# Patient Record
Sex: FEMALE | Race: WHITE | NOT HISPANIC OR LATINO | Employment: OTHER | URBAN - METROPOLITAN AREA
[De-identification: names, ages, dates, MRNs, and addresses within clinical notes are randomized per-mention and may not be internally consistent; named-entity substitution may affect disease eponyms.]

---

## 2017-03-07 ENCOUNTER — GENERIC CONVERSION - ENCOUNTER (OUTPATIENT)
Dept: OTHER | Facility: OTHER | Age: 72
End: 2017-03-07

## 2017-03-27 ENCOUNTER — TRANSCRIBE ORDERS (OUTPATIENT)
Dept: LAB | Facility: CLINIC | Age: 72
End: 2017-03-27

## 2017-03-27 ENCOUNTER — LAB (OUTPATIENT)
Dept: LAB | Facility: CLINIC | Age: 72
End: 2017-03-27
Payer: MEDICARE

## 2017-03-27 DIAGNOSIS — I15.9 SECONDARY HYPERTENSION: ICD-10-CM

## 2017-03-27 DIAGNOSIS — E78.00 PURE HYPERCHOLESTEROLEMIA: ICD-10-CM

## 2017-03-27 DIAGNOSIS — E11.9 TYPE 2 DIABETES MELLITUS WITHOUT COMPLICATIONS (HCC): ICD-10-CM

## 2017-03-27 DIAGNOSIS — E13.9 DIABETES MELLITUS OF OTHER TYPE WITHOUT COMPLICATION: ICD-10-CM

## 2017-03-27 DIAGNOSIS — I10 ESSENTIAL (PRIMARY) HYPERTENSION: ICD-10-CM

## 2017-03-27 DIAGNOSIS — I15.9 SECONDARY HYPERTENSION: Primary | ICD-10-CM

## 2017-03-27 LAB
ALBUMIN SERPL BCP-MCNC: 3.4 G/DL (ref 3.5–5)
ALP SERPL-CCNC: 114 U/L (ref 46–116)
ALT SERPL W P-5'-P-CCNC: 22 U/L (ref 12–78)
ANION GAP SERPL CALCULATED.3IONS-SCNC: 7 MMOL/L (ref 4–13)
AST SERPL W P-5'-P-CCNC: 10 U/L (ref 5–45)
BASOPHILS # BLD AUTO: 0.04 THOUSANDS/ΜL (ref 0–0.1)
BASOPHILS NFR BLD AUTO: 0 % (ref 0–1)
BILIRUB SERPL-MCNC: 0.36 MG/DL (ref 0.2–1)
BUN SERPL-MCNC: 15 MG/DL (ref 5–25)
CALCIUM SERPL-MCNC: 9.1 MG/DL (ref 8.3–10.1)
CHLORIDE SERPL-SCNC: 106 MMOL/L (ref 100–108)
CHOLEST SERPL-MCNC: 246 MG/DL (ref 50–200)
CO2 SERPL-SCNC: 29 MMOL/L (ref 21–32)
CREAT SERPL-MCNC: 0.67 MG/DL (ref 0.6–1.3)
EOSINOPHIL # BLD AUTO: 0.33 THOUSAND/ΜL (ref 0–0.61)
EOSINOPHIL NFR BLD AUTO: 4 % (ref 0–6)
ERYTHROCYTE [DISTWIDTH] IN BLOOD BY AUTOMATED COUNT: 13.4 % (ref 11.6–15.1)
EST. AVERAGE GLUCOSE BLD GHB EST-MCNC: 169 MG/DL
GFR SERPL CREATININE-BSD FRML MDRD: >60 ML/MIN/1.73SQ M
GLUCOSE P FAST SERPL-MCNC: 152 MG/DL (ref 65–99)
HBA1C MFR BLD: 7.5 % (ref 4.2–6.3)
HCT VFR BLD AUTO: 42.2 % (ref 34.8–46.1)
HDLC SERPL-MCNC: 49 MG/DL (ref 40–60)
HGB BLD-MCNC: 13.6 G/DL (ref 11.5–15.4)
LDLC SERPL CALC-MCNC: 153 MG/DL (ref 0–100)
LYMPHOCYTES # BLD AUTO: 1.79 THOUSANDS/ΜL (ref 0.6–4.47)
LYMPHOCYTES NFR BLD AUTO: 20 % (ref 14–44)
MCH RBC QN AUTO: 28.2 PG (ref 26.8–34.3)
MCHC RBC AUTO-ENTMCNC: 32.2 G/DL (ref 31.4–37.4)
MCV RBC AUTO: 87 FL (ref 82–98)
MONOCYTES # BLD AUTO: 0.41 THOUSAND/ΜL (ref 0.17–1.22)
MONOCYTES NFR BLD AUTO: 5 % (ref 4–12)
NEUTROPHILS # BLD AUTO: 6.3 THOUSANDS/ΜL (ref 1.85–7.62)
NEUTS SEG NFR BLD AUTO: 71 % (ref 43–75)
NRBC BLD AUTO-RTO: 0 /100 WBCS
PLATELET # BLD AUTO: 252 THOUSANDS/UL (ref 149–390)
PMV BLD AUTO: 11.3 FL (ref 8.9–12.7)
POTASSIUM SERPL-SCNC: 3.7 MMOL/L (ref 3.5–5.3)
PROT SERPL-MCNC: 7.2 G/DL (ref 6.4–8.2)
RBC # BLD AUTO: 4.83 MILLION/UL (ref 3.81–5.12)
SODIUM SERPL-SCNC: 142 MMOL/L (ref 136–145)
TRIGL SERPL-MCNC: 220 MG/DL
WBC # BLD AUTO: 8.89 THOUSAND/UL (ref 4.31–10.16)

## 2017-03-27 PROCEDURE — 83036 HEMOGLOBIN GLYCOSYLATED A1C: CPT

## 2017-03-27 PROCEDURE — 36415 COLL VENOUS BLD VENIPUNCTURE: CPT

## 2017-03-27 PROCEDURE — 80053 COMPREHEN METABOLIC PANEL: CPT

## 2017-03-27 PROCEDURE — 85025 COMPLETE CBC W/AUTO DIFF WBC: CPT

## 2017-03-27 PROCEDURE — 80061 LIPID PANEL: CPT

## 2017-04-07 ENCOUNTER — ALLSCRIPTS OFFICE VISIT (OUTPATIENT)
Dept: OTHER | Facility: OTHER | Age: 72
End: 2017-04-07

## 2017-05-09 ENCOUNTER — TRANSCRIBE ORDERS (OUTPATIENT)
Dept: LAB | Facility: CLINIC | Age: 72
End: 2017-05-09

## 2017-05-09 ENCOUNTER — APPOINTMENT (OUTPATIENT)
Dept: LAB | Facility: CLINIC | Age: 72
End: 2017-05-09
Payer: MEDICARE

## 2017-05-09 ENCOUNTER — ALLSCRIPTS OFFICE VISIT (OUTPATIENT)
Dept: OTHER | Facility: OTHER | Age: 72
End: 2017-05-09

## 2017-05-09 DIAGNOSIS — E78.00 PURE HYPERCHOLESTEROLEMIA: ICD-10-CM

## 2017-05-09 DIAGNOSIS — I10 ESSENTIAL (PRIMARY) HYPERTENSION: ICD-10-CM

## 2017-05-09 DIAGNOSIS — E11.9 TYPE 2 DIABETES MELLITUS WITHOUT COMPLICATIONS (HCC): ICD-10-CM

## 2017-06-26 ENCOUNTER — APPOINTMENT (OUTPATIENT)
Dept: LAB | Facility: CLINIC | Age: 72
End: 2017-06-26
Payer: MEDICARE

## 2017-06-26 ENCOUNTER — TRANSCRIBE ORDERS (OUTPATIENT)
Dept: LAB | Facility: CLINIC | Age: 72
End: 2017-06-26

## 2017-06-26 DIAGNOSIS — E11.9 DIABETES MELLITUS WITHOUT COMPLICATION (HCC): Primary | ICD-10-CM

## 2017-06-26 DIAGNOSIS — E11.9 DIABETES MELLITUS WITHOUT COMPLICATION (HCC): ICD-10-CM

## 2017-06-26 LAB
ALBUMIN SERPL BCP-MCNC: 3.5 G/DL (ref 3.5–5)
ALP SERPL-CCNC: 100 U/L (ref 46–116)
ALT SERPL W P-5'-P-CCNC: 20 U/L (ref 12–78)
ANION GAP SERPL CALCULATED.3IONS-SCNC: 8 MMOL/L (ref 4–13)
AST SERPL W P-5'-P-CCNC: 14 U/L (ref 5–45)
BILIRUB SERPL-MCNC: 0.4 MG/DL (ref 0.2–1)
BUN SERPL-MCNC: 20 MG/DL (ref 5–25)
CALCIUM SERPL-MCNC: 8.9 MG/DL (ref 8.3–10.1)
CHLORIDE SERPL-SCNC: 110 MMOL/L (ref 100–108)
CHOLEST SERPL-MCNC: 255 MG/DL (ref 50–200)
CO2 SERPL-SCNC: 25 MMOL/L (ref 21–32)
CREAT SERPL-MCNC: 0.67 MG/DL (ref 0.6–1.3)
ERYTHROCYTE [DISTWIDTH] IN BLOOD BY AUTOMATED COUNT: 14.5 % (ref 11.6–15.1)
EST. AVERAGE GLUCOSE BLD GHB EST-MCNC: 151 MG/DL
GFR SERPL CREATININE-BSD FRML MDRD: >60 ML/MIN/1.73SQ M
GLUCOSE P FAST SERPL-MCNC: 144 MG/DL (ref 65–99)
HBA1C MFR BLD: 6.9 % (ref 4.2–6.3)
HCT VFR BLD AUTO: 42.2 % (ref 34.8–46.1)
HDLC SERPL-MCNC: 51 MG/DL (ref 40–60)
HGB BLD-MCNC: 13.5 G/DL (ref 11.5–15.4)
LDLC SERPL CALC-MCNC: 158 MG/DL (ref 0–100)
MCH RBC QN AUTO: 28.7 PG (ref 26.8–34.3)
MCHC RBC AUTO-ENTMCNC: 32 G/DL (ref 31.4–37.4)
MCV RBC AUTO: 90 FL (ref 82–98)
PLATELET # BLD AUTO: 220 THOUSANDS/UL (ref 149–390)
PMV BLD AUTO: 12.5 FL (ref 8.9–12.7)
POTASSIUM SERPL-SCNC: 4.2 MMOL/L (ref 3.5–5.3)
PROT SERPL-MCNC: 6.6 G/DL (ref 6.4–8.2)
RBC # BLD AUTO: 4.7 MILLION/UL (ref 3.81–5.12)
SODIUM SERPL-SCNC: 143 MMOL/L (ref 136–145)
TRIGL SERPL-MCNC: 231 MG/DL
WBC # BLD AUTO: 8.31 THOUSAND/UL (ref 4.31–10.16)

## 2017-06-26 PROCEDURE — 80053 COMPREHEN METABOLIC PANEL: CPT

## 2017-06-26 PROCEDURE — 85027 COMPLETE CBC AUTOMATED: CPT

## 2017-06-26 PROCEDURE — 80061 LIPID PANEL: CPT

## 2017-06-26 PROCEDURE — 36415 COLL VENOUS BLD VENIPUNCTURE: CPT

## 2017-06-26 PROCEDURE — 83036 HEMOGLOBIN GLYCOSYLATED A1C: CPT

## 2017-06-30 ENCOUNTER — ALLSCRIPTS OFFICE VISIT (OUTPATIENT)
Dept: OTHER | Facility: OTHER | Age: 72
End: 2017-06-30

## 2017-06-30 DIAGNOSIS — M79.89 OTHER DISORDERS OF SOFT TISSUE: ICD-10-CM

## 2017-07-11 ENCOUNTER — HOSPITAL ENCOUNTER (OUTPATIENT)
Dept: ULTRASOUND IMAGING | Facility: HOSPITAL | Age: 72
Discharge: HOME | End: 2017-07-11
Attending: INTERNAL MEDICINE
Payer: MEDICARE

## 2017-07-11 DIAGNOSIS — M79.89 OTHER DISORDERS OF SOFT TISSUE: ICD-10-CM

## 2017-07-11 PROCEDURE — 76882 US LMTD JT/FCL EVL NVASC XTR: CPT

## 2017-08-16 ENCOUNTER — TRANSCRIBE ORDERS (OUTPATIENT)
Dept: ADMINISTRATIVE | Facility: HOSPITAL | Age: 72
End: 2017-08-16

## 2017-08-16 DIAGNOSIS — E04.1 THYROID NODULE: ICD-10-CM

## 2017-08-16 DIAGNOSIS — E05.90 HYPERTHYROIDISM: Primary | ICD-10-CM

## 2017-08-18 ENCOUNTER — HOSPITAL ENCOUNTER (OUTPATIENT)
Dept: ULTRASOUND IMAGING | Facility: HOSPITAL | Age: 72
Discharge: HOME | End: 2017-08-18
Payer: MEDICARE

## 2017-08-18 DIAGNOSIS — E05.90 HYPERTHYROIDISM: ICD-10-CM

## 2017-08-18 DIAGNOSIS — E04.1 THYROID NODULE: ICD-10-CM

## 2017-08-18 PROCEDURE — 76536 US EXAM OF HEAD AND NECK: CPT

## 2017-09-27 ENCOUNTER — HOSPITAL ENCOUNTER (INPATIENT)
Facility: HOSPITAL | Age: 72
LOS: 2 days | Discharge: HOME | DRG: 281 | End: 2017-09-29
Attending: EMERGENCY MEDICINE | Admitting: FAMILY MEDICINE
Payer: MEDICARE

## 2017-09-27 DIAGNOSIS — I21.4 NSTEMI (NON-ST ELEVATED MYOCARDIAL INFARCTION) (HCC): ICD-10-CM

## 2017-09-27 DIAGNOSIS — R07.9 CHEST PAIN: Primary | ICD-10-CM

## 2017-09-27 DIAGNOSIS — R73.9 HYPERGLYCEMIA: ICD-10-CM

## 2017-09-27 DIAGNOSIS — I47.1 SVT (SUPRAVENTRICULAR TACHYCARDIA) (HCC): ICD-10-CM

## 2017-09-27 PROBLEM — R00.0 TACHYCARDIA: Status: ACTIVE | Noted: 2017-09-27

## 2017-09-27 PROBLEM — I10 HYPERTENSION: Status: ACTIVE | Noted: 2017-09-27

## 2017-09-27 PROBLEM — E11.9 DIABETES MELLITUS (HCC): Status: ACTIVE | Noted: 2017-09-27

## 2017-09-27 LAB
ALBUMIN SERPL BCP-MCNC: 3.5 G/DL (ref 3.5–5)
ALP SERPL-CCNC: 97 U/L (ref 46–116)
ALT SERPL W P-5'-P-CCNC: 23 U/L (ref 12–78)
ANION GAP SERPL CALCULATED.3IONS-SCNC: 13 MMOL/L (ref 4–13)
APTT PPP: 26 SECONDS (ref 23–35)
AST SERPL W P-5'-P-CCNC: 13 U/L (ref 5–45)
BACTERIA UR QL AUTO: ABNORMAL /HPF
BASOPHILS # BLD AUTO: 0.05 THOUSANDS/ΜL (ref 0–0.1)
BASOPHILS NFR BLD AUTO: 0 % (ref 0–1)
BILIRUB SERPL-MCNC: 0.2 MG/DL (ref 0.2–1)
BILIRUB UR QL STRIP: NEGATIVE
BUN SERPL-MCNC: 25 MG/DL (ref 5–25)
CALCIUM SERPL-MCNC: 9.1 MG/DL (ref 8.3–10.1)
CHLORIDE SERPL-SCNC: 104 MMOL/L (ref 100–108)
CLARITY UR: CLEAR
CO2 SERPL-SCNC: 24 MMOL/L (ref 21–32)
COLOR UR: YELLOW
CREAT SERPL-MCNC: 0.92 MG/DL (ref 0.6–1.3)
DEPRECATED D DIMER PPP: 307 NG/ML (FEU) (ref 0–424)
EOSINOPHIL # BLD AUTO: 0.22 THOUSAND/ΜL (ref 0–0.61)
EOSINOPHIL NFR BLD AUTO: 2 % (ref 0–6)
ERYTHROCYTE [DISTWIDTH] IN BLOOD BY AUTOMATED COUNT: 13.8 % (ref 11.6–15.1)
GFR SERPL CREATININE-BSD FRML MDRD: 62 ML/MIN/1.73SQ M
GLUCOSE SERPL-MCNC: 265 MG/DL (ref 65–140)
GLUCOSE UR STRIP-MCNC: ABNORMAL MG/DL
HCT VFR BLD AUTO: 44.7 % (ref 34.8–46.1)
HGB BLD-MCNC: 14.6 G/DL (ref 11.5–15.4)
HGB UR QL STRIP.AUTO: ABNORMAL
INR PPP: 0.96 (ref 0.86–1.16)
KETONES UR STRIP-MCNC: NEGATIVE MG/DL
LEUKOCYTE ESTERASE UR QL STRIP: NEGATIVE
LYMPHOCYTES # BLD AUTO: 1.75 THOUSANDS/ΜL (ref 0.6–4.47)
LYMPHOCYTES NFR BLD AUTO: 16 % (ref 14–44)
MAGNESIUM SERPL-MCNC: 1.9 MG/DL (ref 1.6–2.6)
MCH RBC QN AUTO: 30 PG (ref 26.8–34.3)
MCHC RBC AUTO-ENTMCNC: 32.7 G/DL (ref 31.4–37.4)
MCV RBC AUTO: 92 FL (ref 82–98)
MONOCYTES # BLD AUTO: 0.51 THOUSAND/ΜL (ref 0.17–1.22)
MONOCYTES NFR BLD AUTO: 5 % (ref 4–12)
NEUTROPHILS # BLD AUTO: 8.72 THOUSANDS/ΜL (ref 1.85–7.62)
NEUTS SEG NFR BLD AUTO: 77 % (ref 43–75)
NITRITE UR QL STRIP: NEGATIVE
NON-SQ EPI CELLS URNS QL MICRO: ABNORMAL /HPF
PH UR STRIP.AUTO: 6 [PH] (ref 4.5–8)
PLATELET # BLD AUTO: 198 THOUSANDS/UL (ref 149–390)
PMV BLD AUTO: 11 FL (ref 8.9–12.7)
POTASSIUM SERPL-SCNC: 3.6 MMOL/L (ref 3.5–5.3)
PROT SERPL-MCNC: 7.1 G/DL (ref 6.4–8.2)
PROT UR STRIP-MCNC: NEGATIVE MG/DL
PROTHROMBIN TIME: 12.7 SECONDS (ref 12.1–14.4)
RBC # BLD AUTO: 4.86 MILLION/UL (ref 3.81–5.12)
RBC #/AREA URNS AUTO: ABNORMAL /HPF
SODIUM SERPL-SCNC: 141 MMOL/L (ref 136–145)
SP GR UR STRIP.AUTO: 1.02 (ref 1–1.03)
TROPONIN I SERPL-MCNC: 0.13 NG/ML
TSH SERPL DL<=0.05 MIU/L-ACNC: 2.67 UIU/ML (ref 0.36–3.74)
UROBILINOGEN UR QL STRIP.AUTO: 0.2 E.U./DL
WBC # BLD AUTO: 11.25 THOUSAND/UL (ref 4.31–10.16)
WBC #/AREA URNS AUTO: ABNORMAL /HPF

## 2017-09-27 PROCEDURE — 85025 COMPLETE CBC W/AUTO DIFF WBC: CPT | Performed by: EMERGENCY MEDICINE

## 2017-09-27 PROCEDURE — 84100 ASSAY OF PHOSPHORUS: CPT | Performed by: FAMILY MEDICINE

## 2017-09-27 PROCEDURE — 84484 ASSAY OF TROPONIN QUANT: CPT | Performed by: EMERGENCY MEDICINE

## 2017-09-27 PROCEDURE — 85379 FIBRIN DEGRADATION QUANT: CPT | Performed by: EMERGENCY MEDICINE

## 2017-09-27 PROCEDURE — 80053 COMPREHEN METABOLIC PANEL: CPT | Performed by: EMERGENCY MEDICINE

## 2017-09-27 PROCEDURE — 93005 ELECTROCARDIOGRAM TRACING: CPT | Performed by: EMERGENCY MEDICINE

## 2017-09-27 PROCEDURE — 96374 THER/PROPH/DIAG INJ IV PUSH: CPT

## 2017-09-27 PROCEDURE — 36415 COLL VENOUS BLD VENIPUNCTURE: CPT | Performed by: EMERGENCY MEDICINE

## 2017-09-27 PROCEDURE — 85730 THROMBOPLASTIN TIME PARTIAL: CPT | Performed by: EMERGENCY MEDICINE

## 2017-09-27 PROCEDURE — 84443 ASSAY THYROID STIM HORMONE: CPT | Performed by: EMERGENCY MEDICINE

## 2017-09-27 PROCEDURE — 83735 ASSAY OF MAGNESIUM: CPT | Performed by: EMERGENCY MEDICINE

## 2017-09-27 PROCEDURE — 85610 PROTHROMBIN TIME: CPT | Performed by: EMERGENCY MEDICINE

## 2017-09-27 PROCEDURE — 81001 URINALYSIS AUTO W/SCOPE: CPT | Performed by: EMERGENCY MEDICINE

## 2017-09-27 RX ORDER — DILTIAZEM HYDROCHLORIDE 5 MG/ML
20 INJECTION INTRAVENOUS ONCE
Status: COMPLETED | OUTPATIENT
Start: 2017-09-27 | End: 2017-09-27

## 2017-09-27 RX ADMIN — DILTIAZEM HYDROCHLORIDE 20 MG: 5 INJECTION INTRAVENOUS at 20:21

## 2017-09-28 ENCOUNTER — APPOINTMENT (INPATIENT)
Dept: NON INVASIVE DIAGNOSTICS | Facility: HOSPITAL | Age: 72
DRG: 281 | End: 2017-09-28
Payer: MEDICARE

## 2017-09-28 LAB
ANION GAP SERPL CALCULATED.3IONS-SCNC: 9 MMOL/L (ref 4–13)
BUN SERPL-MCNC: 22 MG/DL (ref 5–25)
CALCIUM SERPL-MCNC: 9 MG/DL (ref 8.3–10.1)
CHLORIDE SERPL-SCNC: 107 MMOL/L (ref 100–108)
CO2 SERPL-SCNC: 26 MMOL/L (ref 21–32)
CREAT SERPL-MCNC: 0.61 MG/DL (ref 0.6–1.3)
ERYTHROCYTE [DISTWIDTH] IN BLOOD BY AUTOMATED COUNT: 13.7 % (ref 11.6–15.1)
GFR SERPL CREATININE-BSD FRML MDRD: 90 ML/MIN/1.73SQ M
GLUCOSE SERPL-MCNC: 150 MG/DL (ref 65–140)
GLUCOSE SERPL-MCNC: 154 MG/DL (ref 65–140)
GLUCOSE SERPL-MCNC: 165 MG/DL (ref 65–140)
GLUCOSE SERPL-MCNC: 165 MG/DL (ref 65–140)
GLUCOSE SERPL-MCNC: 166 MG/DL (ref 65–140)
GLUCOSE SERPL-MCNC: 175 MG/DL (ref 65–140)
HCT VFR BLD AUTO: 42 % (ref 34.8–46.1)
HGB BLD-MCNC: 13.6 G/DL (ref 11.5–15.4)
MAGNESIUM SERPL-MCNC: 2.1 MG/DL (ref 1.6–2.6)
MCH RBC QN AUTO: 29.7 PG (ref 26.8–34.3)
MCHC RBC AUTO-ENTMCNC: 32.4 G/DL (ref 31.4–37.4)
MCV RBC AUTO: 92 FL (ref 82–98)
PHOSPHATE SERPL-MCNC: 3.8 MG/DL (ref 2.3–4.1)
PLATELET # BLD AUTO: 207 THOUSANDS/UL (ref 149–390)
PLATELET # BLD AUTO: 215 THOUSANDS/UL (ref 149–390)
PMV BLD AUTO: 10.9 FL (ref 8.9–12.7)
PMV BLD AUTO: 11.6 FL (ref 8.9–12.7)
POTASSIUM SERPL-SCNC: 3.7 MMOL/L (ref 3.5–5.3)
RBC # BLD AUTO: 4.58 MILLION/UL (ref 3.81–5.12)
SODIUM SERPL-SCNC: 142 MMOL/L (ref 136–145)
TROPONIN I SERPL-MCNC: 1.34 NG/ML
TROPONIN I SERPL-MCNC: 1.43 NG/ML
TROPONIN I SERPL-MCNC: 1.64 NG/ML
WBC # BLD AUTO: 9.51 THOUSAND/UL (ref 4.31–10.16)

## 2017-09-28 PROCEDURE — 93005 ELECTROCARDIOGRAM TRACING: CPT | Performed by: FAMILY MEDICINE

## 2017-09-28 PROCEDURE — 83735 ASSAY OF MAGNESIUM: CPT | Performed by: FAMILY MEDICINE

## 2017-09-28 PROCEDURE — 84484 ASSAY OF TROPONIN QUANT: CPT | Performed by: FAMILY MEDICINE

## 2017-09-28 PROCEDURE — 82948 REAGENT STRIP/BLOOD GLUCOSE: CPT

## 2017-09-28 PROCEDURE — 85049 AUTOMATED PLATELET COUNT: CPT | Performed by: FAMILY MEDICINE

## 2017-09-28 PROCEDURE — 99285 EMERGENCY DEPT VISIT HI MDM: CPT

## 2017-09-28 PROCEDURE — 85027 COMPLETE CBC AUTOMATED: CPT | Performed by: FAMILY MEDICINE

## 2017-09-28 PROCEDURE — 93306 TTE W/DOPPLER COMPLETE: CPT

## 2017-09-28 PROCEDURE — 80048 BASIC METABOLIC PNL TOTAL CA: CPT | Performed by: FAMILY MEDICINE

## 2017-09-28 RX ORDER — ASPIRIN 81 MG/1
81 TABLET, CHEWABLE ORAL DAILY
Status: DISCONTINUED | OUTPATIENT
Start: 2017-09-28 | End: 2017-09-29 | Stop reason: HOSPADM

## 2017-09-28 RX ORDER — ATORVASTATIN CALCIUM 40 MG/1
40 TABLET, FILM COATED ORAL
Status: DISCONTINUED | OUTPATIENT
Start: 2017-09-28 | End: 2017-09-29 | Stop reason: HOSPADM

## 2017-09-28 RX ORDER — AMLODIPINE BESYLATE 5 MG/1
5 TABLET ORAL DAILY
Status: DISCONTINUED | OUTPATIENT
Start: 2017-09-28 | End: 2017-09-29 | Stop reason: HOSPADM

## 2017-09-28 RX ORDER — NITROGLYCERIN 0.4 MG/1
0.4 TABLET SUBLINGUAL
Status: DISCONTINUED | OUTPATIENT
Start: 2017-09-28 | End: 2017-09-29 | Stop reason: HOSPADM

## 2017-09-28 RX ADMIN — AMLODIPINE BESYLATE 5 MG: 5 TABLET ORAL at 11:22

## 2017-09-28 RX ADMIN — INSULIN LISPRO 1 UNITS: 100 INJECTION, SOLUTION INTRAVENOUS; SUBCUTANEOUS at 01:17

## 2017-09-28 RX ADMIN — ATORVASTATIN CALCIUM 40 MG: 40 TABLET, FILM COATED ORAL at 16:03

## 2017-09-28 RX ADMIN — INSULIN LISPRO 1 UNITS: 100 INJECTION, SOLUTION INTRAVENOUS; SUBCUTANEOUS at 07:50

## 2017-09-28 RX ADMIN — METOPROLOL TARTRATE 25 MG: 25 TABLET ORAL at 01:17

## 2017-09-28 RX ADMIN — METOPROLOL TARTRATE 12.5 MG: 25 TABLET ORAL at 21:28

## 2017-09-28 RX ADMIN — INSULIN LISPRO 1 UNITS: 100 INJECTION, SOLUTION INTRAVENOUS; SUBCUTANEOUS at 11:45

## 2017-09-28 RX ADMIN — METOPROLOL TARTRATE 12.5 MG: 25 TABLET ORAL at 10:08

## 2017-09-28 RX ADMIN — ENOXAPARIN SODIUM 80 MG: 80 INJECTION SUBCUTANEOUS at 02:18

## 2017-09-28 RX ADMIN — ASPIRIN 81 MG 81 MG: 81 TABLET ORAL at 10:08

## 2017-09-28 RX ADMIN — INSULIN LISPRO 1 UNITS: 100 INJECTION, SOLUTION INTRAVENOUS; SUBCUTANEOUS at 21:31

## 2017-09-28 RX ADMIN — INSULIN LISPRO 1 UNITS: 100 INJECTION, SOLUTION INTRAVENOUS; SUBCUTANEOUS at 16:04

## 2017-09-28 NOTE — SOCIAL WORK
Cm met with the patient to evaluate the patients prior function and living situation and any barriers to d/c and form a safe d/c plan  Cm also evaluated the patient for any services in the home or needs for services  Pt resides at home alone in a ranch house  Has 2 AMOL then all on one floor  Pt is independent with her adls and ambulation  No services or DME  PCP is Pennie Mullins and pharmacy is [facility]  Pt drives  Plans are home on dc with no needs anticipated  CM will continue to follow and assist in dc planning

## 2017-09-28 NOTE — PLAN OF CARE
DISCHARGE PLANNING     Discharge to home or other facility with appropriate resources Progressing        Knowledge Deficit     Patient/family/caregiver demonstrates understanding of disease process, treatment plan, medications, and discharge instructions Progressing        PAIN - ADULT     Verbalizes/displays adequate comfort level or baseline comfort level Progressing        SAFETY ADULT     Patient will remain free of falls Progressing     Maintain or return to baseline ADL function Progressing     Maintain or return mobility status to optimal level Progressing

## 2017-09-28 NOTE — PLAN OF CARE
Problem: DISCHARGE PLANNING - CARE MANAGEMENT  Goal: Discharge to post-acute care or home with appropriate resources  INTERVENTIONS:  - Conduct assessment to determine patient/family and health care team treatment goals, and need for post-acute services based on payer coverage, community resources, and patient preferences, and barriers to discharge  - Address psychosocial, clinical, and financial barriers to discharge as identified in assessment in conjunction with the patient/family and health care team  - Arrange appropriate level of post-acute services according to patient's   needs and preference and payer coverage in collaboration with the physician and health care team  - Communicate with and update the patient/family, physician, and health care team regarding progress on the discharge plan  - Arrange appropriate transportation to post-acute venues  Outcome: Progressing  -home on dc with no anticipated CM needs, Will follow and assist in dc planning

## 2017-09-28 NOTE — H&P
History and Physical - [facility] Internal Medicine    Patient Information: Fady Toure 67 y o  female MRN: [id number]  Unit/Bed#: OP63 Encounter: [id number]  Admitting Physician: Erin Medina MD  PCP: Man Pablo DO  Date of Admission:  09/27/17    Assessment/Plan:    Hospital Problem List:     Principal Problem:    Chest pain  Supraventricular tachycardia  Active Problems:    Hypertension    Diabetes mellitus    Thyroid condition (unknown)  Plan for the Primary Problem(s):  1  Telemetry  2  Aspirin 81 mg PO daily  3  Sublingual NTG as needed for chest pain  4  Serial troponin  5  SVT converted to normal sinus rhythm, will monitor  Plan for Additional Problems:   1  HTN: she stated tthat her blood pressure usually runs above normal, she does not take medication for this condition (Although recommended by her PCP)  Will give one dose of metoprolol, monitor blood pressure  She will need to be on an antihypertensive medication  2  Diabetes type 2: hold metformin, start ISS  3  Thyroid condition, she stated that she takes thyroid medication, she can not recall the name and can not tell if hypo or hyperthyroidism  No information available in her available  records ( reviewed)  will clarify with her PCP's office  VTE Prophylaxis: Enoxaparin (Lovenox)  / sequential compression device   Code Status: full code  Anticipated Length of Stay:  Patient will be admitted on an Inpatient basis with an anticipated length of stay of  2 midnights  Justification for Hospital Stay: chest pain, arhythmia  Total Time for Visit, including Counseling / Coordination of Care: 30 minutes  Greater than 50% of this total time spent on direct patient counseling and coordination of care  Chief Complaint:   Chest pain and palpitations      History of Present Illness:    Fady Toure is a 67 y o  female who presents with chest pain and palpitations occurred at her home tonight after stressful conversation with her daughter  The chest pain is midsternal, sharp, 7/10 on pain scale, not radiated to shoulder or back, no jaw pain  No diaphoresis, EMS was called , her heart rate was found to be elevated at 150  Upon arrival here the monitor showed supraventricular tachycardia, she received Cardizem 20 mg IV in the emergency room  She then converted to normal sinus rhythm with current heart rate around 75  Her chest pain resolved after medication given in ambulance ( ? Aspirin)   Review of Systems:    Review of Systems   Constitutional: Negative for chills, diaphoresis, fatigue and fever  HENT: Negative for congestion, facial swelling, sinus pain, sinus pressure and tinnitus  Eyes: Negative for photophobia, discharge, itching and visual disturbance  Respiratory: Negative for apnea, cough, choking, chest tightness, shortness of breath, wheezing and stridor  Cardiovascular: Positive for chest pain and palpitations  Negative for leg swelling  Gastrointestinal: Negative for abdominal distention, abdominal pain, blood in stool, constipation, diarrhea, nausea, rectal pain and vomiting  Genitourinary: Negative for difficulty urinating, dysuria, flank pain, hematuria and pelvic pain  Musculoskeletal: Negative for arthralgias, back pain, joint swelling, myalgias, neck pain and neck stiffness  Right leg muscle atrophy (history of polio)   Skin:        Facial redness ( history of rosacea)   Neurological: Negative for dizziness, tremors, syncope, facial asymmetry, weakness, light-headedness, numbness and headaches  Psychiatric/Behavioral: Negative for agitation, behavioral problems, confusion, hallucinations, self-injury and suicidal ideas  The patient is nervous/anxious  Past Medical and Surgical History:     History reviewed  No pertinent past medical history      Past Surgical History:   Procedure Laterality Date    HYSTERECTOMY      ROTATOR CUFF REPAIR         Meds/Allergies:    Prior to Admission medications    Medication Sig Start Date End Date Taking? Authorizing Provider   metFORMIN (GLUCOPHAGE) 500 mg tablet Take 500 mg by mouth daily with breakfast   Yes Historical Provider, MD     she cannot recall the name of her thyroid medication  Allergies: Allergies   Allergen Reactions    Lisinopril        Social History:     Marital Status: /Civil Union     History   Alcohol Use No     History   Smoking Status    Never Smoker   Smokeless Tobacco    Never Used     History   Drug Use No       Family History:    non-contributory    Physical Exam:     Vitals:   Blood Pressure: 161/65 (09/27/17 2203)  Pulse: 68 (09/27/17 2203)  Temperature: 98 3 °F (36 8 °C) (09/27/17 2008)  Temp Source: Temporal (09/27/17 2008)  Respirations: 18 (09/27/17 2203)  SpO2: 96 % (09/27/17 2203)    Physical Exam   Constitutional: She is oriented to person, place, and time  She appears well-developed and well-nourished  No distress  HENT:   Head: Normocephalic and atraumatic  Mouth/Throat: Oropharynx is clear and moist  No oropharyngeal exudate  Eyes: Conjunctivae and EOM are normal  Pupils are equal, round, and reactive to light  Right eye exhibits no discharge  No scleral icterus  Neck: Normal range of motion  Neck supple  No JVD present  No tracheal deviation present  Cardiovascular: Intact distal pulses  Exam reveals no gallop and no friction rub  No murmur heard  Initial tachycardia, improved to regular sinus rhythm after the Cardizem  Pulmonary/Chest: Effort normal  No stridor  No respiratory distress  She has no wheezes  She has no rales  She exhibits no tenderness  Abdominal: Soft  Bowel sounds are normal  She exhibits no distension  There is no tenderness  There is no rebound and no guarding  Musculoskeletal: She exhibits no edema or tenderness  Right leg muscle atrophy, chronic, (history of polio)  Lymphadenopathy:     She has no cervical adenopathy     Neurological: She is alert and oriented to person, place, and time  No cranial nerve deficit  Coordination normal    Skin: Skin is warm  No rash noted  She is not diaphoretic  There is erythema (facial flushing ( history of rosecea) )  No pallor  Psychiatric: Her behavior is normal    Appears anxious          Additional Data:     Lab Results: I have personally reviewed pertinent reports  Results from last 7 days  Lab Units 09/27/17 2101   WBC Thousand/uL 11 25*   HEMOGLOBIN g/dL 14 6   HEMATOCRIT % 44 7   PLATELETS Thousands/uL 198   NEUTROS PCT % 77*   LYMPHS PCT % 16   MONOS PCT % 5   EOS PCT % 2       Results from last 7 days  Lab Units 09/27/17 2101   SODIUM mmol/L 141   POTASSIUM mmol/L 3 6   CHLORIDE mmol/L 104   CO2 mmol/L 24   BUN mg/dL 25   CREATININE mg/dL 0 92   CALCIUM mg/dL 9 1   TOTAL PROTEIN g/dL 7 1   BILIRUBIN TOTAL mg/dL 0 20   ALK PHOS U/L 97   ALT U/L 23   AST U/L 13   GLUCOSE RANDOM mg/dL 265*       Results from last 7 days  Lab Units 09/27/17  2101   INR  0 96           EKG, Pathology, and Other Studies Reviewed on Admission:   · EKG: initial SVT  Improved after the Cardizem given in the emergency room to NSR  Non specific ST-T wave changes  Allscripts / Epic Records Reviewed:  Yes

## 2017-09-28 NOTE — ED PROVIDER NOTES
History  Chief Complaint   Patient presents with    Chest Pain     CP developed this evening after argument with daughter  Pt and EMS give hx -pt had had argument with daughter and developed mid chest pain and palpitations  EMS found pt in HR 150s  Pt denies radiation of pain  No diaphoresis  No N/V/D  No urinary sx  Pt presents  AND bp 190 / 152  Pt does not see PCP regularly  Saw new PCP about 2 months ago and gave c/o palpitations -states he did an EKG , but unsure results  Pt states was not referred to cardiology or but in meds  Was sent for OP US of thyroid-does not know results  PMH  HTN "years ago"; Polio; History provided by:  Patient  Chest Pain   Pain location:  Substernal area  Pain quality: pressure and tightness    Pain quality: not radiating and not stabbing    Pain radiates to:  Does not radiate  Pain radiates to the back: no    Pain severity:  Mild  Onset quality:  Sudden  Progression:  Partially resolved  Chronicity:  New  Context: not breathing, not lifting, no movement, not raising an arm and no trauma    Relieved by:  Rest  Associated symptoms: palpitations and shortness of breath    Associated symptoms: no abdominal pain, no altered mental status, no anorexia, no back pain, no claudication, no cough, no diaphoresis, no dizziness, no fatigue, no fever, no headache, no lower extremity edema, no nausea, no near-syncope, no numbness, no orthopnea, no syncope and not vomiting    Risk factors: hypertension    Risk factors: no coronary artery disease, no diabetes mellitus, no immobilization, no prior DVT/PE, no smoking and no surgery        Prior to Admission Medications   Prescriptions Last Dose Informant Patient Reported? Taking?   metFORMIN (GLUCOPHAGE) 500 mg tablet   Yes Yes   Sig: Take 500 mg by mouth daily with breakfast      Facility-Administered Medications: None       History reviewed  No pertinent past medical history      Past Surgical History:   Procedure Laterality Date    HYSTERECTOMY      ROTATOR CUFF REPAIR         History reviewed  No pertinent family history  I have reviewed and agree with the history as documented  Social History   Substance Use Topics    Smoking status: Never Smoker    Smokeless tobacco: Never Used    Alcohol use No        Review of Systems   Constitutional: Negative for diaphoresis, fatigue and fever  Respiratory: Positive for shortness of breath  Negative for cough  Cardiovascular: Positive for chest pain and palpitations  Negative for orthopnea, claudication, syncope and near-syncope  Gastrointestinal: Negative for abdominal pain, anorexia, nausea and vomiting  Musculoskeletal: Negative for back pain  Neurological: Negative for dizziness, numbness and headaches  Physical Exam  ED Triage Vitals   Temperature Pulse Respirations Blood Pressure SpO2   09/27/17 2008 09/27/17 2008 09/27/17 2008 09/27/17 2008 09/27/17 2008   98 3 °F (36 8 °C) (!) 150 20 (!) 190/105 94 %      Temp Source Heart Rate Source Patient Position - Orthostatic VS BP Location FiO2 (%)   09/27/17 2008 09/27/17 2008 09/27/17 2008 09/27/17 2008 --   Temporal Monitor Sitting Left arm       Pain Score       09/27/17 2026       No Pain           Physical Exam   Constitutional: She is oriented to person, place, and time  She appears well-developed and well-nourished  She is cooperative  Non-toxic appearance  She does not have a sickly appearance  She appears distressed  HENT:   Head: Normocephalic and atraumatic  Mouth/Throat: Uvula is midline, oropharynx is clear and moist and mucous membranes are normal    Eyes: Conjunctivae and EOM are normal  Pupils are equal, round, and reactive to light  Neck: Trachea normal, normal range of motion and phonation normal  Neck supple  No JVD present  No thyroid mass present  Cardiovascular: Intact distal pulses and normal pulses  An irregularly irregular rhythm present  Tachycardia present      No prf edema or calf tenderness   Pulmonary/Chest: Effort normal  No accessory muscle usage or stridor  No respiratory distress  She has decreased breath sounds  She has no wheezes  She has no rhonchi  She has no rales  Abdominal: Soft  Bowel sounds are normal  There is no tenderness  There is no rigidity, no guarding and no CVA tenderness  Neurological: She is alert and oriented to person, place, and time  She has normal strength and normal reflexes  No cranial nerve deficit  Skin: Skin is warm and dry  No petechiae and no rash noted  She is not diaphoretic  No cyanosis  Psychiatric: She has a normal mood and affect  Her speech is normal and behavior is normal  Cognition and memory are normal    Vitals reviewed        ED Medications  Medications   diltiazem (CARDIZEM) injection 20 mg (20 mg Intravenous Given 9/27/17 2021)   metoprolol tartrate (LOPRESSOR) tablet 25 mg (25 mg Oral Given 9/28/17 0117)   enoxaparin (LOVENOX) subcutaneous injection 80 mg (80 mg Subcutaneous Given 9/28/17 0218)   regadenoson (LEXISCAN) injection 0 4 mg (0 4 mg Intravenous Given 9/29/17 1051)       Diagnostic Studies  Labs Reviewed   CBC AND DIFFERENTIAL - Abnormal        Result Value Ref Range Status    WBC 11 25 (*) 4 31 - 10 16 Thousand/uL Final    Neutrophils Relative 77 (*) 43 - 75 % Final    Neutrophils Absolute 8 72 (*) 1 85 - 7 62 Thousands/µL Final    RBC 4 86  3 81 - 5 12 Million/uL Final    Hemoglobin 14 6  11 5 - 15 4 g/dL Final    Hematocrit 44 7  34 8 - 46 1 % Final    MCV 92  82 - 98 fL Final    MCH 30 0  26 8 - 34 3 pg Final    MCHC 32 7  31 4 - 37 4 g/dL Final    RDW 13 8  11 6 - 15 1 % Final    MPV 11 0  8 9 - 12 7 fL Final    Platelets 534  144 - 390 Thousands/uL Final    Lymphocytes Relative 16  14 - 44 % Final    Monocytes Relative 5  4 - 12 % Final    Eosinophils Relative 2  0 - 6 % Final    Basophils Relative 0  0 - 1 % Final    Lymphocytes Absolute 1 75  0 60 - 4 47 Thousands/µL Final    Monocytes Absolute 0 51  0 17 - 1 22 Thousand/µL Final    Eosinophils Absolute 0 22  0 00 - 0 61 Thousand/µL Final    Basophils Absolute 0 05  0 00 - 0 10 Thousands/µL Final   COMPREHENSIVE METABOLIC PANEL - Abnormal     Glucose 265 (*) 65 - 140 mg/dL Final    Comment:   If the patient is fasting, the ADA then defines impaired fasting glucose as > 100 mg/dL and diabetes as > or equal to 123 mg/dL  Specimen collection should occur prior to Sulfasalazine administration due to the potential for falsely depressed results  Specimen collection should occur prior to Sulfapyridine administration due to the potential for falsely elevated results  Sodium 141  136 - 145 mmol/L Final    Potassium 3 6  3 5 - 5 3 mmol/L Final    Chloride 104  100 - 108 mmol/L Final    CO2 24  21 - 32 mmol/L Final    Anion Gap 13  4 - 13 mmol/L Final    BUN 25  5 - 25 mg/dL Final    Creatinine 0 92  0 60 - 1 30 mg/dL Final    Comment: Standardized to IDMS reference method    Calcium 9 1  8 3 - 10 1 mg/dL Final    AST 13  5 - 45 U/L Final    Comment:   Specimen collection should occur prior to Sulfasalazine administration due to the potential for falsely depressed results  ALT 23  12 - 78 U/L Final    Comment:   Specimen collection should occur prior to Sulfasalazine administration due to the potential for falsely depressed results  Alkaline Phosphatase 97  46 - 116 U/L Final    Total Protein 7 1  6 4 - 8 2 g/dL Final    Albumin 3 5  3 5 - 5 0 g/dL Final    Total Bilirubin 0 20  0 20 - 1 00 mg/dL Final    eGFR 62  ml/min/1 73sq m Final    Narrative:     National Kidney Disease Education Program recommendations are as follows:  GFR calculation is accurate only with a steady state creatinine  Chronic Kidney disease less than 60 ml/min/1 73 sq  meters  Kidney failure less than 15 ml/min/1 73 sq  meters     TROPONIN I - Abnormal     Troponin I 0 13 (*) <=0 04 ng/mL Final    Comment:  Results indicate test should be repeated on new specimen collected within 4-6 hours of the original     Narrative:     [employer] Chemistry analyzer 99% cutoff is > 0 04 ng/mL in network labs    o cTnI 99% cutoff is useful only when applied to patients in the clinical setting of myocardial ischemia  o cTnI 99% cutoff should be interpreted in the context of clinical history, ECG findings and possibly cardiac imaging to establish correct diagnosis  o cTnI 99% cutoff may be suggestive but clearly not indicative of a coronary event without the clinical setting of myocardial ischemia  UA W REFLEX TO MICROSCOPIC WITH REFLEX TO CULTURE - Abnormal     Glucose,  (1/2%) (*) Negative mg/dl Final    Color, UA Yellow   Final    Clarity, UA Clear   Final    Specific Gravity, UA 1 020  1 003 - 1 030 Final    pH, UA 6 0  4 5 - 8 0 Final    Leukocytes, UA Negative  Negative Final    Nitrite, UA Negative  Negative Final    Protein, UA Negative  Negative mg/dl Final    Ketones, UA Negative  Negative mg/dl Final    Urobilinogen, UA 0 2  0 2, 1 0 E U /dl E U /dl Final    Bilirubin, UA Negative  Negative Final    Blood, UA Trace-Intact  Negative, Trace-Intact Final   URINE MICROSCOPIC - Abnormal     RBC, UA 1-2 (*) None Seen, 0-5 /hpf Final    WBC, UA 1-2 (*) None Seen, 0-5, 5-55, 5-65 /hpf Final    Epithelial Cells Occasional  None Seen, Occasional /hpf Final    Bacteria, UA Occasional  None Seen, Occasional /hpf Final   PROTIME-INR - Normal    Protime 12 7  12 1 - 14 4 seconds Final    INR 0 96  0 86 - 1 16 Final   APTT - Normal    PTT 26  23 - 35 seconds Final    Narrative: Therapeutic Heparin Range = 60-90 seconds   TSH, 3RD GENERATION - Normal    TSH 3RD GENERATON 2 669  0 358 - 3 740 uIU/mL Final    Narrative:     Patients undergoing fluorescein dye angiography may retain small amounts of fluorescein in the body for 48-72 hours post procedure  Samples containing fluorescein can produce falsely depressed TSH values   If the patient had this procedure,a specimen should be resubmitted post fluorescein clearance  The recommended reference ranges for TSH during pregnancy are as follows:  First trimester 0 1 to 2 5 uIU/mL  Second trimester  0 2 to 3 0 uIU/mL  Third trimester 0 3 to 3 0 uIU/m     MAGNESIUM - Normal    Magnesium 1 9  1 6 - 2 6 mg/dL Final   D-DIMER, QUANTITATIVE - Normal    D-Dimer, Quant 307  0 - 424 ng/ml (FEU) Final    Comment:   Reference and upper limits to exclude DVT and PE are the same  Do not use to exclude if clinical symptoms are present  Pregnant women:  1st trimester:  <220 - 1060 ng/ml (FEU)  2nd trimester:  <220 - 1880 ng/ml (FEU)  3rd trimester:   238 - 3280 ng/ml (FEU)         GOLD TOP ON HOLD       No orders to display       Procedures  ECG 12 Lead Documentation  Date/Time: 9/27/2017 8:05 PM  Performed by: Erma Wade  Authorized by: Erma Wade     ECG reviewed by me, the ED Provider: yes    Patient location:  ED  Interpretation:     Interpretation: non-specific    Rate:     ECG rate assessment: tachycardic    Rhythm:     Rhythm: SVT    ST segments:     ST segments:  Abnormal    Elevation:  V2, V3 and V4    ECG 12 Lead Documentation  Date/Time: 9/27/2017 8:56 PM  Performed by: Brandon Villela by: Erma Wade     ECG reviewed by me, the ED Provider: yes    Patient location:  ED  Interpretation:     Interpretation: non-specific    Rate:     ECG rate assessment: normal    Rhythm:     Rhythm: sinus rhythm    Ectopy:     Ectopy: none            Phone Contacts  ED Phone Contact    ED Course  ED Course as of Sep 29 2133   Wed Sep 27, 2017   2028 After cardizem HR 89 and bp 140/62   Pt voicing improvement - No CP    2054 Remains in controlled NSR    2116 WBC: (!) 11 25 2116 Hemoglobin: 14 6   2116 Hematocrit: 44 7   2116 Leukocytes, UA: Negative   2116 Nitrite, UA: Negative   2116 Glucose, UA: (!) 500 (1/2%)   2142 Glucose: (!) 265   2142 TSH 3RD GENERATON: 2 669   2142 Leukocytes, UA: Negative   2144 Nitrite, UA: Negative   2147 Discussed results with pt and admit-agrees Troponin I: (!!) 0 13   2158 Spoke with Dr Roshan Zamorano admit          HEART Risk Score    Flowsheet Row Most Recent Value   History  1 Filed at: 09/27/2017 2115   ECG  1 Filed at: 09/27/2017 2115   Age  2 Filed at: 09/27/2017 2115   Risk Factors  1 Filed at: 09/27/2017 2115   Troponin  0 Filed at: 09/27/2017 2115   Heart Score Risk Calculator   History  1 Filed at: 09/27/2017 2115   ECG  1 Filed at: 09/27/2017 2115   Age  2 Filed at: 09/27/2017 2115   Risk Factors  1 Filed at: 09/27/2017 2115   Troponin  0 Filed at: 09/27/2017 2115   HEART Score  5 Filed at: 09/27/2017 2115   HEART Score  5 Filed at: 09/27/2017 2115                            [facility]  CritCare Time    Disposition  Final diagnoses:   Chest pain   SVT (supraventricular tachycardia)   Hyperglycemia     ED Disposition     ED Disposition Condition Comment    Admit  Case was discussed with Dr Shereen Kennedy the patient's admission status was agreed to be Admission Status: inpatient status to the service of Dr Romel Murray   Follow-up Information     Follow up With Specialties Details Why Contact Info    Angel Bolden DO Family Medicine Follow up in 1 week(s)  [address], DO Cardiology, Multidisciplinary   [place] [address]  [phone]          Discharge Medication List as of 9/29/2017  1:18 PM      START taking these medications    Details   amLODIPine (NORVASC) 5 mg tablet Take 1 tablet by mouth daily, Starting Sat 9/30/2017, Normal      aspirin 81 mg chewable tablet Chew 1 tablet daily, Starting Sat 9/30/2017, Normal      atorvastatin (LIPITOR) 40 mg tablet Take 1 tablet by mouth daily with dinner, Starting Fri 9/29/2017, Normal      metoprolol tartrate (LOPRESSOR) 25 mg tablet Take 0 5 tablets by mouth every 12 (twelve) hours, Starting Fri 9/29/2017, Normal         CONTINUE these medications which have NOT CHANGED    Details metFORMIN (GLUCOPHAGE) 500 mg tablet Take 500 mg by mouth daily with breakfast, Historical Med             Outpatient Discharge Orders  Discharge Diet     Activity as tolerated     Call provider for:  severe uncontrolled pain     Call provider for:  difficulty breathing, headache or visual disturbances     Call provider for:  persistent dizziness or light-headedness     Call provider for:  extreme fatigue         ED Provider  Electronically Signed by       Ramon Macias DO  09/29/17 2132       Ramon Macias DO  09/29/17 2133
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
## 2017-09-29 ENCOUNTER — APPOINTMENT (INPATIENT)
Dept: NUCLEAR MEDICINE | Facility: HOSPITAL | Age: 72
DRG: 281 | End: 2017-09-29
Payer: MEDICARE

## 2017-09-29 VITALS
SYSTOLIC BLOOD PRESSURE: 158 MMHG | WEIGHT: 178.57 LBS | BODY MASS INDEX: 31.64 KG/M2 | TEMPERATURE: 97.3 F | OXYGEN SATURATION: 98 % | HEIGHT: 63 IN | DIASTOLIC BLOOD PRESSURE: 82 MMHG | HEART RATE: 66 BPM | RESPIRATION RATE: 18 BRPM

## 2017-09-29 PROBLEM — I21.4 NSTEMI (NON-ST ELEVATED MYOCARDIAL INFARCTION) (HCC): Status: ACTIVE | Noted: 2017-09-29

## 2017-09-29 LAB
ATRIAL RATE: 150 BPM
ATRIAL RATE: 58 BPM
ATRIAL RATE: 73 BPM
GLUCOSE SERPL-MCNC: 138 MG/DL (ref 65–140)
GLUCOSE SERPL-MCNC: 155 MG/DL (ref 65–140)
P AXIS: 54 DEGREES
P AXIS: 61 DEGREES
PR INTERVAL: 174 MS
PR INTERVAL: 204 MS
QRS AXIS: 102 DEGREES
QRS AXIS: 105 DEGREES
QRS AXIS: 127 DEGREES
QRSD INTERVAL: 100 MS
QRSD INTERVAL: 92 MS
QRSD INTERVAL: 94 MS
QT INTERVAL: 316 MS
QT INTERVAL: 424 MS
QT INTERVAL: 462 MS
QTC INTERVAL: 453 MS
QTC INTERVAL: 467 MS
QTC INTERVAL: 504 MS
T WAVE AXIS: -5 DEGREES
T WAVE AXIS: 46 DEGREES
T WAVE AXIS: 56 DEGREES
VENTRICULAR RATE: 153 BPM
VENTRICULAR RATE: 58 BPM
VENTRICULAR RATE: 73 BPM

## 2017-09-29 PROCEDURE — 93017 CV STRESS TEST TRACING ONLY: CPT

## 2017-09-29 PROCEDURE — 82948 REAGENT STRIP/BLOOD GLUCOSE: CPT

## 2017-09-29 PROCEDURE — A9502 TC99M TETROFOSMIN: HCPCS

## 2017-09-29 PROCEDURE — 78452 HT MUSCLE IMAGE SPECT MULT: CPT

## 2017-09-29 RX ORDER — AMLODIPINE BESYLATE 5 MG/1
5 TABLET ORAL DAILY
Qty: 30 TABLET | Refills: 0 | Status: SHIPPED | OUTPATIENT
Start: 2017-09-30 | End: 2018-03-23

## 2017-09-29 RX ORDER — ASPIRIN 81 MG/1
81 TABLET, CHEWABLE ORAL DAILY
Qty: 30 TABLET | Refills: 0 | Status: SHIPPED | OUTPATIENT
Start: 2017-09-30 | End: 2020-11-02

## 2017-09-29 RX ORDER — ATORVASTATIN CALCIUM 40 MG/1
40 TABLET, FILM COATED ORAL
Qty: 30 TABLET | Refills: 0 | Status: SHIPPED | OUTPATIENT
Start: 2017-09-29 | End: 2017-10-04

## 2017-09-29 RX ADMIN — INSULIN LISPRO 1 UNITS: 100 INJECTION, SOLUTION INTRAVENOUS; SUBCUTANEOUS at 08:03

## 2017-09-29 RX ADMIN — AMLODIPINE BESYLATE 5 MG: 5 TABLET ORAL at 12:12

## 2017-09-29 RX ADMIN — REGADENOSON 0.4 MG: 0.08 INJECTION, SOLUTION INTRAVENOUS at 10:51

## 2017-09-29 RX ADMIN — ASPIRIN 81 MG 81 MG: 81 TABLET ORAL at 12:12

## 2017-09-29 RX ADMIN — METOPROLOL TARTRATE 12.5 MG: 25 TABLET ORAL at 12:12

## 2017-09-29 RX ADMIN — ENOXAPARIN SODIUM 40 MG: 40 INJECTION SUBCUTANEOUS at 12:12
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
## 2017-09-29 NOTE — PROGRESS NOTES
[facility] Internal Medicine Progress Note  Patient: Mirella Medina 67 y o  female   MRN: [id number]  PCP: Jenifer Marcano DO  Unit/Bed#: 530-86 Encounter: [id number]  Date Of Visit: 17    Assessment:    Principal Problem:    Chest pain  Active Problems:    Tachycardia    Hypertension    Diabetes mellitus  NSTEMI      Plan:    · Case discussed with Cardiology  · BP control  · Medical therapy  · Check ECHO, further plan based on echo      Current Length of Stay: 2 day(s)    Current Patient Status: Inpatient     Code Status: Level 1 - Full Code      Subjective:   No pain    Objective:     Vitals:   Temp (24hrs), Av 4 °F (36 3 °C), Min:97 3 °F (36 3 °C), Max:97 6 °F (36 4 °C)    HR:  [61-68] 66  Resp:  [18] 18  BP: (133-182)/(62-84) 158/82  SpO2:  [95 %-98 %] 98 %  Body mass index is 31 63 kg/m²  Input and Output Summary (last 24 hours): Intake/Output Summary (Last 24 hours) at 17 1238  Last data filed at 17 0830   Gross per 24 hour   Intake              600 ml   Output              600 ml   Net                0 ml       Physical Exam:     Physical Exam   Constitutional: She is oriented to person, place, and time  She appears well-developed and well-nourished  No distress  Cardiovascular: Normal rate and regular rhythm  Exam reveals no gallop and no friction rub  No murmur heard  Pulmonary/Chest: Effort normal and breath sounds normal  No respiratory distress  She has no wheezes  Abdominal: Soft  Bowel sounds are normal  She exhibits no distension  There is no tenderness  Musculoskeletal: She exhibits no edema  Neurological: She is alert and oriented to person, place, and time  She has normal reflexes  No cranial nerve deficit  Skin: She is not diaphoretic  Nursing note and vitals reviewed        Additional Data:     Labs:      Results from last 7 days  Lab Units 17  0425  17  2101   WBC Thousand/uL 9 51  --  11 25*   HEMOGLOBIN g/dL 13 6  --  14 6   HEMATOCRIT % 42 0  --  44 7   PLATELETS Thousands/uL 215  < > 198   NEUTROS PCT %  --   --  77*   LYMPHS PCT %  --   --  16   MONOS PCT %  --   --  5   EOS PCT %  --   --  2   < > = values in this interval not displayed  Results from last 7 days  Lab Units 09/28/17  0425 09/27/17  2101   SODIUM mmol/L 142 141   POTASSIUM mmol/L 3 7 3 6   CHLORIDE mmol/L 107 104   CO2 mmol/L 26 24   BUN mg/dL 22 25   CREATININE mg/dL 0 61 0 92   CALCIUM mg/dL 9 0 9 1   TOTAL PROTEIN g/dL  --  7 1   BILIRUBIN TOTAL mg/dL  --  0 20   ALK PHOS U/L  --  97   ALT U/L  --  23   AST U/L  --  13   GLUCOSE RANDOM mg/dL 150* 265*       Results from last 7 days  Lab Units 09/27/17 2101   INR  0 96       * I Have Reviewed All Lab Data Listed Above  * Additional Pertinent Lab Tests Reviewed:  [facility] Admission Reviewed      Recent Cultures (last 7 days):           Last 24 Hours Medication List:     amLODIPine 5 mg Oral Daily   aspirin 81 mg Oral Daily   atorvastatin 40 mg Oral Daily With Dinner   enoxaparin 40 mg Subcutaneous Q24H [address]   insulin lispro 1-6 Units Subcutaneous 4x Daily (AC & HS)   metoprolol tartrate 12 5 mg Oral Q12H [address]        Today, Patient Was Seen By: Gregor Haines DO
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
## 2017-09-29 NOTE — PLAN OF CARE
Problem: PAIN - ADULT  Goal: Verbalizes/displays adequate comfort level or baseline comfort level  Interventions:  - Encourage patient to monitor pain and request assistance  - Assess pain using appropriate pain scale  - Administer analgesics based on type and severity of pain and evaluate response  - Implement non-pharmacological measures as appropriate and evaluate response  - Consider cultural and social influences on pain and pain management  - Notify physician/advanced practitioner if interventions unsuccessful or patient reports new pain   Outcome: Progressing      Problem: SAFETY ADULT  Goal: Patient will remain free of falls  INTERVENTIONS:  - Assess patient frequently for physical needs  -  Identify cognitive and physical deficits and behaviors that affect risk of falls    -  [place] fall precautions as indicated by assessment   - Educate patient/family on patient safety including physical limitations  - Instruct patient to call for assistance with activity based on assessment  - Modify environment to reduce risk of injury  - Consider OT/PT consult to assist with strengthening/mobility   Outcome: Progressing    Goal: Maintain or return to baseline ADL function  INTERVENTIONS:  -  Assess patient's ability to carry out ADLs; assess patient's baseline for ADL function and identify physical deficits which impact ability to perform ADLs (bathing, care of mouth/teeth, toileting, grooming, dressing, etc )  - Assess/evaluate cause of self-care deficits   - Assess range of motion  - Assess patient's mobility; develop plan if impaired  - Assess patient's need for assistive devices and provide as appropriate  - Encourage maximum independence but intervene and supervise when necessary  ¯ Involve family in performance of ADLs  ¯ Assess for home care needs following discharge   ¯ Request OT consult to assist with ADL evaluation and planning for discharge  ¯ Provide patient education as appropriate   Outcome: Progressing    Goal: Maintain or return mobility status to optimal level  INTERVENTIONS:  - Assess patient's baseline mobility status (ambulation, transfers, stairs, etc )    - Identify cognitive and physical deficits and behaviors that affect mobility  - Identify mobility aids required to assist with transfers and/or ambulation (gait belt, sit-to-stand, lift, walker, cane, etc )  - [place] fall precautions as indicated by assessment  - Record patient progress and toleration of activity level on Mobility SBAR; progress patient to next Phase/Stage  - Instruct patient to call for assistance with activity based on assessment  - Request Rehabilitation consult to assist with strengthening/weightbearing, etc    Outcome: Progressing      Problem: DISCHARGE PLANNING  Goal: Discharge to home or other facility with appropriate resources  INTERVENTIONS:  - Identify barriers to discharge w/patient and caregiver  - Arrange for needed discharge resources and transportation as appropriate  - Identify discharge learning needs (meds, wound care, etc )  - Arrange for interpretive services to assist at discharge as needed  - Refer to Case Management Department for coordinating discharge planning if the patient needs post-hospital services based on physician/advanced practitioner order or complex needs related to functional status, cognitive ability, or social support system   Outcome: Progressing      Problem: Knowledge Deficit  Goal: Patient/family/caregiver demonstrates understanding of disease process, treatment plan, medications, and discharge instructions  Complete learning assessment and assess knowledge base    Interventions:  - Provide teaching at level of understanding  - Provide teaching via preferred learning methods   Outcome: Progressing      Problem: DISCHARGE PLANNING - CARE MANAGEMENT  Goal: Discharge to post-acute care or home with appropriate resources  INTERVENTIONS:  - Conduct assessment to determine patient/family and health care team treatment goals, and need for post-acute services based on payer coverage, community resources, and patient preferences, and barriers to discharge  - Address psychosocial, clinical, and financial barriers to discharge as identified in assessment in conjunction with the patient/family and health care team  - Arrange appropriate level of post-acute services according to patient's   needs and preference and payer coverage in collaboration with the physician and health care team  - Communicate with and update the patient/family, physician, and health care team regarding progress on the discharge plan  - Arrange appropriate transportation to post-acute venues   Outcome: Progressing

## 2017-09-29 NOTE — PROGRESS NOTES
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  Recommend starting metoprolol 12 5 mg Q 12  Add aspirin and statin  Add Norvasc 5 mg daily for better blood pressure control  As a diabetic she is at high risk for coronary disease her sister did have CABG at a younger age  No further supraventricular tachycardia on monitor  A Alvino feeling well with no complaints  Echocardiogram was reviewed shows preserved LV systolic function  Continue aspirin and statin and beta blocker for a Zuri scan Myoview today to rule out any significant ischemic burden and will need close outpatient follow-up  Follow blood pressure on Norvasc    Subjective:    No significant events overnight  Denies chest pain or discomfort overall feeling well    ROS    Objective:   Vitals: Blood pressure (!) 182/84, pulse 66, temperature (!) 97 3 °F (36 3 °C), temperature source Temporal, resp  rate 18, height 5' 3" (1 6 m), weight 81 kg (178 lb 9 2 oz), SpO2 98 %  , Body mass index is 31 63 kg/m² , Orthostatic Blood Pressures    Flowsheet Row Most Recent Value   Blood Pressure   182/84 filed at 2017 0730   Patient Position - Orthostatic VS  Sitting filed at 2017 8003         Systolic (10IVU), UF , Min:133 , AZB:766     Diastolic (40EFD), RIF:06, Min:62, Max:84      Intake/Output Summary (Last 24 hours) at 17 1057  Last data filed at 17 0830   Gross per 24 hour   Intake              600 ml   Output              600 ml   Net 0 ml     Weight (last 2 days)     Date/Time   Weight    09/28/17 0021  81 (178 57)                Telemetry Review: No significant arrhythmias seen on telemetry review  EKG personally reviewed by Alejandra Rdz DO  Physical Exam   Constitutional: She appears well-nourished  No distress  HENT:   Head: Atraumatic  Eyes: Conjunctivae are normal  Pupils are equal, round, and reactive to light  Neck: Neck supple  Cardiovascular: Normal rate, regular rhythm and normal heart sounds  No murmur heard  Pulmonary/Chest: Effort normal and breath sounds normal  No respiratory distress  She has no rales  Abdominal: Bowel sounds are normal  She exhibits no distension  There is no tenderness  There is no rebound  Musculoskeletal: Normal range of motion  She exhibits no edema  Neurological: She is alert  No cranial nerve deficit  Skin: Skin is warm and dry  No erythema           Laboratory Results:    Results from last 7 days  Lab Units 09/28/17  0652 09/28/17  0352 09/28/17  0103   TROPONIN I ng/mL 1 43* 1 64* 1 34*       CBC with diff:   Results from last 7 days  Lab Units 09/28/17  0425 09/28/17  0103 09/27/17  2101   WBC Thousand/uL 9 51  --  11 25*   HEMOGLOBIN g/dL 13 6  --  14 6   HEMATOCRIT % 42 0  --  44 7   MCV fL 92  --  92   PLATELETS Thousands/uL 215 207 198   MCH pg 29 7  --  30 0   MCHC g/dL 32 4  --  32 7   RDW % 13 7  --  13 8   MPV fL 11 6 10 9 11 0         CMP:  Results from last 7 days  Lab Units 09/28/17  0425 09/27/17  2101   SODIUM mmol/L 142 141   POTASSIUM mmol/L 3 7 3 6   CHLORIDE mmol/L 107 104   CO2 mmol/L 26 24   ANION GAP mmol/L 9 13   BUN mg/dL 22 25   CREATININE mg/dL 0 61 0 92   GLUCOSE RANDOM mg/dL 150* 265*   CALCIUM mg/dL 9 0 9 1   AST U/L  --  13   ALT U/L  --  23   ALK PHOS U/L  --  97   TOTAL PROTEIN g/dL  --  7 1   ALBUMIN g/dL  --  3 5   BILIRUBIN TOTAL mg/dL  --  0 20   EGFR ml/min/1 73sq m 90 62         BMP:  Results from last 7 days  Lab Units 17  0425 17  2101   SODIUM mmol/L 142 141   POTASSIUM mmol/L 3 7 3 6   CHLORIDE mmol/L 107 104   CO2 mmol/L 26 24   BUN mg/dL 22 25   CREATININE mg/dL 0 61 0 92   GLUCOSE RANDOM mg/dL 150* 265*   CALCIUM mg/dL 9 0 9 1       BNP: No results for input(s): BNP in the last 72 hours  Magnesium:   Results from last 7 days  Lab Units 17  0103 17  2101   MAGNESIUM mg/dL 2 1 1 9       Coags:   Results from last 7 days  Lab Units 17  2101   PTT seconds 26   INR  0 96       TSH:       Invalid input(s): TSH     Hemoglobin A1C       Lipid Profile:       Cardiac testing:   Results for orders placed during the hospital encounter of 17   Echo complete with contrast if indicated    Narrative [address] [facility] [address] [facility] [address], [place]  [phone]    Transthoracic Echocardiogram  2D, M-mode, Doppler, and Color Doppler    Study date:  28-Sep-2017    Patient: [facility]  MR number: [id number]  Account number: [de-identified]  : 1945  Age: 67 years  Gender: Female  Status: Inpatient  Location: Bedside  Height: 63 in  Weight: 179 5 lb  BP: 166/ 72 mmHg    Indications: Assess left ventricular function  Diagnoses: R07 9 - Chest pain, unspecified    Sonographer:  Maurice Mccauley RDCS  Primary Physician:  Param Lopez III, DO  Referring Physician:  [place] DO  Group:  [facility]  Interpreting Physician:  Raven Hills DO    SUMMARY    LEFT VENTRICLE:  Systolic function was normal  Ejection fraction was estimated in the range of 55 % to 60 %  Although no diagnostic regional wall motion abnormality was identified, this possibility cannot be completely excluded on the basis of this study  Wall thickness was at the upper limits of normal   Doppler parameters were consistent with abnormal left ventricular relaxation (grade 1 diastolic dysfunction)  MITRAL VALVE:  There was moderate annular calcification    There was moderate thickening of the posterior leaflet  There was mild regurgitation  AORTIC VALVE:  There was mild regurgitation  TRICUSPID VALVE:  There was mild regurgitation  HISTORY: PRIOR HISTORY: CHEST PAIN, DIABETES MELLITUS, HYPERTENSION, HYPERCHOLESTEROLEMIA, PALPITATIONS    PROCEDURE: The procedure was performed at the bedside  This was a routine study  The transthoracic approach was used  The study included complete 2D imaging, M-mode, complete spectral Doppler, and color Doppler  Images were obtained from  the parasternal, apical, subcostal, and suprasternal notch acoustic windows  Image quality was adequate  LEFT VENTRICLE: Size was normal  Systolic function was normal  Ejection fraction was estimated in the range of 55 % to 60 %  Although no diagnostic regional wall motion abnormality was identified, this possibility cannot be completely  excluded on the basis of this study  Wall thickness was at the upper limits of normal  DOPPLER: Doppler parameters were consistent with abnormal left ventricular relaxation (grade 1 diastolic dysfunction)  RIGHT VENTRICLE: The size was normal  Systolic function was normal  Wall thickness was normal     LEFT ATRIUM: Size was normal     RIGHT ATRIUM: Size was normal     MITRAL VALVE: There was moderate annular calcification  There was moderate thickening of the posterior leaflet  There was normal leaflet separation  DOPPLER: The transmitral velocity was within the normal range  There was no evidence for  stenosis  There was mild regurgitation  AORTIC VALVE: The valve was trileaflet  Leaflets exhibited mildly increased thickness, normal cuspal separation, and sclerosis  DOPPLER: Transaortic velocity was within the normal range  There was no evidence for stenosis  There was mild  regurgitation  TRICUSPID VALVE: The valve structure was normal  There was normal leaflet separation  DOPPLER: The transtricuspid velocity was within the normal range   There was no evidence for stenosis  There was mild regurgitation  PULMONIC VALVE: Leaflets exhibited normal thickness, no calcification, and normal cuspal separation  DOPPLER: The transpulmonic velocity was within the normal range  There was no regurgitation  PERICARDIUM: There was no pericardial effusion  The pericardium was normal in appearance  AORTA: The root exhibited normal size  SYSTEMIC VEINS: IVC: The inferior vena cava was normal in size and course  Respirophasic changes were normal     SYSTEM MEASUREMENT TABLES    2D  %FS: 40 06 %  AV Diam: 2 81 cm  EDV(Teich): 118 09 ml  EF(Teich): 70 47 %  ESV(Teich): 34 87 ml  IVSd: 1 cm  LA Area: 14 15 cm2  LA Diam: 3 88 cm  LVEDV MOD A4C: 64 75 ml  LVEF MOD A4C: 69 09 %  LVESV MOD A4C: 20 02 ml  LVIDd: 5 cm  LVIDs: 3 cm  LVLd A4C: 6 66 cm  LVLs A4C: 5 92 cm  LVPWd: 1 cm  RA Area: 9 51 cm2  RV DIAM: 2 88 cm  SV MOD A4C: 44 73 ml  SV(Teich): 83 22 ml    CW  AV Vmax: 1 67 m/s  AV maxP 19 mmHg  PV Vmax: 1 15 m/s  PV maxP 31 mmHg  TR Vmax: 2 67 m/s  TR maxP 61 mmHg    MM  TAPSE: 1 91 cm    PW  E': 0 07 m/s  E/E': 13 42  LVOT Vmax: 0 73 m/s  LVOT maxP 11 mmHg  MV A [place]: 1 4 m/s  MV Dec [place]: 4 39 m/s2  MV DecT: 228 12 ms  MV E [place]: 1 m/s  MV E/A Ratio: 0 72  RVOT Vmax: 0 78 m/s  RVOT maxP 41 mmHg    [employer] Accredited Echocardiography Laboratory    Prepared and electronically signed by    Lupe Tuttle DO  Signed 28-Sep-2017 12:35:37       No results found for this or any previous visit  No results found for this or any previous visit  Results for orders placed in visit on 14   NM myocardial perfusion spect (stress and/or rest)    Narrative This stress test was performed by a cardiologist and the    cardiologist will render the interpretation              Ting Escudero        Reading DEWAYNE Cai MD        Releasing RadiologistDEWAYNE Shultz MD Released Date Time- 02/17/14 1051      ------------------------------------------------------------------------------   AURELIA Ugarte DA        Meds/Allergies   all current active meds have been reviewed  Prescriptions Prior to Admission   Medication    metFORMIN (GLUCOPHAGE) 500 mg tablet          Assessment:  Principal Problem:    Chest pain  Active Problems:    Tachycardia    Hypertension    Diabetes mellitus

## 2017-09-29 NOTE — SOCIAL WORK
Discussed with patient preferences on discharge;understanding how to manage health at home; purpose of taking medications; importance of follow up care/appointments; and symptoms to watch out for once discharged home  Pt is being dc'd home with no Cm needs  Pt prefers to schedule her own post dc follow up  Appointments

## 2017-10-02 ENCOUNTER — GENERIC CONVERSION - ENCOUNTER (OUTPATIENT)
Dept: OTHER | Facility: OTHER | Age: 72
End: 2017-10-02

## 2017-10-04 ENCOUNTER — TELEPHONE (OUTPATIENT)
Dept: PREADMISSION TESTING | Facility: HOSPITAL | Age: 72
End: 2017-10-04

## 2017-10-05 ENCOUNTER — HOSPITAL ENCOUNTER (OUTPATIENT)
Dept: NON INVASIVE DIAGNOSTICS | Facility: HOSPITAL | Age: 72
Discharge: HOME | End: 2017-10-05
Attending: INTERNAL MEDICINE | Admitting: INTERNAL MEDICINE
Payer: MEDICARE

## 2017-10-05 VITALS
RESPIRATION RATE: 16 BRPM | HEIGHT: 63 IN | BODY MASS INDEX: 31.01 KG/M2 | OXYGEN SATURATION: 96 % | DIASTOLIC BLOOD PRESSURE: 69 MMHG | WEIGHT: 175 LBS | HEART RATE: 65 BPM | TEMPERATURE: 97.1 F | SYSTOLIC BLOOD PRESSURE: 157 MMHG

## 2017-10-05 DIAGNOSIS — R07.9 CHEST PAIN: ICD-10-CM

## 2017-10-05 LAB — GLUCOSE SERPL-MCNC: 130 MG/DL (ref 65–140)

## 2017-10-05 PROCEDURE — 82948 REAGENT STRIP/BLOOD GLUCOSE: CPT

## 2017-10-05 PROCEDURE — 99152 MOD SED SAME PHYS/QHP 5/>YRS: CPT | Performed by: INTERNAL MEDICINE

## 2017-10-05 PROCEDURE — 93458 L HRT ARTERY/VENTRICLE ANGIO: CPT | Performed by: INTERNAL MEDICINE

## 2017-10-05 PROCEDURE — C1760 CLOSURE DEV, VASC: HCPCS | Performed by: INTERNAL MEDICINE

## 2017-10-05 PROCEDURE — C1769 GUIDE WIRE: HCPCS | Performed by: INTERNAL MEDICINE

## 2017-10-05 PROCEDURE — C1894 INTRO/SHEATH, NON-LASER: HCPCS | Performed by: INTERNAL MEDICINE

## 2017-10-05 RX ORDER — MIDAZOLAM HYDROCHLORIDE 1 MG/ML
INJECTION INTRAMUSCULAR; INTRAVENOUS CODE/TRAUMA/SEDATION MEDICATION
Status: COMPLETED | OUTPATIENT
Start: 2017-10-05 | End: 2017-10-05

## 2017-10-05 RX ORDER — LIDOCAINE HYDROCHLORIDE 10 MG/ML
INJECTION, SOLUTION INFILTRATION; PERINEURAL CODE/TRAUMA/SEDATION MEDICATION
Status: COMPLETED | OUTPATIENT
Start: 2017-10-05 | End: 2017-10-05

## 2017-10-05 RX ORDER — ASPIRIN 81 MG/1
TABLET, CHEWABLE ORAL CODE/TRAUMA/SEDATION MEDICATION
Status: COMPLETED | OUTPATIENT
Start: 2017-10-05 | End: 2017-10-05

## 2017-10-05 RX ORDER — SODIUM CHLORIDE 9 MG/ML
150 INJECTION, SOLUTION INTRAVENOUS CONTINUOUS
Status: ACTIVE | OUTPATIENT
Start: 2017-10-05 | End: 2017-10-05

## 2017-10-05 RX ORDER — FENTANYL CITRATE 50 UG/ML
INJECTION, SOLUTION INTRAMUSCULAR; INTRAVENOUS CODE/TRAUMA/SEDATION MEDICATION
Status: COMPLETED | OUTPATIENT
Start: 2017-10-05 | End: 2017-10-05

## 2017-10-05 RX ORDER — SODIUM CHLORIDE 9 MG/ML
20 INJECTION, SOLUTION INTRAVENOUS CONTINUOUS
Status: DISCONTINUED | OUTPATIENT
Start: 2017-10-05 | End: 2017-10-05

## 2017-10-05 RX ADMIN — MIDAZOLAM 2 MG: 1 INJECTION INTRAMUSCULAR; INTRAVENOUS at 07:43

## 2017-10-05 RX ADMIN — LIDOCAINE HYDROCHLORIDE 10 ML: 10 INJECTION, SOLUTION INFILTRATION; PERINEURAL at 07:50

## 2017-10-05 RX ADMIN — FENTANYL CITRATE 25 MCG: 50 INJECTION, SOLUTION INTRAMUSCULAR; INTRAVENOUS at 07:43

## 2017-10-05 RX ADMIN — ASPIRIN 81 MG 324 MG: 81 TABLET ORAL at 07:39

## 2017-10-05 RX ADMIN — IOHEXOL 70 ML: 350 INJECTION, SOLUTION INTRAVENOUS at 08:00

## 2017-10-05 NOTE — DISCHARGE INSTRUCTIONS
Chest Pain   WHAT YOU NEED TO KNOW:   Chest pain can be caused by a range of conditions, from not serious to life-threatening  Chest pain can be a symptom of a digestive problem, such as acid reflux or a stomach ulcer  An anxiety attack or a strong emotion, such as anger, can also cause chest pain  Infection, inflammation, or a fracture in the bones or cartilage in your chest can cause pain or discomfort  Sometimes chest pain or pressure is caused by poor blood flow to your heart (angina)  Chest pain may also be caused by life-threatening conditions such as a heart attack or blood clot in your lungs  DISCHARGE INSTRUCTIONS:   Call 911 if:   · You have any of the following signs of a heart attack:      ¨ Squeezing, pressure, or pain in your chest that lasts longer than 5 minutes or returns    ¨ Discomfort or pain in your back, neck, jaw, stomach, or arm     ¨ Trouble breathing    ¨ Nausea or vomiting    ¨ Lightheadedness or a sudden cold sweat, especially with chest pain or trouble breathing    Seek care immediately if:   · You have chest discomfort that gets worse, even with medicine  · You cough or vomit blood  · Your bowel movements are black or bloody  · You cannot stop vomiting, or it hurts to swallow  Contact your healthcare provider if:   · You have questions or concerns about your condition or care  Medicines:   · Medicines  may be given to treat the cause of your chest pain  Examples include pain medicine, anxiety medicine, or medicines to increase blood flow to your heart  · Do not take certain medicines without asking your healthcare provider first   These include NSAIDs, herbal or vitamin supplements, or hormones (estrogen or progestin)  · Take your medicine as directed  Contact your healthcare provider if you think your medicine is not helping or if you have side effects  Tell him or her if you are allergic to any medicine   Keep a list of the medicines, vitamins, and herbs you take  Include the amounts, and when and why you take them  Bring the list or the pill bottles to follow-up visits  Carry your medicine list with you in case of an emergency  Follow up with your healthcare provider within 72 hours, or as directed: You may need to return for more tests to find the cause of your chest pain  You may be referred to a specialist, such as a cardiologist or gastroenterologist  Write down your questions so you remember to ask them during your visits  Healthy living tips: The following are general healthy guidelines  If your chest pain is caused by a heart problem, your healthcare provider will give you specific guidelines to follow  · Do not smoke  Nicotine and other chemicals in cigarettes and cigars can cause lung and heart damage  Ask your healthcare provider for information if you currently smoke and need help to quit  E-cigarettes or smokeless tobacco still contain nicotine  Talk to your healthcare provider before you use these products  · Eat a variety of healthy, low-fat foods  Healthy foods include fruits, vegetables, whole-grain breads, low-fat dairy products, beans, lean meats, and fish  Ask for more information about a heart healthy diet  · Ask about activity  Your healthcare provider will tell you which activities to limit or avoid  Ask when you can drive, return to work, and have sex  Ask about the best exercise plan for you  · Maintain a healthy weight  Ask your healthcare provider how much you should weigh  Ask him or her to help you create a weight loss plan if you are overweight  © 2017 [employer] [employer] Information is for End User's use only and may not be sold, redistributed or otherwise used for commercial purposes  All illustrations and images included in CareNotes® are the copyrighted property of [employer]  or [address]  The above information is an  only   It is not intended as medical advice for individual conditions or treatments  Talk to your doctor, nurse or pharmacist before following any medical regimen to see if it is safe and effective for you

## 2017-10-05 NOTE — PROGRESS NOTES
Cardiac cath performed via the RFA  Closed with angioseal at 8 am     Diffuse diabetic CAD  OM2 subtotally occluded with L to L and R to L collaterals  60 % mid LAD stenosis  60 % mid RCA stenosis    She needs maximal medical therapy - ASA, high dose Lipitor or Crestor, strict BP control   she is presently asymptomatic  F/up with Dr Grace Carey If symptoms develops, she would need culprit vessel PCI or consideration for CABG

## 2017-11-22 ENCOUNTER — TRANSCRIBE ORDERS (OUTPATIENT)
Dept: ADMINISTRATIVE | Facility: HOSPITAL | Age: 72
End: 2017-11-22

## 2017-11-22 DIAGNOSIS — Z12.31 ENCOUNTER FOR SCREENING MAMMOGRAM FOR MALIGNANT NEOPLASM OF BREAST: Primary | ICD-10-CM

## 2017-11-27 ENCOUNTER — ALLSCRIPTS OFFICE VISIT (OUTPATIENT)
Dept: OTHER | Facility: OTHER | Age: 72
End: 2017-11-27

## 2017-11-28 NOTE — PROGRESS NOTES
Assessment  Assessed    1  CAD (coronary artery disease) (414 00) (I25 10)   2  Hypercholesterolemia (272 0) (E78 00)   3  Hypertension (401 9) (I10)   4  Supraventricular tachycardia (427 89) (I47 1)    Plan  CAD (coronary artery disease)    · Rosuvastatin Calcium 5 MG Oral Tablet; TAKE 1 TABLET BY MOUTH EVERYMONDAY AND FRIDAY   Rx By: Jaquelin Lerma; Dispense: 34 Days ; #:10 Tablet; Refill: 3;CAD (coronary artery disease); YASH = N; Verified Transmission to [address] [place] [address]; Last Updated By: SystemFeusd; 11/27/2017 1:15:06 PM  CAD (coronary artery disease), Hypercholesterolemia, Hypertension, Supraventriculartachycardia    · Follow-up visit in 4 Months Evaluation and Treatment  Follow-up  Status: Hold For -Scheduling  Requested for: 57TED0704   Ordered;CAD (coronary artery disease), Hypercholesterolemia, Hypertension, Supraventricular tachycardia; Ordered By: Jaquelin Lerma Performed:  Due: 21CWK5917    Discussion/Summary  Cardiology Discussion Summary Free Text Note Form [facility]:   1  3vCAD moderateHTNSVTHLDDM  Continue aggressive medical therapy for coronary artery disease  She needs to be on a statin will lab rosuvastatin 5 mg one tablet on Monday and Friday and see if she can tolerate this  Losartan is being added by her primary physician to treat her hypertension  We'll continue with aggressive risk factor modification area talked her about lifestyle changes including reduction in sodium and exercise  If she has any progressive symptoms she may need PCI to the RCA  If LAD disease progresses will likely need CABG  History of Present Illness  Cardiology HPI Free Text Note Form [facility]: Mrs  Drakeveien [address] Cerebral 65-year-old female [address] CARDIA AND ELEVATED TROPONIN  SHE MULTIPLE RISK FACTORS FOR CORONARY DISEASE AND UNDERWENT HEART CATHETERIZATION  This revealed three-vessel coronary disease that was moderate   She was started on appropriate medical therapy  There's been no anginal symptoms  She was unable to tolerate the statin due to lower extremity cramping  She stopped this medication  The pressures have also been elevated losartan was recently added  She denies any significant dyspnea  She's able to go swimming a couple days a week  She denies any palpitations, lightheadedness, dizziness, or syncope  There was supraventricular tach cardia which has not returned on beta blockers  Review of Systems  Cardiology Female ROS:    Cardiac: No complaints of chest pain, no palpitations, no fainting  Skin: No complaints of nonhealing sores or skin rash  Genitourinary: No complaints of recurrent urinary tract infections, frequent urination at night, difficult urination, blood in urine, kidney stones, loss of bladder control, kidney problems, denies any birth control or hormone replacement, is not post menopausal, not currently pregnant  Psychological: No complaints of feeling depressed, anxiety, panic attacks, or difficulty concentrating  General: No complaints of trouble sleeping, lack of energy, fatigue, appetite changes, weight changes, fever, frequent infections, or night sweats  Respiratory: No complaints of shortness of breath, cough with sputum, or wheezing  HEENT: No complaints of serious problems, hearing problems, nose problems, throat problems, or snoring  Gastrointestinal: No complaints of liver problems, nausea, vomiting, heartburn, constipation, bloody stools, diarrhea, problems swallowing, adbominal pain, or rectal bleeding  Hematologic: No complaints of bleeding disorders, anemia, blood clots, or excessive brusing  Neurological: No complaints of numbness, tingling, dizziness, weakness, seizures, headaches, syncope or fainting, AM fatigue, daytime sleepiness, no witnessed apnea episodes  Musculoskeletal: No complaints of arthritis, back pain, or painfull swelling  Active Problems  Problems    1   Benign essential hypertension (401  1) (I10)   2  CAD (coronary artery disease) (414 00) (I25 10)   3  Chest pain (786 50) (R07 9)   4  Encounter for diabetic foot exam (250 00) (E11 9)   5  H/O non-ST elevation myocardial infarction (NSTEMI) (412) (I25 2)   6  Hypercholesterolemia (272 0) (E78 00)   7  Hypertension (401 9) (I10)   8  Left arm swelling (729 81) (M79 89)   9  Mixed incontinence urge and stress (788 33) (N39 46)   10  Osteopenia of multiple sites (733 90) (M85 89)   11  Rosacea (695 3) (L71 9)   12  Screening for neurological condition (V80 09) (Z13 89)   13  Supraventricular tachycardia (427 89) (I47 1)   14  Type 2 diabetes mellitus (250 00) (E11 9)    Past Medical History  Problems    1  History of Abnormal electrocardiogram (794 31) (R94 31)   2  History of Complete tear of right rotator cuff (727 61) (M75 121)   3  History of esophageal reflux (V12 79) (Z87 19)   4  History of Poliomyelitis (V12 02)   5  History of Severe depression (311) (F32 2)   6  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)   7  History of Vitamin D deficiency (268 9) (E55 9)  Active Problems And Past Medical History Reviewed: The active problems and past medical history were reviewed and updated today  Surgical History  Problems    1  History of Cataract Surgery   2  History of Hysterectomy   3  Denied: History of Leg Repair  Surgical History Reviewed: The surgical history was reviewed and updated today  Family History  Sister    1  Family history of Breast Cancer (V16 3)   2  Family history of Heart Disease (V17 49)  Family History    3  Family history of Heart Disease (V17 49)   4  Family history of Sudden/Instantaneous Cardiac Death (V17 41)  Family History Reviewed: The family history was reviewed and updated today         Social History  Problems    · Advance directive indicates patient wish for do-not-resuscitate status (V49 86) (Z66)   · Denied: History of Alcohol Use (History)   · Always uses seat belt   · Daily caffeine consumption   · Denied: History of Drug Use   · Exercises daily (V49 89) (Z78 9)   · Has advance care plan   · Marital History - Currently    · Never A Smoker   · No advance directive on file (V49 89) (Z78 9)   · No alcohol use   · No drug use   · Patient has living will (V49 89) (Z78 9)   · Tobacco non-user (V49 89) (Z78 9)   · Under stress   ·   Social History Reviewed: The social history was reviewed and updated today  Current Meds   1  Accu-Chek Krysten Device; USE AS DIRECTED; Therapy: 48VCK0558 to (Evaluate:21Jun2016)  Requested for: 20Jun2016; Last Rx:20Jun2016 Ordered   2  Accu-Chek Krysten STRP; USE TO TEST BLOOD SUGAR TWICE DAILY AS DIRECTED; Therapy: 17BXV9831 to (Last Rx:24Jun2016)  Requested for: 20DVP4137 Ordered   3  Accu-Chek Multiclix Lancets Miscellaneous; USE TO TEST BLOOD SUGAR TWICE DAILY; Therapy: 20Jun2016 to (Last Rx:61Kor7722)  Requested for: 09TBG2688 Ordered   4  [place] Thyroid 15 MG Oral Tablet; TAKE 1 TABLET DAILY; Therapy: (Recorded:27Nov2017) to Recorded   5  [place] Thyroid 30 MG Oral Tablet; TAKE 1 TABLET DAILY; Therapy: [phone]) to Recorded   6  Aspirin 81 MG Oral Tablet Chewable; CHEW AND SWALLOW 1 TABLET DAILY; Therapy: (94 31 11) to Recorded   7  Coenzyme Q10 100 MG Oral Capsule; TAKE 400 MGM DAILY; Therapy: (Recorded:27Nov2017) to Recorded   8  MetFORMIN HCl - 500 MG Oral Tablet; TAKE 1 TABLET DAILY WITH FOOD; Therapy: (94 31 11) to Recorded   9  Metoprolol Tartrate 25 MG Oral Tablet; take 1 tablet every twelve hours; Therapy: (Recorded:27Nov2017) to Recorded    Allergies  Medication    1  AmLODIPine Besylate TABS   2  Atorvastatin Calcium TABS   3  Lisinopril TABS  Denied    4   MetFORMIN HCl TABS    Vitals  Vital Signs    Recorded: 95XHL7607 12:50PM   Heart Rate 76   Systolic 685, LUE, Sitting   Diastolic 88, LUE, Sitting   Height 5 ft 5 in   Weight 187 lb    BMI Calculated 31 12   BSA Calculated 1 92 Physical Exam   Constitutional  General appearance: No acute distress, well appearing and well nourished  Eyes  Conjunctiva and Sclera examination: Conjunctiva pink, sclera anicteric  Ears, Nose, Mouth, and Throat - Oropharynx: Clear, nares are clear, mucous membranes are moist   Neck  Neck and thyroid: Normal, supple, trachea midline, no thyromegaly  Pulmonary  Respiratory effort: No increased work of breathing or signs of respiratory distress  Auscultation of lungs: Clear to auscultation, no rales, no rhonchi, no wheezing, good air movement  Cardiovascular  Auscultation of heart: Normal rate and rhythm, normal S1 and S2, no murmurs  Carotid pulses: Normal, 2+ bilaterally  Peripheral vascular exam: Normal pulses throughout, no tenderness, erythema or swelling  Pedal pulses: Normal, 2+ bilaterally  Examination of extremities for edema and/or varicosities: Normal    Abdomen  Abdomen: Non-tender and no distention  Liver and spleen: No hepatomegaly or splenomegaly  Musculoskeletal Gait and station: Normal gait  -- Digits and nails: Normal without clubbing or cyanosis  -- Inspection/palpation of joints, bones, and muscles: Normal, ROM normal    Skin - Skin and subcutaneous tissue: Normal without rashes or lesions  Skin is warm and well perfused, normal turgor  Neurologic - Cranial nerves: II - XII intact  -- Speech: Normal    Psychiatric - Orientation to person, place, and time: Normal -- Mood and affect: Normal       Signatures   Electronically signed by : Toro Ross DO; Nov 27 2017  1:21PM EST                       (Author)

## 2017-12-15 ENCOUNTER — GENERIC CONVERSION - ENCOUNTER (OUTPATIENT)
Dept: OTHER | Facility: OTHER | Age: 72
End: 2017-12-15

## 2017-12-15 ENCOUNTER — HOSPITAL ENCOUNTER (OUTPATIENT)
Dept: MAMMOGRAPHY | Facility: HOSPITAL | Age: 72
Discharge: HOME | End: 2017-12-15
Payer: MEDICARE

## 2017-12-15 DIAGNOSIS — Z12.31 ENCOUNTER FOR SCREENING MAMMOGRAM FOR MALIGNANT NEOPLASM OF BREAST: ICD-10-CM

## 2017-12-15 PROCEDURE — 77063 BREAST TOMOSYNTHESIS BI: CPT

## 2017-12-15 PROCEDURE — G0202 SCR MAMMO BI INCL CAD: HCPCS

## 2018-01-12 VITALS
SYSTOLIC BLOOD PRESSURE: 120 MMHG | DIASTOLIC BLOOD PRESSURE: 80 MMHG | HEIGHT: 65 IN | WEIGHT: 180.19 LBS | HEART RATE: 76 BPM | TEMPERATURE: 97.8 F | BODY MASS INDEX: 30.02 KG/M2 | OXYGEN SATURATION: 98 % | RESPIRATION RATE: 18 BRPM

## 2018-01-12 VITALS
OXYGEN SATURATION: 98 % | BODY MASS INDEX: 29.51 KG/M2 | HEART RATE: 72 BPM | DIASTOLIC BLOOD PRESSURE: 78 MMHG | HEIGHT: 65 IN | WEIGHT: 177.13 LBS | RESPIRATION RATE: 18 BRPM | TEMPERATURE: 98.5 F | SYSTOLIC BLOOD PRESSURE: 116 MMHG

## 2018-01-14 VITALS
SYSTOLIC BLOOD PRESSURE: 144 MMHG | BODY MASS INDEX: 29.7 KG/M2 | WEIGHT: 178.25 LBS | OXYGEN SATURATION: 95 % | DIASTOLIC BLOOD PRESSURE: 82 MMHG | HEART RATE: 79 BPM | RESPIRATION RATE: 18 BRPM | TEMPERATURE: 97.4 F | HEIGHT: 65 IN

## 2018-01-14 VITALS
DIASTOLIC BLOOD PRESSURE: 88 MMHG | BODY MASS INDEX: 31.16 KG/M2 | HEIGHT: 65 IN | WEIGHT: 187 LBS | SYSTOLIC BLOOD PRESSURE: 184 MMHG | HEART RATE: 76 BPM

## 2018-01-16 NOTE — PROGRESS NOTES
Assessment    1  Encounter for preventive health examination (V70 0) (Z00 00)   2  Type 2 diabetes mellitus (250 00) (E11 9)   3  Screening for breast cancer (V76 10) (Z12 39)   4  Osteopenia of multiple sites (733 90) (M85 89)    Plan  Benign essential hypertension    · Losartan Potassium-HCTZ 100-25 MG Oral Tablet  PMH: History of osteopenia, Osteopenia of multiple sites    · * DXA BONE DENSITY SPINE HIP AND PELVIS; Status:Hold For - Scheduling; Requested  for:17Bcn9394;   Screening for breast cancer    · MAMMO SCREENING BILATERAL W 3D & CAD; Status:Hold For - Scheduling; Requested  for:05Njl6173;   Situational mixed anxiety and depressive disorder    · ALPRAZolam 0 5 MG Oral Tablet  Type 2 diabetes mellitus    · (1) CBC/ PLT (NO DIFF); Status:Active; Requested for:63Xhy3324;    · (1) COMPREHENSIVE METABOLIC PANEL; Status:Active; Requested for:45Dbg5427;    · (1) HEMOGLOBIN A1C; Status:Active; Requested for:03Ldh6929;    · (1) LIPID PANEL, FASTING; Status:Active; Requested for:88Vjn1802;    · (LC) Microalbumin, Random Urine; Status:Active; Requested for:45Mtb7703;     Discussion/Summary    HEALTH MAINTENANCE  -Patient was counseled on age appropriate vaccinations and she is up to date on pneumovax and adacel    -Patient was counseled on age appropriate cancer screening guidelines: she is up to date on colon and cervical cancer screening  She is due for a mammogram and this was ordered today   -She reports good control of her diabetes and we will check fasting labs   -She is up to date on her eye exam and screen for glaucoma  -She is not due for abdominal aortic aneurysm   She is due for repeat bone density screening, DEXA scan ordered  -She was counseled on healthy diet, daily exercise and is a non smoker  -She was encouraged to contact bereavement counselor through [facility] and will monitor on fu  -She will be monitored off antihypertensive therapy to assess for improvement in BP  -We will need to discuss advance directive at a later date: hold off at present per patient request given recent loss of spouse  Impression: Subsequent Annual Wellness Visit, with preventive exam as well as age and risk appropriate counseling completed  Chief Complaint  medicare wellness subsequent      History of Present Illness  Shanti Perez is here for her medicare wellness visit  Patient was given a health risk assessment questionnaire to complete  Her patient care team was reviewed, she currently sees no specialists  She has no symptoms of depression except a normal grief reaction from the passing of her  two months ago  She has moderate symptoms of feeling low but is seeing a grief counselor  She reports no issues with pain, has help at home and she is independent with her ADLs  She rates her health as good  She reports full functional ability and is stable at home  She has no hearing difficulty  She denies any change in vision  She has had a normal EKG in the past   She states she has no cognitive impairment  She has had no falls  She states her glycemic control and BP control has been good  She notes some blotches on her arm and thinks it may be from the medication  No other concerns  The patient is being seen for the subsequent annual wellness visit  Co-Managers and Medical Equipment/Suppliers: See Patient Care Team      Patient Care Team    Care Team Member Role Specialty Office Number   Laureen Guzmangriceldakyrie [facility] Specialist Cardiology [phone]   Spencer HOLLEY  Specialist Orthopedic Surgery [phone]   Ilana Merritt MD  Family Medicine [phone]   Darci Simon      [employer]   [phone]   [address]  Ophthalmology [phone] [address] [facility] [phone]   [facility] [phone]     Review of Systems    Constitutional: negative  Eyes: negative  ENT: negative  Cardiovascular: negative  Respiratory: negative  Gastrointestinal: negative  Genitourinary: negative  Musculoskeletal: negative  Integumentary and Breasts: a rash  Neurological: negative  Psychiatric: negative  Endocrine: negative  Hematologic and Lymphatic: negative  Active Problems    1  Benign essential hypertension (401 1) (I10)   2  Esophageal reflux (530 81) (K21 9)   3  Hypercholesterolemia (272 0) (E78 0)   4  Obesity (278 00) (E66 9)   5  Osteopenia of multiple sites (733 90) (M85 89)   6  Situational mixed anxiety and depressive disorder (309 28) (F43 23)   7  Type 2 diabetes mellitus (250 00) (E11 9)    Past Medical History    1  History of Abnormal electrocardiogram (794 31) (R94 31)   2  History of Complete tear of right rotator cuff (727 61) (M75 121)   3  History of anemia (V12 3) (Z86 2)   4  History of rosacea (V13 3) (Z87 2)   5  History of Poliomyelitis (V12 02)   6  History of Vitamin D deficiency (268 9) (E55 9)    The active problems and past medical history were reviewed and updated today  Surgical History    1  History of Cataract Surgery   2  History of Hysterectomy   3  Denied: History of Leg Repair    The surgical history was reviewed and updated today  Family History  Sister    1  Family history of Breast Cancer (V16 3)   2  Family history of Heart Disease (V17 49)  Family History    3  Family history of Heart Disease (V17 49)   4  Family history of Sudden/Instantaneous Cardiac Death (V17 41)    The family history was reviewed and updated today  Social History    · Denied: History of Alcohol Use (History)   · Denied: History of Drug Use   · Marital History - Currently    · Never A Smoker   · No advance directive on file (V44 89) (Z78 9)  The social history was reviewed and updated today  Current Meds   1  Accu-Chek Krysten Device; USE AS DIRECTED; Therapy: 04SGQ8791 to (Evaluate:21Jun2016)  Requested for: 20Jun2016; Last   Rx:20Jun2016 Ordered   2   Accu-Chek Krysten In Vitro Strip; USE TO TEST BLOOD SUGAR TWICE DAILY AS   DIRECTED; Therapy: 42CPN5756 to (Last Rx:24Jun2016)  Requested for: 26CSL3998 Ordered   3  Accu-Chek Multiclix Lancets Miscellaneous; USE TO TEST BLOOD SUGAR TWICE   DAILY; Therapy: 46UEI5224 to (Last Rx:20Jun2016)  Requested for: 20Jun2016 Ordered   4  ALPRAZolam 0 5 MG Oral Tablet; TAKE 1 TABLET 3 TIMES DAILY AS NEEDED; Therapy: 44YWF2311 to (Evaluate:97Gke2675); Last Rx:21Bgx8813 Ordered   5  Calcium + D3 600-200 MG-UNIT Oral Tablet; TAKE 1 TABLET DAILY; Therapy: 83TZL9333 to (Emmanuel Drop)  Requested for: 13BBZ4875; Last   Rx:11Jun2015 Ordered   6  Losartan Potassium-HCTZ 100-25 MG Oral Tablet; TAKE 1 TABLET DAILY; Therapy: 52JEF1666 to (Sidonie First)  Requested for: 84Qag6847; Last   Rx:22Rfs4835 Ordered   7  MetFORMIN HCl - 500 MG Oral Tablet; TAKE 1 TABLET EVERY 12 HOURS WITH FOOD; Therapy: 83IWB0423 to (Evaluate:34Sfa1245)  Requested for: 90MWQ6495; Last   Rx:99Sen0554 Ordered    The medication list was reviewed and updated today  Allergies    1  MetFORMIN HCl TABS   2  Lisinopril TABS    Immunizations  Influenza --- Anderson Boas: Temporarily Deferred: Pt requests deferral, Pt refuses; Magaly Kail: Temporarily  Deferred: Pt requests deferral, Pt refuses   PPSV --- Anderson Boas: Temporarily Deferred: Pt refuses;  Series2: 28-Mar-2014   Tdap --- Anderson Boas: 2012   Zoster --- Anderson Boas: Temporarily Deferred: Pt refuses     Vitals  Signs   Recorded: 66DWF3236 31:02TZ   Systolic: 290  Diastolic: 78  Heart Rate: 81  Respiration: 18  Temperature: 98 F  O2 Saturation: 98  Height: 5 ft 4 5 in  Weight: 174 lb   BMI Calculated: 29 41  BSA Calculated: 1 85    Signatures   Electronically signed by : Verner Blackwater, MD; Aug 24 2016 10:00AM EST                       (Author)

## 2018-01-17 NOTE — MISCELLANEOUS
Chief Complaint  Chief Complaint Free Text Note Form: [facility] patient transfer out denies [facility]   Chief Complaint Chronic Condition [facility]: The patient is here for an emergency room follow-up  History of Present Illness  TCM Communication [facility]: The patient is being contacted for follow-up after hospitalization and [facility] records were reviewed  She was hospitalized at [facility]  The date of admission: 09/27/2017, date of discharge: 09/29/2017  Diagnosis: chest pain  She was discharged to home  Medications reviewed and updated today  She refused a follow up appointment due to transfered out  Follow-up appointments with other specialists: Pennie Mullins DO Follow up in 1 week(s)  Wiliam Berumen DO  Counseling was provided to the patient  Topics counseled included diagnostic results, instructions for management, risk factor reductions and activities of daily living  Communication performed and completed by Taisha Wolf      Active Problems    1  Benign essential hypertension (401 1) (I10)   2  Chest pain (786 50) (R07 9)   3  Encounter for diabetic foot exam (250 00) (E11 9)   4  H/O non-ST elevation myocardial infarction (NSTEMI) (412) (I25 2)   5  Hypercholesterolemia (272 0) (E78 00)   6  Hypertension (401 9) (I10)   7  Left arm swelling (729 81) (M79 89)   8  Mixed incontinence urge and stress (788 33) (N39 46)   9  Osteopenia of multiple sites (733 90) (M85 89)   10  Rosacea (695 3) (L71 9)   11  Screening for neurological condition (V80 09) (Z13 89)   12  Supraventricular tachycardia (427 89) (I47 1)   13  Type 2 diabetes mellitus (250 00) (E11 9)    Past Medical History    1  History of Abnormal electrocardiogram (794 31) (R94 31)   2  History of Complete tear of right rotator cuff (727 61) (M75 121)   3  History of esophageal reflux (V12 79) (Z87 19)   4  History of Poliomyelitis (V12 02)   5  History of Severe depression (311) (F32 2)   6   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)   7  History of Vitamin D deficiency (268 9) (E55 9)    Surgical History    1  History of Cataract Surgery   2  History of Hysterectomy   3  Denied: History of Leg Repair    Family History  Sister    1  Family history of Breast Cancer (V16 3)   2  Family history of Heart Disease (V17 49)  Family History    3  Family history of Heart Disease (V17 49)   4  Family history of Sudden/Instantaneous Cardiac Death (V17 41)    Social History    · Advance directive indicates patient wish for do-not-resuscitate status (V49 86) (Z68)   · Denied: History of Alcohol Use (History)   · Always uses seat belt   · Daily caffeine consumption   · Denied: History of Drug Use   · Exercises daily (V49 89) (Z78 9)   · Has advance care plan   · Marital History - Currently    · Never A Smoker   · No advance directive on file (V49 89) (Z78 9)   · No alcohol use   · No drug use   · Patient has living will (V49 89) (Z78 9)   · Tobacco non-user (V49 89) (Z78 9)   · Under stress   ·     Current Meds   1  Accu-Chek Krysten Device; USE AS DIRECTED; Therapy: 43FOP3398 to (Evaluate:21Jun2016)  Requested for: 20Jun2016; Last   Rx:20Jun2016 Ordered   2  Accu-Chek Krysten STRP; USE TO TEST BLOOD SUGAR TWICE DAILY AS DIRECTED; Therapy: 48BYQ1056 to (Last Rx:24Jun2016)  Requested for: 77VYG0084 Ordered   3  Accu-Chek Multiclix Lancets Miscellaneous; USE TO TEST BLOOD SUGAR TWICE   DAILY; Therapy: 20Jun2016 to (Last Rx:74Smu5467)  Requested for: 07QFU2509 Ordered   4  AmLODIPine Besylate 5 MG Oral Tablet; TAKE 1 TABLET DAILY AS DIRECTED; Therapy: (Peter Castles) to Recorded   5  Aspirin 81 MG Oral Tablet Chewable; CHEW AND SWALLOW 1 TABLET DAILY; Therapy: (Alvy Castles) to Recorded   6  Atorvastatin Calcium 40 MG Oral Tablet; TAKE 1 TABLET DAILY; Therapy: (Alvy Castles) to Recorded   7  MetFORMIN HCl - 500 MG Oral Tablet; TAKE 1 TABLET DAILY WITH FOOD;    Therapy: (Yasmeen Marking) to Recorded   8  Metoprolol Tartrate 25 MG Oral Tablet; take 0 5mg tab q 12 hrs; Therapy: (Recorded:65Zyi8029) to Recorded    Allergies    1  Lisinopril TABS  Denied    2  MetFORMIN HCl TABS    Message   Recorded as Task   Date: 09/29/2017 02:08 PM, Created By: System   Task Name: [facility] NICOLA   Assigned To:  Linda Reddy   Regarding Patient: Farrukh Morales, Status: Active   Comment:    System - 29 Sep 2017 2:08 PM     Patient discharged from hospital   Patient Name: Sylvia Valerio  Patient YOB: 1945  Discharge Date: 9/29/2017  Facility: [facility]     Signatures   Electronically signed by : Brisa Fraire, ; Oct  4 2017  7:59PM EST                       (Author)    Electronically signed by : HITESH Chawla ; Oct  4 2017 10:08PM EST                       (Co-author)

## 2018-03-19 RX ORDER — ROSUVASTATIN CALCIUM 5 MG/1
5 TABLET, COATED ORAL DAILY
COMMUNITY
Start: 2017-11-27 | End: 2018-05-27

## 2018-03-19 RX ORDER — UBIDECARENONE 100 MG
CAPSULE ORAL
COMMUNITY
End: 2018-04-30

## 2018-03-19 RX ORDER — BLOOD SUGAR DIAGNOSTIC
STRIP MISCELLANEOUS
Refills: 3 | COMMUNITY
Start: 2018-02-06

## 2018-03-23 ENCOUNTER — OFFICE VISIT (OUTPATIENT)
Dept: CARDIOLOGY CLINIC | Facility: HOSPITAL | Age: 73
End: 2018-03-23
Payer: MEDICARE

## 2018-03-23 VITALS
DIASTOLIC BLOOD PRESSURE: 68 MMHG | SYSTOLIC BLOOD PRESSURE: 130 MMHG | WEIGHT: 181.8 LBS | HEIGHT: 63 IN | BODY MASS INDEX: 32.21 KG/M2 | HEART RATE: 62 BPM

## 2018-03-23 DIAGNOSIS — I47.1 PAROXYSMAL SVT (SUPRAVENTRICULAR TACHYCARDIA) (HCC): ICD-10-CM

## 2018-03-23 DIAGNOSIS — I25.119 CORONARY ARTERY DISEASE INVOLVING NATIVE CORONARY ARTERY OF NATIVE HEART WITH ANGINA PECTORIS (HCC): ICD-10-CM

## 2018-03-23 DIAGNOSIS — I10 ESSENTIAL HYPERTENSION: Primary | ICD-10-CM

## 2018-03-23 PROBLEM — I47.10 PAROXYSMAL SVT (SUPRAVENTRICULAR TACHYCARDIA): Status: ACTIVE | Noted: 2018-03-23

## 2018-03-23 PROCEDURE — 93000 ELECTROCARDIOGRAM COMPLETE: CPT | Performed by: INTERNAL MEDICINE

## 2018-03-23 PROCEDURE — 99214 OFFICE O/P EST MOD 30 MIN: CPT | Performed by: INTERNAL MEDICINE

## 2018-03-23 RX ORDER — LOSARTAN POTASSIUM 50 MG/1
50 TABLET ORAL DAILY
Qty: 90 TABLET | Refills: 3 | Status: SHIPPED | OUTPATIENT
Start: 2018-03-23 | End: 2018-05-27

## 2018-03-23 NOTE — PROGRESS NOTES
Cardiology Follow Up    Terence June [birth year]  [id number]  [address] [id number]    1  Essential hypertension  POCT ECG    losartan (COZAAR) 50 mg tablet   2  Coronary artery disease involving native coronary artery of native heart with angina pectoris ([facility]ly [facility])  Lipid panel    Comprehensive metabolic panel   3  Paroxysmal SVT (supraventricular tachycardia) ([facility])         Discussion/Summary:  Overall she is doing well  Coronary artery disease stable with no complaints of angina  She exercises on a routine basis and is doing well with good functional capacity  Blood pressure has been high I have increased losartan to 50 mg a day  She is due for basic blood work to check her kidney and liver function as well as a fasting lipid profile  She has had trouble with the statins before will increased statin to 3 days a week  Interval History:  Routine follow-up visit  Denies any chest pain, shortness of breath, palpitations, lightheadedness, dizziness, or syncope  She goes swimming and exercises on a regular basis with a good functional capacity no limitations  There has been no lower extremity edema, PND, orthopnea  She had a history of supraventricular tachycardia which has not come back      Problem List     Chest pain    Tachycardia    Hypertension    Diabetes mellitus ([facility])    NSTEMI (non-ST elevated myocardial infarction) ([facility] )    Coronary artery disease involving native coronary artery of native heart with angina pectoris ([facility])    Paroxysmal SVT (supraventricular tachycardia) ([facility])        Past Medical History:   Diagnosis Date    Abnormal stress test     Angina pectoris ([facility] )     1 wk ago    Diabetes mellitus ([facility] )     niddm    History of palpitations     History of poliomyelitis     Hyperlipidemia     Hypertension     Hypothyroid     Rosacea     Wears glasses      Social History     Social History    Marital status:      Spouse name: N/A    Number of children: N/A    Years of education: N/A     Occupational History    Not on file  Social History Main Topics    Smoking status: Never Smoker    Smokeless tobacco: Never Used    Alcohol use No    Drug use: No    Sexual activity: Not on file     Other Topics Concern    Not on file     Social History Narrative    No narrative on file      No family history on file    Past Surgical History:   Procedure Laterality Date    CATARACT EXTRACTION Bilateral     COLONOSCOPY      HYSTERECTOMY      ROTATOR CUFF REPAIR Right        Current Outpatient Prescriptions:     ACCU-CHEK KAILEY PLUS test strip, , Disp: , Rfl: 3    aspirin 81 mg chewable tablet, Chew 1 tablet daily, Disp: 30 tablet, Rfl: 0    Blood Glucose Monitoring Suppl (ACCU-CHEK KAILEY CONNECT) w/Device KIT, by Does not apply route, Disp: , Rfl:     glucose blood (ACCU-CHEK KAILEY PLUS) test strip, by In Vitro route, Disp: , Rfl:     Lancets Misc  (ACCU-CHEK MULTICLIX LANCET DEV) KIT, by Does not apply route 2 (two) times a day, Disp: , Rfl:     metFORMIN (GLUCOPHAGE) 500 mg tablet, Take 500 mg by mouth daily with breakfast, Disp: , Rfl:     metoprolol tartrate (LOPRESSOR) 25 mg tablet, Take 0 5 tablets by mouth every 12 (twelve) hours (Patient taking differently: Take 25 mg by mouth every 12 (twelve) hours  ), Disp: 60 tablet, Rfl: 0    rosuvastatin (CRESTOR) 5 mg tablet, Take by mouth, Disp: , Rfl:     Thyroid (NATURE-THROID PO), Take 90 mg by mouth daily  , Disp: , Rfl:     co-enzyme Q-10 100 mg capsule, Take by mouth, Disp: , Rfl:     losartan (COZAAR) 50 mg tablet, Take 1 tablet (50 mg total) by mouth daily, Disp: 90 tablet, Rfl: 3  Allergies   Allergen Reactions    Lisinopril Rash     Changed pigment of my skin     Atorvastatin Rash       Labs:     Chemistry        Component Value Date/Time     09/28/2017 0425     12/08/2015 1014    K 3 7 09/28/2017 0425    K 4 4 12/08/2015 1014     09/28/2017 0425     12/08/2015 1014    CO2 26 09/28/2017 0425    CO2 30 9 12/08/2015 1014    BUN 22 09/28/2017 0425    BUN 20 12/08/2015 1014    CREATININE 0 61 09/28/2017 0425    CREATININE 0 67 12/08/2015 1014        Component Value Date/Time    CALCIUM 9 0 09/28/2017 0425    CALCIUM 8 9 12/08/2015 1014    ALKPHOS 97 09/27/2017 2101    ALKPHOS 111 12/08/2015 1014    AST 13 09/27/2017 2101    AST 20 12/08/2015 1014    ALT 23 09/27/2017 2101    ALT 25 12/08/2015 1014    BILITOT 0 20 09/27/2017 2101    BILITOT 0 48 12/08/2015 1014            Lab Results   Component Value Date    CHOL 255 (H) 06/26/2017    CHOL 246 (H) 03/27/2017    CHOL 264 (H) 11/22/2016     Lab Results   Component Value Date    HDL 51 06/26/2017    HDL 49 03/27/2017    HDL 62 (H) 11/22/2016     Lab Results   Component Value Date    LDLCALC 158 (H) 06/26/2017    LDLCALC 153 (H) 03/27/2017    LDLCALC 157 (H) 11/22/2016     Lab Results   Component Value Date    TRIG 231 (H) 06/26/2017    TRIG 220 (H) 03/27/2017    TRIG 225 (H) 11/22/2016     No components found for: CHOLHDL    Imaging: No results found  ECG:  Normal sinus rhythm right axis deviation      ROS    Vitals:    03/23/18 1353   BP: 130/68   Pulse: 62     Vitals:    03/23/18 1353   Weight: 82 5 kg (181 lb 12 8 oz)     Height: 5' 3" (160 cm)   Body mass index is 32 2 kg/m²      Physical Exam:  Vital signs reviewed home blood pressures are high  General appearance:  Appears stated age, alert, well appearing and in no distress  HEENT:  PERRLA, EOMI, no scleral icterus, no conjunctival pallor  NECK:  Supple, No elevated JVP, no thyromegaly, no carotid bruits  HEART:  Regular rate and rhythm, normal S1/S2, no S3/S4, no murmur or rub  LUNGS:  Clear to auscultation bilaterally, no wheezes rales or rhonchi  ABDOMEN:  Soft, non-tender, positive bowel sounds, no rebound or guarding, no organomegaly   EXTREMITIES:  No edema, normal range of motion  VASCULAR:  Normal pedal pulses, good pulse volume   SKIN: No lesions or rashes on exposed skin  NEURO:  CN II-XII intact, no focal deficits

## 2018-04-19 ENCOUNTER — HOSPITAL ENCOUNTER (EMERGENCY)
Facility: HOSPITAL | Age: 73
Discharge: HOME | End: 2018-04-19
Attending: EMERGENCY MEDICINE | Admitting: EMERGENCY MEDICINE
Payer: MEDICARE

## 2018-04-19 VITALS
HEART RATE: 59 BPM | RESPIRATION RATE: 20 BRPM | SYSTOLIC BLOOD PRESSURE: 116 MMHG | OXYGEN SATURATION: 98 % | WEIGHT: 183.86 LBS | DIASTOLIC BLOOD PRESSURE: 71 MMHG | BODY MASS INDEX: 32.57 KG/M2 | TEMPERATURE: 98.4 F

## 2018-04-19 DIAGNOSIS — I10 HTN (HYPERTENSION): Primary | ICD-10-CM

## 2018-04-19 LAB
ALBUMIN SERPL BCP-MCNC: 3.5 G/DL (ref 3.5–5)
ALP SERPL-CCNC: 110 U/L (ref 46–116)
ALT SERPL W P-5'-P-CCNC: 27 U/L (ref 12–78)
ANION GAP SERPL CALCULATED.3IONS-SCNC: 11 MMOL/L (ref 4–13)
AST SERPL W P-5'-P-CCNC: 19 U/L (ref 5–45)
BASOPHILS # BLD AUTO: 0.03 THOUSANDS/ΜL (ref 0–0.1)
BASOPHILS NFR BLD AUTO: 0 % (ref 0–1)
BILIRUB SERPL-MCNC: 0.4 MG/DL (ref 0.2–1)
BUN SERPL-MCNC: 19 MG/DL (ref 5–25)
CALCIUM SERPL-MCNC: 9.3 MG/DL (ref 8.3–10.1)
CHLORIDE SERPL-SCNC: 102 MMOL/L (ref 100–108)
CO2 SERPL-SCNC: 26 MMOL/L (ref 21–32)
CREAT SERPL-MCNC: 0.81 MG/DL (ref 0.6–1.3)
EOSINOPHIL # BLD AUTO: 0.17 THOUSAND/ΜL (ref 0–0.61)
EOSINOPHIL NFR BLD AUTO: 2 % (ref 0–6)
ERYTHROCYTE [DISTWIDTH] IN BLOOD BY AUTOMATED COUNT: 12.7 % (ref 11.6–15.1)
GFR SERPL CREATININE-BSD FRML MDRD: 72 ML/MIN/1.73SQ M
GLUCOSE SERPL-MCNC: 215 MG/DL (ref 65–140)
HCT VFR BLD AUTO: 43.8 % (ref 34.8–46.1)
HGB BLD-MCNC: 14.9 G/DL (ref 11.5–15.4)
LYMPHOCYTES # BLD AUTO: 1.68 THOUSANDS/ΜL (ref 0.6–4.47)
LYMPHOCYTES NFR BLD AUTO: 20 % (ref 14–44)
MAGNESIUM SERPL-MCNC: 1.9 MG/DL (ref 1.6–2.6)
MCH RBC QN AUTO: 30.7 PG (ref 26.8–34.3)
MCHC RBC AUTO-ENTMCNC: 34 G/DL (ref 31.4–37.4)
MCV RBC AUTO: 90 FL (ref 82–98)
MONOCYTES # BLD AUTO: 0.37 THOUSAND/ΜL (ref 0.17–1.22)
MONOCYTES NFR BLD AUTO: 4 % (ref 4–12)
NEUTROPHILS # BLD AUTO: 6.09 THOUSANDS/ΜL (ref 1.85–7.62)
NEUTS SEG NFR BLD AUTO: 74 % (ref 43–75)
PLATELET # BLD AUTO: 223 THOUSANDS/UL (ref 149–390)
PMV BLD AUTO: 10.9 FL (ref 8.9–12.7)
POTASSIUM SERPL-SCNC: 4 MMOL/L (ref 3.5–5.3)
PROT SERPL-MCNC: 7.4 G/DL (ref 6.4–8.2)
RBC # BLD AUTO: 4.85 MILLION/UL (ref 3.81–5.12)
SODIUM SERPL-SCNC: 139 MMOL/L (ref 136–145)
TROPONIN I SERPL-MCNC: <0.02 NG/ML
TSH SERPL DL<=0.05 MIU/L-ACNC: 2.75 UIU/ML (ref 0.36–3.74)
WBC # BLD AUTO: 8.34 THOUSAND/UL (ref 4.31–10.16)

## 2018-04-19 PROCEDURE — 84443 ASSAY THYROID STIM HORMONE: CPT | Performed by: EMERGENCY MEDICINE

## 2018-04-19 PROCEDURE — 36415 COLL VENOUS BLD VENIPUNCTURE: CPT | Performed by: EMERGENCY MEDICINE

## 2018-04-19 PROCEDURE — 96376 TX/PRO/DX INJ SAME DRUG ADON: CPT

## 2018-04-19 PROCEDURE — 83735 ASSAY OF MAGNESIUM: CPT | Performed by: EMERGENCY MEDICINE

## 2018-04-19 PROCEDURE — 85025 COMPLETE CBC W/AUTO DIFF WBC: CPT | Performed by: EMERGENCY MEDICINE

## 2018-04-19 PROCEDURE — 80053 COMPREHEN METABOLIC PANEL: CPT | Performed by: EMERGENCY MEDICINE

## 2018-04-19 PROCEDURE — 96374 THER/PROPH/DIAG INJ IV PUSH: CPT

## 2018-04-19 PROCEDURE — 93005 ELECTROCARDIOGRAM TRACING: CPT

## 2018-04-19 PROCEDURE — 96361 HYDRATE IV INFUSION ADD-ON: CPT

## 2018-04-19 PROCEDURE — 99283 EMERGENCY DEPT VISIT LOW MDM: CPT

## 2018-04-19 PROCEDURE — 84484 ASSAY OF TROPONIN QUANT: CPT | Performed by: EMERGENCY MEDICINE

## 2018-04-19 RX ORDER — HYDRALAZINE HYDROCHLORIDE 20 MG/ML
5 INJECTION INTRAMUSCULAR; INTRAVENOUS ONCE
Status: COMPLETED | OUTPATIENT
Start: 2018-04-19 | End: 2018-04-19

## 2018-04-19 RX ORDER — HYDRALAZINE HYDROCHLORIDE 20 MG/ML
5 INJECTION INTRAMUSCULAR; INTRAVENOUS ONCE
Status: DISCONTINUED | OUTPATIENT
Start: 2018-04-19 | End: 2018-04-19

## 2018-04-19 RX ORDER — LOSARTAN POTASSIUM 50 MG/1
50 TABLET ORAL 2 TIMES DAILY
Qty: 40 TABLET | Refills: 0 | Status: SHIPPED | OUTPATIENT
Start: 2018-04-19 | End: 2019-03-01 | Stop reason: SDUPTHER

## 2018-04-19 RX ADMIN — SODIUM CHLORIDE 1000 ML: 0.9 INJECTION, SOLUTION INTRAVENOUS at 09:10

## 2018-04-19 RX ADMIN — HYDRALAZINE HYDROCHLORIDE 5 MG: 20 INJECTION INTRAMUSCULAR; INTRAVENOUS at 09:11

## 2018-04-19 RX ADMIN — HYDRALAZINE HYDROCHLORIDE 5 MG: 20 INJECTION INTRAMUSCULAR; INTRAVENOUS at 10:19

## 2018-04-19 NOTE — ED PROVIDER NOTES
History  Chief Complaint   Patient presents with    High Blood Pressure     Monitoring blood pressures since 3/23 when saw cardiologist  Medication changes were made but blood pressures are getting higher  denies any symptoms     Patient is a 69-year-old female with a history of high blood pressure coming in today for worsening blood pressure  Patient has a history of high blood pressure, SVT, diabetes, coronary artery disease (triple vessel disease amenable to medical therapy), hypothyroidism coming in today with worsening blood pressure  Patient states she saw her cardiologist on March 23rd where he increased her losartan from 25 mg to 50 mg once a day  Patient states she has been compliant with her medications, no changes to her medications, diet or exercise  She has had no headaches, visual changes, nausea, vomiting, diarrhea  She has no chest pain, shortness of breath, palpitations, lower extremity edema  Patient had brought her paper where she keeps  her blood pressure documented  Blood pressures range from 670-829 systolic           History provided by:  Patient and caregiver   used: No    Hypertension   Severity:  Moderate  Onset quality:  Gradual  Timing:  Constant  Progression:  Worsening  Chronicity:  Recurrent  Context: normal sodium, not caffeine, not drug abuse, not herbal remedies, not noncompliance, not OTC medications used and not stress    Relieved by:  None tried  Worsened by:  Nothing  Ineffective treatments:  None tried  Associated symptoms: no abdominal pain, no anxiety, no blurred vision, no chest pain, no confusion, no dizziness, no ear pain, no epistaxis, no fatigue, no fever, no headaches, no hematuria, no hypokalemia, no loss of consciousness, no nausea, no neck pain, no palpitations, no peripheral edema, no shortness of breath, no syncope, no tinnitus, not vomiting and no weakness    Risk factors: diabetes    Risk factors: no alcohol use, no cardiac disease, no cocaine use, no decongestant use, no family history of hypertension, no kidney disease, no obesity, no prior aneurysm, no prior stroke, no PVD and no tobacco use        Prior to Admission Medications   Prescriptions Last Dose Informant Patient Reported? Taking? ACCU-CHEK KAILEY PLUS test strip   Yes No   Blood Glucose Monitoring Suppl (ACCU-CHEK KAILEY CONNECT) w/Device KIT   Yes No   Sig: by Does not apply route   Lancets Misc  (ACCU-CHEK MULTICLIX LANCET DEV) KIT   Yes No   Sig: by Does not apply route 2 (two) times a day   Thyroid (NATURE-THROID PO)  Self Yes No   Sig: Take 90 mg by mouth daily     aspirin 81 mg chewable tablet   No No   Sig: Chew 1 tablet daily   co-enzyme Q-10 100 mg capsule   Yes No   Sig: Take by mouth   glucose blood (ACCU-CHEK KAILEY PLUS) test strip   Yes No   Sig: by In Vitro route   losartan (COZAAR) 50 mg tablet   No No   Sig: Take 1 tablet (50 mg total) by mouth daily   metFORMIN (GLUCOPHAGE) 500 mg tablet   Yes No   Sig: Take 500 mg by mouth daily with breakfast   metoprolol tartrate (LOPRESSOR) 25 mg tablet  Self No No   Sig: Take 0 5 tablets by mouth every 12 (twelve) hours   Patient taking differently: Take 25 mg by mouth every 12 (twelve) hours     rosuvastatin (CRESTOR) 5 mg tablet   Yes No   Sig: Take by mouth      Facility-Administered Medications: None       Past Medical History:   Diagnosis Date    Abnormal stress test     Angina pectoris ([facility])     1 wk ago    Diabetes mellitus ([facility])     niddm    History of palpitations     History of poliomyelitis     Hyperlipidemia     Hypertension     Hypothyroid     Rosacea     Wears glasses        Past Surgical History:   Procedure Laterality Date    CATARACT EXTRACTION Bilateral     COLONOSCOPY      HYSTERECTOMY      ROTATOR CUFF REPAIR Right        History reviewed  No pertinent family history  I have reviewed and agree with the history as documented      Social History   Substance Use Topics    Smoking status: Never Smoker    Smokeless tobacco: Never Used    Alcohol use No        Review of Systems   Constitutional: Negative for fatigue and fever  HENT: Negative for ear pain, nosebleeds and tinnitus  Eyes: Negative for blurred vision  Respiratory: Negative for shortness of breath  Cardiovascular: Negative for chest pain, palpitations and syncope  Gastrointestinal: Negative for abdominal pain, nausea and vomiting  Genitourinary: Negative for hematuria  Musculoskeletal: Negative for neck pain  Neurological: Negative for dizziness, loss of consciousness, weakness and headaches  Psychiatric/Behavioral: Negative for confusion  The patient is not nervous/anxious  Physical Exam  ED Triage Vitals   Temperature Pulse Respirations Blood Pressure SpO2   04/19/18 0849 04/19/18 0849 04/19/18 0849 04/19/18 0849 04/19/18 0849   98 4 °F (36 9 °C) 78 18 (!) 240/119 98 %      Temp Source Heart Rate Source Patient Position - Orthostatic VS BP Location FiO2 (%)   04/19/18 0849 04/19/18 0849 04/19/18 0915 04/19/18 0849 --   Temporal Monitor Lying Left arm       Pain Score       04/19/18 0849       No Pain           Orthostatic Vital Signs  Vitals:    04/19/18 0915 04/19/18 1000 04/19/18 1015 04/19/18 1052   BP: (!) 186/110 (!) 195/116 (!) 208/91 116/71   Pulse: 59 64 61 59   Patient Position - Orthostatic VS: Lying Lying Lying Lying       Physical Exam   Constitutional: She is oriented to person, place, and time  She appears well-developed and well-nourished  No distress  HENT:   Head: Normocephalic and atraumatic  Mouth/Throat: Oropharynx is clear and moist    Patient maintaining airway maintaining secretions   Eyes: Conjunctivae and EOM are normal  Pupils are equal, round, and reactive to light  Neck: Normal range of motion  Neck supple  Cardiovascular: Normal rate, regular rhythm, normal heart sounds and intact distal pulses  No murmur heard    Pulmonary/Chest: Effort normal and breath sounds normal  No stridor  No respiratory distress  Abdominal: Soft  Bowel sounds are normal  She exhibits no distension  There is no tenderness  Musculoskeletal: Normal range of motion  She exhibits no edema  Right lower extremity atrophy due to polio   Neurological: She is alert and oriented to person, place, and time  She displays normal reflexes  No cranial nerve deficit or sensory deficit  She exhibits normal muscle tone  Coordination normal    Negative pronator drift   Skin: Skin is warm  Capillary refill takes less than 2 seconds  She is not diaphoretic  Psychiatric: She has a normal mood and affect  Her behavior is normal  Judgment and thought content normal    Nursing note and vitals reviewed  ED Medications  Medications   sodium chloride 0 9 % bolus 1,000 mL (0 mL Intravenous Stopped 4/19/18 1017)   hydrALAZINE (APRESOLINE) injection 5 mg (5 mg Intravenous Given 4/19/18 0911)   hydrALAZINE (APRESOLINE) injection 5 mg (5 mg Intravenous Given 4/19/18 1019)       Diagnostic Studies  Results Reviewed     Procedure Component Value Units Date/Time    TSH [[id number]]  (Normal) Collected:  04/19/18 0910    Lab Status:  Final result Specimen:  Blood from Arm, Right Updated:  04/19/18 1003     TSH 3RD GENERATON 2 755 uIU/mL     Narrative:         Patients undergoing fluorescein dye angiography may retain small amounts of fluorescein in the body for 48-72 hours post procedure  Samples containing fluorescein can produce falsely depressed TSH values  If the patient had this procedure,a specimen should be resubmitted post fluorescein clearance            The recommended reference ranges for TSH during pregnancy are as follows:  First trimester 0 1 to 2 5 uIU/mL  Second trimester  0 2 to 3 0 uIU/mL  Third trimester 0 3 to 3 0 uIU/m      Comprehensive metabolic panel [[id number]]  (Abnormal) Collected:  04/19/18 0909    Lab Status:  Final result Specimen:  Blood from Arm, Right Updated:  04/19/18 0948     Sodium 139 mmol/L Potassium 4 0 mmol/L      Chloride 102 mmol/L      CO2 26 mmol/L      Anion Gap 11 mmol/L      BUN 19 mg/dL      Creatinine 0 81 mg/dL      Glucose 215 (H) mg/dL      Calcium 9 3 mg/dL      AST 19 U/L      ALT 27 U/L      Alkaline Phosphatase 110 U/L      Total Protein 7 4 g/dL      Albumin 3 5 g/dL      Total Bilirubin 0 40 mg/dL      eGFR 72 ml/min/1 73sq m     Narrative:         National Kidney Disease Education Program recommendations are as follows:  GFR calculation is accurate only with a steady state creatinine  Chronic Kidney disease less than 60 ml/min/1 73 sq  meters  Kidney failure less than 15 ml/min/1 73 sq  meters  Magnesium [[id number]]  (Normal) Collected:  04/19/18 0909    Lab Status:  Final result Specimen:  Blood from Arm, Right Updated:  04/19/18 0948     Magnesium 1 9 mg/dL     Troponin I [[id number]]  (Normal) Collected:  04/19/18 0909    Lab Status:  Final result Specimen:  Blood from Arm, Right Updated:  04/19/18 0941     Troponin I <0 02 ng/mL     Narrative:         [employer] Chemistry analyzer 99% cutoff is > 0 04 ng/mL in network labs    o cTnI 99% cutoff is useful only when applied to patients in the clinical setting of myocardial ischemia  o cTnI 99% cutoff should be interpreted in the context of clinical history, ECG findings and possibly cardiac imaging to establish correct diagnosis  o cTnI 99% cutoff may be suggestive but clearly not indicative of a coronary event without the clinical setting of myocardial ischemia      CBC and differential [[id number]]  (Normal) Collected:  04/19/18 0909    Lab Status:  Final result Specimen:  Blood from Arm, Right Updated:  04/19/18 0917     WBC 8 34 Thousand/uL      RBC 4 85 Million/uL      Hemoglobin 14 9 g/dL      Hematocrit 43 8 %      MCV 90 fL      MCH 30 7 pg      MCHC 34 0 g/dL      RDW 12 7 %      MPV 10 9 fL      Platelets 000 Thousands/uL      Neutrophils Relative 74 %      Lymphocytes Relative 20 %      Monocytes Relative 4 % Eosinophils Relative 2 %      Basophils Relative 0 %      Neutrophils Absolute 6 09 Thousands/µL      Lymphocytes Absolute 1 68 Thousands/µL      Monocytes Absolute 0 37 Thousand/µL      Eosinophils Absolute 0 17 Thousand/µL      Basophils Absolute 0 03 Thousands/µL                  No orders to display              Procedures  Procedures       Phone Contacts  ED Phone Contact    ED Course  ED Course                                MDM  Number of Diagnoses or Management Options  HTN (hypertension):   Diagnosis management comments: Patient is a 79-year-old female coming in today with uncontrolled blood pressure at this time  Patient is neurologically intact, nontoxic appearing on my exam   Will check labs for end-organ damage as well as chest x-ray  Will provide IV fluids and IV hydralazine  Will slowly lower blood pressure  10:07 AM  Patient finished 500 cc bolus and did not wish for more IVF  Patient blood pressure is 195  Will give another 5 hydralazine and discussed with Cardiology  Labs stable  No evidence of end-organ damage  10:16 AM  Patient was given another 5 mg at Hydralazine given elevated Bp  Spoke with patient's cardiologist   Suggest increasing valsartan to 50 mg b i d  He wishes need a call PCP for closer follow-up  10:44 AM  Patient has appointment with her PCP tomorrow morning  Discussed with patient regarding her labs a my discussion with cardiologist   Patient did take her valsartan this morning  Patient appears more comfortable  She again denies headaches, visual changes, weakness throughout the bilateral upper and lower extremities  NIH 0     10:54 AM  Patient's blood pressure markedly improved  Will discharge home  Patient remains asymptomatic  Return to ER instructions given   AM: normal sinus rhythm at 70 beats per minute  Right axis  Intervals within normal limits and stable  QTC measured at 443 milliseconds  Nonspecific ST segment changes    Artifact present  Amount and/or Complexity of Data Reviewed  Clinical lab tests: ordered and reviewed  Tests in the medicine section of CPT®: ordered and reviewed      CritCare Time    Disposition  Final diagnoses:   HTN (hypertension)     Time reflects when diagnosis was documented in both MDM as applicable and the Disposition within this note     Time User Action Codes Description Comment    4/19/2018  9:21 AM Brittany Pelletier Add [I10] HTN (hypertension)       ED Disposition     ED Disposition Condition Comment    Discharge  Brittaniealeta Magaly discharge to home/self care  Condition at discharge: Stable        Follow-up Information     Follow up With Specialties Details Why Contact Info    Eric Trevizo DO Family Medicine Go in 1 day  [address]  [phone]          Discharge Medication List as of 4/19/2018 10:53 AM      START taking these medications    Details   !! losartan (COZAAR) 50 mg tablet Take 1 tablet (50 mg total) by mouth 2 (two) times a day for 20 days, Starting Thu 4/19/2018, Until Wed 5/9/2018, Print       !! - Potential duplicate medications found  Please discuss with provider  CONTINUE these medications which have NOT CHANGED    Details   ! ! ACCU-CHEK KAILEY PLUS test strip Historical Med      aspirin 81 mg chewable tablet Chew 1 tablet daily, Starting Sat 9/30/2017, Normal      Blood Glucose Monitoring Suppl (ACCU-CHEK KAILEY CONNECT) w/Device KIT by Does not apply route, Starting Mon 6/20/2016, Historical Med      co-enzyme Q-10 100 mg capsule Take by mouth, Historical Med      !! glucose blood (ACCU-CHEK KAILEY PLUS) test strip by In Vitro route, Starting Mon 6/20/2016, Historical Med      Lancets Misc  (ACCU-CHEK MULTICLIX LANCET DEV) KIT by Does not apply route 2 (two) times a day, Starting Mon 6/20/2016, Historical Med      !! losartan (COZAAR) 50 mg tablet Take 1 tablet (50 mg total) by mouth daily, Starting Fri 3/23/2018, Normal      metFORMIN (GLUCOPHAGE) 500 mg tablet Take 500 mg by mouth daily with breakfast, Historical Med      metoprolol tartrate (LOPRESSOR) 25 mg tablet Take 0 5 tablets by mouth every 12 (twelve) hours, Starting Fri 9/29/2017, Normal      rosuvastatin (CRESTOR) 5 mg tablet Take by mouth, Starting Mon 11/27/2017, Historical Med      Thyroid (NATURE-THROID PO) Take 90 mg by mouth daily  , Historical Med       !! - Potential duplicate medications found  Please discuss with provider  No discharge procedures on file      ED Provider  Electronically Signed by           Josefa Miller DO  04/19/18 1124

## 2018-04-19 NOTE — DISCHARGE INSTRUCTIONS
Make sure you keep your appointment with your family doctor tomorrow 4/20/18  Call Cardiology for follow-up  Chronic Hypertension   WHAT YOU NEED TO KNOW:   Hypertension is high blood pressure (BP)  Your BP is the force of your blood moving against the walls of your arteries  Normal BP is less than 120/80  Prehypertension is between 120/80 and 139/89  Hypertension is 140/90 or higher  Hypertension causes your BP to get so high that your heart has to work much harder than normal  This can damage your heart  Chronic hypertension is a long-term condition that you can control with a healthy lifestyle or medicines  A controlled blood pressure helps protect your organs, such as your heart, lungs, brain, and kidneys  DISCHARGE INSTRUCTIONS:   Call 911 for any of the following:   · You have discomfort in your chest that feels like squeezing, pressure, fullness, or pain  · You become confused or have difficulty speaking  · You suddenly feel lightheaded or have trouble breathing  · You have pain or discomfort in your back, neck, jaw, stomach, or arm  Seek care immediately if:   · You have a severe headache or vision loss  · You have weakness in an arm or leg  Contact your healthcare provider if:   · You feel faint, dizzy, confused, or drowsy  · You have been taking your BP medicine and your BP is still higher than your healthcare provider says it should be  · You have questions or concerns about your condition or care  Medicines: You may need any of the following:  · Medicine  may be used to help lower your BP  You may need more than one type of medicine  Take the medicine exactly as directed  · Diuretics  help decrease extra fluid that collects in your body  This will help lower your BP  You may urinate more often while you take this medicine  · Cholesterol medicine  helps lower your cholesterol level   A low cholesterol level helps prevent heart disease and makes it easier to control your blood pressure  · Take your medicine as directed  Contact your healthcare provider if you think your medicine is not helping or if you have side effects  Tell him or her if you are allergic to any medicine  Keep a list of the medicines, vitamins, and herbs you take  Include the amounts, and when and why you take them  Bring the list or the pill bottles to follow-up visits  Carry your medicine list with you in case of an emergency  Follow up with your healthcare provider as directed: You will need to return to have your blood pressure checked and to have other lab tests done  Write down your questions so you remember to ask them during your visits  Manage chronic hypertension:  Talk with your healthcare provider about these and other ways to manage hypertension:  · Take your BP at home  Sit and rest for 5 minutes before you take your BP  Extend your arm and support it on a flat surface  Your arm should be at the same level as your heart  Follow the directions that came with your BP monitor  If possible, take at least 2 BP readings each time  Take your BP at least twice a day at the same times each day, such as morning and evening  Keep a record of your BP readings and bring it to your follow-up visits  Ask your healthcare provider what your blood pressure should be  · Limit sodium (salt) as directed  Too much sodium can affect your fluid balance  Check labels to find low-sodium or no-salt-added foods  Some low-sodium foods use potassium salts for flavor  Too much potassium can also cause health problems  Your healthcare provider will tell you how much sodium and potassium are safe for you to have in a day  He or she may recommend that you limit sodium to 2,300 mg a day  · Follow the meal plan recommended by your healthcare provider  A dietitian or your provider can give you more information on low-sodium plans or the DASH (Dietary Approaches to Stop Hypertension) eating plan   The DASH plan is low in sodium, unhealthy fats, and total fat  It is high in potassium, calcium, and fiber  · Exercise to maintain a healthy weight  Exercise at least 30 minutes per day, on most days of the week  This will help decrease your blood pressure  Ask about the best exercise plan for you  · Decrease stress  This may help lower your BP  Learn ways to relax, such as deep breathing or listening to music  · Limit alcohol  Women should limit alcohol to 1 drink a day  Men should limit alcohol to 2 drinks a day  A drink of alcohol is 12 ounces of beer, 5 ounces of wine, or 1½ ounces of liquor  · Do not smoke  Nicotine and other chemicals in cigarettes and cigars can increase your BP and also cause lung damage  Ask your healthcare provider for information if you currently smoke and need help to quit  E-cigarettes or smokeless tobacco still contain nicotine  Talk to your healthcare provider before you use these products  © 2017 [employer] Information is for End User's use only and may not be sold, redistributed or otherwise used for commercial purposes  All illustrations and images included in CareNotes® are the copyrighted property of [employer]  or [employer]  The above information is an  only  It is not intended as medical advice for individual conditions or treatments  Talk to your doctor, nurse or pharmacist before following any medical regimen to see if it is safe and effective for you  Hypertension   WHAT YOU NEED TO KNOW:   Hypertension is high blood pressure (BP)  Your BP is the force of your blood moving against the walls of your arteries  Normal BP is less than 120/80  Prehypertension is between 120/80 and 139/89  Hypertension is 140/90 or higher  Hypertension causes your BP to get so high that your heart has to work much harder than normal  This can damage your heart  You can control hypertension with a healthy lifestyle or medicines   A controlled blood pressure helps protect your organs, such as your heart, lungs, brain, and kidneys  DISCHARGE INSTRUCTIONS:   Call 911 for any of the following:   · You have discomfort in your chest that feels like squeezing, pressure, fullness, or pain  · You become confused or have difficulty speaking  · You suddenly feel lightheaded or have trouble breathing  · You have pain or discomfort in your back, neck, jaw, stomach, or arm  Seek care immediately if:   · You have a severe headache or vision loss  · You have weakness in an arm or leg  Contact your healthcare provider if:   · You feel faint, dizzy, confused, or drowsy  · You have been taking your BP medicine and your BP is still higher than your healthcare provider says it should be  · You have questions or concerns about your condition or care  Medicines: You may  need any of the following:  · Medicine  may be used to help lower your BP  You may need more than one type of medicine  Take the medicine exactly as directed  · Diuretics  help decrease extra fluid that collects in your body  This will help lower your BP  You may urinate more often while you take this medicine  · Cholesterol medicine  helps lower your cholesterol level  A low cholesterol level helps prevent heart disease and makes it easier to control your blood pressure  · Take your medicine as directed  Contact your healthcare provider if you think your medicine is not helping or if you have side effects  Tell him or her if you are allergic to any medicine  Keep a list of the medicines, vitamins, and herbs you take  Include the amounts, and when and why you take them  Bring the list or the pill bottles to follow-up visits  Carry your medicine list with you in case of an emergency  Follow up with your healthcare provider as directed: You will need to return to have your BP checked and to have other lab tests done   Write down your questions so you remember to ask them during your visits  Manage hypertension:  Talk with your healthcare provider about these and other ways to manage hypertension:  · Check your BP at home  Sit and rest for 5 minutes before you take your BP  Extend your arm and support it on a flat surface  Your arm should be at the same level as your heart  Follow the directions that came with your BP monitor  If possible, take at least 2 BP readings each time  Take your BP at least twice a day at the same times each day, such as morning and evening  Keep a record of your BP readings and bring it to your follow-up visits  Ask your healthcare provider what your BP should be  · Limit sodium (salt) as directed  Too much sodium can affect your fluid balance  Check labels to find low-sodium or no-salt-added foods  Some low-sodium foods use potassium salts for flavor  Too much potassium can also cause health problems  Your healthcare provider will tell you how much sodium and potassium are safe for you to have in a day  He or she may recommend that you limit sodium to 2,300 mg a day  · Follow the meal plan recommended by your healthcare provider  A dietitian or your provider can give you more information on low-sodium plans or the DASH (Dietary Approaches to Stop Hypertension) eating plan  The DASH plan is low in sodium, unhealthy fats, and total fat  It is high in potassium, calcium, and fiber  · Exercise to maintain a healthy weight  Exercise at least 30 minutes per day, on most days of the week  This will help decrease your blood pressure  Ask your healthcare provider about the best exercise plan for you  · Decrease stress  This may help lower your BP  Learn ways to relax, such as deep breathing or listening to music  · Limit alcohol  Women should limit alcohol to 1 drink a day  Men should limit alcohol to 2 drinks a day  A drink of alcohol is 12 ounces of beer, 5 ounces of wine, or 1½ ounces of liquor      · Do not smoke   Nicotine and other chemicals in cigarettes and cigars can increase your BP and also cause lung damage  Ask your healthcare provider for information if you currently smoke and need help to quit  E-cigarettes or smokeless tobacco still contain nicotine  Talk to your healthcare provider before you use these products  · Manage any other health conditions you have  Health conditions such as diabetes can increase your risk for hypertension  Follow your healthcare provider's instructions and take all your medicines as directed  © 2017 [employer] Information is for End User's use only and may not be sold, redistributed or otherwise used for commercial purposes  All illustrations and images included in CareNotes® are the copyrighted property of [employer]  or [employer]  The above information is an  only  It is not intended as medical advice for individual conditions or treatments  Talk to your doctor, nurse or pharmacist before following any medical regimen to see if it is safe and effective for you  Hypertension in the Older Adult   WHAT YOU NEED TO KNOW:   Hypertension is high blood pressure (BP)  Your BP is the force of your blood moving against the walls of your arteries  Normal BP is less than 120/80  Prehypertension is between 120/80 and 139/89  Hypertension is 140/90 or higher  Hypertension causes your heart to work much harder than normal  This can damage your heart  Your blood pressure will increase as you get older  However, hypertension is not a normal part of aging  You can control hypertension with a healthy lifestyle or medicines  A controlled blood pressure helps protect your organs, such as your heart, lungs, brain, and kidneys  DISCHARGE INSTRUCTIONS:   Call 911 or have someone else call for any of the following:   · You have discomfort in your chest that feels like squeezing, pressure, fullness, or pain       · You become confused or have difficulty speaking  · You suddenly feel lightheaded or have trouble breathing  · You have pain or discomfort in your back, neck, jaw, stomach, or arm  · You faint or lose consciousness  Seek care immediately if:   · You have a severe headache or vision loss  · You have weakness in an arm or leg  · You get dizzy and fall  Contact your healthcare provider if:   · You feel faint, dizzy, confused, or drowsy  · You have been taking your BP medicine and your BP is still higher than your healthcare provider says it should be  · Your blood pressure is lower than your healthcare provider said it should be  · You have questions or concerns about your condition or care  Medicines: You may  need any of the following:  · Medicine  may be used to help lower your BP  You may need more than one type of medicine  · Diuretics  help decrease extra fluid that collects in your body  This will help lower your BP  You may urinate more often while you take this medicine  · Take your medicine as directed  Contact your healthcare provider if you think your medicine is not helping or if you have side effects  Tell him or her if you are allergic to any medicine  Keep a list of the medicines, vitamins, and herbs you take  Include the amounts, and when and why you take them  Bring the list or the pill bottles to follow-up visits  Carry your medicine list with you in case of an emergency  What you need to know about BP medicine:   · Take your medicine at the same time every day  · Do not stop taking your medicine if your BP is at the target goal  A BP at the target goal means that the medicine is working correctly  · Know the name and dose of your medicine  · Refill your medicine before you run out  · Blood pressure medicine can make you feel dizzy  Stand up slowly from a sitting or lying position  This will help prevent dizziness or your risk of falls       · Ask your healthcare provider if you should take your blood pressure medicine on the day of a surgery or procedure  Follow up with your healthcare provider as directed: You will need to return to have your BP checked  Write down your questions so you remember to ask them during your visits  Manage hypertension:   · Check your BP at home 2 times a day or as directed  Take your BP at the same times each day, such as morning and evening  Take 2 readings each time  Ask your healthcare provider for more directions  Keep a record of your BP readings and bring it to your follow-up visits  Ask your healthcare provider what your BP should be  · Limit sodium as directed  Too much sodium can affect your fluid balance and make it hard to control your BP  Check labels to find low-sodium or no-salt-added foods  Some low-sodium foods use potassium salts for flavor  Too much potassium can also cause health problems  Your healthcare provider will tell you how much sodium and potassium are safe for you to have in a day  He or she may recommend that you limit sodium to 2,300 mg a day  · Follow the meal plan recommended by your healthcare provider  A dietitian or your provider can give you more information on low-sodium plans or the DASH (Dietary Approaches to Stop Hypertension) eating plan  The DASH plan is low in sodium, unhealthy fats, and total fat  It is high in potassium, calcium, and fiber  · Exercise to maintain a healthy weight  Exercise will also help decrease your BP  Exercise for 30 minutes a day on most days of the week  Start with 10 minutes at a time  Examples of exercise include brisk walking or riding a stationary bike  Talk to your healthcare provider before you start an exercise plan  He or she can make sure the exercise plan is safe for you  · Decrease stress  This can help lower your BP  Learn ways to relax, such as deep breathing or listening to music  Get plenty of sleep every night   A lack of sleep can increase your stress level  · Limit or do not drink alcohol  Ask your healthcare provider if it is safe for you to drink alcohol  Also ask how much is safe for you to drink  · Do not smoke  Nicotine and other chemicals in cigarettes and cigars can increase your BP and also cause lung damage  Ask your healthcare provider for information if you currently smoke and need help to quit  E-cigarettes or smokeless tobacco still contain nicotine  Talk to your healthcare provider before you use these products  · Manage any other health conditions you have  Health conditions such as diabetes can increase your risk for hypertension  Follow your healthcare provider's instructions and take all your medicines as directed  © 2017 [employer] Information is for End User's use only and may not be sold, redistributed or otherwise used for commercial purposes  All illustrations and images included in CareNotes® are the copyrighted property of [employer]  or [employer]  The above information is an  only  It is not intended as medical advice for individual conditions or treatments  Talk to your doctor, nurse or pharmacist before following any medical regimen to see if it is safe and effective for you

## 2018-04-20 ENCOUNTER — TRANSCRIBE ORDERS (OUTPATIENT)
Dept: ADMINISTRATIVE | Facility: HOSPITAL | Age: 73
End: 2018-04-20

## 2018-04-20 DIAGNOSIS — I25.10 CORONARY ARTERY DISEASE INVOLVING NATIVE HEART, ANGINA PRESENCE UNSPECIFIED, UNSPECIFIED VESSEL OR LESION TYPE: ICD-10-CM

## 2018-04-20 DIAGNOSIS — I10 HYPERTENSION, UNSPECIFIED TYPE: Primary | ICD-10-CM

## 2018-04-20 LAB
ATRIAL RATE: 69 BPM
P AXIS: 54 DEGREES
PR INTERVAL: 186 MS
QRS AXIS: 115 DEGREES
QRSD INTERVAL: 94 MS
QT INTERVAL: 414 MS
QTC INTERVAL: 443 MS
T WAVE AXIS: 54 DEGREES
VENTRICULAR RATE: 69 BPM

## 2018-04-20 PROCEDURE — 93010 ELECTROCARDIOGRAM REPORT: CPT | Performed by: INTERNAL MEDICINE

## 2018-04-23 ENCOUNTER — TELEPHONE (OUTPATIENT)
Dept: CARDIOLOGY CLINIC | Facility: CLINIC | Age: 73
End: 2018-04-23

## 2018-04-23 DIAGNOSIS — I10 HYPERTENSION, UNSPECIFIED TYPE: Primary | ICD-10-CM

## 2018-04-23 RX ORDER — HYDRALAZINE HYDROCHLORIDE 25 MG/1
25 TABLET, FILM COATED ORAL 2 TIMES DAILY
Qty: 60 TABLET | Refills: 6 | Status: SHIPPED | OUTPATIENT
Start: 2018-04-23 | End: 2019-09-17 | Stop reason: ALTCHOICE

## 2018-04-23 NOTE — TELEPHONE ENCOUNTER
I told them she needed an appointment with her PCP within 2 days  She needs to be seen  Can you call them because the ER is useless  Thanks  I am rounding at [place] and cannot see her

## 2018-04-26 ENCOUNTER — HOSPITAL ENCOUNTER (OUTPATIENT)
Dept: ULTRASOUND IMAGING | Facility: HOSPITAL | Age: 73
Discharge: HOME | End: 2018-04-26
Payer: MEDICARE

## 2018-04-26 DIAGNOSIS — I10 HYPERTENSION, UNSPECIFIED TYPE: ICD-10-CM

## 2018-04-26 PROCEDURE — 93975 VASCULAR STUDY: CPT

## 2018-04-26 PROCEDURE — 93975 VASCULAR STUDY: CPT | Performed by: SURGERY

## 2018-04-30 ENCOUNTER — HOSPITAL ENCOUNTER (EMERGENCY)
Facility: HOSPITAL | Age: 73
Discharge: HOME | End: 2018-05-01
Attending: EMERGENCY MEDICINE | Admitting: EMERGENCY MEDICINE
Payer: MEDICARE

## 2018-04-30 ENCOUNTER — APPOINTMENT (EMERGENCY)
Dept: RADIOLOGY | Facility: HOSPITAL | Age: 73
End: 2018-04-30
Payer: MEDICARE

## 2018-04-30 VITALS
DIASTOLIC BLOOD PRESSURE: 67 MMHG | BODY MASS INDEX: 32.23 KG/M2 | OXYGEN SATURATION: 99 % | TEMPERATURE: 97.6 F | RESPIRATION RATE: 18 BRPM | SYSTOLIC BLOOD PRESSURE: 148 MMHG | HEART RATE: 64 BPM | HEIGHT: 63 IN | WEIGHT: 181.88 LBS

## 2018-04-30 DIAGNOSIS — R07.9 CHEST PAIN: ICD-10-CM

## 2018-04-30 DIAGNOSIS — R00.2 PALPITATIONS: Primary | ICD-10-CM

## 2018-04-30 LAB
ALBUMIN SERPL BCP-MCNC: 3.7 G/DL (ref 3.5–5)
ALP SERPL-CCNC: 118 U/L (ref 46–116)
ALT SERPL W P-5'-P-CCNC: 29 U/L (ref 12–78)
ANION GAP SERPL CALCULATED.3IONS-SCNC: 9 MMOL/L (ref 4–13)
AST SERPL W P-5'-P-CCNC: 24 U/L (ref 5–45)
BASOPHILS # BLD AUTO: 0.04 THOUSANDS/ΜL (ref 0–0.1)
BASOPHILS NFR BLD AUTO: 0 % (ref 0–1)
BILIRUB SERPL-MCNC: 0.4 MG/DL (ref 0.2–1)
BUN SERPL-MCNC: 22 MG/DL (ref 5–25)
CALCIUM SERPL-MCNC: 10 MG/DL (ref 8.3–10.1)
CHLORIDE SERPL-SCNC: 103 MMOL/L (ref 100–108)
CO2 SERPL-SCNC: 30 MMOL/L (ref 21–32)
CREAT SERPL-MCNC: 0.72 MG/DL (ref 0.6–1.3)
EOSINOPHIL # BLD AUTO: 0.29 THOUSAND/ΜL (ref 0–0.61)
EOSINOPHIL NFR BLD AUTO: 3 % (ref 0–6)
ERYTHROCYTE [DISTWIDTH] IN BLOOD BY AUTOMATED COUNT: 12.5 % (ref 11.6–15.1)
GFR SERPL CREATININE-BSD FRML MDRD: 83 ML/MIN/1.73SQ M
GLUCOSE SERPL-MCNC: 131 MG/DL (ref 65–140)
HCT VFR BLD AUTO: 45.3 % (ref 34.8–46.1)
HGB BLD-MCNC: 15.2 G/DL (ref 11.5–15.4)
LYMPHOCYTES # BLD AUTO: 2.52 THOUSANDS/ΜL (ref 0.6–4.47)
LYMPHOCYTES NFR BLD AUTO: 22 % (ref 14–44)
MCH RBC QN AUTO: 30.3 PG (ref 26.8–34.3)
MCHC RBC AUTO-ENTMCNC: 33.6 G/DL (ref 31.4–37.4)
MCV RBC AUTO: 90 FL (ref 82–98)
MONOCYTES # BLD AUTO: 0.45 THOUSAND/ΜL (ref 0.17–1.22)
MONOCYTES NFR BLD AUTO: 4 % (ref 4–12)
NEUTROPHILS # BLD AUTO: 7.97 THOUSANDS/ΜL (ref 1.85–7.62)
NEUTS SEG NFR BLD AUTO: 71 % (ref 43–75)
PLATELET # BLD AUTO: 232 THOUSANDS/UL (ref 149–390)
PMV BLD AUTO: 11.1 FL (ref 8.9–12.7)
POTASSIUM SERPL-SCNC: 4.1 MMOL/L (ref 3.5–5.3)
PROT SERPL-MCNC: 7.7 G/DL (ref 6.4–8.2)
RBC # BLD AUTO: 5.01 MILLION/UL (ref 3.81–5.12)
SODIUM SERPL-SCNC: 142 MMOL/L (ref 136–145)
TROPONIN I SERPL-MCNC: <0.02 NG/ML
TROPONIN I SERPL-MCNC: <0.02 NG/ML
WBC # BLD AUTO: 11.27 THOUSAND/UL (ref 4.31–10.16)

## 2018-04-30 PROCEDURE — 36415 COLL VENOUS BLD VENIPUNCTURE: CPT

## 2018-04-30 PROCEDURE — 96374 THER/PROPH/DIAG INJ IV PUSH: CPT

## 2018-04-30 PROCEDURE — 93005 ELECTROCARDIOGRAM TRACING: CPT

## 2018-04-30 PROCEDURE — 85025 COMPLETE CBC W/AUTO DIFF WBC: CPT | Performed by: EMERGENCY MEDICINE

## 2018-04-30 PROCEDURE — 71046 X-RAY EXAM CHEST 2 VIEWS: CPT

## 2018-04-30 PROCEDURE — 80053 COMPREHEN METABOLIC PANEL: CPT | Performed by: EMERGENCY MEDICINE

## 2018-04-30 PROCEDURE — 84484 ASSAY OF TROPONIN QUANT: CPT | Performed by: EMERGENCY MEDICINE

## 2018-04-30 PROCEDURE — 96375 TX/PRO/DX INJ NEW DRUG ADDON: CPT

## 2018-04-30 RX ORDER — METOPROLOL TARTRATE 5 MG/5ML
5 INJECTION INTRAVENOUS ONCE
Status: COMPLETED | OUTPATIENT
Start: 2018-04-30 | End: 2018-04-30

## 2018-04-30 RX ORDER — HYDRALAZINE HYDROCHLORIDE 20 MG/ML
5 INJECTION INTRAMUSCULAR; INTRAVENOUS ONCE
Status: COMPLETED | OUTPATIENT
Start: 2018-04-30 | End: 2018-04-30

## 2018-04-30 RX ORDER — LABETALOL HYDROCHLORIDE 5 MG/ML
15 INJECTION, SOLUTION INTRAVENOUS ONCE
Status: COMPLETED | OUTPATIENT
Start: 2018-04-30 | End: 2018-04-30

## 2018-04-30 RX ORDER — ASPIRIN 81 MG/1
324 TABLET, CHEWABLE ORAL ONCE
Status: COMPLETED | OUTPATIENT
Start: 2018-04-30 | End: 2018-04-30

## 2018-04-30 RX ADMIN — ASPIRIN 81 MG 324 MG: 81 TABLET ORAL at 20:56

## 2018-04-30 RX ADMIN — METOPROLOL TARTRATE 5 MG: 5 INJECTION, SOLUTION INTRAVENOUS at 22:08

## 2018-04-30 RX ADMIN — HYDRALAZINE HYDROCHLORIDE 5 MG: 20 INJECTION INTRAMUSCULAR; INTRAVENOUS at 23:20

## 2018-04-30 RX ADMIN — LABETALOL 20 MG/4 ML (5 MG/ML) INTRAVENOUS SYRINGE 15 MG: at 20:57

## 2018-05-01 ENCOUNTER — HOSPITAL ENCOUNTER (OUTPATIENT)
Dept: NON INVASIVE DIAGNOSTICS | Facility: HOSPITAL | Age: 73
Discharge: HOME | End: 2018-05-01
Attending: EMERGENCY MEDICINE
Payer: MEDICARE

## 2018-05-01 DIAGNOSIS — R00.2 PALPITATIONS: ICD-10-CM

## 2018-05-01 LAB
ATRIAL RATE: 76 BPM
P AXIS: 45 DEGREES
PR INTERVAL: 186 MS
QRS AXIS: 105 DEGREES
QRSD INTERVAL: 96 MS
QT INTERVAL: 412 MS
QTC INTERVAL: 463 MS
T WAVE AXIS: 2 DEGREES
VENTRICULAR RATE: 76 BPM

## 2018-05-01 PROCEDURE — 93010 ELECTROCARDIOGRAM REPORT: CPT | Performed by: INTERNAL MEDICINE

## 2018-05-01 PROCEDURE — 93225 XTRNL ECG REC<48 HRS REC: CPT

## 2018-05-01 PROCEDURE — 93226 XTRNL ECG REC<48 HR SCAN A/R: CPT

## 2018-05-01 PROCEDURE — 99284 EMERGENCY DEPT VISIT MOD MDM: CPT

## 2018-05-01 NOTE — ED PROCEDURE NOTE
PROCEDURE  ECG 12 Lead Documentation  Date/Time: 4/30/2018 7:31 PM  Performed by: Domonique Watts by: Mellissa Warner     Indications / Diagnosis:  Palpitations  ECG reviewed by me, the ED Provider: yes    Patient location:  ED  Previous ECG:     Comparison to cardiac monitor: Yes    Interpretation:     Interpretation: non-specific    Rate:     ECG rate:  76    ECG rate assessment: normal    Rhythm:     Rhythm: sinus rhythm    Ectopy:     Ectopy: none    QRS:     QRS axis:  Right    QRS intervals:  Normal  Conduction:     Conduction: normal    ST segments:     ST segments:  Normal  T waves:     T waves: normal           Mauricio Boeck, MD  05/01/18 1378

## 2018-05-01 NOTE — DISCHARGE INSTRUCTIONS
Chest Pain   WHAT YOU NEED TO KNOW:   Chest pain can be caused by a range of conditions, from not serious to life-threatening  Chest pain can be a symptom of a digestive problem, such as acid reflux or a stomach ulcer  An anxiety attack or a strong emotion, such as anger, can also cause chest pain  Infection, inflammation, or a fracture in the bones or cartilage in your chest can cause pain or discomfort  Sometimes chest pain or pressure is caused by poor blood flow to your heart (angina)  Chest pain may also be caused by life-threatening conditions such as a heart attack or blood clot in your lungs  DISCHARGE INSTRUCTIONS:   Call 911 if:   · You have any of the following signs of a heart attack:      ¨ Squeezing, pressure, or pain in your chest that lasts longer than 5 minutes or returns    ¨ Discomfort or pain in your back, neck, jaw, stomach, or arm     ¨ Trouble breathing    ¨ Nausea or vomiting    ¨ Lightheadedness or a sudden cold sweat, especially with chest pain or trouble breathing    Return to the emergency department if:   · You have chest discomfort that gets worse, even with medicine  · You cough or vomit blood  · Your bowel movements are black or bloody  · You cannot stop vomiting, or it hurts to swallow  Contact your healthcare provider if:   · You have questions or concerns about your condition or care  Medicines:   · Medicines  may be given to treat the cause of your chest pain  Examples include pain medicine, anxiety medicine, or medicines to increase blood flow to your heart  · Do not take certain medicines without asking your healthcare provider first   These include NSAIDs, herbal or vitamin supplements, or hormones (estrogen or progestin)  · Take your medicine as directed  Contact your healthcare provider if you think your medicine is not helping or if you have side effects  Tell him or her if you are allergic to any medicine   Keep a list of the medicines, vitamins, and herbs you take  Include the amounts, and when and why you take them  Bring the list or the pill bottles to follow-up visits  Carry your medicine list with you in case of an emergency  Follow up with your healthcare provider within 72 hours, or as directed: You may need to return for more tests to find the cause of your chest pain  You may be referred to a specialist, such as a cardiologist or gastroenterologist  Write down your questions so you remember to ask them during your visits  Healthy living tips: The following are general healthy guidelines  If your chest pain is caused by a heart problem, your healthcare provider will give you specific guidelines to follow  · Do not smoke  Nicotine and other chemicals in cigarettes and cigars can cause lung and heart damage  Ask your healthcare provider for information if you currently smoke and need help to quit  E-cigarettes or smokeless tobacco still contain nicotine  Talk to your healthcare provider before you use these products  · Eat a variety of healthy, low-fat foods  Healthy foods include fruits, vegetables, whole-grain breads, low-fat dairy products, beans, lean meats, and fish  Ask for more information about a heart healthy diet  · Ask about activity  Your healthcare provider will tell you which activities to limit or avoid  Ask when you can drive, return to work, and have sex  Ask about the best exercise plan for you  · Maintain a healthy weight  Ask your healthcare provider how much you should weigh  Ask him or her to help you create a weight loss plan if you are overweight  · Get the flu and pneumonia vaccines  All adults should get the influenza (flu) vaccine  Get it every year as soon as it becomes available  The pneumococcal vaccine is given to adults aged 72 years or older  The vaccine is given every 5 years to prevent pneumococcal disease, such as pneumonia    © 2017 [employer] Information is for End User's use only and may not be sold, redistributed or otherwise used for commercial purposes  All illustrations and images included in CareNotes® are the copyrighted property of A D [employer] M [employer] Inc  or Terrence Cherry  The above information is an  only  It is not intended as medical advice for individual conditions or treatments  Talk to your doctor, nurse or pharmacist before following any medical regimen to see if it is safe and effective for you  Heart Palpitations   WHAT YOU NEED TO KNOW:   Heart palpitations are feelings that your heart races, jumps, throbs, or flutters  You may feel extra beats, no beats for a short time, or skipped beats  You may have these feelings in your chest, throat, or neck  They may happen when you are sitting, standing, or lying  Heart palpitations may be frightening, but are usually not caused by a serious problem  DISCHARGE INSTRUCTIONS:   Call 911 or have someone else call for any of the following:   · You have any of the following signs of a heart attack:      ¨ Squeezing, pressure, or pain in your chest that lasts longer than 5 minutes or returns    ¨ Discomfort or pain in your back, neck, jaw, stomach, or arm     ¨ Trouble breathing    ¨ Nausea or vomiting    ¨ Lightheadedness or a sudden cold sweat, especially with chest pain or trouble breathing    · You have any of the following signs of a stroke:      ¨ Numbness or drooping on one side of your face     ¨ Weakness in an arm or leg    ¨ Confusion or difficulty speaking    ¨ Dizziness, a severe headache, or vision loss    · You faint or lose consciousness  Return to the emergency department if:   · Your palpitations happen more often or get more intense  Contact your healthcare provider if:   · You have new or worsening swelling in your feet or ankles  · You have questions or concerns about your condition or care  Follow up with your healthcare provider as directed:   You may need to follow up with a cardiologist  Bienvenido Colón may need tests to check for heart problems that cause palpitations  Write down your questions so you remember to ask them during your visits  Keep a record:  Write down when your palpitations start and stop, what you were doing when they started, and your symptoms  Keep track of what you ate or drank within a few hours of your palpitations  Include anything that seemed to help your symptoms, such as lying down or holding your breath  This record will help you and your healthcare provider learn what triggers your palpitations  Bring this record with you to your follow up visits  Help prevent heart palpitations:   · Manage stress and anxiety  Find ways to relax such as listening to music, meditating, or doing yoga  Exercise can also help decrease stress and anxiety  Talk to someone you trust about your stress or anxiety  You can also talk to a therapist      · Get plenty of sleep every night  Ask your healthcare provider how much sleep you need each night  · Do not drink caffeine or alcohol  Caffeine and alcohol can make your palpitations worse  Caffeine is found in soda, coffee, tea, chocolate, and drinks that increase your energy  · Do not smoke  Nicotine and other chemicals in cigarettes and cigars may damage your heart and blood vessels  Ask your healthcare provider for information if you currently smoke and need help to quit  E-cigarettes or smokeless tobacco still contain nicotine  Talk to your healthcare provider before you use these products  · Do not use illegal drugs  Talk to your healthcare provider if you use illegal drugs and want help to quit  © 2017 [address] [employer] Information is for End User's use only and may not be sold, redistributed or otherwise used for commercial purposes  All illustrations and images included in CareNotes® are the copyrighted property of A [employer]  or Terrence Cherry    The above information is an  only  It is not intended as medical advice for individual conditions or treatments  Talk to your doctor, nurse or pharmacist before following any medical regimen to see if it is safe and effective for you

## 2018-05-03 PROCEDURE — 93227 XTRNL ECG REC<48 HR R&I: CPT | Performed by: INTERNAL MEDICINE

## 2018-05-09 NOTE — ED PROVIDER NOTES
History  Chief Complaint   Patient presents with    Hypertension     Pt reports increased heart rate and BP after coming inside from doing yardwork  Denies any SOB     Patient: Josh Garcia  20 y o /female  YOB: 1945  MRN: [id number]  PCP: Riya Robles DO  Date of evaluation: 4/30/2018    (N B  Voice-recognition software may have been used in the preparation of this document )    History provided by:  Patient  Palpitations   Onset quality:  Gradual  Timing:  Constant  Progression:  Resolved  Chronicity:  New  Context: exercise    Context: not anxiety and not appetite suppressants    Relieved by:  Nothing  Worsened by:  Nothing  Associated symptoms: chest pain    Associated symptoms: no back pain, no cough, no diaphoresis, no dizziness, no leg pain, no nausea, no near-syncope, no orthopnea, no PND, no shortness of breath, no syncope, no vomiting and no weakness        Prior to Admission Medications   Prescriptions Last Dose Informant Patient Reported? Taking?    ACCU-CHEK KAILEY PLUS test strip   Yes Yes   Blood Glucose Monitoring Suppl (ACCU-CHEK KAILEY CONNECT) w/Device KIT   Yes Yes   Sig: by Does not apply route   Lancets Misc  (ACCU-CHEK MULTICLIX LANCET DEV) KIT   Yes Yes   Sig: by Does not apply route 2 (two) times a day   Thyroid (NATURE-THROID PO)  Self Yes Yes   Sig: Take 90 mg by mouth daily     aspirin 81 mg chewable tablet   No Yes   Sig: Chew 1 tablet daily   glucose blood (ACCU-CHEK KAILEY PLUS) test strip   Yes Yes   Sig: by In Vitro route   hydrALAZINE (APRESOLINE) 25 mg tablet   No Yes   Sig: Take 1 tablet (25 mg total) by mouth 2 (two) times a day   losartan (COZAAR) 50 mg tablet   No Yes   Sig: Take 1 tablet (50 mg total) by mouth daily   losartan (COZAAR) 50 mg tablet   No Yes   Sig: Take 1 tablet (50 mg total) by mouth 2 (two) times a day for 20 days   metFORMIN (GLUCOPHAGE) 500 mg tablet   Yes Yes   Sig: Take 500 mg by mouth daily with breakfast   metoprolol tartrate (LOPRESSOR) 25 mg tablet  Self No Yes   Sig: Take 0 5 tablets by mouth every 12 (twelve) hours   Patient taking differently: Take 25 mg by mouth every 12 (twelve) hours     rosuvastatin (CRESTOR) 5 mg tablet   Yes Yes   Sig: Take by mouth      Facility-Administered Medications: None       Past Medical History:   Diagnosis Date    Abnormal stress test     Angina pectoris (HCC)     1 wk ago    Diabetes mellitus ([facility] [address] )     niddm    History of palpitations     History of poliomyelitis     Hyperlipidemia     Hypertension     Hypothyroid     Rosacea     Wears glasses        Past Surgical History:   Procedure Laterality Date    CATARACT EXTRACTION Bilateral     COLONOSCOPY      HYSTERECTOMY      ROTATOR CUFF REPAIR Right        History reviewed  No pertinent family history  I have reviewed and agree with the history as documented  Social History   Substance Use Topics    Smoking status: Never Smoker    Smokeless tobacco: Never Used    Alcohol use No        Review of Systems   Constitutional: Negative for chills, diaphoresis and fever  HENT: Negative for hearing loss, trouble swallowing and voice change  Eyes: Negative for pain, redness and visual disturbance  Respiratory: Negative for cough and shortness of breath  Cardiovascular: Positive for chest pain and palpitations  Negative for orthopnea, syncope, PND and near-syncope  Gastrointestinal: Negative for abdominal pain, constipation, diarrhea, nausea and vomiting  Genitourinary: Negative for dysuria, hematuria, vaginal bleeding and vaginal discharge  Musculoskeletal: Negative for back pain, gait problem and neck pain  Skin: Negative for color change and rash  Neurological: Negative for dizziness, weakness and light-headedness  Psychiatric/Behavioral: Negative for confusion and decreased concentration  The patient is not nervous/anxious  All other systems reviewed and are negative        Physical Exam  ED Triage Vitals [04/30/18 1921]   Temperature Pulse Respirations Blood Pressure SpO2   97 6 °F (36 4 °C) 81 18 (!) 228/100 99 %      Temp Source Heart Rate Source Patient Position - Orthostatic VS BP Location FiO2 (%)   Oral Monitor Sitting Left arm --      Pain Score       3           Orthostatic Vital Signs  Vitals:    04/30/18 2131 04/30/18 2214 04/30/18 2300 04/30/18 2330   BP: (!) 172/72 (!) 178/111 163/74 148/67   Pulse: 65 60 62 64   Patient Position - Orthostatic VS: Sitting   Lying       Physical Exam   Constitutional: She is oriented to person, place, and time  She appears well-developed and well-nourished  HENT:   Mouth/Throat: Oropharynx is clear and moist and mucous membranes are normal    Voice normal   Eyes: EOM are normal  Pupils are equal, round, and reactive to light  Cardiovascular: Normal rate and regular rhythm  Pulmonary/Chest: Effort normal    Abdominal: Soft  Bowel sounds are normal    Neurological: She is alert and oriented to person, place, and time  GCS eye subscore is 4  GCS verbal subscore is 5  GCS motor subscore is 6  Skin: Skin is warm and dry  Psychiatric: She has a normal mood and affect  Her speech is normal and behavior is normal    Nursing note and vitals reviewed        ED Medications  Medications   labetalol (NORMODYNE) injection 15 mg (15 mg Intravenous Given 4/30/18 2057)   aspirin chewable tablet 324 mg (324 mg Oral Given 4/30/18 2056)   metoprolol (LOPRESSOR) injection 5 mg (5 mg Intravenous Given 4/30/18 2208)   hydrALAZINE (APRESOLINE) injection 5 mg (5 mg Intravenous Given 4/30/18 2320)       Diagnostic Studies  Results Reviewed     Procedure Component Value Units Date/Time    Troponin I [[id number]]  (Normal) Collected:  04/30/18 2213    Lab Status:  Final result Specimen:  Blood from Arm, Right Updated:  04/30/18 2238     Troponin I <0 02 ng/mL     Narrative:         Siemens Chemistry analyzer 99% cutoff is > 0 04 ng/mL in network labs    o cTnI 99% cutoff is useful only when applied to patients in the clinical setting of myocardial ischemia  o cTnI 99% cutoff should be interpreted in the context of clinical history, ECG findings and possibly cardiac imaging to establish correct diagnosis  o cTnI 99% cutoff may be suggestive but clearly not indicative of a coronary event without the clinical setting of myocardial ischemia  Troponin I [[id number]]  (Normal) Collected:  04/30/18 1942    Lab Status:  Final result Specimen:  Blood from Arm, Right Updated:  04/30/18 2006     Troponin I <0 02 ng/mL     Narrative:         S[employer]ens Chemistry analyzer 99% cutoff is > 0 04 ng/mL in network labs    o cTnI 99% cutoff is useful only when applied to patients in the clinical setting of myocardial ischemia  o cTnI 99% cutoff should be interpreted in the context of clinical history, ECG findings and possibly cardiac imaging to establish correct diagnosis  o cTnI 99% cutoff may be suggestive but clearly not indicative of a coronary event without the clinical setting of myocardial ischemia  Comprehensive metabolic panel [[id number]]  (Abnormal) Collected:  04/30/18 1942    Lab Status:  Final result Specimen:  Blood from Arm, Right Updated:  04/30/18 2001     Sodium 142 mmol/L      Potassium 4 1 mmol/L      Chloride 103 mmol/L      CO2 30 mmol/L      Anion Gap 9 mmol/L      BUN 22 mg/dL      Creatinine 0 72 mg/dL      Glucose 131 mg/dL      Calcium 10 0 mg/dL      AST 24 U/L      ALT 29 U/L      Alkaline Phosphatase 118 (H) U/L      Total Protein 7 7 g/dL      Albumin 3 7 g/dL      Total Bilirubin 0 40 mg/dL      eGFR 83 ml/min/1 73sq m     Narrative:         National Kidney Disease Education Program recommendations are as follows:  GFR calculation is accurate only with a steady state creatinine  Chronic Kidney disease less than 60 ml/min/1 73 sq  meters  Kidney failure less than 15 ml/min/1 73 sq  meters      CBC and differential [[id number]]  (Abnormal) Collected:  04/30/18 1942    Lab Status:  Final result Specimen:  Blood from Arm, Right Updated:  04/30/18 1949     WBC 11 27 (H) Thousand/uL      RBC 5 01 Million/uL      Hemoglobin 15 2 g/dL      Hematocrit 45 3 %      MCV 90 fL      MCH 30 3 pg      MCHC 33 6 g/dL      RDW 12 5 %      MPV 11 1 fL      Platelets 342 Thousands/uL      Neutrophils Relative 71 %      Lymphocytes Relative 22 %      Monocytes Relative 4 %      Eosinophils Relative 3 %      Basophils Relative 0 %      Neutrophils Absolute 7 97 (H) Thousands/µL      Lymphocytes Absolute 2 52 Thousands/µL      Monocytes Absolute 0 45 Thousand/µL      Eosinophils Absolute 0 29 Thousand/µL      Basophils Absolute 0 04 Thousands/µL                  X-ray chest 2 views   Final Result by AURELIA Granger MD (05/01 9027)      No acute cardiopulmonary disease  Workstation performed: [id number]                    Procedures  Procedures       Phone Contacts  ED Phone Contact    ED Course  ED Course as of May 09 0025   Tue May 01, 2018   0010 I reviewed the results of this evaluation and their significance, as well as the need for followup, with the patient and with family when available  All concerns / questions were addressed  I went over any signs / symptoms which should prompt a return to the ED  The patient appears stable for discharge and early follow-up as directed  I discharged the patient myself  The patient left the department stable and in no distress              HEART Risk Score      Most Recent Value   History  0 Filed at: 05/01/2018 0256   ECG  1 Filed at: 05/01/2018 0256   Age  2 Filed at: 05/01/2018 0256   Risk Factors  1 Filed at: 05/01/2018 0256   Troponin  0 Filed at: 05/01/2018 0256   Heart Score Risk Calculator   History  0 Filed at: 05/01/2018 0256   ECG  1 Filed at: 05/01/2018 0256   Age  2 Filed at: 05/01/2018 0256   Risk Factors  1 Filed at: 05/01/2018 0256   Troponin  0 Filed at: 05/01/2018 0256   HEART Score  4 Filed at: 05/01/2018 0256   HEART Score  4 Filed at: 05/01/2018 0256                            [facility]  CritCare Time    Disposition  Final diagnoses:   Palpitations   Chest pain     Time reflects when diagnosis was documented in both MDM as applicable and the Disposition within this note     Time User Action Codes Description Comment    4/30/2018 11:57 PM Mary Kate Sheriff Add [R00 2] Palpitations     4/30/2018 11:57 PM Elizabeth PROCTOR Add [R07 9] Chest pain       ED Disposition     ED Disposition Condition Comment    Discharge  Yinka Salmon discharge to home/self care  Condition at discharge: Good        Follow-up Information     Follow up With Specialties Details Why Leyda [address], DO Cardiology, Multidisciplinary Call in 1 day Tell about this ER visit  Jaime [address] [place] [address], DO [address] Colton [facility]  [phone]          Discharge Medication List as of 4/30/2018 11:58 PM      CONTINUE these medications which have NOT CHANGED    Details   ! ! ACCU-CHEK KAILEY PLUS test strip Historical Med      aspirin 81 mg chewable tablet Chew 1 tablet daily, Starting Sat 9/30/2017, Normal      Blood Glucose Monitoring Suppl (ACCU-CHEK KAILEY CONNECT) w/Device KIT by Does not apply route, Starting Mon 6/20/2016, Historical Med      !! glucose blood (ACCU-CHEK KAILEY PLUS) test strip by In Vitro route, Starting Mon 6/20/2016, Historical Med      hydrALAZINE (APRESOLINE) 25 mg tablet Take 1 tablet (25 mg total) by mouth 2 (two) times a day, Starting Mon 4/23/2018, Normal      Lancets Misc  (ACCU-CHEK MULTICLIX LANCET DEV) KIT by Does not apply route 2 (two) times a day, Starting Mon 6/20/2016, Historical Med      !! losartan (COZAAR) 50 mg tablet Take 1 tablet (50 mg total) by mouth daily, Starting Fri 3/23/2018, Normal      !! losartan (COZAAR) 50 mg tablet Take 1 tablet (50 mg total) by mouth 2 (two) times a day for 20 days, Starting Thu 4/19/2018, Until Wed 5/9/2018, Print metFORMIN (GLUCOPHAGE) 500 mg tablet Take 500 mg by mouth daily with breakfast, Historical Med      metoprolol tartrate (LOPRESSOR) 25 mg tablet Take 0 5 tablets by mouth every 12 (twelve) hours, Starting Fri 9/29/2017, Normal      rosuvastatin (CRESTOR) 5 mg tablet Take by mouth, Starting Mon 11/27/2017, Historical Med      Thyroid (NATURE-THROID PO) Take 90 mg by mouth daily  , Historical Med       !! - Potential duplicate medications found  Please discuss with provider  Outpatient Discharge Orders  Holter monitor - 48 hour   Standing Status: Future Number of Occurrences: 1 Standing Exp   Date: 04/30/22         ED Provider  Electronically Signed by           Joycelyn Samuels MD  05/09/18 7954

## 2018-05-27 ENCOUNTER — HOSPITAL ENCOUNTER (EMERGENCY)
Facility: HOSPITAL | Age: 73
Discharge: HOME | End: 2018-05-27
Payer: MEDICARE

## 2018-05-27 VITALS
RESPIRATION RATE: 18 BRPM | HEART RATE: 80 BPM | WEIGHT: 181.66 LBS | DIASTOLIC BLOOD PRESSURE: 82 MMHG | TEMPERATURE: 97.4 F | HEIGHT: 63 IN | OXYGEN SATURATION: 96 % | BODY MASS INDEX: 32.19 KG/M2 | SYSTOLIC BLOOD PRESSURE: 170 MMHG
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
## 2018-05-27 PROCEDURE — 99281 EMR DPT VST MAYX REQ PHY/QHP: CPT

## 2018-05-27 RX ORDER — CEPHALEXIN 500 MG/1
500 CAPSULE ORAL EVERY 6 HOURS SCHEDULED
Qty: 28 CAPSULE | Refills: 0 | Status: SHIPPED | OUTPATIENT
Start: 2018-05-27 | End: 2018-06-03

## 2018-05-27 RX ORDER — CEPHALEXIN 250 MG/1
500 CAPSULE ORAL 4 TIMES DAILY
Qty: 28 CAPSULE | Refills: 0 | Status: SHIPPED | OUTPATIENT
Start: 2018-05-27 | End: 2018-06-03

## 2018-05-27 RX ORDER — CEPHALEXIN 250 MG/1
500 CAPSULE ORAL ONCE
Status: COMPLETED | OUTPATIENT
Start: 2018-05-27 | End: 2018-05-27

## 2018-05-27 RX ADMIN — CEPHALEXIN 500 MG: 250 CAPSULE ORAL at 16:19

## 2018-05-27 NOTE — ED PROVIDER NOTES
History  Chief Complaint   Patient presents with   Nneka Almazan 82     PT reports last night while she was sleeping she was bit by something on her posterior left arm  Pt reports area is itchy     Patient presents to the emergency department today offering a chief complaint of an insect bite left deltoid region  Patient states she woke up this morning with some itching  She denies pain  She admits to some localized redness  Denies range of motion or sensation deficits  Prior to Admission Medications   Prescriptions Last Dose Informant Patient Reported? Taking?    ACCU-CHEK KAILEY PLUS test strip   Yes No   Blood Glucose Monitoring Suppl (ACCU-CHEK KAILEY CONNECT) w/Device KIT   Yes No   Sig: by Does not apply route   Lancets Misc  (ACCU-CHEK MULTICLIX LANCET DEV) KIT   Yes No   Sig: by Does not apply route 2 (two) times a day   Thyroid (NATURE-THROID PO)  Self Yes Yes   Sig: Take 90 mg by mouth daily     aspirin 81 mg chewable tablet   No Yes   Sig: Chew 1 tablet daily   glucose blood (ACCU-CHEK KAILEY PLUS) test strip   Yes No   Sig: by In Vitro route   hydrALAZINE (APRESOLINE) 25 mg tablet   No Yes   Sig: Take 1 tablet (25 mg total) by mouth 2 (two) times a day   losartan (COZAAR) 50 mg tablet   No Yes   Sig: Take 1 tablet (50 mg total) by mouth 2 (two) times a day for 20 days   metFORMIN (GLUCOPHAGE) 500 mg tablet   Yes Yes   Sig: Take 500 mg by mouth daily with breakfast   metoprolol tartrate (LOPRESSOR) 25 mg tablet  Self No Yes   Sig: Take 0 5 tablets by mouth every 12 (twelve) hours   Patient taking differently: Take 25 mg by mouth every 12 (twelve) hours        Facility-Administered Medications: None       Past Medical History:   Diagnosis Date    Abnormal stress test     Angina pectoris (HCC)     1 wk ago    Diabetes mellitus (HCC)     niddm    History of palpitations     History of poliomyelitis     Hyperlipidemia     Hypertension     Hypothyroid     Rosacea     Wears glasses        Past Surgical History:   Procedure Laterality Date    CATARACT EXTRACTION Bilateral     COLONOSCOPY      HYSTERECTOMY      ROTATOR CUFF REPAIR Right        History reviewed  No pertinent family history  I have reviewed and agree with the history as documented  Social History   Substance Use Topics    Smoking status: Never Smoker    Smokeless tobacco: Never Used    Alcohol use No        Review of Systems   Constitutional: Negative  HENT: Negative  Eyes: Negative  Respiratory: Negative  Cardiovascular: Negative  Endocrine: Negative  Genitourinary: Negative  Musculoskeletal: Negative  Skin: Positive for wound  Allergic/Immunologic: Negative  Neurological: Negative  Hematological: Negative  Psychiatric/Behavioral: Negative  All other systems reviewed and are negative  Physical Exam  Physical Exam   Constitutional: She is oriented to person, place, and time  She appears well-developed and well-nourished  No distress  HENT:   Head: Atraumatic  Eyes: Pupils are equal, round, and reactive to light  Cardiovascular: Normal rate  Pulmonary/Chest: Effort normal and breath sounds normal    Musculoskeletal: She exhibits no deformity  Neurological: She is alert and oriented to person, place, and time  Skin: Skin is warm  Capillary refill takes less than 2 seconds  She is not diaphoretic  Patient has an area without apparent by x2 very small consistent with an unknown insect of the left deltoid region with mild surrounding erythema  Normal neurovascular motor exams distally   Psychiatric: She has a normal mood and affect  Vitals reviewed        Vital Signs  ED Triage Vitals   Temperature Pulse Respirations Blood Pressure SpO2   05/27/18 1608 05/27/18 1608 05/27/18 1608 05/27/18 1612 05/27/18 1608   (!) 97 4 °F (36 3 °C) 80 18 170/82 96 %      Temp Source Heart Rate Source Patient Position - Orthostatic VS BP Location FiO2 (%)   05/27/18 1608 05/27/18 1608 05/27/18 1612 05/27/18 1612 --   Temporal Monitor Sitting Right arm       Pain Score       05/27/18 1608       3           Vitals:    05/27/18 1608 05/27/18 1612   BP:  170/82   Pulse: 80    Patient Position - Orthostatic VS:  Sitting       Visual Acuity      ED Medications  Medications   cephalexin (KEFLEX) capsule 500 mg (500 mg Oral Given 5/27/18 1619)       Diagnostic Studies  Results Reviewed     None                 No orders to display              Procedures  Procedures       Phone Contacts  ED Phone Contact    ED Course                               MDM  CritCare Time    Disposition  Final diagnoses:   Insect bite, initial encounter     Time reflects when diagnosis was documented in both MDM as applicable and the Disposition within this note     Time User Action Codes Description Comment    5/27/2018  4:15 PM Cortez Lunger Add Mikle Clock  XXXA] Insect bite, initial encounter       ED Disposition     ED Disposition Condition Comment    Discharge  Jamie Valladares discharge to home/self care  Condition at discharge: Good        Follow-up Information     Follow up With Specialties Details Why Contact Info    Reilly Lafleur DO Family Medicine Schedule an appointment as soon as possible for a visit If symptoms worsen [address] [place] [id number]  [phone]            Patient's Medications   Discharge Prescriptions    CEPHALEXIN (KEFLEX) 250 MG CAPSULE    Take 2 capsules (500 mg total) by mouth 4 (four) times a day for 7 days       Start Date: 5/27/2018 End Date: 6/3/2018       Order Dose: 500 mg       Quantity: 28 capsule    Refills: 0     No discharge procedures on file      ED Provider  Electronically Signed by           Alysia Zhang PA-C  05/27/18 5965

## 2018-05-27 NOTE — DISCHARGE INSTRUCTIONS
Insect Bite or Sting   WHAT YOU NEED TO KNOW:   Most insect bites and stings are not dangerous and go away without treatment  Your symptoms may be mild, or you may develop anaphylaxis  Anaphylaxis is a sudden, life-threatening reaction that needs immediate treatment  Common examples of insects that bite or sting are bees, ticks, mosquitoes, spiders, and ants  Insect bites or stings can lead to diseases such as malaria, West Nile virus, Lyme disease, or [place] Spotted Fever  DISCHARGE INSTRUCTIONS:   Call 911 for signs or symptoms of anaphylaxis,  such as trouble breathing, swelling in your mouth or throat, or wheezing  You may also have itching, a rash, hives, or feel like you are going to faint  Return to the emergency department if:   · You are stung on your tongue or in your throat  · A white area forms around the bite  · You are sweating badly or have body pain  · You think you were bitten or stung by a poisonous insect  Contact your healthcare provider if:   · You have a fever  · The area becomes red, warm, tender, and swollen beyond the area of the bite or sting  · You have questions or concerns about your condition or care  Medicines:   · Antihistamines  decrease itching and rash  · Epinephrine  is used to treat severe allergic reactions such as anaphylaxis  · Take your medicine as directed  Contact your healthcare provider if you think your medicine is not helping or if you have side effects  Tell him of her if you are allergic to any medicine  Keep a list of the medicines, vitamins, and herbs you take  Include the amounts, and when and why you take them  Bring the list or the pill bottles to follow-up visits  Carry your medicine list with you in case of an emergency  Steps to take for signs or symptoms of anaphylaxis:   · Immediately  give 1 shot of epinephrine only into the outer thigh muscle  · Leave the shot in place  as directed   Your healthcare provider may recommend you leave it in place for up to 10 seconds before you remove it  This helps make sure all of the epinephrine is delivered  · Call 911 and go to the emergency department,  even if the shot improved symptoms  Do not drive yourself  Bring the used epinephrine shot with you  Safety precautions to take if you are at risk for anaphylaxis:   · Keep 2 shots of epinephrine with you at all times  You may need a second shot, because epinephrine only works for about 20 minutes and symptoms may return  Your healthcare provider can show you and family members how to give the shot  Check the expiration date every month and replace it before it expires  · Create an action plan  Your healthcare provider can help you create a written plan that explains the allergy and an emergency plan to treat a reaction  The plan explains when to give a second epinephrine shot if symptoms return or do not improve after the first  Give copies of the action plan and emergency instructions to family members, work and school staff, and  providers  Show them how to give a shot of epinephrine  · Carry medical alert identification  Wear medical alert jewelry or carry a card that says you have an insect allergy  Ask your healthcare provider where to get these items  If an insect bites or stings you:   · Remove the stinger  Scrape the stinger out with your fingernail, edge of a credit card, or a knife blade  Do not squeeze the wound  Gently wash the area with soap and water  · Remove the tick  Ticks must be removed as soon as possible so you do not get diseases passed through tick bites  Ask your healthcare provider for more information on tick bites and how to remove ticks  Care for a bite or sting wound:   · Elevate the affected area  Prop the wound above the level of your heart, if possible  Elevate the area for 10 to 20 minutes each hour or as directed by your healthcare provider  · Use compresses    Soak a clean washcloth in cold water, wring it out, and put it on the bite or sting  Use the compress for 10 to 20 minutes each hour or as directed by your healthcare provider  After 24 to 48 hours, change to warm compresses  · Apply a paste  Add water to baking soda to make a thick paste  Put the paste on the area for 5 minutes  Rinse gently to remove the paste  Prevent another insect bite or sting:   · Do not wear bright-colored or flower-print clothing when you plan to spend time outdoors  Do not use hairspray, perfumes, or aftershave  · Do not leave food out  · Empty any standing water and wash container with soap and water every 2 days  · Put screens on all open windows and doors  · Put insect repellent that contains DEET on skin that is showing when you go outside  Put insect repellent at the top of your boots, bottom of pant legs, and sleeve cuffs  Wear long sleeves, pants, and shoes  · Use citronella candles outdoors to help keep mosquitoes away  Put a tick and flea collar on pets  Follow up with your healthcare provider as directed:  Write down your questions so you remember to ask them during your visits  © 2017 [employer] Information is for End User's use only and may not be sold, redistributed or otherwise used for commercial purposes  All illustrations and images included in CareNotes® are the copyrighted property of [employer]  or Terrence Cherry  The above information is an  only  It is not intended as medical advice for individual conditions or treatments  Talk to your doctor, nurse or pharmacist before following any medical regimen to see if it is safe and effective for you

## 2018-09-04 RX ORDER — THYROID 30 MG/1
30 TABLET ORAL 2 TIMES DAILY
Refills: 6 | COMMUNITY
Start: 2018-08-08

## 2018-09-04 RX ORDER — DOXAZOSIN 8 MG/1
4 TABLET ORAL
Refills: 1 | COMMUNITY
Start: 2018-08-08 | End: 2019-11-13 | Stop reason: SDUPTHER

## 2018-09-06 ENCOUNTER — OFFICE VISIT (OUTPATIENT)
Dept: CARDIOLOGY CLINIC | Facility: HOSPITAL | Age: 73
End: 2018-09-06
Payer: MEDICARE

## 2018-09-06 VITALS
WEIGHT: 183 LBS | DIASTOLIC BLOOD PRESSURE: 68 MMHG | BODY MASS INDEX: 32.43 KG/M2 | HEIGHT: 63 IN | HEART RATE: 60 BPM | SYSTOLIC BLOOD PRESSURE: 124 MMHG

## 2018-09-06 DIAGNOSIS — I47.1 PAROXYSMAL SVT (SUPRAVENTRICULAR TACHYCARDIA) (HCC): ICD-10-CM

## 2018-09-06 DIAGNOSIS — I10 HYPERTENSION, UNSPECIFIED TYPE: ICD-10-CM

## 2018-09-06 DIAGNOSIS — I25.119 CORONARY ARTERY DISEASE INVOLVING NATIVE CORONARY ARTERY OF NATIVE HEART WITH ANGINA PECTORIS (HCC): Primary | ICD-10-CM

## 2018-09-06 PROCEDURE — 93000 ELECTROCARDIOGRAM COMPLETE: CPT | Performed by: INTERNAL MEDICINE

## 2018-09-06 PROCEDURE — 99214 OFFICE O/P EST MOD 30 MIN: CPT | Performed by: INTERNAL MEDICINE

## 2018-09-06 NOTE — PROGRESS NOTES
Cardiology Follow Up    Taya Sorto  [birth year]  [id number]  [address] [address]    1  Coronary artery disease involving native coronary artery of native heart with angina pectoris ([address] )  POCT ECG    Lipid Panel with Direct LDL reflex   2  Hypertension, unspecified type  POCT ECG    Lipid Panel with Direct LDL reflex   3  Paroxysmal SVT (supraventricular tachycardia) ([facility])         Discussion/Summary:  Overall she is doing well  Coronary artery disease stable with no complaints of angina  Her functional capacity has been good for her age  She has a hard time managing her blood pressure she does not seem to remember which medication she is supposed to take and has several pill bottles in her bag  She is having new medications added and her home blood pressure cuff is about 20 points higher than our measurements today  I have asked her to bring in all of her pills to her next office appointment to try to clarify  Continues to refuse statins or any other cholesterol medications    Interval History:  78-year-old female history of coronary disease, hypertension, renal artery stenosis, paroxysmal supraventricular tachycardia Routine follow-up visit  Denies any chest pain, shortness of breath, palpitations, lightheadedness, dizziness, or syncope  There has been no further supraventricular tachycardia  Functional capacity has been good  There has been no lightheadedness  She stays well hydrated  She seems somewhat unsure on her antihypertensive regimen      Problem List     Chest pain    Tachycardia    Hypertension    Diabetes mellitus (HCC)    NSTEMI (non-ST elevated myocardial infarction) [facility])    Coronary artery disease involving native coronary artery of native heart with angina pectoris (HCC)    Paroxysmal SVT (supraventricular tachycardia) [facility])        Past Medical History:   Diagnosis Date    Abnormal stress test     Angina pectoris (HCC)     1 wk ago    Diabetes mellitus ([facility] )     niddm    History of palpitations     History of poliomyelitis     Hyperlipidemia     Hypertension     Hypothyroid     Rosacea     Wears glasses      Social History     Social History    Marital status:      Spouse name: N/A    Number of children: N/A    Years of education: N/A     Occupational History    Not on file  Social History Main Topics    Smoking status: Never Smoker    Smokeless tobacco: Never Used    Alcohol use No    Drug use: No    Sexual activity: Not on file     Other Topics Concern    Not on file     Social History Narrative    No narrative on file      History reviewed  No pertinent family history    Past Surgical History:   Procedure Laterality Date    CATARACT EXTRACTION Bilateral     COLONOSCOPY      HYSTERECTOMY      ROTATOR CUFF REPAIR Right        Current Outpatient Prescriptions:     ACCU-CHEK KAILEY PLUS test strip, , Disp: , Rfl: 3    ARMOUR THYROID 30 MG tablet, Take 15 mg by mouth daily  , Disp: , Rfl: 6    aspirin 81 mg chewable tablet, Chew 1 tablet daily, Disp: 30 tablet, Rfl: 0    Blood Glucose Monitoring Suppl (ACCU-CHEK KAILEY CONNECT) w/Device KIT, by Does not apply route, Disp: , Rfl:     doxazosin (CARDURA) 8 MG tablet, Take 8 mg by mouth daily at bedtime, Disp: , Rfl: 1    glucose blood (ACCU-CHEK KAILEY PLUS) test strip, by In Vitro route, Disp: , Rfl:     Lancets Misc  (ACCU-CHEK MULTICLIX LANCET DEV) KIT, by Does not apply route 2 (two) times a day, Disp: , Rfl:     metFORMIN (GLUCOPHAGE) 500 mg tablet, Take 500 mg by mouth 3 (three) times a day  , Disp: , Rfl:     metoprolol tartrate (LOPRESSOR) 25 mg tablet, Take 0 5 tablets by mouth every 12 (twelve) hours (Patient taking differently: Take 25 mg by mouth every 12 (twelve) hours  ), Disp: 60 tablet, Rfl: 0    Thyroid (NATURE-THROID PO), Take 90 mg by mouth daily  , Disp: , Rfl:     hydrALAZINE (APRESOLINE) 25 mg tablet, Take 1 tablet (25 mg total) by mouth 2 (two) times a day (Patient not taking: Reported on 9/6/2018 ), Disp: 60 tablet, Rfl: 6    losartan (COZAAR) 50 mg tablet, Take 1 tablet (50 mg total) by mouth 2 (two) times a day for 20 days (Patient not taking: Reported on 9/6/2018 ), Disp: 40 tablet, Rfl: 0  Allergies   Allergen Reactions    Lisinopril Rash     Changed pigment of my skin     Atorvastatin Rash    Amlodipine        Labs:     Chemistry        Component Value Date/Time     04/30/2018 1942     12/08/2015 1014    K 4 1 04/30/2018 1942    K 4 4 12/08/2015 1014     04/30/2018 1942     12/08/2015 1014    CO2 30 04/30/2018 1942    CO2 30 9 12/08/2015 1014    BUN 22 04/30/2018 1942    BUN 20 12/08/2015 1014    CREATININE 0 72 04/30/2018 1942    CREATININE 0 67 12/08/2015 1014        Component Value Date/Time    CALCIUM 10 0 04/30/2018 1942    CALCIUM 8 9 12/08/2015 1014    ALKPHOS 118 (H) 04/30/2018 1942    ALKPHOS 111 12/08/2015 1014    AST 24 04/30/2018 1942    AST 20 12/08/2015 1014    ALT 29 04/30/2018 1942    ALT 25 12/08/2015 1014    BILITOT 0 48 12/08/2015 1014            Lab Results   Component Value Date    CHOL 267 12/08/2015    CHOL 270 06/10/2015    CHOL 243 03/18/2015     Lab Results   Component Value Date    HDL 51 06/26/2017    HDL 49 03/27/2017    HDL 62 (H) 11/22/2016     Lab Results   Component Value Date    LDLCALC 158 (H) 06/26/2017    LDLCALC 153 (H) 03/27/2017    LDLCALC 157 (H) 11/22/2016     Lab Results   Component Value Date    TRIG 231 (H) 06/26/2017    TRIG 220 (H) 03/27/2017    TRIG 225 (H) 11/22/2016     No components found for: CHOLHDL    Imaging: No results found  ECG:  Normal sinus rhythm right axis deviation nonspecific ST changes      Review of Systems   Constitution: Negative  HENT: Negative  Eyes: Negative  Cardiovascular: Negative  Respiratory: Negative  Endocrine: Negative  Hematologic/Lymphatic: Negative  Skin: Negative  Musculoskeletal: Negative  Gastrointestinal: Negative  Genitourinary: Negative  Neurological: Negative  Psychiatric/Behavioral: Negative  Vitals:    09/06/18 1035   BP: 124/68   Pulse: 60     Vitals:    09/06/18 1035   Weight: 83 kg (183 lb)     Height: 5' 3" (160 cm)   Body mass index is 32 42 kg/m²      Physical Exam:  Vital signs reviewed home blood pressures are high  General appearance:  Appears stated age, alert, well appearing and in no distress  HEENT:  PERRLA, EOMI, no scleral icterus, no conjunctival pallor  NECK:  Supple, No elevated JVP, no thyromegaly, no carotid bruits  HEART:  Regular rate and rhythm, normal S1/S2, no S3/S4, no murmur or rub  LUNGS:  Clear to auscultation bilaterally, no wheezes rales or rhonchi  ABDOMEN:  Soft, non-tender, positive bowel sounds, no rebound or guarding, no organomegaly   EXTREMITIES:  No edema, normal range of motion  VASCULAR:  Normal pedal pulses, good pulse volume   SKIN: No lesions or rashes on exposed skin  NEURO:  CN II-XII intact, no focal deficits

## 2019-01-23 ENCOUNTER — OFFICE VISIT (OUTPATIENT)
Dept: CARDIOLOGY CLINIC | Facility: CLINIC | Age: 74
End: 2019-01-23
Payer: MEDICARE

## 2019-01-23 VITALS
SYSTOLIC BLOOD PRESSURE: 128 MMHG | HEIGHT: 63 IN | BODY MASS INDEX: 32.07 KG/M2 | DIASTOLIC BLOOD PRESSURE: 62 MMHG | HEART RATE: 50 BPM | WEIGHT: 181 LBS

## 2019-01-23 DIAGNOSIS — I25.10 CORONARY ARTERY DISEASE INVOLVING NATIVE CORONARY ARTERY OF NATIVE HEART WITHOUT ANGINA PECTORIS: ICD-10-CM

## 2019-01-23 DIAGNOSIS — I21.4 NSTEMI (NON-ST ELEVATED MYOCARDIAL INFARCTION) (HCC): Primary | ICD-10-CM

## 2019-01-23 DIAGNOSIS — E78.00 HYPERCHOLESTEROLEMIA: ICD-10-CM

## 2019-01-23 DIAGNOSIS — I10 ESSENTIAL HYPERTENSION: ICD-10-CM

## 2019-01-23 PROBLEM — R07.9 CHEST PAIN: Status: RESOLVED | Noted: 2017-09-27 | Resolved: 2019-01-23

## 2019-01-23 PROBLEM — R00.0 TACHYCARDIA: Status: RESOLVED | Noted: 2017-09-27 | Resolved: 2019-01-23

## 2019-01-23 PROCEDURE — 99214 OFFICE O/P EST MOD 30 MIN: CPT | Performed by: INTERNAL MEDICINE

## 2019-01-23 RX ORDER — CLOPIDOGREL BISULFATE 75 MG/1
75 TABLET ORAL
COMMUNITY
Start: 2019-01-09 | End: 2019-01-23 | Stop reason: SDUPTHER

## 2019-01-23 RX ORDER — CLOPIDOGREL BISULFATE 75 MG/1
75 TABLET ORAL ONCE
Qty: 90 TABLET | Refills: 3 | Status: SHIPPED | OUTPATIENT
Start: 2019-01-23 | End: 2019-11-13 | Stop reason: SDUPTHER

## 2019-01-23 RX ORDER — ATORVASTATIN CALCIUM 40 MG/1
40 TABLET, FILM COATED ORAL
COMMUNITY
Start: 2019-01-08 | End: 2019-01-23 | Stop reason: SDUPTHER

## 2019-01-23 RX ORDER — ATORVASTATIN CALCIUM 40 MG/1
40 TABLET, FILM COATED ORAL DAILY
Qty: 90 TABLET | Refills: 3 | Status: SHIPPED | OUTPATIENT
Start: 2019-01-23 | End: 2020-03-06 | Stop reason: SDUPTHER

## 2019-01-23 RX ORDER — NITROGLYCERIN 0.4 MG/1
0.4 TABLET SUBLINGUAL
COMMUNITY
Start: 2019-01-08

## 2019-01-23 RX ORDER — THYROID,PORK 15 MG
15 TABLET ORAL EVERY EVENING
Refills: 5 | COMMUNITY
Start: 2019-01-15

## 2019-01-23 NOTE — PROGRESS NOTES
Cardiology Follow Up    Елена Villagomez  [birth year]  [facility]  [address] [id number] [phone]    1  NSTEMI (non-ST elevated myocardial infarction) ([facility])  atorvastatin (LIPITOR) 40 mg tablet    clopidogrel (PLAVIX) 75 mg tablet   2  Coronary artery disease involving native coronary artery of native heart without angina pectoris     3  Essential hypertension     4  Hypercholesterolemia           Discussion/Summary:She is stable post NSTEMI with RCA stenting  She has other CAD and needs to be followed closely for recurrent anginal symptoms  I have reviewed her medications and made no changes  See has a f/up apptment with Dr Liliane Joya in early 3/2019  Interval History: She is seen in hospital follow up after admission at [facility] for CP / non STEMI  She is a patient of Dr Liliane Joya and sees him in the [employer]  Hx :  Cardiac cath 10/5/2017 - multi - vessel CAD, she has not having anginal symptoms and medical management was recommended  She refused statin therapy  Did well without CP     1/7/2019 - acute CP  Treated at [facility]  Elevated troponin  NSTEMI  Cath - received a TIM to the mid RCA ( 3 0 x 28 mm )    ECHO - EF 60%, no significant valve disease  Started on Lipitor 40 mg daily which she is tolerating  Also on ASA and Plavix  Doing well  No further CP  To start cardiac rehab tomorrow      Patient Active Problem List   Diagnosis    Hypertension    Diabetes mellitus ([facility]    NSTEMI (non-ST elevated myocardial infarction) ([facility]    Coronary artery disease involving native coronary artery of native heart without angina pectoris    Paroxysmal SVT (supraventricular tachycardia) ([facility] )    Hypercholesterolemia     Past Medical History:   Diagnosis Date    Abnormal stress test     Angina pectoris ([facility] )     1 wk ago    CAD (coronary artery disease)     Diabetes mellitus ([facility] )     niddm    Edema     Endometriosis     History of palpitations     History of poliomyelitis     Hyperlipidemia     Hypertension     Hypothyroid     Kidney stones     NSTEMI (non-ST elevated myocardial infarction) ([facility] ) 01/05/2019    Obesity     Paroxysmal SVT [facility]supraventricular tachycardia) ([facility])     Renal insufficiency     Rosacea     Thyroid nodule     Wears glasses      Social History     Social History    Marital status:      Spouse name: N/A    Number of children: N/A    Years of education: N/A     Occupational History    Not on file       Social History Main Topics    Smoking status: Never Smoker    Smokeless tobacco: Never Used    Alcohol use No    Drug use: No    Sexual activity: Not on file     Other Topics Concern    Not on file     Social History Narrative    No narrative on file      Family History   Problem Relation Age of Onset    Asthma Mother     Stomach cancer Father      Past Surgical History:   Procedure Laterality Date    CARDIAC CATHETERIZATION  2017    CARDIAC CATHETERIZATION  01/07/2019    CATARACT EXTRACTION Bilateral     COLONOSCOPY      HYSTERECTOMY      ROTATOR CUFF REPAIR Right        Current Outpatient Prescriptions:     ACCU-CHEK KAILEY PLUS test strip, , Disp: , Rfl: 3    ARMOUR THYROID 15 MG tablet, 15 mg every evening, Disp: , Rfl: 5    ARMOUR THYROID 30 MG tablet, Take 15 mg by mouth daily  , Disp: , Rfl: 6    aspirin 81 mg chewable tablet, Chew 1 tablet daily, Disp: 30 tablet, Rfl: 0    atorvastatin (LIPITOR) 40 mg tablet, Take 1 tablet (40 mg total) by mouth daily, Disp: 90 tablet, Rfl: 3    Blood Glucose Monitoring Suppl (ACCU-CHEK KAILEY CONNECT) w/Device KIT, by Does not apply route, Disp: , Rfl:     clopidogrel (PLAVIX) 75 mg tablet, Take 1 tablet (75 mg total) by mouth once for 1 dose, Disp: 90 tablet, Rfl: 3    doxazosin (CARDURA) 8 MG tablet, Take 8 mg by mouth daily at bedtime, Disp: , Rfl: 1    glucose blood (ACCU-CHEK KAILEY PLUS) test strip, by In Vitro route, Disp: , Rfl:     Lancets Misc  (ACCU-CHEK MULTICLIX LANCET DEV) KIT, by Does not apply route 2 (two) times a day, Disp: , Rfl:     losartan (COZAAR) 50 mg tablet, Take 1 tablet (50 mg total) by mouth 2 (two) times a day for 20 days, Disp: 40 tablet, Rfl: 0    metFORMIN (GLUCOPHAGE) 500 mg tablet, Take 500 mg by mouth 3 (three) times a day  , Disp: , Rfl:     metoprolol tartrate (LOPRESSOR) 25 mg tablet, Take 0 5 tablets by mouth every 12 (twelve) hours (Patient taking differently: Take 50 mg by mouth every 12 (twelve) hours  ), Disp: 60 tablet, Rfl: 0    nitroglycerin (NITROSTAT) 0 4 mg SL tablet, Place 0 4 mg under the tongue, Disp: , Rfl:     hydrALAZINE (APRESOLINE) 25 mg tablet, Take 1 tablet (25 mg total) by mouth 2 (two) times a day (Patient not taking: Reported on 9/6/2018 ), Disp: 60 tablet, Rfl: 6  Allergies   Allergen Reactions    Lisinopril Rash     Changed pigment of my skin     Atorvastatin Rash    Amlodipine      Vitals:    01/23/19 0905   BP: 128/62   BP Location: Right arm   Patient Position: Sitting   Cuff Size: Adult   Pulse: (!) 50   Weight: 82 1 kg (181 lb)   Height: 5' 3" (1 6 m)     Weight (last 2 days)     Date/Time   Weight    01/23/19 0905  82 1 (181)             Blood pressure 128/62, pulse (!) 50, height 5' 3" (1 6 m), weight 82 1 kg (181 lb)  , Body mass index is 32 06 kg/m²  Labs:  No visits with results within 6 Month(s) from this visit     Latest known visit with results is:   Admission on 04/30/2018, Discharged on 05/01/2018   Component Date Value    Sodium 04/30/2018 142     Potassium 04/30/2018 4 1     Chloride 04/30/2018 103     CO2 04/30/2018 30     ANION GAP 04/30/2018 9     BUN 04/30/2018 22     Creatinine 04/30/2018 0 72     Glucose 04/30/2018 131     Calcium 04/30/2018 10 0     AST 04/30/2018 24     ALT 04/30/2018 29     Alkaline Phosphatase 04/30/2018 118*    Total Protein 04/30/2018 7 7     Albumin 04/30/2018 3 7     Total Bilirubin 04/30/2018 0 40     eGFR 04/30/2018 83     WBC 04/30/2018 11 27*    RBC 04/30/2018 5 01     Hemoglobin 04/30/2018 15 2     Hematocrit 04/30/2018 45 3     MCV 04/30/2018 90     MCH 04/30/2018 30 3     MCHC 04/30/2018 33 6     RDW 04/30/2018 12 5     MPV 04/30/2018 11 1     Platelets 09/57/2584 232     Neutrophils Relative 04/30/2018 71     Lymphocytes Relative 04/30/2018 22     Monocytes Relative 04/30/2018 4     Eosinophils Relative 04/30/2018 3     Basophils Relative 04/30/2018 0     Neutrophils Absolute 04/30/2018 7 97*    Lymphocytes Absolute 04/30/2018 2 52     Monocytes Absolute 04/30/2018 0 45     Eosinophils Absolute 04/30/2018 0 29     Basophils Absolute 04/30/2018 0 04     Troponin I 04/30/2018 <0 02     Troponin I 04/30/2018 <0 02     Ventricular Rate 04/30/2018 76     Atrial Rate 04/30/2018 76     W[place] Interval 04/30/2018 186     QRSD Interval 04/30/2018 96     QT Interval 04/30/2018 412     QTC Interval 04/30/2018 463     P Axis 04/30/2018 45     QRS Axis 04/30/2018 105     T Wave Axis 04/30/2018 2      Imaging: No results found  Review of Systems:  Review of Systems   Constitutional: Negative for diaphoresis, fatigue, fever and unexpected weight change  HENT: Negative  Respiratory: Negative for cough, shortness of breath and wheezing  Cardiovascular: Negative for chest pain, palpitations and leg swelling  Gastrointestinal: Negative for abdominal pain, diarrhea and nausea  Musculoskeletal: Negative for gait problem and myalgias  Skin: Negative for rash  Neurological: Negative for dizziness and numbness  Psychiatric/Behavioral: Negative  Physical Exam:  Physical Exam   Constitutional: She is oriented to person, place, and time  She appears well-developed and well-nourished  HENT:   Head: Normocephalic and atraumatic  Eyes: Pupils are equal, round, and reactive to light  Neck: Normal range of motion   Neck supple  No JVD present  Cardiovascular: Regular rhythm, S1 normal, S2 normal and normal pulses  Pulses:       Carotid pulses are 2+ on the right side, and 2+ on the left side  Pulmonary/Chest: Effort normal and breath sounds normal  She has no wheezes  She has no rales  Abdominal: Soft  Bowel sounds are normal  There is no tenderness  Musculoskeletal: Normal range of motion  She exhibits no edema or tenderness  Neurological: She is alert and oriented to person, place, and time  She has normal reflexes  No cranial nerve deficit  Skin: Skin is warm  Psychiatric: She has a normal mood and affect

## 2019-03-01 ENCOUNTER — OFFICE VISIT (OUTPATIENT)
Dept: CARDIOLOGY CLINIC | Facility: HOSPITAL | Age: 74
End: 2019-03-01
Payer: MEDICARE

## 2019-03-01 VITALS
DIASTOLIC BLOOD PRESSURE: 70 MMHG | HEIGHT: 63 IN | WEIGHT: 185 LBS | HEART RATE: 53 BPM | BODY MASS INDEX: 32.78 KG/M2 | SYSTOLIC BLOOD PRESSURE: 120 MMHG

## 2019-03-01 DIAGNOSIS — I10 ESSENTIAL HYPERTENSION: ICD-10-CM

## 2019-03-01 DIAGNOSIS — I47.1 PAROXYSMAL SVT (SUPRAVENTRICULAR TACHYCARDIA) (HCC): ICD-10-CM

## 2019-03-01 DIAGNOSIS — I10 HTN (HYPERTENSION): ICD-10-CM

## 2019-03-01 DIAGNOSIS — I25.10 CORONARY ARTERY DISEASE INVOLVING NATIVE CORONARY ARTERY OF NATIVE HEART WITHOUT ANGINA PECTORIS: Primary | ICD-10-CM

## 2019-03-01 DIAGNOSIS — E78.00 HYPERCHOLESTEROLEMIA: ICD-10-CM

## 2019-03-01 PROCEDURE — 93000 ELECTROCARDIOGRAM COMPLETE: CPT | Performed by: INTERNAL MEDICINE

## 2019-03-01 PROCEDURE — 99214 OFFICE O/P EST MOD 30 MIN: CPT | Performed by: INTERNAL MEDICINE

## 2019-03-01 RX ORDER — LOSARTAN POTASSIUM 50 MG/1
50 TABLET ORAL DAILY
Qty: 90 TABLET | Refills: 3 | Status: SHIPPED | OUTPATIENT
Start: 2019-03-01 | End: 2020-05-27 | Stop reason: SDUPTHER

## 2019-03-01 NOTE — PROGRESS NOTES
Cardiology Follow Up    Dawna Butler  [birth year]  [id number]  [address] [address]    1  Coronary artery disease involving native coronary artery of native heart without angina pectoris  POCT ECG    CBC and Platelet    Basic metabolic panel    metoprolol tartrate (LOPRESSOR) 25 mg tablet   2  Essential hypertension     3  Paroxysmal SVT (supraventricular tachycardia) ([address] )     4  Hypercholesterolemia     5  HTN (hypertension)  losartan (COZAAR) 50 mg tablet       Discussion/Summary:  In January she had a small non-STEMI at an outside hospital and received a drug coated stent to the RCA  Since that time she has had significant fatigue  The increased her metoprolol to 50 mg q 12 and she has been a bit bradycardic  I have decreased down to 25 mg q 12 continue to follow her closely  I am pleased that she is taking a statin at this time  Continue on dual anti-platelet therapy  LV function has been preserved  Encouraged that she continue with diet and exercise no further testing at this time I will see her back in 6 months  Continue with cardiac rehab  Interval History:  78-year-old female history of coronary disease, hypertension, renal artery stenosis, paroxysmal supraventricular tachycardia Routine follow-up visit  In early January she was driving in the car and had an episode of tachycardia with chest tightness  She was brought to an outside hospital and was sent to [place] where they found a small troponin elevation  Left heart catheterization was performed the stent was placed in the right coronary artery  Since hospital discharge she has been doing well with the exception of increased generalized fatigue  Beta-blocker dose had been increased as well  She is now taking a statin with no side effects  Denies any lower extremity edema, PND, orthopnea  His been no palpitations, lightheadedness, dizziness  No shortness of breath or chest discomfort  Problem List     Chest pain    Tachycardia    Hypertension    Diabetes mellitus (HCC)    NSTEMI (non-ST elevated myocardial infarction) ([place] [address] )    Coronary artery disease involving native coronary artery of native heart with angina pectoris (HCC)    Paroxysmal SVT (supraventricular tachycardia) ([place] [address] )        Past Medical History:   Diagnosis Date    Abnormal stress test     Angina pectoris ([place] [address] )     1 wk ago    CAD (coronary artery disease)     Diabetes mellitus ([place] [address] )     niddm    Edema     Endometriosis     History of palpitations     History of poliomyelitis     Hyperlipidemia     Hypertension     Hypothyroid     Kidney stones     NSTEMI (non-ST elevated myocardial infarction) ([place] [address] ) 01/05/2019    Obesity     [facility]     Renal insufficiency     Rosacea     Thyroid nodule     Wears glasses      Social History     Socioeconomic History    Marital status:       Spouse name: Not on file    Number of children: Not on file    Years of education: Not on file    Highest education level: Not on file   Occupational History    Not on file   Social Needs    Financial resource strain: Not on file    Food insecurity:     Worry: Not on file     Inability: Not on file    Transportation needs:     Medical: Not on file     Non-medical: Not on file   Tobacco Use    Smoking status: Never Smoker    Smokeless tobacco: Never Used   Substance and Sexual Activity    Alcohol use: No    Drug use: No    Sexual activity: Not on file   Lifestyle    Physical activity:     Days per week: Not on file     Minutes per session: Not on file    Stress: Not on file   Relationships    Social connections:     Talks on phone: Not on file     Gets together: Not on file     Attends Yazidism service: Not on file     Active member of club or organization: Not on file     Attends meetings of clubs or organizations: Not on file     Relationship status: Not on file    Intimate partner violence:     Fear of current or ex partner: Not on file     Emotionally abused: Not on file     Physically abused: Not on file     Forced sexual activity: Not on file   Other Topics Concern    Not on file   Social History Narrative    Not on file      Family History   Problem Relation Age of Onset    Asthma Mother     Stomach cancer Father      Past Surgical History:   Procedure Laterality Date    CARDIAC CATHETERIZATION  2017    CARDIAC CATHETERIZATION  01/07/2019    CATARACT EXTRACTION Bilateral     COLONOSCOPY      HYSTERECTOMY      ROTATOR CUFF REPAIR Right        Current Outpatient Medications:     ACCU-CHEK KAILEY PLUS test strip, , Disp: , Rfl: 3    ARMOUR THYROID 15 MG tablet, 15 mg every evening, Disp: , Rfl: 5    ARMOUR THYROID 30 MG tablet, Take 15 mg by mouth daily  , Disp: , Rfl: 6    aspirin 81 mg chewable tablet, Chew 1 tablet daily, Disp: 30 tablet, Rfl: 0    Blood Glucose Monitoring Suppl (ACCU-CHEK KAILEY CONNECT) w/Device KIT, by Does not apply route, Disp: , Rfl:     clopidogrel (PLAVIX) 75 mg tablet, Take 1 tablet (75 mg total) by mouth once for 1 dose, Disp: 90 tablet, Rfl: 3    doxazosin (CARDURA) 8 MG tablet, Take 4 mg by mouth daily at bedtime , Disp: , Rfl: 1    glucose blood (ACCU-CHEK KAILEY PLUS) test strip, by In Vitro route, Disp: , Rfl:     Lancets Misc  (ACCU-CHEK MULTICLIX LANCET DEV) KIT, by Does not apply route 2 (two) times a day, Disp: , Rfl:     losartan (COZAAR) 50 mg tablet, Take 1 tablet (50 mg total) by mouth daily, Disp: 90 tablet, Rfl: 3    metFORMIN (GLUCOPHAGE) 500 mg tablet, Take 500 mg by mouth 2 (two) times a day with meals , Disp: , Rfl:     metoprolol tartrate (LOPRESSOR) 25 mg tablet, Take 1 tablet (25 mg total) by mouth every 12 (twelve) hours, Disp: 180 tablet, Rfl: 3    nitroglycerin (NITROSTAT) 0 4 mg SL tablet, Place 0 4 mg under the tongue, Disp: , Rfl:     atorvastatin (LIPITOR) 40 mg tablet, Take 1 tablet (40 mg total) by mouth daily (Patient not taking: Reported on 3/1/2019), Disp: 90 tablet, Rfl: 3    hydrALAZINE (APRESOLINE) 25 mg tablet, Take 1 tablet (25 mg total) by mouth 2 (two) times a day (Patient not taking: Reported on 9/6/2018 ), Disp: 60 tablet, Rfl: 6  Allergies   Allergen Reactions    Lisinopril Rash     Changed pigment of my skin     Atorvastatin Rash    Amlodipine        Labs:     Chemistry        Component Value Date/Time     12/08/2015 1014    K 4 1 04/30/2018 1942    K 4 4 12/08/2015 1014     04/30/2018 1942     12/08/2015 1014    CO2 30 04/30/2018 1942    CO2 30 9 12/08/2015 1014    BUN 22 04/30/2018 1942    BUN 20 12/08/2015 1014    CREATININE 0 72 04/30/2018 1942    CREATININE 0 67 12/08/2015 1014        Component Value Date/Time    CALCIUM 10 0 04/30/2018 1942    CALCIUM 8 9 12/08/2015 1014    ALKPHOS 118 (H) 04/30/2018 1942    ALKPHOS 111 12/08/2015 1014    AST 24 04/30/2018 1942    AST 20 12/08/2015 1014    ALT 29 04/30/2018 1942    ALT 25 12/08/2015 1014    BILITOT 0 48 12/08/2015 1014            Lab Results   Component Value Date    CHOL 267 12/08/2015    CHOL 270 06/10/2015    CHOL 243 03/18/2015     Lab Results   Component Value Date    HDL 51 06/26/2017    HDL 49 03/27/2017    HDL 62 (H) 11/22/2016     Lab Results   Component Value Date    LDLCALC 158 (H) 06/26/2017    LDLCALC 153 (H) 03/27/2017    LDLCALC 157 (H) 11/22/2016     Lab Results   Component Value Date    TRIG 231 (H) 06/26/2017    TRIG 220 (H) 03/27/2017    TRIG 225 (H) 11/22/2016     No results found for: CHOLHDL    Imaging: No results found  ECG:  Sinus bradycardia right axis      Review of Systems   Constitution: Negative  HENT: Negative  Eyes: Negative  Cardiovascular: Negative  Respiratory: Negative  Endocrine: Negative  Hematologic/Lymphatic: Negative  Skin: Negative  Musculoskeletal: Negative  Gastrointestinal: Negative  Genitourinary: Negative  Neurological: Negative  Psychiatric/Behavioral: Negative  Vitals:    03/01/19 1112   BP: 120/70   Pulse: (!) 53     Vitals:    03/01/19 1112   Weight: 83 9 kg (185 lb)     Height: 5' 3" (160 cm)   Body mass index is 32 77 kg/m²      Physical Exam:  General:  Alert and cooperative, appears stated age  HEENT:  PERRLA, EOMI, no scleral icterus, no conjunctival pallor  Neck:  No lymphadenopathy, no thyromegaly, no carotid bruits, no elevated JVP  Heart[de-identified]  Regular rate and rhythm, normal S1/S2, no S3/S4, no murmur  Lungs:  Clear to auscultation bilaterally   Abdomen:  Soft, non-tender, positive bowel sounds, no rebound or guarding,   no organomegaly   Extremities:  No clubbing, cyanosis or edema   Vascular:  2+ pedal pulses  Skin:  No rashes or lesions on exposed skin  Neurologic:  Cranial nerves II-XII grossly intact without focal deficits

## 2019-06-03 ENCOUNTER — TELEPHONE (OUTPATIENT)
Dept: CARDIOLOGY CLINIC | Facility: CLINIC | Age: 74
End: 2019-06-03

## 2019-06-03 DIAGNOSIS — I47.1 PAROXYSMAL SVT (SUPRAVENTRICULAR TACHYCARDIA) (HCC): Primary | ICD-10-CM

## 2019-06-10 ENCOUNTER — APPOINTMENT (EMERGENCY)
Dept: RADIOLOGY | Facility: HOSPITAL | Age: 74
End: 2019-06-10
Payer: MEDICARE

## 2019-06-10 ENCOUNTER — HOSPITAL ENCOUNTER (EMERGENCY)
Facility: HOSPITAL | Age: 74
Discharge: HOME | End: 2019-06-10
Attending: EMERGENCY MEDICINE | Admitting: EMERGENCY MEDICINE
Payer: MEDICARE

## 2019-06-10 ENCOUNTER — HOSPITAL ENCOUNTER (OUTPATIENT)
Dept: NON INVASIVE DIAGNOSTICS | Facility: HOSPITAL | Age: 74
Discharge: HOME | End: 2019-06-10
Payer: MEDICARE

## 2019-06-10 VITALS
SYSTOLIC BLOOD PRESSURE: 134 MMHG | OXYGEN SATURATION: 91 % | TEMPERATURE: 98.3 F | HEIGHT: 63 IN | HEART RATE: 58 BPM | BODY MASS INDEX: 32.34 KG/M2 | DIASTOLIC BLOOD PRESSURE: 63 MMHG | RESPIRATION RATE: 16 BRPM | WEIGHT: 182.54 LBS

## 2019-06-10 DIAGNOSIS — J06.9 VIRAL URI WITH COUGH: ICD-10-CM

## 2019-06-10 DIAGNOSIS — I47.1 PAROXYSMAL SVT (SUPRAVENTRICULAR TACHYCARDIA) (HCC): ICD-10-CM

## 2019-06-10 DIAGNOSIS — R00.0 TACHYCARDIA: ICD-10-CM

## 2019-06-10 DIAGNOSIS — R00.2 PALPITATIONS: Primary | ICD-10-CM

## 2019-06-10 LAB
ANION GAP SERPL CALCULATED.3IONS-SCNC: 11 MMOL/L (ref 4–13)
APTT PPP: 23 SECONDS (ref 26–38)
ATRIAL RATE: 97 BPM
BASOPHILS # BLD AUTO: 0.04 THOUSANDS/ΜL (ref 0–0.1)
BASOPHILS NFR BLD AUTO: 1 % (ref 0–1)
BUN SERPL-MCNC: 19 MG/DL (ref 5–25)
CALCIUM SERPL-MCNC: 8.8 MG/DL (ref 8.3–10.1)
CHLORIDE SERPL-SCNC: 104 MMOL/L (ref 100–108)
CO2 SERPL-SCNC: 26 MMOL/L (ref 21–32)
CREAT SERPL-MCNC: 0.9 MG/DL (ref 0.6–1.3)
DEPRECATED D DIMER PPP: 520 NG/ML (FEU)
EOSINOPHIL # BLD AUTO: 0.14 THOUSAND/ΜL (ref 0–0.61)
EOSINOPHIL NFR BLD AUTO: 3 % (ref 0–6)
ERYTHROCYTE [DISTWIDTH] IN BLOOD BY AUTOMATED COUNT: 14.9 % (ref 11.6–15.1)
GFR SERPL CREATININE-BSD FRML MDRD: 63 ML/MIN/1.73SQ M
GLUCOSE SERPL-MCNC: 233 MG/DL (ref 65–140)
HCT VFR BLD AUTO: 41.3 % (ref 34.8–46.1)
HGB BLD-MCNC: 12.9 G/DL (ref 11.5–15.4)
IMM GRANULOCYTES # BLD AUTO: 0.01 THOUSAND/UL (ref 0–0.2)
IMM GRANULOCYTES NFR BLD AUTO: 0 % (ref 0–2)
INR PPP: 1.19 (ref 0.86–1.17)
LYMPHOCYTES # BLD AUTO: 0.82 THOUSANDS/ΜL (ref 0.6–4.47)
LYMPHOCYTES NFR BLD AUTO: 18 % (ref 14–44)
MAGNESIUM SERPL-MCNC: 1.8 MG/DL (ref 1.6–2.6)
MCH RBC QN AUTO: 27.9 PG (ref 26.8–34.3)
MCHC RBC AUTO-ENTMCNC: 31.2 G/DL (ref 31.4–37.4)
MCV RBC AUTO: 89 FL (ref 82–98)
MONOCYTES # BLD AUTO: 0.51 THOUSAND/ΜL (ref 0.17–1.22)
MONOCYTES NFR BLD AUTO: 11 % (ref 4–12)
NEUTROPHILS # BLD AUTO: 3.03 THOUSANDS/ΜL (ref 1.85–7.62)
NEUTS SEG NFR BLD AUTO: 67 % (ref 43–75)
NRBC BLD AUTO-RTO: 0 /100 WBCS
P AXIS: 46 DEGREES
PLATELET # BLD AUTO: 171 THOUSANDS/UL (ref 149–390)
PMV BLD AUTO: 10.6 FL (ref 8.9–12.7)
POTASSIUM SERPL-SCNC: 3.7 MMOL/L (ref 3.5–5.3)
PR INTERVAL: 168 MS
PROTHROMBIN TIME: 14.5 SECONDS (ref 11.8–14.2)
QRS AXIS: 107 DEGREES
QRSD INTERVAL: 96 MS
QT INTERVAL: 374 MS
QTC INTERVAL: 474 MS
RBC # BLD AUTO: 4.63 MILLION/UL (ref 3.81–5.12)
SODIUM SERPL-SCNC: 141 MMOL/L (ref 136–145)
T WAVE AXIS: 27 DEGREES
TROPONIN I SERPL-MCNC: <0.02 NG/ML
TSH SERPL DL<=0.05 MIU/L-ACNC: 0.79 UIU/ML (ref 0.36–3.74)
VENTRICULAR RATE: 97 BPM
WBC # BLD AUTO: 4.55 THOUSAND/UL (ref 4.31–10.16)

## 2019-06-10 PROCEDURE — 96360 HYDRATION IV INFUSION INIT: CPT

## 2019-06-10 PROCEDURE — 84484 ASSAY OF TROPONIN QUANT: CPT | Performed by: EMERGENCY MEDICINE

## 2019-06-10 PROCEDURE — 80048 BASIC METABOLIC PNL TOTAL CA: CPT | Performed by: EMERGENCY MEDICINE

## 2019-06-10 PROCEDURE — 85610 PROTHROMBIN TIME: CPT | Performed by: EMERGENCY MEDICINE

## 2019-06-10 PROCEDURE — 93005 ELECTROCARDIOGRAM TRACING: CPT

## 2019-06-10 PROCEDURE — 96361 HYDRATE IV INFUSION ADD-ON: CPT

## 2019-06-10 PROCEDURE — 36415 COLL VENOUS BLD VENIPUNCTURE: CPT | Performed by: EMERGENCY MEDICINE

## 2019-06-10 PROCEDURE — 93225 XTRNL ECG REC<48 HRS REC: CPT

## 2019-06-10 PROCEDURE — 83735 ASSAY OF MAGNESIUM: CPT | Performed by: EMERGENCY MEDICINE

## 2019-06-10 PROCEDURE — 93010 ELECTROCARDIOGRAM REPORT: CPT | Performed by: INTERNAL MEDICINE

## 2019-06-10 PROCEDURE — 84443 ASSAY THYROID STIM HORMONE: CPT | Performed by: EMERGENCY MEDICINE

## 2019-06-10 PROCEDURE — 71046 X-RAY EXAM CHEST 2 VIEWS: CPT

## 2019-06-10 PROCEDURE — 99284 EMERGENCY DEPT VISIT MOD MDM: CPT | Performed by: EMERGENCY MEDICINE

## 2019-06-10 PROCEDURE — 85730 THROMBOPLASTIN TIME PARTIAL: CPT | Performed by: EMERGENCY MEDICINE

## 2019-06-10 PROCEDURE — 85379 FIBRIN DEGRADATION QUANT: CPT | Performed by: EMERGENCY MEDICINE

## 2019-06-10 PROCEDURE — 93226 XTRNL ECG REC<48 HR SCAN A/R: CPT

## 2019-06-10 PROCEDURE — 85025 COMPLETE CBC W/AUTO DIFF WBC: CPT | Performed by: EMERGENCY MEDICINE

## 2019-06-10 PROCEDURE — 99285 EMERGENCY DEPT VISIT HI MDM: CPT

## 2019-06-10 RX ORDER — 0.9 % SODIUM CHLORIDE 0.9 %
3 VIAL (ML) INJECTION AS NEEDED
Status: DISCONTINUED | OUTPATIENT
Start: 2019-06-10 | End: 2019-06-10 | Stop reason: HOSPADM

## 2019-06-10 RX ADMIN — SODIUM CHLORIDE 1000 ML: 0.9 INJECTION, SOLUTION INTRAVENOUS at 09:15

## 2019-06-14 PROCEDURE — 93227 XTRNL ECG REC<48 HR R&I: CPT | Performed by: INTERNAL MEDICINE

## 2019-06-24 ENCOUNTER — TELEPHONE (OUTPATIENT)
Dept: CARDIOLOGY CLINIC | Facility: CLINIC | Age: 74
End: 2019-06-24

## 2019-06-25 DIAGNOSIS — I47.1 SVT (SUPRAVENTRICULAR TACHYCARDIA) (HCC): Primary | ICD-10-CM

## 2019-07-01 ENCOUNTER — OFFICE VISIT (OUTPATIENT)
Dept: CARDIOLOGY CLINIC | Facility: HOSPITAL | Age: 74
End: 2019-07-01
Payer: MEDICARE

## 2019-07-01 VITALS
WEIGHT: 177 LBS | HEART RATE: 59 BPM | DIASTOLIC BLOOD PRESSURE: 70 MMHG | BODY MASS INDEX: 31.36 KG/M2 | SYSTOLIC BLOOD PRESSURE: 138 MMHG | HEIGHT: 63 IN

## 2019-07-01 DIAGNOSIS — E78.2 MIXED HYPERLIPIDEMIA: ICD-10-CM

## 2019-07-01 DIAGNOSIS — I25.10 CORONARY ARTERY DISEASE INVOLVING NATIVE CORONARY ARTERY OF NATIVE HEART WITHOUT ANGINA PECTORIS: ICD-10-CM

## 2019-07-01 DIAGNOSIS — I47.1 PAROXYSMAL SVT (SUPRAVENTRICULAR TACHYCARDIA) (HCC): Primary | ICD-10-CM

## 2019-07-01 DIAGNOSIS — I10 ESSENTIAL HYPERTENSION: ICD-10-CM

## 2019-07-01 DIAGNOSIS — I25.2 HISTORY OF NON-ST ELEVATION MYOCARDIAL INFARCTION (NSTEMI): ICD-10-CM

## 2019-07-01 PROCEDURE — 93000 ELECTROCARDIOGRAM COMPLETE: CPT | Performed by: PHYSICIAN ASSISTANT

## 2019-07-01 PROCEDURE — 99214 OFFICE O/P EST MOD 30 MIN: CPT | Performed by: PHYSICIAN ASSISTANT

## 2019-07-01 NOTE — PROGRESS NOTES
Cardiology Follow Up    Mamie Wade  1945  Heydi Cunningham Principal [facility] [address]  [phone]    1  Paroxysmal SVT (supraventricular tachycardia) (HCC)  POCT ECG    Echo complete with contrast if indicated    metoprolol tartrate (LOPRESSOR) 25 mg tablet   2  Coronary artery disease involving native coronary artery of native heart without angina pectoris     3  History of non-ST elevation myocardial infarction (NSTEMI)     4  Essential hypertension     5  Mixed hyperlipidemia         Discussion/Summary:  Paroxysmal supraventricular tachycardia: doing much better with increased AV adam blocking regimen  Continue current therapy  Discussed valsalva maneuvers  Continue wearing event recorder  Electrolytes were wnl on recent blood work  Avoid excessive caffeine intake  I will also check an echocardiogram  Should she continue to have frequent episodes or extremely fast heart rates, can refer to EP for an ablation  Coronary artery disease with recent NSTEMI s/p TIM to the RCA: currently stable w/o symptoms of angina  Continue DAPT, statin, and BB therapy  Hypertension: adequately controlled in the office today  It is hard to say if her blood pressure is controlled at home since her blood pressure cuff is inaccurate  She states she was tested for sleep apnea in the past and she "does not have it "     She will RTO in 3 months with Dr Tracie Conner or sooner if necessary  She will call with any concerns  Interval History:   Mamie Wade is a 75 y o  female with a history of multivessel coronary artery disease with recent RCA stenting, hypertension, and paroxysmal supraventricular tachycardia who presents to the office today for an acute visit to discuss palpitations/24 hour holter monitor      Patient called the office one month ago complaining of palpitations and elevated heart rate readings  She states she has had SVT in the past but her palpitations were infrequent until recently when they started occurring a few days per week  She states they last upwards of 30 minutes at a time  The palpitations can occur at rest and with exertion  There is no associated lightheadedness, dizziness, shortness of breath, or chest pain/discomfort  She wore a 24 hour holter monitor in June 2019 which showed mostly sinus rhythm with several atrial runs consistent with SVT  She continued to have symptoms and it was advised that she increase her metoprolol tartrate to 50 mg in the a m  And 25 mg at night last week - 6/25/19  An event recorder was also ordered which she has been wearing faithfully  Since the increased dose of metoprolol she has been feeling well  She states she had one episode lasting a few minutes since the medication adjustment which was 6 days ago  Otherwise her functional capacity has been good  With activity she denies any chest pain and shortness of breath  She denies lower extremity swelling, orthopnea, and paroxysmal nocturnal dyspnea  She is taking her blood pressure at home daily with readings between 524-963 systolic  She had her blood pressure cuff interrogated in the office previously which showed that her home cuff is about 20 mm Hg higher than ours  She is taking all medications as prescribed  Problem List     Hypertension    Diabetes mellitus ([facility] )    Lab Results   Component Value Date    HGBA1C 6 9 (H) 06/26/2017       No results for input(s): POCGLU in the last 72 hours      Blood Sugar Average: Last 72 hrs:          NSTEMI (non-ST elevated myocardial infarction) ([facility] )    Coronary artery disease involving native coronary artery of native heart without angina pectoris    [facility]ox[facility] )    Hypercholesterolemia        Past Medical History:   Diagnosis Date    Abnormal stress test     Angina pectoris (HCC)     1 wk ago    CAD (coronary artery disease)     Diabetes mellitus ([facility] )     niddm    Edema     Endometriosis     History of palpitations     History of poliomyelitis     Hyperlipidemia     Hypertension     Hypothyroid     Kidney stones     NSTEMI (non-ST elevated myocardial infarction) ([place] [address] ) 01/05/2019    Obesity     Paroxysmal SVT (supraventricular tachycardia) (HCC)     Renal insufficiency     Rosacea     Thyroid nodule     Wears glasses      Social History     Socioeconomic History    Marital status:       Spouse name: Not on file    Number of children: Not on file    Years of education: Not on file    Highest education level: Not on file   Occupational History    Not on file   Social Needs    Financial resource strain: Not on file    Food insecurity:     Worry: Not on file     Inability: Not on file    Transportation needs:     Medical: Not on file     Non-medical: Not on file   Tobacco Use    Smoking status: Never Smoker    Smokeless tobacco: Never Used   Substance and Sexual Activity    Alcohol use: No    Drug use: No    Sexual activity: Not on file   Lifestyle    Physical activity:     Days per week: Not on file     Minutes per session: Not on file    Stress: Not on file   Relationships    Social connections:     Talks on phone: Not on file     Gets together: Not on file     Attends Voodoo service: Not on file     Active member of club or organization: Not on file     Attends meetings of clubs or organizations: Not on file     Relationship status: Not on file    Intimate partner violence:     Fear of current or ex partner: Not on file     Emotionally abused: Not on file     Physically abused: Not on file     Forced sexual activity: Not on file   Other Topics Concern    Not on file   Social History Narrative    Not on file      Family History   Problem Relation Age of Onset    Asthma Mother     Stomach cancer Father      Past Surgical History:   Procedure Laterality Date    CARDIAC CATHETERIZATION  2017    CARDIAC CATHETERIZATION  01/07/2019    CATARACT EXTRACTION Bilateral     COLONOSCOPY      HYSTERECTOMY      ROTATOR CUFF REPAIR Right        Current Outpatient Medications:     ACCU-CHEK KAILEY PLUS test strip, , Disp: , Rfl: 3    ARMOUR THYROID 15 MG tablet, 15 mg every evening Before bedtime, Disp: , Rfl: 5    ARMOUR THYROID 30 MG tablet, Take 30 mg by mouth 2 (two) times a day Take one in the morning and one in the afternoon, Disp: , Rfl: 6    aspirin 81 mg chewable tablet, Chew 1 tablet daily, Disp: 30 tablet, Rfl: 0    atorvastatin (LIPITOR) 40 mg tablet, Take 1 tablet (40 mg total) by mouth daily, Disp: 90 tablet, Rfl: 3    Blood Glucose Monitoring Suppl (ACCU-CHEK KAILEY CONNECT) w/Device KIT, by Does not apply route, Disp: , Rfl:     clopidogrel (PLAVIX) 75 mg tablet, Take 1 tablet (75 mg total) by mouth once for 1 dose, Disp: 90 tablet, Rfl: 3    doxazosin (CARDURA) 8 MG tablet, Take 4 mg by mouth daily at bedtime , Disp: , Rfl: 1    glucose blood (ACCU-CHEK KAILEY PLUS) test strip, by In Vitro route, Disp: , Rfl:     Lancets Misc  (ACCU-CHEK MULTICLIX LANCET DEV) KIT, by Does not apply route 2 (two) times a day, Disp: , Rfl:     losartan (COZAAR) 50 mg tablet, Take 1 tablet (50 mg total) by mouth daily, Disp: 90 tablet, Rfl: 3    metFORMIN (GLUCOPHAGE) 500 mg tablet, Take 500 mg by mouth 3 (three) times a day 2 tablets in the morning and one in the afternoon, Disp: , Rfl:     metoprolol tartrate (LOPRESSOR) 25 mg tablet, Take two tablets (50 mg total) by mouth every morning and one tablet by mouth (25 mg) at night , Disp: 270 tablet, Rfl: 3    nitroglycerin (NITROSTAT) 0 4 mg SL tablet, Place 0 4 mg under the tongue, Disp: , Rfl:     hydrALAZINE (APRESOLINE) 25 mg tablet, Take 1 tablet (25 mg total) by mouth 2 (two) times a day (Patient not taking: Reported on 9/6/2018 ), Disp: 60 tablet, Rfl: 6  Allergies   Allergen Reactions    Lisinopril Rash     Changed pigment of my skin     Amlodipine        Labs:     Chemistry        Component Value Date/Time     12/08/2015 1014    K 3 7 06/10/2019 0916    K 4 4 12/08/2015 1014     06/10/2019 0916     12/08/2015 1014    CO2 26 06/10/2019 0916    CO2 30 9 12/08/2015 1014    BUN 19 06/10/2019 0916    BUN 20 12/08/2015 1014    CREATININE 0 90 06/10/2019 0916    CREATININE 0 67 12/08/2015 1014        Component Value Date/Time    CALCIUM 8 8 06/10/2019 0916    CALCIUM 8 9 12/08/2015 1014    ALKPHOS 118 (H) 04/30/2018 1942    ALKPHOS 111 12/08/2015 1014    AST 24 04/30/2018 1942    AST 20 12/08/2015 1014    ALT 29 04/30/2018 1942    ALT 25 12/08/2015 1014    BILITOT 0 48 12/08/2015 1014            Lab Results   Component Value Date    CHOL 267 12/08/2015    CHOL 270 06/10/2015    CHOL 243 03/18/2015     Lab Results   Component Value Date    HDL 51 06/26/2017    HDL 49 03/27/2017    HDL 62 (H) 11/22/2016     Lab Results   Component Value Date    LDLCALC 158 (H) 06/26/2017    LDLCALC 153 (H) 03/27/2017    LDLCALC 157 (H) 11/22/2016     Lab Results   Component Value Date    TRIG 231 (H) 06/26/2017    TRIG 220 (H) 03/27/2017    TRIG 225 (H) 11/22/2016     No results found for: CHOLHDL    Imaging: X-ray Chest 2 Views    Result Date: 6/10/2019  Narrative: CHEST INDICATION:   Cough, tachycardia, and palpitations  COMPARISON:  4/30/2018 EXAM PERFORMED/VIEWS:  XR CHEST PA & LATERAL FINDINGS: Cardiomediastinal silhouette appears unremarkable  The lungs are clear  No pneumothorax or pleural effusion  Osseous structures appear within normal limits for patient age  Prior right rotator cuff repair  Impression: No acute cardiopulmonary findings or significant change from prior  Workstation performed: [id number]       ECG:  Sinus bradycardia  Rightward axis       Review of Systems   Constitution: Negative for chills, fever and malaise/fatigue  HENT: Negative  Cardiovascular: Positive for palpitations   Negative for chest pain, dyspnea on exertion, leg swelling, near-syncope, orthopnea, paroxysmal nocturnal dyspnea and syncope  Respiratory: Negative for cough and shortness of breath  Gastrointestinal: Negative for diarrhea, nausea and vomiting  Genitourinary: Negative  Neurological: Negative for dizziness and light-headedness  Psychiatric/Behavioral: Negative  Vitals:    07/01/19 1342   BP: 138/70   Pulse: 59     Vitals:    07/01/19 1342   Weight: 80 3 kg (177 lb)     Height: 5' 3" (160 cm)   Body mass index is 31 35 kg/m²  Physical Exam:  Physical Exam   Constitutional: She is oriented to person, place, and time  No distress  HENT:   Head: Normocephalic and atraumatic  Eyes: Pupils are equal, round, and reactive to light  Neck: Normal range of motion  Carotid bruit is not present  Cardiovascular: Normal rate and regular rhythm  Murmur heard  Systolic murmur is present with a grade of 2/6  Pulses:       Radial pulses are 2+ on the right side, and 2+ on the left side  Dorsalis pedis pulses are 2+ on the right side, and 2+ on the left side  2/6 systolic murmur best heard @ apex   Pulmonary/Chest: Effort normal and breath sounds normal  No respiratory distress  She has no wheezes  She has no rales  Musculoskeletal: She exhibits deformity (muscle atrophy of right lower extremity due to history of polio)  She exhibits no edema  Neurological: She is alert and oriented to person, place, and time  Skin: Skin is warm and dry  She is not diaphoretic  No erythema  Psychiatric: Her behavior is normal  Her mood appears anxious  Vitals reviewed

## 2019-07-18 ENCOUNTER — HOSPITAL ENCOUNTER (OUTPATIENT)
Dept: NON INVASIVE DIAGNOSTICS | Facility: HOSPITAL | Age: 74
Discharge: HOME | End: 2019-07-18
Payer: MEDICARE

## 2019-07-18 DIAGNOSIS — I47.1 PAROXYSMAL SVT (SUPRAVENTRICULAR TACHYCARDIA) (HCC): ICD-10-CM

## 2019-07-18 PROCEDURE — 93306 TTE W/DOPPLER COMPLETE: CPT

## 2019-07-18 PROCEDURE — 93306 TTE W/DOPPLER COMPLETE: CPT | Performed by: INTERNAL MEDICINE

## 2019-08-07 ENCOUNTER — OFFICE VISIT (OUTPATIENT)
Dept: URGENT CARE | Facility: CLINIC | Age: 74
End: 2019-08-07
Payer: MEDICARE

## 2019-08-07 VITALS
RESPIRATION RATE: 20 BRPM | DIASTOLIC BLOOD PRESSURE: 78 MMHG | BODY MASS INDEX: 31.01 KG/M2 | OXYGEN SATURATION: 98 % | TEMPERATURE: 98 F | HEIGHT: 63 IN | SYSTOLIC BLOOD PRESSURE: 122 MMHG | HEART RATE: 75 BPM | WEIGHT: 175 LBS

## 2019-08-07 DIAGNOSIS — E88.1 LIPODYSTROPHY, NOT ELSEWHERE CLASSIFIED: Primary | ICD-10-CM

## 2019-08-07 PROCEDURE — G0463 HOSPITAL OUTPT CLINIC VISIT: HCPCS | Performed by: PHYSICIAN ASSISTANT

## 2019-08-07 PROCEDURE — 99203 OFFICE O/P NEW LOW 30 MIN: CPT | Performed by: PHYSICIAN ASSISTANT

## 2019-08-07 NOTE — PROGRESS NOTES
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]  1  Lipodystrophy, not elsewhere classified           Patient Instructions       Follow up with PCP in 3-5 days  Proceed to  ER if symptoms worsen  Chief Complaint     Chief Complaint   Patient presents with    Leg Swelling     indentation on right lateral lower leg  No injury or pain, oticed on          History of Present Illness       74 y/o F presents for eval of R lateral calf "indentation" first noticed 3 days ago  Pt denies any pain, calf pain, swelling, numbness or tingling, weakness  Pt does have chronic muscle atrophy in R calf due to polio as a child, however the area of depression in her skin is new  She denies chest pain, SOB, fever, chills, N/V  Denies known injury  Review of Systems   Review of Systems   All other systems reviewed and are negative          Current Medications       Current Outpatient Medications:     ACCU-CHEK KAILEY PLUS test strip, , Disp: , Rfl: 3    ARMOUR THYROID 15 MG tablet, 15 mg every evening Before bedtime, Disp: , Rfl: 5    ARMOUR THYROID 30 MG tablet, Take 30 mg by mouth 2 (two) times a day Take one in the morning and one in the afternoon, Disp: , Rfl: 6    aspirin 81 mg chewable tablet, Chew 1 tablet daily, Disp: 30 tablet, Rfl: 0    atorvastatin (LIPITOR) 40 mg tablet, Take 1 tablet (40 mg total) by mouth daily, Disp: 90 tablet, Rfl: 3    Blood Glucose Monitoring Suppl (ACCU-CHEK KAILEY CONNECT) w/Device KIT, by Does not apply route, Disp: , Rfl:     doxazosin (CARDURA) 8 MG tablet, Take 4 mg by mouth daily at bedtime , Disp: , Rfl: 1    glucose blood (ACCU-CHEK KAILEY PLUS) test strip, by In Vitro route, Disp: , Rfl:     hydrALAZINE (APRESOLINE) 25 mg tablet, Take 1 tablet (25 mg total) by mouth 2 (two) times a day, Disp: 60 tablet, Rfl: 6   Lancets Misc  (ACCU-CHEK MULTICLIX LANCET DEV) KIT, by Does not apply route 2 (two) times a day, Disp: , Rfl:     losartan (COZAAR) 50 mg tablet, Take 1 tablet (50 mg total) by mouth daily, Disp: 90 tablet, Rfl: 3    metFORMIN (GLUCOPHAGE) 500 mg tablet, Take 500 mg by mouth 3 (three) times a day 2 tablets in the morning and one in the afternoon, Disp: , Rfl:     metoprolol tartrate (LOPRESSOR) 25 mg tablet, Take two tablets (50 mg total) by mouth every morning and one tablet by mouth (25 mg) at night , Disp: 270 tablet, Rfl: 3    nitroglycerin (NITROSTAT) 0 4 mg SL tablet, Place 0 4 mg under the tongue, Disp: , Rfl:     clopidogrel (PLAVIX) 75 mg tablet, Take 1 tablet (75 mg total) by mouth once for 1 dose, Disp: 90 tablet, Rfl: 3    Current Allergies     Allergies as of 08/07/2019 - Reviewed 08/07/2019   Allergen Reaction Noted    Lisinopril Rash 02/07/2014    Amlodipine  11/27/2017            The following portions of the patient's history were reviewed and updated as appropriate: allergies, current medications, past family history, past medical history, past social history, past surgical history and problem list      Past Medical History:   Diagnosis Date    Abnormal stress test     Angina pectoris ([facility] )     1 wk ago    CAD (coronary artery disease)     Diabetes mellitus ([facility] )     niddm    Edema     Endometriosis     History of palpitations     History of poliomyelitis     Hyperlipidemia     Hypertension     Hypothyroid     Kidney stones     NSTEMI (non-ST elevated myocardial infarction) ([facility] ) 01/05/2019    Obesity     Paroxysmal SVT (supraventricular tachycardia) (HCC)     Renal insufficiency     Rosacea     Thyroid nodule     Wears glasses        Past Surgical History:   Procedure Laterality Date    CARDIAC CATHETERIZATION  2017    CARDIAC CATHETERIZATION  01/07/2019    CATARACT EXTRACTION Bilateral     COLONOSCOPY      HYSTERECTOMY      ROTATOR CUFF REPAIR Right        Family History   Problem Relation Age of Onset    Asthma Mother     Stomach cancer Father          Medications have been verified  Objective   /78   Pulse 75   Temp 98 °F (36 7 °C) (Tympanic)   Resp 20   Ht 5' 3" (1 6 m)   Wt 79 4 kg (175 lb)   SpO2 98%   BMI 31 00 kg/m²        Physical Exam     Physical Exam   Constitutional: She is oriented to person, place, and time  She appears well-developed and well-nourished  No distress  HENT:   Head: Normocephalic and atraumatic  Cardiovascular: Normal rate, regular rhythm and normal heart sounds  Exam reveals no gallop and no friction rub  No murmur heard  Pulmonary/Chest: Effort normal and breath sounds normal  She has no wheezes  She has no rales  Musculoskeletal:   RLE - diffuse atrophy of R calf compared L  Spencer's negative  No calf swelling, pain   Neurological: She is alert and oriented to person, place, and time  Skin:        Psychiatric: She has a normal mood and affect  Vitals reviewed

## 2019-09-16 ENCOUNTER — TELEPHONE (OUTPATIENT)
Dept: CARDIOLOGY CLINIC | Facility: HOSPITAL | Age: 74
End: 2019-09-16

## 2019-09-16 NOTE — TELEPHONE ENCOUNTER
Spoke with René Jean Baptiste and she confirmed she will be at her appointment with cardiology on Tuesday September 17th

## 2019-09-17 ENCOUNTER — OFFICE VISIT (OUTPATIENT)
Dept: CARDIOLOGY CLINIC | Facility: HOSPITAL | Age: 74
End: 2019-09-17
Payer: MEDICARE

## 2019-09-17 VITALS
HEIGHT: 63 IN | DIASTOLIC BLOOD PRESSURE: 72 MMHG | BODY MASS INDEX: 32.18 KG/M2 | SYSTOLIC BLOOD PRESSURE: 128 MMHG | HEART RATE: 57 BPM | WEIGHT: 181.6 LBS

## 2019-09-17 DIAGNOSIS — I25.10 CORONARY ARTERY DISEASE INVOLVING NATIVE CORONARY ARTERY OF NATIVE HEART WITHOUT ANGINA PECTORIS: Primary | ICD-10-CM

## 2019-09-17 DIAGNOSIS — I10 ESSENTIAL HYPERTENSION: ICD-10-CM

## 2019-09-17 DIAGNOSIS — E78.00 HYPERCHOLESTEROLEMIA: ICD-10-CM

## 2019-09-17 DIAGNOSIS — I47.1 PAROXYSMAL SVT (SUPRAVENTRICULAR TACHYCARDIA) (HCC): ICD-10-CM

## 2019-09-17 PROCEDURE — 93000 ELECTROCARDIOGRAM COMPLETE: CPT | Performed by: INTERNAL MEDICINE

## 2019-09-17 PROCEDURE — 99214 OFFICE O/P EST MOD 30 MIN: CPT | Performed by: INTERNAL MEDICINE

## 2019-09-17 NOTE — PROGRESS NOTES
Cardiology Follow Up    Tamie Dudley  [birth year]  [id number]  [address] [address]    1  Coronary artery disease involving native coronary artery of native heart without angina pectoris  POCT ECG    Lipid Panel with Direct LDL reflex   2  Essential hypertension     3  Paroxysmal SVT (supraventricular tachycardia) ([facility] )     4  Hypercholesterolemia  Lipid Panel with Direct LDL reflex       Discussion/Summary:  Overall she has been doing well from a cardiac standpoint  Coronary disease stable no complaints of angina, she has a good functional capacity  There was 1 episode of supraventricular tachycardia this summer but there has been no recurrence  Up titrated beta-blockers has helped  Blood pressure is well controlled  Lipids are due to be checked to evaluate efficacy of statin dosing  Continue with diet exercise follow-up in 6-8 months  Interval History:  14-year-old female history of coronary disease, hypertension, renal artery stenosis, paroxysmal supraventricular tachycardia Routine follow-up visit  In early January she was driving in the car and had an episode of tachycardia with chest tightness  She was brought to an outside hospital and was sent to [facility] where they found a small troponin elevation  Left heart catheterization was performed the stent was placed in the right coronary artery  Since her last visit she has been doing well  There was a brief episode of SVT this summer that spontaneously aborted  Functional capacity has been good  Denies any chest pain, shortness of breath, palpitations, lightheadedness, dizziness, or syncope  There has been no lower extremity edema, PND, orthopnea    Overall feeling well        Problem List     Chest pain    Tachycardia    Hypertension    Diabetes mellitus ([facility] )    NSTEMI (non-ST elevated myocardial infarction) [facility])    Coronary artery disease involving native coronary artery of native heart with angina pectoris (HCC)    Paroxysmal SVT (supraventricular tachycardia) ([facility])        Past Medical History:   Diagnosis Date    Abnormal stress test     Angina pectoris ([place] [address] )     1 wk ago    CAD (coronary artery disease)     Diabetes mellitus ([place] [address] )     niddm    Edema     Endometriosis     History of palpitations     History of poliomyelitis     Hyperlipidemia     Hypertension     Hypothyroid     Kidney stones     NSTEMI (non-ST elevated myocardial infarction) ([place] [address] ) 01/05/2019    Obesity     Paroxysmal SVT (supraventricular tachycardia) ([facility])     Renal insufficiency     Rosacea     Thyroid nodule     Wears glasses      Social History     Socioeconomic History    Marital status:       Spouse name: Not on file    Number of children: Not on file    Years of education: Not on file    Highest education level: Not on file   Occupational History    Not on file   Social Needs    Financial resource strain: Not on file    Food insecurity:     Worry: Not on file     Inability: Not on file    Transportation needs:     Medical: Not on file     Non-medical: Not on file   Tobacco Use    Smoking status: Never Smoker    Smokeless tobacco: Never Used   Substance and Sexual Activity    Alcohol use: No    Drug use: No    Sexual activity: Not on file   Lifestyle    Physical activity:     Days per week: Not on file     Minutes per session: Not on file    Stress: Not on file   Relationships    Social connections:     Talks on phone: Not on file     Gets together: Not on file     Attends Druze service: Not on file     Active member of club or organization: Not on file     Attends meetings of clubs or organizations: Not on file     Relationship status: Not on file    Intimate partner violence:     Fear of current or ex partner: Not on file     Emotionally abused: Not on file     Physically abused: Not on file     Forced sexual activity: Not on file   Other Topics Concern    Not on file   Social History Narrative    Not on file      Family History   Problem Relation Age of Onset    Asthma Mother     Stomach cancer Father      Past Surgical History:   Procedure Laterality Date    CARDIAC CATHETERIZATION  2017    CARDIAC CATHETERIZATION  01/07/2019    CATARACT EXTRACTION Bilateral     COLONOSCOPY      HYSTERECTOMY      ROTATOR CUFF REPAIR Right        Current Outpatient Medications:     ACCU-CHEK KAILEY PLUS test strip, , Disp: , Rfl: 3    ARMOUR THYROID 15 MG tablet, 15 mg every evening Before bedtime, Disp: , Rfl: 5    ARMOUR THYROID 30 MG tablet, Take 30 mg by mouth 2 (two) times a day Take one in the morning and one in the afternoon, Disp: , Rfl: 6    aspirin 81 mg chewable tablet, Chew 1 tablet daily, Disp: 30 tablet, Rfl: 0    atorvastatin (LIPITOR) 40 mg tablet, Take 1 tablet (40 mg total) by mouth daily, Disp: 90 tablet, Rfl: 3    Blood Glucose Monitoring Suppl (ACCU-CHEK KAILEY CONNECT) w/Device KIT, by Does not apply route, Disp: , Rfl:     clopidogrel (PLAVIX) 75 mg tablet, Take 1 tablet (75 mg total) by mouth once for 1 dose, Disp: 90 tablet, Rfl: 3    doxazosin (CARDURA) 8 MG tablet, Take 4 mg by mouth daily at bedtime , Disp: , Rfl: 1    glucose blood (ACCU-CHEK KAILEY PLUS) test strip, by In Vitro route, Disp: , Rfl:     Lancets Misc  (ACCU-CHEK MULTICLIX LANCET DEV) KIT, by Does not apply route 2 (two) times a day, Disp: , Rfl:     losartan (COZAAR) 50 mg tablet, Take 1 tablet (50 mg total) by mouth daily, Disp: 90 tablet, Rfl: 3    metFORMIN (GLUCOPHAGE) 500 mg tablet, Take 500 mg by mouth 3 (three) times a day 2 tablets in the morning and one in the afternoon, Disp: , Rfl:     metoprolol tartrate (LOPRESSOR) 25 mg tablet, Take two tablets (50 mg total) by mouth every morning and one tablet by mouth (25 mg) at night   (Patient taking differently: 25 mg every 12 (twelve) hours Take two tablets (50 mg total) by mouth every morning and one tablet by mouth (25 mg) at night ), Disp: 270 tablet, Rfl: 3    nitroglycerin (NITROSTAT) 0 4 mg SL tablet, Place 0 4 mg under the tongue, Disp: , Rfl:   Allergies   Allergen Reactions    Lisinopril Rash     Changed pigment of my skin     Amlodipine        Labs:     Chemistry        Component Value Date/Time     12/08/2015 1014    K 3 7 06/10/2019 0916    K 4 4 12/08/2015 1014     06/10/2019 0916     12/08/2015 1014    CO2 26 06/10/2019 0916    CO2 30 9 12/08/2015 1014    BUN 19 06/10/2019 0916    BUN 20 12/08/2015 1014    CREATININE 0 90 06/10/2019 0916    CREATININE 0 67 12/08/2015 1014        Component Value Date/Time    CALCIUM 8 8 06/10/2019 0916    CALCIUM 8 9 12/08/2015 1014    ALKPHOS 118 (H) 04/30/2018 1942    ALKPHOS 111 12/08/2015 1014    AST 24 04/30/2018 1942    AST 20 12/08/2015 1014    ALT 29 04/30/2018 1942    ALT 25 12/08/2015 1014    BILITOT 0 48 12/08/2015 1014            Lab Results   Component Value Date    CHOL 267 12/08/2015    CHOL 270 06/10/2015    CHOL 243 03/18/2015     Lab Results   Component Value Date    HDL 51 06/26/2017    HDL 49 03/27/2017    HDL 62 (H) 11/22/2016     Lab Results   Component Value Date    LDLCALC 158 (H) 06/26/2017    LDLCALC 153 (H) 03/27/2017    LDLCALC 157 (H) 11/22/2016     Lab Results   Component Value Date    TRIG 231 (H) 06/26/2017    TRIG 220 (H) 03/27/2017    TRIG 225 (H) 11/22/2016     No results found for: CHOLHDL    Imaging: No results found  ECG:  Sinus bradycardia PACs      Review of Systems   Constitution: Negative  HENT: Negative  Eyes: Negative  Cardiovascular: Negative  Respiratory: Negative  Endocrine: Negative  Hematologic/Lymphatic: Negative  Skin: Negative  Musculoskeletal: Negative  Gastrointestinal: Negative  Genitourinary: Negative  Neurological: Negative  Psychiatric/Behavioral: Negative          Vitals:    09/17/19 1223   BP: 128/72   Pulse: 57     Vitals:    09/17/19 1223   Weight: 82 4 kg (181 lb 9 6 oz)     Height: 5' 3" (160 cm)   Body mass index is 32 17 kg/m²      Physical Exam:  General:  Alert and cooperative, appears stated age  HEENT:  PERRLA, EOMI, no scleral icterus, no conjunctival pallor  Neck:  No lymphadenopathy, no thyromegaly, no carotid bruits, no elevated JVP  Heart:  Regular rate and rhythm, normal S1/S2, no S3/S4, no murmur  Lungs:  Clear to auscultation bilaterally   Abdomen:  Soft, non-tender, positive bowel sounds, no rebound or guarding,   no organomegaly   Extremities:  No clubbing, cyanosis or edema   Vascular:  2+ pedal pulses  Skin:  No rashes or lesions on exposed skin  Neurologic:  Cranial nerves II-XII grossly intact without focal deficits

## 2019-10-28 ENCOUNTER — OFFICE VISIT (OUTPATIENT)
Dept: NEPHROLOGY | Facility: CLINIC | Age: 74
End: 2019-10-28
Payer: MEDICARE

## 2019-10-28 VITALS
BODY MASS INDEX: 32.36 KG/M2 | HEIGHT: 63 IN | SYSTOLIC BLOOD PRESSURE: 138 MMHG | DIASTOLIC BLOOD PRESSURE: 72 MMHG | HEART RATE: 56 BPM | WEIGHT: 182.6 LBS

## 2019-10-28 DIAGNOSIS — I10 ESSENTIAL HYPERTENSION: Primary | ICD-10-CM

## 2019-10-28 DIAGNOSIS — R60.9 EDEMA, UNSPECIFIED TYPE: ICD-10-CM

## 2019-10-28 DIAGNOSIS — E83.42 HYPOMAGNESEMIA: ICD-10-CM

## 2019-10-28 DIAGNOSIS — E11.9 TYPE 2 DIABETES MELLITUS WITHOUT COMPLICATION, WITHOUT LONG-TERM CURRENT USE OF INSULIN (HCC): ICD-10-CM

## 2019-10-28 DIAGNOSIS — I70.1 RAS (RENAL ARTERY STENOSIS) (HCC): ICD-10-CM

## 2019-10-28 PROCEDURE — 99214 OFFICE O/P EST MOD 30 MIN: CPT | Performed by: INTERNAL MEDICINE

## 2019-10-28 NOTE — PROGRESS NOTES
[facility] [place], [place]    Name: Wilfrid Denton  YOB: 1945      Assessment/Plan:    Hypertension  Blood pressures reviewed from home which noted to be increased very mostly between 150-170 there is a mercury systolic  However, patient claims that her blood pressure cuff free to at least 25 point higher than true reading  Here in the office patient's blood pressure is appropriate  No medication changes at this time  VICKEY (renal artery stenosis) ([facility] )  As mentioned in previous notes, patient had as hemodynamically significant renal artery stenosis, but she is controlled from medical standpoint and we can continue to avoid the need for surgical intervention at this time  Edema  Patient remains on a fairly high sodium diet  I advised her on continuing to focus on reducing sodium in her diet  Problem List Items Addressed This Visit        Endocrine    Diabetes mellitus ([facility] )       Cardiovascular and Mediastinum    Hypertension - Primary     Blood pressures reviewed from home which noted to be increased very mostly between 150-170 there is a mercury systolic  However, patient claims that her blood pressure cuff free to at least 25 point higher than true reading  Here in the office patient's blood pressure is appropriate  No medication changes at this time  Relevant Orders    Comprehensive metabolic panel    Microalbumin / creatinine urine ratio    Urinalysis with microscopic    VICKEY (renal artery stenosis) ([facility] )     As mentioned in previous notes, patient had as hemodynamically significant renal artery stenosis, but she is controlled from medical standpoint and we can continue to avoid the need for surgical intervention at this time  Other    Edema     Patient remains on a fairly high sodium diet  I advised her on continuing to focus on reducing sodium in her diet             Other Visit Diagnoses     Hypomagnesemia Relevant Orders    Magnesium            Patient is stable and doing well at this time  We was here back in approximately 6 months for regular appointments  No medication changes were done during this appointment  Continue monitor blood work closely  Subjective:      Patient ID: Prosper Peralta is a 75 y o  female  Patient has some mild lower extremity edema  Claims that she remains on a moderate to high sodium diet at this time  She is course trying to avoid most high sodium foods, but the diet does remain in need of continued improvement  Edema is improved with elevation of the legs  Hypertension   This is a chronic problem  The current episode started more than 1 year ago  The problem has been waxing and waning since onset  The problem is controlled  Pertinent negatives include no chest pain  There are no associated agents to hypertension  Risk factors for coronary artery disease include diabetes mellitus and post-menopausal state  Past treatments include alpha 1 blockers  The current treatment provides mild improvement  Compliance problems include diet  The following portions of the patient's history were reviewed and updated as appropriate: allergies, current medications, past family history, past medical history, past social history, past surgical history and problem list     Review of Systems   Cardiovascular: Negative for chest pain  All other systems reviewed and are negative  Social History     Socioeconomic History    Marital status:       Spouse name: None    Number of children: None    Years of education: None    Highest education level: None   Occupational History    Occupation: /    Social Needs    Financial resource strain: None    Food insecurity:     Worry: None     Inability: None    Transportation needs:     Medical: None     Non-medical: None   Tobacco Use    Smoking status: Never Smoker    Smokeless tobacco: Never Used   Substance and Sexual Activity    Alcohol use: No    Drug use: No    Sexual activity: None   Lifestyle    Physical activity:     Days per week: None     Minutes per session: None    Stress: None   Relationships    Social connections:     Talks on phone: None     Gets together: None     Attends Scientologist service: None     Active member of club or organization: None     Attends meetings of clubs or organizations: None     Relationship status: None    Intimate partner violence:     Fear of current or ex partner: None     Emotionally abused: None     Physically abused: None     Forced sexual activity: None   Other Topics Concern    None   Social History Narrative    Consumes on average 2 cups of decaf coffee per day      Past Medical History:   Diagnosis Date    Abnormal stress test     [facility]     1 wk ago    Atherosclerosis of renal artery [facility]     CAD (coronary artery disease)     Chronic kidney disease, stage 2 (mild)     Diabetes mellitus ([facility] )     niddm    Edema     Endometriosis     History of palpitations     History of poliomyelitis     Hyperlipidemia     Hypertension     Hypertensive chronic kidney disease with stage 1 through stage 4 chronic kidney disease, or unspecified chronic kidney disease     Hypothyroid     Kidney stones     Myocardial infarction ([facility] )     NSTEMI (non-ST elevated myocardial infarction) ([facility] ) 01/05/2019    Obesity     Paroxysmal SVT (supraventricular tachycardia) (HCC)     Renal insufficiency     Rosacea     Thyroid nodule     Type 2 diabetes mellitus ([facility] )     Wears glasses      Past Surgical History:   Procedure Laterality Date    ACHILLES TENDON LENGTHENING Right     Achilles tendon stretch     CARDIAC CATHETERIZATION  2017    CARDIAC CATHETERIZATION  01/07/2019    Stent placed in RCA     CATARACT EXTRACTION Bilateral     COLONOSCOPY      HYSTERECTOMY      Total - Endometriosis     ROTATOR CUFF REPAIR Right        Current Outpatient Medications:   ACCU-CHEK KAILEY PLUS test strip, , Disp: , Rfl: 3    ARMOUR THYROID 15 MG tablet, 15 mg every evening Before bedtime, Disp: , Rfl: 5    ARMOUR THYROID 30 MG tablet, Take 30 mg by mouth 2 (two) times a day Take one in the morning and one in the afternoon, Disp: , Rfl: 6    aspirin 81 mg chewable tablet, Chew 1 tablet daily, Disp: 30 tablet, Rfl: 0    atorvastatin (LIPITOR) 40 mg tablet, Take 1 tablet (40 mg total) by mouth daily, Disp: 90 tablet, Rfl: 3    Blood Glucose Monitoring Suppl (ACCU-CHEK KAILEY CONNECT) w/Device KIT, by Does not apply route, Disp: , Rfl:     doxazosin (CARDURA) 8 MG tablet, Take 4 mg by mouth daily at bedtime , Disp: , Rfl: 1    glucose blood (ACCU-CHEK KAILEY PLUS) test strip, by In Vitro route, Disp: , Rfl:     Lancets Misc  (ACCU-CHEK MULTICLIX LANCET DEV) KIT, by Does not apply route 2 (two) times a day, Disp: , Rfl:     losartan (COZAAR) 50 mg tablet, Take 1 tablet (50 mg total) by mouth daily, Disp: 90 tablet, Rfl: 3    metFORMIN (GLUCOPHAGE) 500 mg tablet, Take 500 mg by mouth 3 (three) times a day 2 tablets in the morning and one in the afternoon, Disp: , Rfl:     metoprolol tartrate (LOPRESSOR) 25 mg tablet, Take two tablets (50 mg total) by mouth every morning and one tablet by mouth (25 mg) at night   (Patient taking differently: 25 mg every 12 (twelve) hours Take two tablets (50 mg total) by mouth every morning and one tablet by mouth (25 mg) at night ), Disp: 270 tablet, Rfl: 3    nitroglycerin (NITROSTAT) 0 4 mg SL tablet, Place 0 4 mg under the tongue, Disp: , Rfl:     clopidogrel (PLAVIX) 75 mg tablet, Take 1 tablet (75 mg total) by mouth once for 1 dose, Disp: 90 tablet, Rfl: 3    Lab Results   Component Value Date     12/08/2015    SODIUM 141 06/10/2019    K 3 7 06/10/2019     06/10/2019    CO2 26 06/10/2019    ANIONGAP 9 12/08/2015    AGAP 11 06/10/2019    BUN 19 06/10/2019    CREATININE 0 90 06/10/2019    GLUC 233 (H) 06/10/2019    GLUF 144 (H) 06/26/2017    CALCIUM 8 8 06/10/2019    AST 24 04/30/2018    ALT 29 04/30/2018    ALKPHOS 118 (H) 04/30/2018    PROT 7 0 12/08/2015    TP 7 7 04/30/2018    BILITOT 0 48 12/08/2015    TBILI 0 40 04/30/2018    EGFR 63 06/10/2019     Lab Results   Component Value Date    WBC 4 55 06/10/2019    HGB 12 9 06/10/2019    HCT 41 3 06/10/2019    MCV 89 06/10/2019     06/10/2019     Lab Results   Component Value Date    CHOLESTEROL 255 (H) 06/26/2017    CHOLESTEROL 246 (H) 03/27/2017    CHOLESTEROL 264 (H) 11/22/2016     Lab Results   Component Value Date    HDL 51 06/26/2017    HDL 49 03/27/2017    HDL 62 (H) 11/22/2016     Lab Results   Component Value Date    LDLCALC 158 (H) 06/26/2017    LDLCALC 153 (H) 03/27/2017    LDLCALC 157 (H) 11/22/2016     Lab Results   Component Value Date    TRIG 231 (H) 06/26/2017    TRIG 220 (H) 03/27/2017    TRIG 225 (H) 11/22/2016     No results found for: [place], [place]  Lab Results   Component Value Date    [id number] 0 793 06/10/2019     Lab Results   Component Value Date    CALCIUM 8 8 06/10/2019    PHOS 3 8 09/27/2017     No results found for: SPEP, UPEP  No results found for: MICROALBUR, [id number]        Objective:      /72 (BP Location: Left arm, Patient Position: Sitting, Cuff Size: Standard)   Pulse 56   Ht 5' 3" (1 6 m)   Wt 82 8 kg (182 lb 9 6 oz)   BMI 32 35 kg/m²          Physical Exam   Constitutional: She is oriented to person, place, and time  She appears well-developed and well-nourished  No distress  HENT:   Head: Normocephalic and atraumatic  Eyes: Conjunctivae are normal    Neck: Neck supple  Cardiovascular: Normal rate and regular rhythm  Pulmonary/Chest: Effort normal and breath sounds normal    Abdominal: Soft  Musculoskeletal: She exhibits edema (mild bilateral LE edema)  Neurological: She is alert and oriented to person, place, and time  No cranial nerve deficit  Skin: Skin is warm  No rash noted     Psychiatric: She has a normal mood and affect   Her behavior is normal

## 2019-10-29 NOTE — ASSESSMENT & PLAN NOTE
As mentioned in previous notes, patient had as hemodynamically significant renal artery stenosis, but she is controlled from medical standpoint and we can continue to avoid the need for surgical intervention at this time

## 2019-10-29 NOTE — ASSESSMENT & PLAN NOTE
Patient remains on a fairly high sodium diet  I advised her on continuing to focus on reducing sodium in her diet

## 2019-10-29 NOTE — ASSESSMENT & PLAN NOTE
Blood pressures reviewed from home which noted to be increased very mostly between 150-170 there is a mercury systolic  However, patient claims that her blood pressure cuff free to at least 25 point higher than true reading  Here in the office patient's blood pressure is appropriate  No medication changes at this time

## 2019-11-13 DIAGNOSIS — I10 ESSENTIAL HYPERTENSION: Primary | ICD-10-CM

## 2019-11-13 DIAGNOSIS — I21.4 NSTEMI (NON-ST ELEVATED MYOCARDIAL INFARCTION) (HCC): ICD-10-CM

## 2019-11-13 RX ORDER — CLOPIDOGREL BISULFATE 75 MG/1
75 TABLET ORAL ONCE
Qty: 90 TABLET | Refills: 2 | Status: SHIPPED | OUTPATIENT
Start: 2019-11-13 | End: 2020-05-05

## 2019-11-14 RX ORDER — DOXAZOSIN 8 MG/1
4 TABLET ORAL
Qty: 30 TABLET | Refills: 1 | Status: SHIPPED | OUTPATIENT
Start: 2019-11-14 | End: 2019-11-21 | Stop reason: SDUPTHER

## 2019-11-21 DIAGNOSIS — I10 ESSENTIAL HYPERTENSION: ICD-10-CM

## 2019-11-21 RX ORDER — DOXAZOSIN 8 MG/1
4 TABLET ORAL
Qty: 30 TABLET | Refills: 1 | Status: SHIPPED | OUTPATIENT
Start: 2019-11-21 | End: 2020-01-03

## 2019-12-26 ENCOUNTER — OFFICE VISIT (OUTPATIENT)
Dept: UROLOGY | Facility: CLINIC | Age: 74
End: 2019-12-26
Payer: MEDICARE

## 2019-12-26 VITALS
BODY MASS INDEX: 33.55 KG/M2 | WEIGHT: 189.38 LBS | HEART RATE: 60 BPM | DIASTOLIC BLOOD PRESSURE: 80 MMHG | SYSTOLIC BLOOD PRESSURE: 126 MMHG

## 2019-12-26 DIAGNOSIS — R32 URINARY INCONTINENCE, UNSPECIFIED TYPE: Primary | ICD-10-CM

## 2019-12-26 LAB
POST-VOID RESIDUAL VOLUME, ML POC: 30 ML
SL AMB  POCT GLUCOSE, UA: NORMAL
SL AMB LEUKOCYTE ESTERASE,UA: NORMAL
SL AMB POCT BILIRUBIN,UA: NORMAL
SL AMB POCT BLOOD,UA: NORMAL
SL AMB POCT CLARITY,UA: CLEAR
SL AMB POCT COLOR,UA: YELLOW
SL AMB POCT KETONES,UA: NORMAL
SL AMB POCT NITRITE,UA: NORMAL
SL AMB POCT PH,UA: NORMAL
SL AMB POCT SPECIFIC GRAVITY,UA: NORMAL
SL AMB POCT URINE PROTEIN: NORMAL
SL AMB POCT UROBILINOGEN: NORMAL

## 2019-12-26 PROCEDURE — 81002 URINALYSIS NONAUTO W/O SCOPE: CPT | Performed by: PHYSICIAN ASSISTANT

## 2019-12-26 PROCEDURE — 99203 OFFICE O/P NEW LOW 30 MIN: CPT | Performed by: PHYSICIAN ASSISTANT

## 2019-12-26 PROCEDURE — 51798 US URINE CAPACITY MEASURE: CPT | Performed by: PHYSICIAN ASSISTANT

## 2019-12-26 RX ORDER — OXYBUTYNIN CHLORIDE 10 MG/1
10 TABLET, EXTENDED RELEASE ORAL
Qty: 30 TABLET | Refills: 3 | Status: SHIPPED | OUTPATIENT
Start: 2019-12-26 | End: 2020-03-26

## 2019-12-26 NOTE — PATIENT INSTRUCTIONS
Urinary Incontinence   WHAT YOU NEED TO KNOW:   What is urinary incontinence? Urinary incontinence (UI) is when you lose control of your bladder  What causes UI? UI occurs because your bladder cannot store or empty urine properly  The following are the most common types of UI:  · Stress incontinence  is when you leak urine due to increased bladder pressure  This may happen when you cough, sneeze, or exercise  · Urge incontinence  is when you feel the need to urinate right away and leak urine accidentally  · Mixed incontinence  is when you have both stress and urge UI  What are the signs and symptoms of UI?   · You feel like your bladder does not empty completely when you urinate  · You urinate often and need to urinate immediately  · You leak urine when you sleep, or you wake up with the urge to urinate  · You leak urine when you cough, sneeze, exercise, or laugh  How is UI diagnosed? Your healthcare provider will ask how often you leak urine and whether you have stress or urge symptoms  Tell him which medicines you take, how often you urinate, and how much liquid you drink each day  You may need any of the following tests:  · Urine tests  may show infection or kidney function  · A pelvic exam  may be done to check for blockages  A pelvic exam will also show if your bladder, uterus, or other organs have moved out of place  · An x-ray, ultrasound, or CT  may show problems with parts of your urinary system  You may be given contrast liquid to help your organs show up better in the pictures  Tell the healthcare provider if you have ever had an allergic reaction to contrast liquid  Do not enter the MRI room with anything metal  Metal can cause serious injury  Tell the healthcare provider if you have any metal in or on your body  · A bladder scan  will show how much urine is left in your bladder after you urinate   You will be asked to urinate and then healthcare providers will use a small ultrasound machine to check the urine left in your bladder  · Cystometry  is used to check the function of your urinary system  Your healthcare provider checks the pressure in your bladder while filling it with fluid  Your bladder pressure may also be tested when your bladder is full and while you urinate  How is UI treated? · Medicines  can help strengthen your bladder control  · Electrical stimulation  is used to send a small amount of electrical energy to your pelvic floor muscles  This helps control your bladder function  Electrodes may be placed outside your body or in your rectum  For women, the electrodes may be placed in the vagina  · A bulking agent  may be injected into the wall of your urethra to make it thicker  This helps keep your urethra closed and decreases urine leakage  · Devices  such as a clamp, pessary, or tampon may help stop urine leaks  Ask your healthcare provider for more information about these and other devices  · Surgery  may be needed if other treatments do not work  Several types of surgery can help improve your bladder control  Ask your healthcare provider for more information about the surgery you may need  How can I manage my symptoms? · Do pelvic muscle exercises often  Your pelvic muscles help you stop urinating  Squeeze these muscles tight for 5 seconds, then relax for 5 seconds  Gradually work up to squeezing for 10 seconds  Do 3 sets of 15 repetitions a day, or as directed  This will help strengthen your pelvic muscles and improve bladder control  · A catheter  may be used to help empty your bladder  A catheter is a tiny, plastic tube that is put into your bladder to drain your urine  Your healthcare provider may tell you to use a catheter to prevent your bladder from getting too full and leaking urine  · Keep a UI record  Write down how often you leak urine and how much you leak   Make a note of what you were doing when you leaked urine     · Train your bladder  Go to the bathroom at set times, such as every 2 hours, even if you do not feel the urge to go  You can also try to hold your urine when you feel the urge to go  For example, hold your urine for 5 minutes when you feel the urge to go  As that becomes easier, hold your urine for 10 minutes  · Drink liquids as directed  Ask your healthcare provider how much liquid to drink each day and which liquids are best for you  You may need to limit the amount of liquid you drink to help control your urine leakage  Limit or do not have drinks that contain caffeine or alcohol  Do not drink any liquid right before you go to bed  · Prevent constipation  Eat a variety of high-fiber foods  Good examples are high-fiber cereals, beans, vegetables, and whole-grain breads  Prune juice may help make your bowel movement softer  Walking is the best way to trigger your intestines to have a bowel movement  · Exercise regularly and maintain a healthy weight  Ask your healthcare provider how much you should weigh and about the best exercise plan for you  Weight loss and exercise will decrease pressure on your bladder and help you control your leakage  Ask him to help you create a weight loss plan if you are overweight  When should I seek immediate care? · You have severe pain  · You are confused or cannot think clearly  When should I contact my healthcare provider? · You have a fever  · You see blood in your urine  · You have pain when you urinate  · You have new or worse pain, even after treatment  · Your mouth feels dry or you have vision changes  · Your urine is cloudy or smells bad  · You have questions or concerns about your condition or care  CARE AGREEMENT:   You have the right to help plan your care  Learn about your health condition and how it may be treated  Discuss treatment options with your caregivers to decide what care you want to receive   You always have the right to refuse treatment  The above information is an  only  It is not intended as medical advice for individual conditions or treatments  Talk to your doctor, nurse or pharmacist before following any medical regimen to see if it is safe and effective for you  © 2017 [employer] Information is for End User's use only and may not be sold, redistributed or otherwise used for commercial purposes  All illustrations and images included in CareNotes® are the copyrighted property of [employer]  or Terrence Cherry  Kegel Exercises for Women   WHAT YOU NEED TO KNOW:   Kegel exercises help strengthen your pelvic muscles  Pelvic muscles hold your pelvic organs, such as your bladder and uterus, in place  Kegel exercises help prevent or control problems with urine incontinence (leakage)  Incontinence may be caused by pregnancy, childbirth, or menopause  DISCHARGE INSTRUCTIONS:   Contact your healthcare provider if:   · You cannot feel your muscles tighten or relax  · You continue to leak urine  · You have questions or concerns about your condition or care  Use the correct muscles:  Pelvic muscles are the muscles you use to control urine flow  To target these muscles, stop and start the flow of urine several times  This will help you become familiar with how it feels to tighten and relax these muscles  How to do Kegel exercises:   · Empty your bladder  You may lie down, stand up, or sit down to do these exercises  When you first try to do these exercises, it may be easier if you lie down  Tighten or squeeze your pelvic muscles slowly  It may feel like you are trying to hold back urine or gas  Hold this position for 3 seconds  Relax for 3 seconds  Repeat this cycle 10 times  · Do 10 sets of Kegel exercises, at least 3 times a day  Do not hold your breath when you do Kegel exercises  Keep your stomach, back, and leg muscles relaxed       · As your muscles get stronger, you will be able to hold the squeeze longer  Your healthcare provider may ask that you increase your pelvic muscle squeeze to 10 seconds  After you squeeze for 10 seconds, relax for 10 seconds  What else you should know:   · Once you know how to do Kegel exercises, use different positions  This will help to strengthen your pelvic muscles as much as possible  You can do these exercises while you lie on the floor, watch TV, or stand  · You may notice improved bladder control within about 6 weeks  · Tighten your pelvic muscles before you sneeze, cough, or lift to prevent urine leakage  Follow up with your healthcare provider as directed:  Write down your questions so you remember to ask them during your visits  © 2017 [facility] [facility] Information is for End User's use only and may not be sold, redistributed or otherwise used for commercial purposes  All illustrations and images included in CareNotes® are the copyrighted property of [employer]  or Terrence Cherry  The above information is an  only  It is not intended as medical advice for individual conditions or treatments  Talk to your doctor, nurse or pharmacist before following any medical regimen to see if it is safe and effective for you

## 2019-12-26 NOTE — PROGRESS NOTES
2019      Chief Complaint   Patient presents with    Urinary Incontinence         Assessment and Plan    75 y o  female managed by NEW PATIENT    1  Overactive bladder with mixed urinary incontinence  - urine dip unremarkable  - pvr 30ml  - avoidance of bladder irritants  - start oxybutynin 10mg ER daily  - start kegel exercises- unable to travel 30+ miles for formal PFPT, will reconsider if becomes available closer    FU 8 weeks with PVR      History of Present Illness  Myra Brunner is a 75 y o  female here for evaluation of new patient with overactive bladder mixed urinary incontinence  Ongoing problem for about three years, no prior treatment for urologic evaluation  I noted she does take doxazosin 4 mg daily at bedtime, this is for blood pressure control as she has allergies to other antihypertensives  Symptoms do not seem to coincide with use of this medication  Pelvic history  [id number]; x5  without complications  Hysterectomy approx 30 yrs ago for AUB  No UTIs  No hematuria  No constipation  Nonsmoker    Urinary symptoms  Frequency q 2 hrs  Nocturia 2-3x  Soaks 3 pads per day and 3 overnight wakes dry, wet on way to toilet  Urge (soaked) and stress (dribble) based incontinence    Discussion  First we discussed the pathophysiology of overactive bladder including storage and voiding symptoms, and the potential for interplay between anatomic and neurologic factors as well as effect of medications, diet, and stress  The initial treatment option discussed is behavior modifications including avoidance of bladder irritants such as caffeine/spicy/acidic foods/alcohol/tobacco; Increasing water intake; Avoidance of constipation and appropriate bowel regimens; Healthy weight loss; Elevating the lower extremities or wearing stockings throughout the day and >1 hr prior to bedtime in the presence of edema;  Taking any diuretics in AM if feasible; Managing chronic pain or other mobility issues that make it difficult to reach the toilet; Timed voiding and/or double voiding  I have also encouraged the patient to track a voiding diary at least for the next few weeks if not already doing so  Next we discussed Kegel exercises as well as formal pelvic floor physical therapy which is a great low-risk high-benefit option for voiding dysfunction including chronic pelvic pain and incontinence  We discussed medication options for treatment of overactive bladder including the dosing and side effect potentials for each (anticholinergics and beta adrenergic agonist)  We also discussed the utility of alpha blockers having some efficacy in symptom improvement in females with incomplete emptying in particular  For postmenopausal females without known estrogen dependent malignancy or significant cardiac/vascular risk factors topical estrogen has shown to be efficacious in treating OAB and incontinence as well as UTI prevention  Lastly, we discussed an office based procedure- cystoscopy to visually evaluate the bladder which is particularly useful in the setting of concurrent hematuria or symptoms refractory to above treatment options  We discussed the utility of urodynamic testing that may or may not be recommended at some point in the patient's care timeline  We did discuss more invasive/tertiary treatment options including PTNS, Botox, and Interstim and the necessary steps to evaluate candidacy for these as well as referral to urogynecology for surgical options  Review of Systems   Constitutional: Negative for activity change, appetite change, chills, fever and unexpected weight change  HENT: Negative  Respiratory: Negative  Negative for shortness of breath  Cardiovascular: Negative  Negative for chest pain  Gastrointestinal: Negative for abdominal pain, diarrhea, nausea and vomiting  Endocrine: Negative  Genitourinary: Positive for frequency and urgency   Negative for decreased urine volume, difficulty urinating, dysuria, flank pain and hematuria  Musculoskeletal: Negative for back pain and gait problem  Skin: Negative  Allergic/Immunologic: Negative  Neurological: Negative  Hematological: Negative for adenopathy  Does not bruise/bleed easily  Past Medical History  Past Medical History:   Diagnosis Date    Abnormal stress test     Angina pectoris (HCC)     1 wk ago    Atherosclerosis of renal artery ([facility])     CAD (coronary artery disease)     Chronic kidney disease, stage 2 (mild)     Diabetes mellitus ([facility])     niddm    Edema     Endometriosis     History of palpitations     History of poliomyelitis     Hyperlipidemia     Hypertension     Hypertensive chronic kidney disease with stage 1 through stage 4 chronic kidney disease, or unspecified chronic kidney disease     Hypothyroid     Kidney stones     [facility] [address] [facility]     [facility]    Obesity     Paroxysmal SVT (supraventricular tachycardia) ([facility])     Renal insufficiency     Rosacea     Thyroid nodule     Type 2 diabetes mellitus [facility]     Wears glasses      Past Social History  Past Surgical History:   Procedure Laterality Date    ACHILLES TENDON LENGTHENING Right     Achilles tendon stretch     CARDIAC CATHETERIZATION  2017    CARDIAC CATHETERIZATION  01/07/2019    Stent placed in RCA     CATARACT EXTRACTION Bilateral     COLONOSCOPY      HYSTERECTOMY      Total - Endometriosis     ROTATOR CUFF REPAIR Right      Social History     Tobacco Use   Smoking Status Never Smoker   Smokeless Tobacco Never Used     Past Family History  Family History   Problem Relation Age of Onset    Asthma Mother     Stomach cancer Father      Past Social history  Social History     Socioeconomic History    Marital status:       Spouse name: Not on file    Number of children: Not on file    Years of education: Not on file    Highest education level: Not on file   Occupational History    Occupation: /    Social Needs    Financial resource strain: Not on file    Food insecurity:     Worry: Not on file     Inability: Not on file    Transportation needs:     Medical: Not on file     Non-medical: Not on file   Tobacco Use    Smoking status: Never Smoker    Smokeless tobacco: Never Used   Substance and Sexual Activity    Alcohol use: No    Drug use: No    Sexual activity: Not on file   Lifestyle    Physical activity:     Days per week: Not on file     Minutes per session: Not on file    Stress: Not on file   Relationships    Social connections:     Talks on phone: Not on file     Gets together: Not on file     Attends Orthodoxy service: Not on file     Active member of club or organization: Not on file     Attends meetings of clubs or organizations: Not on file     Relationship status: Not on file    Intimate partner violence:     Fear of current or ex partner: Not on file     Emotionally abused: Not on file     Physically abused: Not on file     Forced sexual activity: Not on file   Other Topics Concern    Not on file   Social History Narrative    Consumes on average 2 cups of decaf coffee per day      Current Medications  Current Outpatient Medications   Medication Sig Dispense Refill    ACCU-CHEK KAILEY PLUS test strip   3    ARMOUR THYROID 15 MG tablet 15 mg every evening Before bedtime  5    ARMOUR THYROID 30 MG tablet Take 30 mg by mouth 2 (two) times a day Take one in the morning and one in the afternoon  6    aspirin 81 mg chewable tablet Chew 1 tablet daily 30 tablet 0    atorvastatin (LIPITOR) 40 mg tablet Take 1 tablet (40 mg total) by mouth daily 90 tablet 3    Blood Glucose Monitoring Suppl (ACCU-CHEK KAILEY CONNECT) w/Device KIT by Does not apply route      clopidogrel (PLAVIX) 75 mg tablet Take 1 tablet (75 mg total) by mouth once for 1 dose 90 tablet 2    doxazosin (CARDURA) 8 MG tablet Take 0 5 tablets (4 mg total) by mouth daily at bedtime 30 tablet 1    glucose blood (ACCU-CHEK KAILEY PLUS) test strip by In Vitro route      Lancets Misc  (ACCU-CHEK MULTICLIX LANCET DEV) KIT by Does not apply route 2 (two) times a day      losartan (COZAAR) 50 mg tablet Take 1 tablet (50 mg total) by mouth daily 90 tablet 3    metFORMIN (GLUCOPHAGE) 500 mg tablet Take 500 mg by mouth 3 (three) times a day 2 tablets in the morning and one in the afternoon      metoprolol tartrate (LOPRESSOR) 25 mg tablet Take two tablets (50 mg total) by mouth every morning and one tablet by mouth (25 mg) at night  (Patient taking differently: 25 mg every 12 (twelve) hours Take two tablets (50 mg total) by mouth every morning and one tablet by mouth (25 mg) at night ) 270 tablet 3    nitroglycerin (NITROSTAT) 0 4 mg SL tablet Place 0 4 mg under the tongue      oxybutynin (DITROPAN-XL) 10 MG 24 hr tablet Take 1 tablet (10 mg total) by mouth daily at bedtime 30 tablet 3     No current facility-administered medications for this visit  Allergies  Allergies   Allergen Reactions    Lisinopril Rash     Changed pigment of my skin     Amlodipine          The following portions of the patient's history were reviewed and updated as appropriate: allergies, current medications, past medical history, past social history, past surgical history and problem list       Vitals  Vitals:    12/26/19 1350   BP: 126/80   Pulse: 60   Weight: 85 9 kg (189 lb 6 oz)       Physical Exam   Constitutional: She is oriented to person, place, and time  She appears well-developed and well-nourished  No distress  HENT:   Head: Normocephalic and atraumatic  Pulmonary/Chest: Effort normal    Abdominal: Soft  There is no tenderness  PVR 30ml   Musculoskeletal: She exhibits no edema  Neurological: She is alert and oriented to person, place, and time  Gait normal    Skin: Skin is warm and dry  She is not diaphoretic     Psychiatric: She has a normal mood and affect  Her speech is normal and behavior is normal    Nursing note and vitals reviewed  Results  No results found for this or any previous visit (from the past 1 hour(s))  ]  No results found for: PSA  Lab Results   Component Value Date    GLUCOSE 139 12/08/2015    CALCIUM 8 8 06/10/2019     12/08/2015    K 3 7 06/10/2019    CO2 26 06/10/2019     06/10/2019    BUN 19 06/10/2019    CREATININE 0 90 06/10/2019     Lab Results   Component Value Date    WBC 4 55 06/10/2019    HGB 12 9 06/10/2019    HCT 41 3 06/10/2019    MCV 89 06/10/2019     06/10/2019         Orders  Orders Placed This Encounter   Procedures    POCT urine dip    POCT Measure PVR

## 2020-01-03 ENCOUNTER — OFFICE VISIT (OUTPATIENT)
Dept: OTOLARYNGOLOGY | Facility: CLINIC | Age: 75
End: 2020-01-03
Payer: MEDICARE

## 2020-01-03 VITALS
OXYGEN SATURATION: 98 % | HEIGHT: 63 IN | SYSTOLIC BLOOD PRESSURE: 102 MMHG | DIASTOLIC BLOOD PRESSURE: 70 MMHG | BODY MASS INDEX: 33.31 KG/M2 | WEIGHT: 188 LBS | HEART RATE: 54 BPM

## 2020-01-03 DIAGNOSIS — H61.303 STENOSIS OF BOTH EXTERNAL AUDITORY CANALS: Primary | ICD-10-CM

## 2020-01-03 DIAGNOSIS — H61.23 BILATERAL HEARING LOSS DUE TO CERUMEN IMPACTION: ICD-10-CM

## 2020-01-03 PROCEDURE — 99203 OFFICE O/P NEW LOW 30 MIN: CPT | Performed by: OTOLARYNGOLOGY

## 2020-01-03 PROCEDURE — 69210 REMOVE IMPACTED EAR WAX UNI: CPT | Performed by: OTOLARYNGOLOGY

## 2020-01-03 RX ORDER — DOXAZOSIN MESYLATE 4 MG/1
TABLET ORAL
COMMUNITY
Start: 2019-10-03 | End: 2020-01-22 | Stop reason: SDUPTHER

## 2020-01-03 NOTE — PROGRESS NOTES
Consultation - Otolaryngology - Head and Neck Surgery  Facial Plastic and Reconstructive Surgery  Chris De La Fuente 75 y o  female MRN: [id number]  Encounter: [id number]        Assessment/Plan:  1  Stenosis of both external auditory canals     2  Bilateral hearing loss due to cerumen impaction         Cerumen impaction:  We discussed the nature of cerumen impaction  We discussed appropriate treat with hydrogen peroxide 4-5 drops once per week  We discussed discontinuing the use of any Q-Tips or other objects into the ear  History of Present Illness   Physician Requesting Consult: Ronald Moseley DO  Reason for Consult / Principal Problem:  Ear blockage  HPI: Chris De LaF uente is a 75y o  year old female who presents with "ear blockage" x 3 weeks  She felt that after the shower it felt blocked, thinking maybe it was water in her ear but the blockage feeling has persisted  Feels blockage and decreased hearing in the left ear but believes that the right ear may also be blocked  She has tried laying on a warm heating blanket, but found no relief  Has not tried any other remedies and denies trying to get it out herself or using Q-tips  Has not seen anyone for this before and has never been treated in the past  Denies ear pain, itch, irritation, or discharge  She has a history of frequent cerumen impaction was followed previously by an otolaryngologist   She no longer follows with that otolaryngologist   No previous ear surgery  No sudden hearing change  Denies nasal congestion or drainage  No otalgia      Review of systems:  10 Point ROS was performed and negative except as above or otherwise noted in the medical record      Historical Information   Past Medical History:   Diagnosis Date    Abnormal stress test     Angina pectoris (HCC)     1 wk ago    Atherosclerosis of renal artery (HCC)     CAD (coronary artery disease)     Chronic kidney disease, stage 2 (mild)     Diabetes mellitus (HCC)     niddm    Edema  Endometriosis     History of palpitations     History of poliomyelitis     Hyperlipidemia     Hypertension     Hypertensive chronic kidney disease with stage 1 through stage 4 chronic kidney disease, or unspecified chronic kidney disease     Hypothyroid     Kidney stones     [facility] infarction [facility] [address] )     NSTEMI (non-ST elevated myocardial infarction) ([facility] ) 01/05/2019    Obesity     Paroxysmal SVT (supraventricular tachycardia) (HCC)     Renal insufficiency     Rosacea     Thyroid nodule     Type [facility]     Wears glasses      Past Surgical History:   Procedure Laterality Date    ACHILLES TENDON LENGTHENING Right     Achilles tendon stretch     CARDIAC CATHETERIZATION  2017    CARDIAC CATHETERIZATION  01/07/2019    Stent placed in RCA     CATARACT EXTRACTION Bilateral     COLONOSCOPY      HYSTERECTOMY      Total - Endometriosis     ROTATOR CUFF REPAIR Right      Social History   Social History     Substance and Sexual Activity   Alcohol Use No     Social History     Substance and Sexual Activity   Drug Use No     Social History     Tobacco Use   Smoking Status Never Smoker   Smokeless Tobacco Never Used     Family History:   Family History   Problem Relation Age of Onset    Asthma Mother     Stomach cancer Father        Current Outpatient Medications on File Prior to Visit   Medication Sig    ACCU-CHEK KAILEY PLUS test strip     ARMOUR THYROID 15 MG tablet 15 mg every evening Before bedtime    ARMOUR THYROID 30 MG tablet Take 30 mg by mouth 2 (two) times a day Take one in the morning and one in the afternoon    aspirin 81 mg chewable tablet Chew 1 tablet daily    atorvastatin (LIPITOR) 40 mg tablet Take 1 tablet (40 mg total) by mouth daily    Blood Glucose Monitoring Suppl (ACCU-CHEK KAILEY CONNECT) w/Device KIT by Does not apply route    doxazosin (CARDURA) 4 mg tablet     glucose blood (ACCU-CHEK KAILEY PLUS) test strip by In Vitro route    Lancets Misc  (ACCU-CHEK MULTICLIX LANCET DEV) KIT by Does not apply route 2 (two) times a day    losartan (COZAAR) 50 mg tablet Take 1 tablet (50 mg total) by mouth daily    metFORMIN (GLUCOPHAGE) 500 mg tablet Take 500 mg by mouth 3 (three) times a day 2 tablets in the morning and one in the afternoon    metoprolol tartrate (LOPRESSOR) 25 mg tablet Take two tablets (50 mg total) by mouth every morning and one tablet by mouth (25 mg) at night  (Patient taking differently: 25 mg every 12 (twelve) hours Take two tablets (50 mg total) by mouth every morning and one tablet by mouth (25 mg) at night )    nitroglycerin (NITROSTAT) 0 4 mg SL tablet Place 0 4 mg under the tongue    oxybutynin (DITROPAN-XL) 10 MG 24 hr tablet Take 1 tablet (10 mg total) by mouth daily at bedtime    clopidogrel (PLAVIX) 75 mg tablet Take 1 tablet (75 mg total) by mouth once for 1 dose    [DISCONTINUED] doxazosin (CARDURA) 8 MG tablet Take 0 5 tablets (4 mg total) by mouth daily at bedtime     No current facility-administered medications on file prior to visit  Allergies   Allergen Reactions    Lisinopril Rash     Changed pigment of my skin     Amlodipine        Vitals:    01/03/20 0946   BP: 102/70   Pulse: (!) 54   SpO2: 98%       Physical Exam   Constitutional: Oriented to person, place, and time  Well-developed and well-nourished, no apparent distress, non-toxic appearance  Cooperative, able to hear and answer questions without difficulty  Voice: Normal voice quality  Head: Normocephalic, atraumatic  No scars, masses or lesions  Face: Symmetric, no edema, no sinus tenderness  Eyes: Vision grossly intact, extra-ocular movement intact  Ears: External ears normal   Bilateral external auditory canal stenosis able to use a 4 mm speculum  Tympanic membranes intact with intact normal landmarks  No post-auricular erythema or tenderness  Nose: Septum intact, nares clear  Mucosa moist, turbinates well appearing    No crusting, polyps or discharge evident  Oral cavity: Dentition intact  Mucosa moist, lips without lesions or masses  Tongue mobile, floor of mouth soft and flat  Hard palate intact  No masses or lesions  Oropharynx: Uvula is midline, soft palate intact without lesion or mass  Oropharyngeal inlet without obstruction  Tonsils unremarkable  Posterior pharyngeal wall clear  No masses or lesions  Salivary glands:  Parotid glands and submandibular glands symmetric, no enlargement or tenderness  Neck: Normal laryngeal elevation with swallow  Trachea midline  No masses or lesions  No palpable adenopathy  Thyroid: Without tenderness or palpable nodules  Pulmonary/Chest: Normal effort and rate  No respiratory distress  No stertor or stridor  Musculoskeletal: Normal range of motion  Neurological: Cranial nerves 2-12 intact  Skin: Skin is warm and dry  Psychiatric: Normal mood and affect  Procedure: Cerumen debridement bilaterally    Indications: Cerumen impaction bilaterally    Procedure in detail: After informed verbal consent was obtained the ear was visualized using microscopy  Using cerumen loop, suction, and alligator forceps the cerumen was debrided and the findings below were seen  The patient tolerated the procedure well  FINDINGS:  Small external auditory canals completely impacted with cerumen  We were able to use a 4 mm speculum  Imaging Studies: I have personally reviewed pertinent reports  Lab Results: I have personally reviewed pertinent lab results  Greater than 40 minutes were spent in consultation  More than 1/2 of that time was spent in counselling and coordination of care

## 2020-01-03 NOTE — PROGRESS NOTES
Review of Systems   Constitutional: Negative  HENT: Positive for ear pain  Eyes: Negative  Respiratory: Negative  Cardiovascular: Negative  Gastrointestinal: Negative  Endocrine: Negative  Genitourinary: Negative  Musculoskeletal: Negative  Skin: Negative  Allergic/Immunologic: Negative  Neurological: Negative  Hematological: Negative  Psychiatric/Behavioral: Negative

## 2020-01-22 DIAGNOSIS — I10 ESSENTIAL HYPERTENSION: Primary | ICD-10-CM

## 2020-01-22 RX ORDER — DOXAZOSIN MESYLATE 4 MG/1
4 TABLET ORAL DAILY
Qty: 90 TABLET | Refills: 2 | Status: SHIPPED | OUTPATIENT
Start: 2020-01-22 | End: 2020-03-05 | Stop reason: SDUPTHER

## 2020-03-05 DIAGNOSIS — I10 ESSENTIAL HYPERTENSION: ICD-10-CM

## 2020-03-05 DIAGNOSIS — I21.4 NSTEMI (NON-ST ELEVATED MYOCARDIAL INFARCTION) (HCC): ICD-10-CM

## 2020-03-05 RX ORDER — DOXAZOSIN MESYLATE 4 MG/1
4 TABLET ORAL DAILY
Qty: 90 TABLET | Refills: 3 | Status: SHIPPED | OUTPATIENT
Start: 2020-03-05 | End: 2020-04-03

## 2020-03-06 RX ORDER — ATORVASTATIN CALCIUM 40 MG/1
40 TABLET, FILM COATED ORAL DAILY
Qty: 90 TABLET | Refills: 3 | Status: SHIPPED | OUTPATIENT
Start: 2020-03-06 | End: 2021-01-09

## 2020-03-09 ENCOUNTER — APPOINTMENT (RX ONLY)
Dept: URBAN - NONMETROPOLITAN AREA CLINIC 4 | Facility: CLINIC | Age: 75
Setting detail: DERMATOLOGY
End: 2020-03-09

## 2020-03-09 DIAGNOSIS — L71.8 OTHER ROSACEA: ICD-10-CM

## 2020-03-09 PROCEDURE — ? COUNSELING

## 2020-03-09 PROCEDURE — 99202 OFFICE O/P NEW SF 15 MIN: CPT

## 2020-03-09 PROCEDURE — ? TREATMENT REGIMEN

## 2020-03-09 PROCEDURE — ? PRESCRIPTION

## 2020-03-09 PROCEDURE — ? PRESCRIPTION MEDICATION MANAGEMENT

## 2020-03-09 RX ORDER — METRONIDAZOLE 7.5 MG/G
.75% CREAM TOPICAL QD
Qty: 1 | Refills: 3 | Status: ERX | COMMUNITY
Start: 2020-03-09

## 2020-03-09 RX ADMIN — METRONIDAZOLE .75%: 7.5 CREAM TOPICAL at 00:00

## 2020-03-09 ASSESSMENT — LOCATION SIMPLE DESCRIPTION DERM
LOCATION SIMPLE: LEFT CHEEK
LOCATION SIMPLE: RIGHT CHEEK

## 2020-03-09 ASSESSMENT — LOCATION DETAILED DESCRIPTION DERM
LOCATION DETAILED: LEFT INFERIOR CENTRAL MALAR CHEEK
LOCATION DETAILED: RIGHT INFERIOR CENTRAL MALAR CHEEK

## 2020-03-09 ASSESSMENT — LOCATION ZONE DERM: LOCATION ZONE: FACE

## 2020-03-09 NOTE — PROCEDURE: PRESCRIPTION MEDICATION MANAGEMENT
Initiate Treatment: Minocycline 100mg BID; Metronidazole 0.75% cream BID
Render In Strict Bullet Format?: No
Detail Level: Zone

## 2020-03-12 ENCOUNTER — OFFICE VISIT (OUTPATIENT)
Dept: CARDIOLOGY CLINIC | Facility: HOSPITAL | Age: 75
End: 2020-03-12
Payer: MEDICARE

## 2020-03-12 VITALS
DIASTOLIC BLOOD PRESSURE: 70 MMHG | HEIGHT: 63 IN | SYSTOLIC BLOOD PRESSURE: 158 MMHG | BODY MASS INDEX: 33.32 KG/M2 | WEIGHT: 188.05 LBS | HEART RATE: 48 BPM

## 2020-03-12 DIAGNOSIS — I10 ESSENTIAL HYPERTENSION: Primary | ICD-10-CM

## 2020-03-12 DIAGNOSIS — E78.00 HYPERCHOLESTEROLEMIA: ICD-10-CM

## 2020-03-12 DIAGNOSIS — I70.1 RAS (RENAL ARTERY STENOSIS) (HCC): ICD-10-CM

## 2020-03-12 DIAGNOSIS — I25.10 CORONARY ARTERY DISEASE INVOLVING NATIVE CORONARY ARTERY OF NATIVE HEART WITHOUT ANGINA PECTORIS: ICD-10-CM

## 2020-03-12 DIAGNOSIS — R60.9 EDEMA, UNSPECIFIED TYPE: ICD-10-CM

## 2020-03-12 DIAGNOSIS — I47.1 PAROXYSMAL SVT (SUPRAVENTRICULAR TACHYCARDIA) (HCC): ICD-10-CM

## 2020-03-12 PROCEDURE — 99214 OFFICE O/P EST MOD 30 MIN: CPT | Performed by: INTERNAL MEDICINE

## 2020-03-12 RX ORDER — MINOCYCLINE HYDROCHLORIDE 100 MG/1
100 CAPSULE ORAL EVERY 12 HOURS SCHEDULED
COMMUNITY
End: 2020-12-18 | Stop reason: ALTCHOICE

## 2020-03-12 NOTE — PROGRESS NOTES
Cardiology Follow Up    Prosper Peralta  [birth year]  [id number]  [address] [id number]    1  Essential hypertension  CBC, NO DIFFERENTIAL/PLATELET    Comprehensive metabolic panel    Lipid Panel with Direct LDL reflex   2  Coronary artery disease involving native coronary artery of native heart without angina pectoris  CBC, NO DIFFERENTIAL/PLATELET    Comprehensive metabolic panel    Lipid Panel with Direct LDL reflex   3  Paroxysmal SVT (supraventricular tachycardia) ([facility] [address] )     4  VICKEY (renal artery stenosis) ([facility] [address] )     5  Hypercholesterolemia     6  Edema, unspecified type         Discussion/Summary:  Overall she has been doing well from a cardiac standpoint  Coronary artery disease stable no complaints of angina  No further episodes of supraventricular tachycardia  Blood pressure has been running a little on the high side on her home monitor  Her home monitor does read 20 points higher than our cuff  She has significant amount of sodium in her diet I have asked her to restrict this and I think this will bring a blood pressure down at least another 20 points  I have ordered some basic lab work  She will continue the current statin till I review labs  Interval History:  28-year-old female history of coronary disease, hypertension, renal artery stenosis, paroxysmal supraventricular tachycardia Routine follow-up visit  In early January she was driving in the car and had an episode of tachycardia with chest tightness  She was brought to an outside hospital and was sent to [facility] where they found a small troponin elevation  Left heart catheterization was performed the stent was placed in the right coronary artery  Presents for routine scheduled follow-up visit  Denies any chest pain, shortness of breath, palpitations, lightheadedness, dizziness, or syncope  There has been no lower extremity edema, PND, orthopnea  Denies any changes in medication  Denies any fevers chills  Problem List     Chest pain    Tachycardia    Hypertension    Diabetes mellitus ([facility])    NSTEMI (non-ST elevated myocardial infarction) ([place] [address] )    Coronary artery disease involving native coronary artery of native heart with angina pectoris (HCC)    Paroxysmal SVT (supraventricular tachycardia) ([facility])        Past Medical History:   Diagnosis Date    Abnormal stress test     Angina pectoris ([place] [address] )     1 wk ago    Atherosclerosis of renal artery ([place] [address] )     CAD (coronary artery disease)     Chronic kidney disease, stage 2 (mild)     Diabetes mellitus (HCC)     niddm    Edema     Endometriosis     History of palpitations     History of poliomyelitis     Hyperlipidemia     Hypertension     Hypertensive chronic kidney disease with stage 1 through stage 4 chronic kidney disease, or unspecified chronic kidney disease     Hypothyroid     Kidney stones     Myocardial infarction ([place] [address] )     NSTEMI (non-ST elevated myocardial infarction) ([place] [address] ) 01/05/2019    Obesity     Paroxysmal SVT (supraventricular tachycardia) ([facility])     Renal insufficiency     Rosacea     Thyroid nodule     Type 2 diabetes mellitus ([place] [address] )     Wears glasses      Social History     Socioeconomic History    Marital status:       Spouse name: Not on file    Number of children: Not on file    Years of education: Not on file    Highest education level: Not on file   Occupational History    Occupation: /    Social Needs    Financial resource strain: Not on file    Food insecurity:     Worry: Not on file     Inability: Not on file    Transportation needs:     Medical: Not on file     Non-medical: Not on file   Tobacco Use    Smoking status: Never Smoker    Smokeless tobacco: Never Used   Substance and Sexual Activity    Alcohol use: No    Drug use: No    Sexual activity: Not on file   Lifestyle  Physical activity:     Days per week: Not on file     Minutes per session: Not on file    Stress: Not on file   Relationships    Social connections:     Talks on phone: Not on file     Gets together: Not on file     Attends [employer] service: Not on file     Active member of club or organization: Not on file     Attends meetings of clubs or organizations: Not on file     Relationship status: Not on file    Intimate partner violence:     Fear of current or ex partner: Not on file     Emotionally abused: Not on file     Physically abused: Not on file     Forced sexual activity: Not on file   Other Topics Concern    Not on file   Social History Narrative    Consumes on average 2 cups of decaf coffee per day       Family History   Problem Relation Age of Onset    Asthma Mother     Stomach cancer Father      Past Surgical History:   Procedure Laterality Date    ACHILLES TENDON LENGTHENING Right     Achilles tendon stretch     CARDIAC CATHETERIZATION  2017    CARDIAC CATHETERIZATION  01/07/2019    Stent placed in RCA     CATARACT EXTRACTION Bilateral     COLONOSCOPY      HYSTERECTOMY      Total - Endometriosis     ROTATOR CUFF REPAIR Right        Current Outpatient Medications:     ACCU-CHEK KAILEY PLUS test strip, , Disp: , Rfl: 3    ARMOUR THYROID 15 MG tablet, 15 mg every evening Before bedtime, Disp: , Rfl: 5    ARMOUR THYROID 30 MG tablet, Take 30 mg by mouth 2 (two) times a day Take one in the morning and one in the afternoon, Disp: , Rfl: 6    aspirin 81 mg chewable tablet, Chew 1 tablet daily, Disp: 30 tablet, Rfl: 0    atorvastatin (LIPITOR) 40 mg tablet, Take 1 tablet (40 mg total) by mouth daily, Disp: 90 tablet, Rfl: 3    Blood Glucose Monitoring Suppl (ACCU-CHEK KAILEY CONNECT) w/Device KIT, by Does not apply route, Disp: , Rfl:     clopidogrel (PLAVIX) 75 mg tablet, Take 1 tablet (75 mg total) by mouth once for 1 dose, Disp: 90 tablet, Rfl: 2    doxazosin (CARDURA) 4 mg tablet, Take 1 tablet (4 mg total) by mouth daily, Disp: 90 tablet, Rfl: 3    glucose blood (ACCU-CHEK KAILEY PLUS) test strip, by In Vitro route, Disp: , Rfl:     Lancets Misc  (ACCU-CHEK MULTICLIX LANCET DEV) KIT, by Does not apply route 2 (two) times a day, Disp: , Rfl:     losartan (COZAAR) 50 mg tablet, Take 1 tablet (50 mg total) by mouth daily, Disp: 90 tablet, Rfl: 3    metFORMIN (GLUCOPHAGE) 500 mg tablet, Take 500 mg by mouth 3 (three) times a day 2 tablets in the morning and one in the afternoon, Disp: , Rfl:     metoprolol tartrate (LOPRESSOR) 25 mg tablet, Take two tablets (50 mg total) by mouth every morning and one tablet by mouth (25 mg) at night   (Patient taking differently: 25 mg every 12 (twelve) hours Take two tablets (50 mg total) by mouth every morning and one tablet by mouth (25 mg) at night ), Disp: 270 tablet, Rfl: 3    minocycline (MINOCIN) 100 mg capsule, Take 100 mg by mouth every 12 (twelve) hours, Disp: , Rfl:     nitroglycerin (NITROSTAT) 0 4 mg SL tablet, Place 0 4 mg under the tongue, Disp: , Rfl:     oxybutynin (DITROPAN-XL) 10 MG 24 hr tablet, Take 1 tablet (10 mg total) by mouth daily at bedtime, Disp: 30 tablet, Rfl: 3  Allergies   Allergen Reactions    Lisinopril Rash     Changed pigment of my skin     Amlodipine        Labs:     Chemistry        Component Value Date/Time     12/08/2015 1014    K 3 7 06/10/2019 0916    K 4 4 12/08/2015 1014     06/10/2019 0916     12/08/2015 1014    CO2 26 06/10/2019 0916    CO2 30 9 12/08/2015 1014    BUN 19 06/10/2019 0916    BUN 20 12/08/2015 1014    CREATININE 0 90 06/10/2019 0916    CREATININE 0 67 12/08/2015 1014        Component Value Date/Time    CALCIUM 8 8 06/10/2019 0916    CALCIUM 8 9 12/08/2015 1014    ALKPHOS 118 (H) 04/30/2018 1942    ALKPHOS 111 12/08/2015 1014    AST 24 04/30/2018 1942    AST 20 12/08/2015 1014    ALT 29 04/30/2018 1942    ALT 25 12/08/2015 1014    BILITOT 0 48 12/08/2015 1014            Lab Results Component Value Date    CHOL 267 12/08/2015    CHOL 270 06/10/2015    CHOL 243 03/18/2015     Lab Results   Component Value Date    HDL 51 06/26/2017    HDL 49 03/27/2017    HDL 62 (H) 11/22/2016     Lab Results   Component Value Date    LDLCALC 158 (H) 06/26/2017    LDLCALC 153 (H) 03/27/2017    LDLCALC 157 (H) 11/22/2016     Lab Results   Component Value Date    TRIG 231 (H) 06/26/2017    TRIG 220 (H) 03/27/2017    TRIG 225 (H) 11/22/2016     No results found for: CHOLHDL    Imaging: No results found  ECG:  Sinus bradycardia PACs      Review of Systems   Constitution: Negative  HENT: Negative  Eyes: Negative  Cardiovascular: Negative  Respiratory: Negative  Endocrine: Negative  Hematologic/Lymphatic: Negative  Skin: Negative  Musculoskeletal: Negative  Gastrointestinal: Negative  Genitourinary: Negative  Neurological: Negative  Psychiatric/Behavioral: Negative  Vitals:    03/12/20 1259   BP: 158/70   Pulse: (!) 48     Vitals:    03/12/20 1259   Weight: 85 3 kg (188 lb 0 8 oz)     Height: 5' 3" (160 cm)   Body mass index is 33 31 kg/m²  Physical Exam:  Vital signs reviewed  General:  Alert and cooperative, appears stated age, no acute distress  HEENT:  PERRLA, EOMI, no scleral icterus, no conjunctival pallor  Neck:  No lymphadenopathy, no thyromegaly, no carotid bruits, no elevated JVP  Heart:  Regular rate and rhythm, normal S1/S2, no S3/S4, no murmur, rubs or gallops  PMI nondisplaced  Lungs:  Clear to auscultation bilaterally, no wheezes rales or rhonchi  Abdomen:  Soft, non-tender, positive bowel sounds, no rebound or guarding,   no organomegaly   Extremities:  Normal range of motion    No clubbing, cyanosis or edema   Vascular:  2+ pedal pulses  Skin:  No rashes or lesions on exposed skin  Neurologic:  Cranial nerves II-XII grossly intact without focal deficits

## 2020-03-26 DIAGNOSIS — R32 URINARY INCONTINENCE, UNSPECIFIED TYPE: ICD-10-CM

## 2020-03-26 DIAGNOSIS — I47.1 PAROXYSMAL SVT (SUPRAVENTRICULAR TACHYCARDIA) (HCC): ICD-10-CM

## 2020-03-26 RX ORDER — OXYBUTYNIN CHLORIDE 10 MG/1
10 TABLET, EXTENDED RELEASE ORAL
Qty: 30 TABLET | Refills: 3 | Status: SHIPPED | OUTPATIENT
Start: 2020-03-26 | End: 2020-05-04 | Stop reason: SDUPTHER

## 2020-04-03 ENCOUNTER — OFFICE VISIT (OUTPATIENT)
Dept: OTOLARYNGOLOGY | Facility: CLINIC | Age: 75
End: 2020-04-03
Payer: MEDICARE

## 2020-04-03 VITALS — BODY MASS INDEX: 32.78 KG/M2 | HEIGHT: 63 IN | OXYGEN SATURATION: 98 % | HEART RATE: 50 BPM | WEIGHT: 185 LBS

## 2020-04-03 DIAGNOSIS — H61.303 STENOSIS OF BOTH EXTERNAL AUDITORY CANALS: Primary | ICD-10-CM

## 2020-04-03 DIAGNOSIS — H61.23 BILATERAL IMPACTED CERUMEN: ICD-10-CM

## 2020-04-03 PROCEDURE — 69210 REMOVE IMPACTED EAR WAX UNI: CPT | Performed by: OTOLARYNGOLOGY

## 2020-04-03 PROCEDURE — 99213 OFFICE O/P EST LOW 20 MIN: CPT | Performed by: OTOLARYNGOLOGY

## 2020-04-03 RX ORDER — DOXAZOSIN 8 MG/1
4 TABLET ORAL
COMMUNITY
Start: 2020-01-06 | End: 2020-10-15 | Stop reason: SDUPTHER

## 2020-04-27 ENCOUNTER — OFFICE VISIT (OUTPATIENT)
Dept: NEPHROLOGY | Facility: CLINIC | Age: 75
End: 2020-04-27
Payer: MEDICARE

## 2020-04-27 VITALS
WEIGHT: 184 LBS | SYSTOLIC BLOOD PRESSURE: 128 MMHG | HEIGHT: 63 IN | BODY MASS INDEX: 32.6 KG/M2 | DIASTOLIC BLOOD PRESSURE: 74 MMHG | HEART RATE: 50 BPM

## 2020-04-27 DIAGNOSIS — N18.2 STAGE 2 CHRONIC KIDNEY DISEASE: Primary | ICD-10-CM

## 2020-04-27 DIAGNOSIS — I70.1 RAS (RENAL ARTERY STENOSIS) (HCC): ICD-10-CM

## 2020-04-27 DIAGNOSIS — I10 ESSENTIAL HYPERTENSION: ICD-10-CM

## 2020-04-27 DIAGNOSIS — R60.9 EDEMA, UNSPECIFIED TYPE: ICD-10-CM

## 2020-04-27 DIAGNOSIS — E11.9 TYPE 2 DIABETES MELLITUS WITHOUT COMPLICATION, WITHOUT LONG-TERM CURRENT USE OF INSULIN (HCC): ICD-10-CM

## 2020-04-27 LAB
CREAT ?TM UR-SCNC: 106.7 UMOL/L
EXT MICROALBUMIN URINE RANDOM: 3.3
MICROALBUMIN/CREAT UR: 30.9 MG/G{CREAT}
SL AMB POCT HEMOGLOBIN AIC: 6.4 (ref ?–6.5)

## 2020-04-27 PROCEDURE — 83036 HEMOGLOBIN GLYCOSYLATED A1C: CPT | Performed by: INTERNAL MEDICINE

## 2020-04-27 PROCEDURE — 99214 OFFICE O/P EST MOD 30 MIN: CPT | Performed by: INTERNAL MEDICINE

## 2020-05-04 DIAGNOSIS — R32 URINARY INCONTINENCE, UNSPECIFIED TYPE: ICD-10-CM

## 2020-05-04 RX ORDER — OXYBUTYNIN CHLORIDE 10 MG/1
10 TABLET, EXTENDED RELEASE ORAL
Qty: 90 TABLET | Refills: 2 | Status: SHIPPED | OUTPATIENT
Start: 2020-05-04 | End: 2020-11-02

## 2020-05-05 ENCOUNTER — TELEPHONE (OUTPATIENT)
Dept: UROLOGY | Facility: MEDICAL CENTER | Age: 75
End: 2020-05-05

## 2020-05-05 DIAGNOSIS — I21.4 NSTEMI (NON-ST ELEVATED MYOCARDIAL INFARCTION) (HCC): ICD-10-CM

## 2020-05-05 RX ORDER — CLOPIDOGREL BISULFATE 75 MG/1
TABLET ORAL
Qty: 90 TABLET | Refills: 2 | Status: SHIPPED | OUTPATIENT
Start: 2020-05-05 | End: 2021-02-05

## 2020-05-27 DIAGNOSIS — I10 HTN (HYPERTENSION): ICD-10-CM

## 2020-05-27 RX ORDER — LOSARTAN POTASSIUM 50 MG/1
50 TABLET ORAL DAILY
Qty: 90 TABLET | Refills: 3 | Status: SHIPPED | OUTPATIENT
Start: 2020-05-27 | End: 2021-03-10

## 2020-06-08 ENCOUNTER — APPOINTMENT (RX ONLY)
Dept: URBAN - NONMETROPOLITAN AREA CLINIC 4 | Facility: CLINIC | Age: 75
Setting detail: DERMATOLOGY
End: 2020-06-08

## 2020-06-08 DIAGNOSIS — L71.8 OTHER ROSACEA: ICD-10-CM | Status: IMPROVED

## 2020-06-08 PROCEDURE — ? PRESCRIPTION

## 2020-06-08 PROCEDURE — ? ADDITIONAL NOTES

## 2020-06-08 PROCEDURE — 99213 OFFICE O/P EST LOW 20 MIN: CPT | Mod: 95

## 2020-06-08 PROCEDURE — ? COUNSELING

## 2020-06-08 PROCEDURE — ? PRESCRIPTION MEDICATION MANAGEMENT

## 2020-06-08 ASSESSMENT — LOCATION ZONE DERM: LOCATION ZONE: FACE

## 2020-06-08 ASSESSMENT — LOCATION DETAILED DESCRIPTION DERM
LOCATION DETAILED: RIGHT CENTRAL MALAR CHEEK
LOCATION DETAILED: LEFT CENTRAL MALAR CHEEK

## 2020-06-08 ASSESSMENT — LOCATION SIMPLE DESCRIPTION DERM
LOCATION SIMPLE: RIGHT CHEEK
LOCATION SIMPLE: LEFT CHEEK

## 2020-06-08 ASSESSMENT — SEVERITY ASSESSMENT OVERALL AMONG ALL PATIENTS: IN YOUR EXPERIENCE, AMONG ALL PATIENTS YOU HAVE SEEN WITH THIS CONDITION, HOW SEVERE IS THIS PATIENT'S CONDITION?: MILD

## 2020-06-08 NOTE — PROCEDURE: PRESCRIPTION MEDICATION MANAGEMENT
Continue Regimen: Minocycline 100mg po bid, metrogel qd, sunscreen as directed
Detail Level: Zone
Render In Strict Bullet Format?: No

## 2020-06-29 DIAGNOSIS — I47.1 PAROXYSMAL SVT (SUPRAVENTRICULAR TACHYCARDIA) (HCC): ICD-10-CM

## 2020-06-30 ENCOUNTER — APPOINTMENT (EMERGENCY)
Dept: CT IMAGING | Facility: HOSPITAL | Age: 75
End: 2020-06-30
Payer: MEDICARE

## 2020-06-30 ENCOUNTER — HOSPITAL ENCOUNTER (EMERGENCY)
Facility: HOSPITAL | Age: 75
Discharge: HOME | End: 2020-06-30
Attending: EMERGENCY MEDICINE
Payer: MEDICARE

## 2020-06-30 ENCOUNTER — OFFICE VISIT (OUTPATIENT)
Dept: URGENT CARE | Facility: CLINIC | Age: 75
End: 2020-06-30
Payer: MEDICARE

## 2020-06-30 VITALS
TEMPERATURE: 98.4 F | BODY MASS INDEX: 32.85 KG/M2 | WEIGHT: 185.4 LBS | HEART RATE: 74 BPM | HEIGHT: 63 IN | RESPIRATION RATE: 20 BRPM | SYSTOLIC BLOOD PRESSURE: 217 MMHG | DIASTOLIC BLOOD PRESSURE: 84 MMHG | OXYGEN SATURATION: 98 %

## 2020-06-30 VITALS
OXYGEN SATURATION: 97 % | HEIGHT: 63 IN | TEMPERATURE: 98.1 F | RESPIRATION RATE: 20 BRPM | BODY MASS INDEX: 32.85 KG/M2 | WEIGHT: 185.41 LBS | SYSTOLIC BLOOD PRESSURE: 199 MMHG | DIASTOLIC BLOOD PRESSURE: 88 MMHG | HEART RATE: 65 BPM

## 2020-06-30 DIAGNOSIS — R10.9 ABDOMINAL PAIN: Primary | ICD-10-CM

## 2020-06-30 DIAGNOSIS — R10.9 ABDOMINAL PAIN, UNSPECIFIED ABDOMINAL LOCATION: Primary | ICD-10-CM

## 2020-06-30 DIAGNOSIS — R19.7 DIARRHEA: ICD-10-CM

## 2020-06-30 LAB
ALBUMIN SERPL BCP-MCNC: 3.5 G/DL (ref 3.5–5)
ALP SERPL-CCNC: 119 U/L (ref 46–116)
ALT SERPL W P-5'-P-CCNC: 23 U/L (ref 12–78)
ANION GAP SERPL CALCULATED.3IONS-SCNC: 7 MMOL/L (ref 4–13)
AST SERPL W P-5'-P-CCNC: 17 U/L (ref 5–45)
ATRIAL RATE: 60 BPM
BASOPHILS # BLD AUTO: 0.06 THOUSANDS/ΜL (ref 0–0.1)
BASOPHILS NFR BLD AUTO: 1 % (ref 0–1)
BILIRUB SERPL-MCNC: 0.4 MG/DL (ref 0.2–1)
BUN SERPL-MCNC: 18 MG/DL (ref 5–25)
CALCIUM SERPL-MCNC: 9 MG/DL (ref 8.3–10.1)
CHLORIDE SERPL-SCNC: 107 MMOL/L (ref 100–108)
CO2 SERPL-SCNC: 30 MMOL/L (ref 21–32)
CREAT SERPL-MCNC: 0.92 MG/DL (ref 0.6–1.3)
EOSINOPHIL # BLD AUTO: 0.26 THOUSAND/ΜL (ref 0–0.61)
EOSINOPHIL NFR BLD AUTO: 3 % (ref 0–6)
ERYTHROCYTE [DISTWIDTH] IN BLOOD BY AUTOMATED COUNT: 13 % (ref 11.6–15.1)
GFR SERPL CREATININE-BSD FRML MDRD: 61 ML/MIN/1.73SQ M
GLUCOSE SERPL-MCNC: 137 MG/DL (ref 65–140)
HCT VFR BLD AUTO: 42.3 % (ref 34.8–46.1)
HGB BLD-MCNC: 13.6 G/DL (ref 11.5–15.4)
IMM GRANULOCYTES # BLD AUTO: 0.04 THOUSAND/UL (ref 0–0.2)
IMM GRANULOCYTES NFR BLD AUTO: 0 % (ref 0–2)
LIPASE SERPL-CCNC: 94 U/L (ref 73–393)
LYMPHOCYTES # BLD AUTO: 1.78 THOUSANDS/ΜL (ref 0.6–4.47)
LYMPHOCYTES NFR BLD AUTO: 18 % (ref 14–44)
MCH RBC QN AUTO: 31.1 PG (ref 26.8–34.3)
MCHC RBC AUTO-ENTMCNC: 32.2 G/DL (ref 31.4–37.4)
MCV RBC AUTO: 97 FL (ref 82–98)
MONOCYTES # BLD AUTO: 0.62 THOUSAND/ΜL (ref 0.17–1.22)
MONOCYTES NFR BLD AUTO: 6 % (ref 4–12)
NEUTROPHILS # BLD AUTO: 7.2 THOUSANDS/ΜL (ref 1.85–7.62)
NEUTS SEG NFR BLD AUTO: 72 % (ref 43–75)
NRBC BLD AUTO-RTO: 0 /100 WBCS
P AXIS: 56 DEGREES
PLATELET # BLD AUTO: 192 THOUSANDS/UL (ref 149–390)
PMV BLD AUTO: 11 FL (ref 8.9–12.7)
POTASSIUM SERPL-SCNC: 4 MMOL/L (ref 3.5–5.3)
PR INTERVAL: 212 MS
PROT SERPL-MCNC: 7.1 G/DL (ref 6.4–8.2)
QRS AXIS: 94 DEGREES
QRSD INTERVAL: 94 MS
QT INTERVAL: 438 MS
QTC INTERVAL: 438 MS
RBC # BLD AUTO: 4.38 MILLION/UL (ref 3.81–5.12)
SODIUM SERPL-SCNC: 144 MMOL/L (ref 136–145)
T WAVE AXIS: 41 DEGREES
TROPONIN I SERPL-MCNC: 0.03 NG/ML
VENTRICULAR RATE: 60 BPM
WBC # BLD AUTO: 9.96 THOUSAND/UL (ref 4.31–10.16)

## 2020-06-30 PROCEDURE — 84484 ASSAY OF TROPONIN QUANT: CPT | Performed by: EMERGENCY MEDICINE

## 2020-06-30 PROCEDURE — 99285 EMERGENCY DEPT VISIT HI MDM: CPT | Performed by: EMERGENCY MEDICINE

## 2020-06-30 PROCEDURE — 85025 COMPLETE CBC W/AUTO DIFF WBC: CPT | Performed by: EMERGENCY MEDICINE

## 2020-06-30 PROCEDURE — 93010 ELECTROCARDIOGRAM REPORT: CPT | Performed by: INTERNAL MEDICINE

## 2020-06-30 PROCEDURE — 99213 OFFICE O/P EST LOW 20 MIN: CPT | Performed by: PHYSICIAN ASSISTANT

## 2020-06-30 PROCEDURE — 83690 ASSAY OF LIPASE: CPT | Performed by: EMERGENCY MEDICINE

## 2020-06-30 PROCEDURE — 96361 HYDRATE IV INFUSION ADD-ON: CPT

## 2020-06-30 PROCEDURE — 74177 CT ABD & PELVIS W/CONTRAST: CPT

## 2020-06-30 PROCEDURE — 80053 COMPREHEN METABOLIC PANEL: CPT | Performed by: EMERGENCY MEDICINE

## 2020-06-30 PROCEDURE — 96360 HYDRATION IV INFUSION INIT: CPT

## 2020-06-30 PROCEDURE — 93005 ELECTROCARDIOGRAM TRACING: CPT

## 2020-06-30 PROCEDURE — G0463 HOSPITAL OUTPT CLINIC VISIT: HCPCS | Performed by: PHYSICIAN ASSISTANT

## 2020-06-30 PROCEDURE — 99284 EMERGENCY DEPT VISIT MOD MDM: CPT

## 2020-06-30 RX ADMIN — IOHEXOL 100 ML: 350 INJECTION, SOLUTION INTRAVENOUS at 16:57

## 2020-06-30 RX ADMIN — SODIUM CHLORIDE 1000 ML: 0.9 INJECTION, SOLUTION INTRAVENOUS at 16:02

## 2020-06-30 NOTE — ED PROVIDER NOTES
History  Chief Complaint   Patient presents with    Abdominal Pain     patient reports generalized abdominal pain, bloating, and diarrhea for the past week  65-year-old female presents with bilateral lower quadrant bloating and abdominal pain along with diarrhea x1 week  Patient with urgent care today and was referred to the emerged department  She denies fever  She denies frequency or dysuria  Abdominal Pain   Pain location:  Suprapubic, RUQ and RLQ  Pain quality: aching and bloating    Pain radiates to:  Does not radiate  Pain severity:  Mild  Onset quality:  Gradual  Duration:  1 week  Progression:  Waxing and waning  Context: not alcohol use, not awakening from sleep, not diet changes and not eating    Relieved by:  Nothing  Worsened by:  Nothing  Ineffective treatments:  None tried  Associated symptoms: diarrhea    Associated symptoms: no belching and no chest pain    Risk factors: no alcohol abuse, no aspirin use and not elderly        Prior to Admission Medications   Prescriptions Last Dose Informant Patient Reported? Taking? ACCU-CHEK KAILEY PLUS test strip  Self Yes No   ARMOUR THYROID 15 MG tablet  Self Yes No   Sig: 15 mg every evening Before bedtime   ARMOUR THYROID 30 MG tablet  Self Yes No   Sig: Take 30 mg by mouth 2 (two) times a day Take one in the morning and one in the afternoon   Blood Glucose Monitoring Suppl (ACCU-CHEK KAILEY CONNECT) w/Device KIT  Self Yes No   Sig: by Does not apply route   Lancets Misc  (ACCU-CHEK MULTICLIX LANCET DEV) KIT  Self Yes No   Sig: by Does not apply route 2 (two) times a day   aspirin 81 mg chewable tablet  Self No No   Sig: Chew 1 tablet daily   atorvastatin (LIPITOR) 40 mg tablet  Self No No   Sig: Take 1 tablet (40 mg total) by mouth daily   clopidogrel (PLAVIX) 75 mg tablet   No No   Sig: TAKE ONE TABLET DAILY     doxazosin (CARDURA) 8 MG tablet  Self Yes No   Si mg    glucose blood (ACCU-CHEK KAILEY PLUS) test strip  Self Yes No   Sig: by In Vitro route   losartan (COZAAR) 50 mg tablet   No No   Sig: Take 1 tablet (50 mg total) by mouth daily   metFORMIN (GLUCOPHAGE) 500 mg tablet  Self Yes No   Sig: Take 500 mg by mouth 3 (three) times a day 2 tablets in the morning and one in the afternoon   metoprolol tartrate (LOPRESSOR) 25 mg tablet   No No   Sig: Take 2 tablets in the morning and 1 tablet at night    minocycline (MINOCIN) 100 mg capsule  Self Yes No   Sig: Take 100 mg by mouth every 12 (twelve) hours   nitroglycerin (NITROSTAT) 0 4 mg SL tablet  Self Yes No   Sig: Place 0 4 mg under the tongue   oxybutynin (DITROPAN-XL) 10 MG 24 hr tablet   No No   Sig: Take 1 tablet (10 mg total) by mouth daily at bedtime      Facility-Administered Medications: None       Past Medical History:   Diagnosis Date    Abnormal stress test     Angina pectoris (HCC)     1 wk ago    Atherosclerosis of renal artery (HCC)     CAD (coronary artery disease)     Chronic kidney disease, stage 2 (mild)     Diabetes mellitus (HCC)     niddm    Edema     Endometriosis     History of palpitations     History of poliomyelitis     Hyperlipidemia     Hypertension     Hypertensive chronic kidney disease with stage 1 through stage 4 chronic kidney disease, or unspecified chronic kidney disease     Hypothyroid     Kidney stones     [facility] [address] [facility]     NSTEMI (non-ST elevated myocardial infarction) ([facility] [address] ) 01/05/2019    Obesity     [facility]     Renal insufficiency     Rosacea     Thyroid nodule     Type 2 diabetes mellitus ([facility] [address] [facility]     Wears glasses        Past Surgical History:   Procedure Laterality Date    ACHILLES TENDON LENGTHENING Right     Achilles tendon stretch     CARDIAC CATHETERIZATION  2017    CARDIAC CATHETERIZATION  01/07/2019    Stent placed in RCA     CATARACT EXTRACTION Bilateral     COLONOSCOPY      HYSTERECTOMY      Total - Endometriosis     ROTATOR CUFF REPAIR Right        Family History Problem Relation Age of Onset    Asthma Mother     Stomach cancer Father      I have reviewed and agree with the history as documented  E-Cigarette/Vaping    E-Cigarette Use Never User      E-Cigarette/Vaping Substances    Nicotine No     THC No     CBD No     Flavoring No     Other No     Unknown No      Social History     Tobacco Use    Smoking status: Never Smoker    Smokeless tobacco: Never Used   Substance Use Topics    Alcohol use: No    Drug use: No       Review of Systems   Constitutional: Negative  HENT: Negative  Eyes: Negative  Respiratory: Negative  Cardiovascular: Negative for chest pain  Gastrointestinal: Positive for abdominal pain and diarrhea  Endocrine: Negative  Genitourinary: Negative  Musculoskeletal: Negative  Skin: Negative  Allergic/Immunologic: Negative  Neurological: Negative  Hematological: Negative  Psychiatric/Behavioral: Negative  All other systems reviewed and are negative  Physical Exam  Physical Exam   Constitutional: She appears well-developed and well-nourished  HENT:   Head: Normocephalic  Mouth/Throat: Oropharynx is clear and moist    Eyes: Pupils are equal, round, and reactive to light  Cardiovascular: Normal rate  No murmur heard  Pulmonary/Chest: Effort normal  Stridor present  No respiratory distress  Abdominal: Normal appearance and bowel sounds are normal  There is tenderness in the right lower quadrant, suprapubic area and left lower quadrant  Musculoskeletal: She exhibits no edema or deformity  Neurological: She is alert  She displays normal reflexes  No cranial nerve deficit  She exhibits normal muscle tone  Coordination normal    Skin: Skin is warm  Capillary refill takes less than 2 seconds  No erythema  Psychiatric: She has a normal mood and affect  Her behavior is normal  Thought content normal    Vitals reviewed        Vital Signs  ED Triage Vitals [06/30/20 1541]   Temperature Pulse Respirations Blood Pressure SpO2   98 1 °F (36 7 °C) 65 20 (!) 199/88 97 %      Temp Source Heart Rate Source Patient Position - Orthostatic VS BP Location FiO2 (%)   Temporal Monitor Lying Left arm --      Pain Score       3           Vitals:    06/30/20 1541   BP: (!) 199/88   Pulse: 65   Patient Position - Orthostatic VS: Lying         Visual Acuity      ED Medications  Medications   sodium chloride 0 9 % bolus 1,000 mL (1,000 mL Intravenous New Bag 6/30/20 1602)   iohexol (OMNIPAQUE) 350 MG/ML injection (MULTI-DOSE) 100 mL (100 mL Intravenous Given 6/30/20 1657)       Diagnostic Studies  Results Reviewed     Procedure Component Value Units Date/Time    Comprehensive metabolic panel [[id number]]  (Abnormal) Collected:  06/30/20 1602    Lab Status:  Final result Specimen:  Blood from Arm, Left Updated:  06/30/20 1627     Sodium 144 mmol/L      Potassium 4 0 mmol/L      Chloride 107 mmol/L      CO2 30 mmol/L      ANION GAP 7 mmol/L      BUN 18 mg/dL      Creatinine 0 92 mg/dL      Glucose 137 mg/dL      Calcium 9 0 mg/dL      AST 17 U/L      ALT 23 U/L      Alkaline Phosphatase 119 U/L      Total Protein 7 1 g/dL      Albumin 3 5 g/dL      Total Bilirubin 0 40 mg/dL      eGFR 61 ml/min/1 73sq m     Narrative:       Leah guidelines for Chronic Kidney Disease (CKD):     Stage 1 with normal or high GFR (GFR > 90 mL/min/1 73 square meters)    Stage 2 Mild CKD (GFR = 60-89 mL/min/1 73 square meters)    Stage 3A Moderate CKD (GFR = 45-59 mL/min/1 73 square meters)    Stage 3B Moderate CKD (GFR = 30-44 mL/min/1 73 square meters)    Stage 4 Severe CKD (GFR = 15-29 mL/min/1 73 square meters)    Stage 5 End Stage CKD (GFR <15 mL/min/1 73 square meters)  Note: GFR calculation is accurate only with a steady state creatinine    Troponin I [[id number]]  (Normal) Collected:  06/30/20 1602    Lab Status:  Final result Specimen:  Blood from Arm, Left Updated:  06/30/20 1627     Troponin I 0 03 ng/mL     Lipase [[id number]]  (Normal) Collected:  06/30/20 1602    Lab Status:  Final result Specimen:  Blood from Arm, Left Updated:  06/30/20 1622     Lipase 94 u/L     CBC and differential [[id number]] Collected:  06/30/20 1602    Lab Status:  Final result Specimen:  Blood from Arm, Left Updated:  06/30/20 1608     WBC 9 96 Thousand/uL      RBC 4 38 Million/uL      Hemoglobin 13 6 g/dL      Hematocrit 42 3 %      MCV 97 fL      MCH 31 1 pg      MCHC 32 2 g/dL      RDW 13 0 %      MPV 11 0 fL      Platelets 407 Thousands/uL      nRBC 0 /100 WBCs      Neutrophils Relative 72 %      Immat GRANS % 0 %      Lymphocytes Relative 18 %      Monocytes Relative 6 %      Eosinophils Relative 3 %      Basophils Relative 1 %      Neutrophils Absolute 7 20 Thousands/µL      Immature Grans Absolute 0 04 Thousand/uL      Lymphocytes Absolute 1 78 Thousands/µL      Monocytes Absolute 0 62 Thousand/µL      Eosinophils Absolute 0 26 Thousand/µL      Basophils Absolute 0 06 Thousands/µL     UA w Reflex to Microscopic w Reflex to Culture [[id number]]     Lab Status:  No result Specimen:  Urine                  CT abdomen pelvis with contrast   Final Result by Patricia Zuniga MD (06/30 1717)      1  No acute abnormality in the abdomen or pelvis  2   Small bilateral nonobstructing calculi  3   Diverticulosis coli              Workstation performed: [id number]                    Procedures  ECG 12 Lead Documentation Only  Date/Time: 6/30/2020 4:10 PM  Performed by: Virgilio Bryan DO  Authorized by: Virgilio Bryan DO     Indications / Diagnosis:  Abdominal pain   ECG reviewed by me, the ED Provider: yes    Patient location:  ED  Previous ECG:     Previous ECG:  Unavailable  Interpretation:     Interpretation: non-specific    Rate:     ECG rate:  60     ECG rate assessment: normal    Rhythm:     Rhythm: sinus rhythm and A-V block    ST segments:     ST segments:  Non-specific             ED Course  ED Course as of Jun 30 1731 Tue Jun 30, 2020   1639 HCT: 40 2       US AUDIT      Most Recent Value   Initial Alcohol Screen: US AUDIT-C    1  How often do you have a drink containing alcohol?  0 Filed at: 06/30/2020 1541   2  How many drinks containing alcohol do you have on a typical day you are drinking? 0 Filed at: 06/30/2020 1541   3a  Male UNDER 65: How often do you have five or more drinks on one occasion? 0 Filed at: 06/30/2020 1541   3b  FEMALE Any Age, or MALE 65+: How often do you have 4 or more drinks on one occassion? 0 Filed at: 06/30/2020 1541   Audit-C Score  0 Filed at: 06/30/2020 1541            HEART Risk Score      Most Recent Value   Heart Score Risk Calculator   History  1 Filed at: 06/30/2020 1543   ECG  1 Filed at: 06/30/2020 1543   Age  2 Filed at: 06/30/2020 1543   Risk Factors  1 Filed at: 06/30/2020 1543   Troponin  0 Filed at: 06/30/2020 1543   HEART Score  5 Filed at: 06/30/2020 1543            VIKA/DAST-10      Most Recent Value   How many times in the past year have you    Used an illegal drug or used a prescription medication for non-medical reasons? Never Filed at: 06/30/2020 1541                                [facility]  Number of Diagnoses or Management Options  Abdominal pain:   Diarrhea:   Diagnosis management comments: Differential diagnosis 1  Colitis 2  Bowel obstruction 3  Diverticulitis  4  UTI    13 28  Patient refuses to wait for urinalysis  She states it's not my urine   Would like to go home         Amount and/or Complexity of Data Reviewed  Clinical lab tests: ordered and reviewed  Tests in the radiology section of CPT®: ordered and reviewed  Tests in the medicine section of CPT®: reviewed and ordered    Risk of Complications, Morbidity, and/or Mortality  Presenting problems: high  Diagnostic procedures: high  Management options: high          Disposition  Final diagnoses:   Abdominal pain   Diarrhea     Time reflects when diagnosis was documented in both MDM as applicable and the Disposition within this note     Time User Action Codes Description Comment    6/30/2020  5:30 PM Sergey Saunders Add [R10 9] Abdominal pain     6/30/2020  5:30 PM Sergey Saunders Add [R19 7] Diarrhea       ED Disposition     ED Disposition Condition Date/Time Comment    Discharge Stable Tue Jun 30, 2020  5:30 PM Angelia Cox discharge to home/self care  Follow-up Information     Follow up With Specialties Details Why [address]  Habib DO [facility]   [address] [place] [id number]  [phone]            Patient's Medications   Discharge Prescriptions    No medications on file     No discharge procedures on file      PDMP Review     None          ED Provider  Electronically Signed by           Lanny Jones DO  06/30/20 6438

## 2020-07-02 ENCOUNTER — HOSPITAL ENCOUNTER (EMERGENCY)
Facility: HOSPITAL | Age: 75
Discharge: HOME | End: 2020-07-02
Attending: EMERGENCY MEDICINE | Admitting: EMERGENCY MEDICINE
Payer: MEDICARE

## 2020-07-02 VITALS
DIASTOLIC BLOOD PRESSURE: 85 MMHG | HEIGHT: 63 IN | SYSTOLIC BLOOD PRESSURE: 200 MMHG | RESPIRATION RATE: 16 BRPM | HEART RATE: 68 BPM | BODY MASS INDEX: 32.81 KG/M2 | WEIGHT: 185.19 LBS | TEMPERATURE: 98 F | OXYGEN SATURATION: 98 %

## 2020-07-02 DIAGNOSIS — T14.8XXA BRUISING: Primary | ICD-10-CM

## 2020-07-02 PROCEDURE — 99283 EMERGENCY DEPT VISIT LOW MDM: CPT

## 2020-07-02 PROCEDURE — 99281 EMR DPT VST MAYX REQ PHY/QHP: CPT | Performed by: PHYSICIAN ASSISTANT

## 2020-07-02 NOTE — ED PROVIDER NOTES
History  Chief Complaint   Patient presents with    Bleeding/Bruising     patient was just seen in emergency department and received blood work two days ago; bruising noted in right a/c where IV was placed; denies any pain; patient takes aspirin and plavix      Patient presents to the emergency department today for evaluation of bruising of the right arm  She states she was here a few days ago had a tourniquet placed in had an IV started in the right arm  She has had bruising in that region which appears very consistent with the tourniquet site  She denies any pain  Denies swelling redness  Denies warmth  Denies range of motion or sensation deficits  She does take aspirin and Plavix  Prior to Admission Medications   Prescriptions Last Dose Informant Patient Reported? Taking? ACCU-CHEK KAILEY PLUS test strip  Self Yes No   ARMOUR THYROID 15 MG tablet  Self Yes No   Sig: 15 mg every evening Before bedtime   ARMOUR THYROID 30 MG tablet  Self Yes No   Sig: Take 30 mg by mouth 2 (two) times a day Take one in the morning and one in the afternoon   Blood Glucose Monitoring Suppl (ACCU-CHEK KAILEY CONNECT) w/Device KIT  Self Yes No   Sig: by Does not apply route   Lancets Misc  (ACCU-CHEK MULTICLIX LANCET DEV) KIT 2020 at Unknown time Self Yes Yes   Sig: by Does not apply route 2 (two) times a day   aspirin 81 mg chewable tablet 2020 at Unknown time Self No Yes   Sig: Chew 1 tablet daily   atorvastatin (LIPITOR) 40 mg tablet 2020 at Unknown time Self No Yes   Sig: Take 1 tablet (40 mg total) by mouth daily   clopidogrel (PLAVIX) 75 mg tablet 2020 at Unknown time  No Yes   Sig: TAKE ONE TABLET DAILY     doxazosin (CARDURA) 8 MG tablet 2020 at Unknown time Self Yes Yes   Si mg    glucose blood (ACCU-CHEK KAILEY PLUS) test strip 2020 at Unknown time Self Yes Yes   Sig: by In Vitro route   losartan (COZAAR) 50 mg tablet 2020 at Unknown time  No Yes   Sig: Take 1 tablet (50 mg total) by mouth daily   metFORMIN (GLUCOPHAGE) 500 mg tablet 7/2/2020 at Unknown time Self Yes Yes   Sig: Take 500 mg by mouth 3 (three) times a day 2 tablets in the morning and one in the afternoon   metoprolol tartrate (LOPRESSOR) 25 mg tablet 7/2/2020 at Unknown time  No Yes   Sig: Take 2 tablets in the morning and 1 tablet at night    minocycline (MINOCIN) 100 mg capsule  Self Yes No   Sig: Take 100 mg by mouth every 12 (twelve) hours   nitroglycerin (NITROSTAT) 0 4 mg SL tablet  Self Yes No   Sig: Place 0 4 mg under the tongue   oxybutynin (DITROPAN-XL) 10 MG 24 hr tablet 7/1/2020 at Unknown time  No Yes   Sig: Take 1 tablet (10 mg total) by mouth daily at bedtime      Facility-Administered Medications: None       Past Medical History:   Diagnosis Date    Abnormal stress test     Angina pectoris (HCC)     1 wk ago    Atherosclerosis of renal artery (HCC)     CAD (coronary artery disease)     Chronic kidney disease, stage 2 (mild)     Diabetes mellitus ([facility])     niddm    Edema     Endometriosis     History of palpitations     History of poliomyelitis     Hyperlipidemia     Hypertension     Hypertensive chronic kidney disease with stage 1 through stage 4 chronic kidney disease, or unspecified chronic kidney disease     Hypothyroid     Kidney stones     [facility] [address]     NSTEMI (non-ST elevated myocardial infarction) ([facility] [address] [facility] 01/05/2019    Obesity     Paroxysmal SVT (supraventricular tachycardia) ([facility]     Renal insufficiency     Rosacea     Thyroid nodule     Type 2 diabetes mellitus ([facility] [address] [facility]     Wears glasses        Past Surgical History:   Procedure Laterality Date    ACHILLES TENDON LENGTHENING Right     Achilles tendon stretch     CARDIAC CATHETERIZATION  2017    CARDIAC CATHETERIZATION  01/07/2019    Stent placed in RCA     CATARACT EXTRACTION Bilateral     COLONOSCOPY      HYSTERECTOMY      Total - Endometriosis     ROTATOR CUFF REPAIR Right        Family History Problem Relation Age of Onset    Asthma Mother     Stomach cancer Father      I have reviewed and agree with the history as documented  E-Cigarette/Vaping    E-Cigarette Use Never User      E-Cigarette/Vaping Substances    Nicotine No     THC No     CBD No     Flavoring No     Other No     Unknown No      Social History     Tobacco Use    Smoking status: Never Smoker    Smokeless tobacco: Never Used   Substance Use Topics    Alcohol use: No    Drug use: No       Review of Systems   Constitutional: Negative  Negative for chills and fever  HENT: Negative  Negative for sore throat and trouble swallowing  Eyes: Negative  Respiratory: Negative  Negative for cough, shortness of breath and wheezing  Cardiovascular: Negative  Negative for chest pain and leg swelling  Gastrointestinal: Negative  Negative for abdominal pain, blood in stool and vomiting  Endocrine: Negative  Genitourinary: Negative  Musculoskeletal: Negative  Negative for neck stiffness  Skin: Negative  Allergic/Immunologic: Negative  Neurological: Negative  Negative for dizziness, seizures, speech difficulty, weakness, light-headedness, numbness and headaches  Hematological: Bruises/bleeds easily  Psychiatric/Behavioral: Negative  All other systems reviewed and are negative  Physical Exam  Physical Exam   Constitutional: She is oriented to person, place, and time  Vital signs are normal  She appears well-developed and well-nourished  She does not have a sickly appearance  She does not appear ill  No distress  HENT:   Right Ear: External ear normal  No swelling  Tympanic membrane is not bulging  Left Ear: External ear normal  No swelling  Tympanic membrane is not bulging  Nose: Nose normal    Mouth/Throat: Oropharynx is clear and moist  No oropharyngeal exudate  Eyes: Pupils are equal, round, and reactive to light  Conjunctivae, EOM and lids are normal    Neck: Normal range of motion  Neck supple  No JVD present  No tracheal deviation, no edema and normal range of motion present  No thyromegaly present  Cardiovascular: Normal rate, regular rhythm, normal heart sounds, intact distal pulses and normal pulses  Exam reveals no gallop and no friction rub  No murmur heard  Pulmonary/Chest: Effort normal and breath sounds normal  No stridor  No respiratory distress  She has no wheezes  She has no rales  She exhibits no tenderness  Abdominal: Soft  Bowel sounds are normal  She exhibits no distension and no mass  There is no tenderness  There is no rebound, no guarding and negative Kern's sign  No hernia  Musculoskeletal: Normal range of motion  She exhibits no edema or tenderness  Lymphadenopathy:     She has no cervical adenopathy  Neurological: She is alert and oriented to person, place, and time  She has normal strength and normal reflexes  No cranial nerve deficit or sensory deficit  GCS eye subscore is 4  GCS verbal subscore is 5  GCS motor subscore is 6  Skin: Skin is warm and dry  Capillary refill takes less than 2 seconds  No rash noted  She is not diaphoretic  No erythema  No pallor  There is purplish colored contusion of the right lower bicep region  It is very linear demarcated consistent with prior tourniquet site  There is no tenderness or swelling in this region  Normal neurovascular motor exams distally  Psychiatric: She has a normal mood and affect  Her speech is normal and behavior is normal    Vitals reviewed        Vital Signs  ED Triage Vitals   Temperature Pulse Respirations Blood Pressure SpO2   07/02/20 1719 07/02/20 1719 07/02/20 1719 07/02/20 1724 07/02/20 1719   98 °F (36 7 °C) 68 16 (!) 200/85 98 %      Temp Source Heart Rate Source Patient Position - Orthostatic VS BP Location FiO2 (%)   07/02/20 1719 07/02/20 1719 07/02/20 1719 07/02/20 1719 --   Temporal Monitor Lying Right arm       Pain Score       --                  Vitals:    07/02/20 1719 07/02/20 1724   BP:  (!) 200/85   Pulse: 68    Patient Position - Orthostatic VS: Lying          Visual Acuity      ED Medications  Medications - No data to display    Diagnostic Studies  Results Reviewed     None                 No orders to display              Procedures  Procedures         ED Course  ED Course as of Jul 02 1735 Thu Jul 02, 2020   1733 I did complete a bedside ultrasound of this patient  Upon inspection of the arm utilizing both venous and soft tissue settings I did not note any hematoma collections or any collections consistent with abscess  Veins are easily compressible with no evidence of thrombus  Normal neurovascular motor exams noted          US AUDIT      Most Recent Value   Initial Alcohol Screen: US AUDIT-C    1  How often do you have a drink containing alcohol?  0 Filed at: 07/02/2020 1721   2  How many drinks containing alcohol do you have on a typical day you are drinking? 0 Filed at: 07/02/2020 1721   3b  FEMALE Any Age, or MALE 65+: How often do you have 4 or more drinks on one occassion? 0 Filed at: 07/02/2020 1721   Audit-C Score  0 Filed at: 07/02/2020 1721                  VIKA/DAST-10      Most Recent Value   How many times in the past year have you    Used an illegal drug or used a prescription medication for non-medical reasons? Never Filed at: 07/02/2020 1721                                MDM      Disposition  Final diagnoses:   Bruising     Time reflects when diagnosis was documented in both MDM as applicable and the Disposition within this note     Time User Action Codes Description Comment    7/2/2020  5:34 PM Merlinda Slocumb Add Ethridge Sabot  [address] Bruising       ED Disposition     ED Disposition Condition Date/Time Comment    Discharge Stable Thu Jul 2, 2020  5:34 PM Tawanda Ferrer discharge to home/self care              Follow-up Information     Follow up With Specialties Details Why Contact Info    Emory Espinosa, DO [facility] Schedule an appointment as soon as possible for a visit  As needed [address] [place] [address]            Patient's Medications   Discharge Prescriptions    No medications on file     No discharge procedures on file      PDMP Review     None          ED Provider  Electronically Signed by           Yvonne Baum PA-C  07/02/20 7693

## 2020-08-14 ENCOUNTER — OFFICE VISIT (OUTPATIENT)
Dept: OTOLARYNGOLOGY | Facility: CLINIC | Age: 75
End: 2020-08-14
Payer: MEDICARE

## 2020-08-14 VITALS
WEIGHT: 185 LBS | HEART RATE: 65 BPM | HEIGHT: 63 IN | TEMPERATURE: 98.1 F | BODY MASS INDEX: 32.78 KG/M2 | OXYGEN SATURATION: 97 % | DIASTOLIC BLOOD PRESSURE: 70 MMHG | SYSTOLIC BLOOD PRESSURE: 130 MMHG

## 2020-08-14 DIAGNOSIS — H61.23 BILATERAL IMPACTED CERUMEN: ICD-10-CM

## 2020-08-14 DIAGNOSIS — H61.23 BILATERAL HEARING LOSS DUE TO CERUMEN IMPACTION: ICD-10-CM

## 2020-08-14 DIAGNOSIS — H61.303 STENOSIS OF BOTH EXTERNAL AUDITORY CANALS: Primary | ICD-10-CM

## 2020-08-14 PROCEDURE — 99213 OFFICE O/P EST LOW 20 MIN: CPT | Performed by: OTOLARYNGOLOGY

## 2020-08-14 PROCEDURE — 69210 REMOVE IMPACTED EAR WAX UNI: CPT | Performed by: OTOLARYNGOLOGY

## 2020-08-14 RX ORDER — DOXAZOSIN MESYLATE 4 MG/1
TABLET ORAL
COMMUNITY
Start: 2020-06-16 | End: 2021-01-15

## 2020-08-14 NOTE — PROGRESS NOTES
Dawna Butler is a 75 y  o female who presents for re-evaluation of ear canal stenosis and frequent cerumen impactions  Last visit was 4 months ago  She has not noticed any further hearing loss  She is concerned that she may again have further problems with cerumen impaction  She still has not been using the hydrogen peroxide as discussed  No otalgia  No ear drainage  No sudden hearing changes  Past Medical History:   Diagnosis Date    Abnormal stress test     Angina pectoris (HCC)     1 wk ago    [facility]     CAD (coronary artery disease)     Chronic kidney disease, stage 2 (mild)     Diabetes mellitus (HCC)     niddm    Edema     Endometriosis     History of palpitations     History of poliomyelitis     Hyperlipidemia     Hypertension     Hypertensive chronic kidney disease with stage 1 through stage 4 chronic kidney disease, or unspecified chronic kidney disease     Hypothyroid     Kidney stones     [facility]ocardial [facility]     NSTEM[facility] (non-ST elevated [facility]) [facility] 01/05/2019    Obesity     Paroxysmal SVT [facility]supraventricular tachycardia) ([facility]     Renal insufficiency     Rosacea     Thyroid nodule     Type 2 diabetes mellitus ([facility])     Wears glasses        /70 (BP Location: Left arm, Cuff Size: Large)   Pulse 65   Temp 98 1 °F (36 7 °C) (Tympanic)   Ht 5' 3" (1 6 m)   Wt 83 9 kg (185 lb)   SpO2 97%   BMI 32 77 kg/m²       Physical Exam   Constitutional: Oriented to person, place, and time  Well-developed and well-nourished, no apparent distress, non-toxic appearance  Cooperative, able to hear and answer questions without difficulty  Voice: Normal voice quality  Head: Normocephalic, atraumatic  No scars, masses or lesions  Face: Symmetric, no edema, no sinus tenderness  Eyes: Vision grossly intact, extra-ocular movement intact    Ears: External ear normal   Bilateral cerumen impaction after debridement the following was noted: Bilateral tympanic membranes are intact with intact normal landmarks  No post-auricular erythema or tenderness  Nose: Septum midline, nares clear  Mucosa moist, turbinates well appearing  No crusting, polyps or discharge evident  Oral cavity: Dentition intact  Mucosa moist, lips normal   Tongue mobile, floor of mouth normal   Hard palate unremarkable  No masses or lesions  Oropharynx: Uvula is midline, soft palate normal   Unremarkable oropharyngeal inlet  Tonsils unremarkable  Posterior pharyngeal wall clear  No masses or lesions  Salivary glands:  Parotid glands and submandibular glands symmetric, no enlargement or tenderness  Neck: Normal laryngeal elevation with swallow  Trachea midline  No masses or lesions  No palpable adenopathy  Thyroid: normal in size, unremarkable without tenderness or palpable nodules  Pulmonary/Chest: Normal effort and rate  No respiratory distress  Musculoskeletal: Normal range of motion  Neurological: Cranial nerves 2-12 intact  Skin: Skin is warm and dry  Psychiatric: Normal mood and affect  Procedure: Cerumen debridement bilaterally    Indications: Cerumen impaction bilaterally    Procedure in detail: After informed verbal consent was obtained the ear was visualized using microscopy  Using cerumen loop, suction, and alligator forceps the cerumen was debrided and the findings below were seen  The patient tolerated the procedure well  FINDINGS:  Cerumen removed mostly with 5 suction  She tolerated this well  No otalgia  We were able to use a 4 mm speculum to evaluate the ear canal despite her stenosis  A/P: Cerumen impaction:  We discussed the nature of cerumen impaction  We discussed appropriate treat with hydrogen peroxide 4-5 drops once per week  We discussed discontinuing the use of any Q-Tips or other objects into the ear

## 2020-10-15 ENCOUNTER — OFFICE VISIT (OUTPATIENT)
Dept: CARDIOLOGY CLINIC | Facility: HOSPITAL | Age: 75
End: 2020-10-15
Payer: MEDICARE

## 2020-10-15 VITALS
OXYGEN SATURATION: 95 % | SYSTOLIC BLOOD PRESSURE: 138 MMHG | WEIGHT: 186 LBS | BODY MASS INDEX: 32.96 KG/M2 | HEART RATE: 62 BPM | DIASTOLIC BLOOD PRESSURE: 64 MMHG | HEIGHT: 63 IN

## 2020-10-15 DIAGNOSIS — I25.10 CORONARY ARTERY DISEASE INVOLVING NATIVE CORONARY ARTERY OF NATIVE HEART WITHOUT ANGINA PECTORIS: Primary | ICD-10-CM

## 2020-10-15 DIAGNOSIS — E78.5 DYSLIPIDEMIA: ICD-10-CM

## 2020-10-15 DIAGNOSIS — Z95.5 PRESENCE OF DRUG-ELUTING STENT IN RIGHT CORONARY ARTERY: ICD-10-CM

## 2020-10-15 DIAGNOSIS — Z86.73 HISTORY OF CARDIOEMBOLIC CEREBROVASCULAR ACCIDENT (CVA): ICD-10-CM

## 2020-10-15 DIAGNOSIS — I47.1 PAROXYSMAL SVT (SUPRAVENTRICULAR TACHYCARDIA) (HCC): ICD-10-CM

## 2020-10-15 PROCEDURE — 99214 OFFICE O/P EST MOD 30 MIN: CPT | Performed by: PHYSICIAN ASSISTANT

## 2020-10-21 ENCOUNTER — TELEPHONE (OUTPATIENT)
Dept: CARDIOLOGY CLINIC | Facility: CLINIC | Age: 75
End: 2020-10-21

## 2020-11-02 ENCOUNTER — OFFICE VISIT (OUTPATIENT)
Dept: NEPHROLOGY | Facility: CLINIC | Age: 75
End: 2020-11-02
Payer: MEDICARE

## 2020-11-02 VITALS
TEMPERATURE: 96.8 F | BODY MASS INDEX: 32.78 KG/M2 | SYSTOLIC BLOOD PRESSURE: 148 MMHG | DIASTOLIC BLOOD PRESSURE: 76 MMHG | WEIGHT: 185 LBS | HEIGHT: 63 IN | HEART RATE: 54 BPM | OXYGEN SATURATION: 96 %

## 2020-11-02 DIAGNOSIS — I10 ESSENTIAL HYPERTENSION: ICD-10-CM

## 2020-11-02 DIAGNOSIS — R60.9 EDEMA, UNSPECIFIED TYPE: ICD-10-CM

## 2020-11-02 DIAGNOSIS — N18.2 STAGE 2 CHRONIC KIDNEY DISEASE: ICD-10-CM

## 2020-11-02 DIAGNOSIS — I70.1 RAS (RENAL ARTERY STENOSIS) (HCC): Primary | ICD-10-CM

## 2020-11-02 PROCEDURE — 99214 OFFICE O/P EST MOD 30 MIN: CPT | Performed by: INTERNAL MEDICINE

## 2020-11-02 RX ORDER — CYCLOSPORINE 0.5 MG/ML
EMULSION OPHTHALMIC
COMMUNITY
Start: 2020-10-30

## 2020-11-09 ENCOUNTER — TELEPHONE (OUTPATIENT)
Dept: INPATIENT UNIT | Facility: HOSPITAL | Age: 75
End: 2020-11-09

## 2020-11-10 ENCOUNTER — HOSPITAL ENCOUNTER (OUTPATIENT)
Dept: NON INVASIVE DIAGNOSTICS | Facility: HOSPITAL | Age: 75
Discharge: HOME | End: 2020-11-10
Attending: INTERNAL MEDICINE | Admitting: INTERNAL MEDICINE
Payer: MEDICARE

## 2020-11-10 VITALS
TEMPERATURE: 98 F | OXYGEN SATURATION: 97 % | DIASTOLIC BLOOD PRESSURE: 66 MMHG | SYSTOLIC BLOOD PRESSURE: 162 MMHG | RESPIRATION RATE: 18 BRPM | HEART RATE: 53 BPM

## 2020-11-10 DIAGNOSIS — I47.1 PAROXYSMAL SVT (SUPRAVENTRICULAR TACHYCARDIA) (HCC): ICD-10-CM

## 2020-11-10 DIAGNOSIS — Z86.73 HISTORY OF CARDIOEMBOLIC CEREBROVASCULAR ACCIDENT (CVA): ICD-10-CM

## 2020-11-10 PROCEDURE — 33285 INSJ SUBQ CAR RHYTHM MNTR: CPT | Performed by: INTERNAL MEDICINE

## 2020-11-10 PROCEDURE — C1764 EVENT RECORDER, CARDIAC: HCPCS

## 2020-11-10 PROCEDURE — 33285 INSJ SUBQ CAR RHYTHM MNTR: CPT

## 2020-11-10 RX ORDER — LIDOCAINE HYDROCHLORIDE AND EPINEPHRINE 10; 10 MG/ML; UG/ML
INJECTION, SOLUTION INFILTRATION; PERINEURAL CODE/TRAUMA/SEDATION MEDICATION
Status: COMPLETED | OUTPATIENT
Start: 2020-11-10 | End: 2020-11-10

## 2020-11-10 RX ORDER — LIDOCAINE HYDROCHLORIDE AND EPINEPHRINE 10; 10 MG/ML; UG/ML
INJECTION, SOLUTION INFILTRATION; PERINEURAL
Status: DISCONTINUED
Start: 2020-11-10 | End: 2020-11-10 | Stop reason: HOSPADM

## 2020-11-10 RX ADMIN — LIDOCAINE HYDROCHLORIDE,EPINEPHRINE BITARTRATE 10 ML: 10; .01 INJECTION, SOLUTION INFILTRATION; PERINEURAL at 13:51

## 2020-11-17 ENCOUNTER — IN-CLINIC DEVICE VISIT (OUTPATIENT)
Dept: CARDIOLOGY CLINIC | Facility: HOSPITAL | Age: 75
End: 2020-11-17
Payer: MEDICARE

## 2020-11-17 DIAGNOSIS — Z95.818 PRESENCE OF OTHER CARDIAC IMPLANTS AND GRAFTS: Primary | ICD-10-CM

## 2020-11-17 PROCEDURE — 93298 REM INTERROG DEV EVAL SCRMS: CPT | Performed by: INTERNAL MEDICINE

## 2020-11-17 PROCEDURE — G2066 INTER DEVC REMOTE 30D: HCPCS | Performed by: INTERNAL MEDICINE

## 2020-11-24 DIAGNOSIS — I47.1 PAROXYSMAL SVT (SUPRAVENTRICULAR TACHYCARDIA) (HCC): Primary | ICD-10-CM

## 2020-11-24 DIAGNOSIS — I21.4 NSTEMI (NON-ST ELEVATED MYOCARDIAL INFARCTION) (HCC): ICD-10-CM

## 2020-12-02 LAB
CREAT ?TM UR-SCNC: 153 UMOL/L
EXT MICROALBUMIN URINE RANDOM: 3.4
MICROALBUMIN/CREAT UR: 22.2 MG/G{CREAT}

## 2020-12-18 ENCOUNTER — OFFICE VISIT (OUTPATIENT)
Dept: CARDIOLOGY CLINIC | Facility: HOSPITAL | Age: 75
End: 2020-12-18
Payer: MEDICARE

## 2020-12-18 VITALS
HEIGHT: 63 IN | DIASTOLIC BLOOD PRESSURE: 72 MMHG | SYSTOLIC BLOOD PRESSURE: 122 MMHG | HEART RATE: 63 BPM | OXYGEN SATURATION: 99 % | WEIGHT: 184 LBS | BODY MASS INDEX: 32.6 KG/M2

## 2020-12-18 DIAGNOSIS — N18.2 STAGE 2 CHRONIC KIDNEY DISEASE: ICD-10-CM

## 2020-12-18 DIAGNOSIS — I10 ESSENTIAL HYPERTENSION: ICD-10-CM

## 2020-12-18 DIAGNOSIS — I25.10 CORONARY ARTERY DISEASE INVOLVING NATIVE CORONARY ARTERY OF NATIVE HEART WITHOUT ANGINA PECTORIS: Primary | ICD-10-CM

## 2020-12-18 DIAGNOSIS — R60.9 EDEMA, UNSPECIFIED TYPE: ICD-10-CM

## 2020-12-18 DIAGNOSIS — I47.1 PAROXYSMAL SVT (SUPRAVENTRICULAR TACHYCARDIA) (HCC): ICD-10-CM

## 2020-12-18 PROCEDURE — 99214 OFFICE O/P EST MOD 30 MIN: CPT | Performed by: INTERNAL MEDICINE

## 2020-12-25 ENCOUNTER — HOSPITAL ENCOUNTER (EMERGENCY)
Facility: HOSPITAL | Age: 75
Discharge: HOME | End: 2020-12-25
Attending: EMERGENCY MEDICINE | Admitting: EMERGENCY MEDICINE
Payer: MEDICARE

## 2020-12-25 ENCOUNTER — APPOINTMENT (EMERGENCY)
Dept: RADIOLOGY | Facility: HOSPITAL | Age: 75
End: 2020-12-25
Payer: MEDICARE

## 2020-12-25 VITALS
BODY MASS INDEX: 32.62 KG/M2 | WEIGHT: 184.08 LBS | HEIGHT: 63 IN | TEMPERATURE: 97.9 F | OXYGEN SATURATION: 98 % | RESPIRATION RATE: 16 BRPM | DIASTOLIC BLOOD PRESSURE: 72 MMHG | SYSTOLIC BLOOD PRESSURE: 158 MMHG | HEART RATE: 68 BPM

## 2020-12-25 DIAGNOSIS — S93.602A SPRAIN OF LEFT FOOT, INITIAL ENCOUNTER: Primary | ICD-10-CM

## 2020-12-25 PROCEDURE — 73630 X-RAY EXAM OF FOOT: CPT

## 2020-12-25 PROCEDURE — 99282 EMERGENCY DEPT VISIT SF MDM: CPT | Performed by: EMERGENCY MEDICINE

## 2020-12-25 PROCEDURE — 99283 EMERGENCY DEPT VISIT LOW MDM: CPT

## 2020-12-25 RX ORDER — ACETAMINOPHEN 325 MG/1
650 TABLET ORAL EVERY 6 HOURS PRN
Qty: 1 BOTTLE | Refills: 0
Start: 2020-12-25

## 2021-01-09 DIAGNOSIS — I21.4 NSTEMI (NON-ST ELEVATED MYOCARDIAL INFARCTION) (HCC): ICD-10-CM

## 2021-01-09 DIAGNOSIS — I47.1 PAROXYSMAL SVT (SUPRAVENTRICULAR TACHYCARDIA) (HCC): ICD-10-CM

## 2021-01-09 RX ORDER — ATORVASTATIN CALCIUM 40 MG/1
40 TABLET, FILM COATED ORAL DAILY
Qty: 90 TABLET | Refills: 3 | Status: SHIPPED | OUTPATIENT
Start: 2021-01-09 | End: 2021-04-07

## 2021-01-09 RX ORDER — APIXABAN 5 MG/1
TABLET, FILM COATED ORAL
Qty: 90 TABLET | Refills: 1 | Status: SHIPPED | OUTPATIENT
Start: 2021-01-09 | End: 2021-02-24

## 2021-01-15 DIAGNOSIS — I10 ESSENTIAL HYPERTENSION: Primary | ICD-10-CM

## 2021-01-15 RX ORDER — DOXAZOSIN MESYLATE 4 MG/1
TABLET ORAL
Qty: 90 TABLET | Refills: 3 | Status: SHIPPED | OUTPATIENT
Start: 2021-01-15 | End: 2021-04-13 | Stop reason: SDUPTHER

## 2021-02-05 DIAGNOSIS — I21.4 NSTEMI (NON-ST ELEVATED MYOCARDIAL INFARCTION) (HCC): ICD-10-CM

## 2021-02-05 RX ORDER — CLOPIDOGREL BISULFATE 75 MG/1
TABLET ORAL
Qty: 90 TABLET | Refills: 2 | Status: SHIPPED | OUTPATIENT
Start: 2021-02-05 | End: 2021-11-02

## 2021-02-12 DIAGNOSIS — Z23 ENCOUNTER FOR IMMUNIZATION: ICD-10-CM

## 2021-02-18 ENCOUNTER — REMOTE DEVICE CLINIC VISIT (OUTPATIENT)
Dept: CARDIOLOGY CLINIC | Facility: CLINIC | Age: 76
End: 2021-02-18
Payer: MEDICARE

## 2021-02-18 DIAGNOSIS — Z95.818 PRESENCE OF OTHER CARDIAC IMPLANTS AND GRAFTS: Primary | ICD-10-CM

## 2021-02-18 PROCEDURE — G2066 INTER DEVC REMOTE 30D: HCPCS | Performed by: INTERNAL MEDICINE

## 2021-02-18 PROCEDURE — 93298 REM INTERROG DEV EVAL SCRMS: CPT | Performed by: INTERNAL MEDICINE

## 2021-02-18 NOTE — PROGRESS NOTES
MDT LOOP   CARELINK TRANSMISSION: LOOP RECORDER  PRESENTING RHYTHM ST @ 136 BPM  BATTERY STATUS "OK"  4 PT ACTIVATED EPISODES  2020 [facility] SVT @ 158-167 BPM  259 PAUSE EPISODES W/ EGRAMS SHOWING UNDERSENSING  NO PATIENT ACTIVATED EPISODES  NORMAL DEVICE FUNCTION   DL

## 2021-02-23 DIAGNOSIS — I47.1 PAROXYSMAL SVT (SUPRAVENTRICULAR TACHYCARDIA) (HCC): ICD-10-CM

## 2021-02-24 RX ORDER — APIXABAN 5 MG/1
TABLET, FILM COATED ORAL
Qty: 90 TABLET | Refills: 1 | Status: SHIPPED | OUTPATIENT
Start: 2021-02-24 | End: 2021-04-13

## 2021-03-05 ENCOUNTER — OFFICE VISIT (OUTPATIENT)
Dept: OTOLARYNGOLOGY | Facility: CLINIC | Age: 76
End: 2021-03-05
Payer: MEDICARE

## 2021-03-05 VITALS
WEIGHT: 182 LBS | TEMPERATURE: 97.1 F | HEART RATE: 59 BPM | DIASTOLIC BLOOD PRESSURE: 60 MMHG | OXYGEN SATURATION: 99 % | HEIGHT: 63 IN | BODY MASS INDEX: 32.25 KG/M2 | SYSTOLIC BLOOD PRESSURE: 110 MMHG

## 2021-03-05 DIAGNOSIS — H61.303 STENOSIS OF BOTH EXTERNAL AUDITORY CANALS: Primary | ICD-10-CM

## 2021-03-05 DIAGNOSIS — H61.23 BILATERAL HEARING LOSS DUE TO CERUMEN IMPACTION: ICD-10-CM

## 2021-03-05 DIAGNOSIS — H61.23 BILATERAL IMPACTED CERUMEN: ICD-10-CM

## 2021-03-05 PROCEDURE — 99212 OFFICE O/P EST SF 10 MIN: CPT | Performed by: OTOLARYNGOLOGY

## 2021-03-05 PROCEDURE — 69210 REMOVE IMPACTED EAR WAX UNI: CPT | Performed by: OTOLARYNGOLOGY

## 2021-03-05 NOTE — PROGRESS NOTES
Jagdeep Sandoval is a 67 y  o female who presents for re-evaluation of ear canal stenosis and frequent cerumen impactions  Last visit was 4 months ago  She has not noticed any further hearing loss  She is concerned that she may again have further problems with cerumen impaction  She still has not been using the hydrogen peroxide as discussed  No otalgia  No ear drainage  No sudden hearing changes  Past Medical History:   Diagnosis Date    Abnormal stress test     Angina pectoris (HCC)     1 wk ago    [facility]     CAD (coronary artery disease)     Chronic kidney disease, stage 2 (mild)     Diabetes mellitus (HCC)     niddm    Edema     Endometriosis     History of palpitations     History of poliomyelitis     Hyperlipidemia     Hypertension     Hypertensive chronic kidney disease with stage 1 through stage 4 chronic kidney disease, or unspecified chronic kidney disease     Hypothyroid     Kidney stones     [facility] [facility]     [facility]STEMI [facility]non-ST elevated [facility] infarction) ([facility] ) 01/05/2019    Obesity     Paroxysmal [facility]) [facility]     Renal insufficiency     Rosacea     Thyroid nodule     Type 2 diabetes mellitus (HCC)     Wears glasses        /60 (BP Location: Left arm, Cuff Size: Large)   Pulse 59   Temp (!) 97 1 °F (36 2 °C) (Temporal)   Ht 5' 3" (1 6 m)   Wt 82 6 kg (182 lb)   SpO2 99%   BMI 32 24 kg/m²       Physical Exam   Constitutional: Oriented to person, place, and time  Well-developed and well-nourished, no apparent distress, non-toxic appearance  Cooperative, able to hear and answer questions without difficulty  Voice: Normal voice quality  Head: Normocephalic, atraumatic  No scars, masses or lesions  Face: Symmetric, no edema, no sinus tenderness  Eyes: Vision grossly intact, extra-ocular movement intact    Ears: External ear normal   Bilateral cerumen impaction after debridement the following was noted:  Bilateral tympanic membranes are intact with intact normal landmarks  No post-auricular erythema or tenderness  Nose: Septum midline, nares clear  Mucosa moist, turbinates well appearing  No crusting, polyps or discharge evident  Oral cavity: Dentition intact  Mucosa moist, lips normal   Tongue mobile, floor of mouth normal   Hard palate unremarkable  No masses or lesions  Oropharynx: Uvula is midline, soft palate normal   Unremarkable oropharyngeal inlet  Tonsils unremarkable  Posterior pharyngeal wall clear  No masses or lesions  Salivary glands:  Parotid glands and submandibular glands symmetric, no enlargement or tenderness  Neck: Normal laryngeal elevation with swallow  Trachea midline  No masses or lesions  No palpable adenopathy  Thyroid: normal in size, unremarkable without tenderness or palpable nodules  Pulmonary/Chest: Normal effort and rate  No respiratory distress  Musculoskeletal: Normal range of motion  Neurological: Cranial nerves 2-12 intact  Skin: Skin is warm and dry  Psychiatric: Normal mood and affect  Procedure: Cerumen debridement bilaterally    Indications: Cerumen impaction bilaterally    Procedure in detail: After informed verbal consent was obtained the ear was visualized using microscopy  Using cerumen loop, suction, and alligator forceps the cerumen was debrided and the findings below were seen  The patient tolerated the procedure well  FINDINGS:  Cerumen removed mostly with 5 suction  She tolerated this well  No otalgia  We were able to use a 4 mm speculum to evaluate the ear canal despite her stenosis  A/P: Cerumen impaction:  We discussed the nature of cerumen impaction  We discussed appropriate treat with hydrogen peroxide 4-5 drops once per week  We discussed discontinuing the use of any Q-Tips or other objects into the ear

## 2021-03-10 DIAGNOSIS — I10 HTN (HYPERTENSION): ICD-10-CM

## 2021-03-10 RX ORDER — LOSARTAN POTASSIUM 50 MG/1
50 TABLET ORAL DAILY
Qty: 90 TABLET | Refills: 3 | Status: SHIPPED | OUTPATIENT
Start: 2021-03-10 | End: 2021-06-07 | Stop reason: SDUPTHER

## 2021-04-07 DIAGNOSIS — I21.4 NSTEMI (NON-ST ELEVATED MYOCARDIAL INFARCTION) (HCC): ICD-10-CM

## 2021-04-07 RX ORDER — ATORVASTATIN CALCIUM 40 MG/1
40 TABLET, FILM COATED ORAL DAILY
Qty: 90 TABLET | Refills: 3 | Status: SHIPPED | OUTPATIENT
Start: 2021-04-07 | End: 2021-04-13 | Stop reason: SDUPTHER

## 2021-04-12 DIAGNOSIS — I47.1 PAROXYSMAL SVT (SUPRAVENTRICULAR TACHYCARDIA) (HCC): ICD-10-CM

## 2021-04-13 DIAGNOSIS — I21.4 NSTEMI (NON-ST ELEVATED MYOCARDIAL INFARCTION) (HCC): ICD-10-CM

## 2021-04-13 DIAGNOSIS — I10 ESSENTIAL HYPERTENSION: ICD-10-CM

## 2021-04-13 RX ORDER — APIXABAN 5 MG/1
TABLET, FILM COATED ORAL
Qty: 90 TABLET | Refills: 1 | Status: SHIPPED | OUTPATIENT
Start: 2021-04-13 | End: 2021-06-30

## 2021-04-13 RX ORDER — DOXAZOSIN MESYLATE 4 MG/1
4 TABLET ORAL DAILY
Qty: 90 TABLET | Refills: 3 | Status: SHIPPED | OUTPATIENT
Start: 2021-04-13 | End: 2022-01-25

## 2021-04-13 RX ORDER — ATORVASTATIN CALCIUM 40 MG/1
40 TABLET, FILM COATED ORAL DAILY
Qty: 90 TABLET | Refills: 3 | Status: SHIPPED | OUTPATIENT
Start: 2021-04-13 | End: 2022-01-25

## 2021-04-20 ENCOUNTER — OFFICE VISIT (OUTPATIENT)
Dept: URGENT CARE | Facility: CLINIC | Age: 76
End: 2021-04-20
Payer: MEDICARE

## 2021-04-20 VITALS
OXYGEN SATURATION: 96 % | BODY MASS INDEX: 31.89 KG/M2 | HEART RATE: 66 BPM | RESPIRATION RATE: 20 BRPM | DIASTOLIC BLOOD PRESSURE: 90 MMHG | TEMPERATURE: 98 F | HEIGHT: 63 IN | WEIGHT: 180 LBS | SYSTOLIC BLOOD PRESSURE: 139 MMHG

## 2021-04-20 DIAGNOSIS — R23.8 PAPULE OF SKIN: Primary | ICD-10-CM

## 2021-04-20 PROCEDURE — 99213 OFFICE O/P EST LOW 20 MIN: CPT | Performed by: PHYSICIAN ASSISTANT

## 2021-04-20 PROCEDURE — G0463 HOSPITAL OUTPT CLINIC VISIT: HCPCS | Performed by: PHYSICIAN ASSISTANT

## 2021-04-20 NOTE — PROGRESS NOTES
[facility]        NAME: Zamzam Chisholm is a 67 y o  female  : 1945    MRN: [id number]  DATE: 2021  TIME: 11:03 AM    Assessment and Plan   Papule of skin [R23 8]  1  Papule of skin           Patient Instructions       Follow up with PCP in 3-5 days  Proceed to  ER if symptoms worsen  Chief Complaint     Chief Complaint   Patient presents with    Insect Bite         History of Present Illness         Patient presents with a possible insect bite on her back which started a few days ago  The area is beneath her bra line  Review of Systems   Review of Systems   Constitutional: Negative for chills and fever  Current Medications       Current Outpatient Medications:     ARMOUR THYROID 15 MG tablet, 15 mg every evening Before bedtime, Disp: , Rfl: 5    ARMOUR THYROID 30 MG tablet, Take 30 mg by mouth 2 (two) times a day Take one in the morning and one in the afternoon, Disp: , Rfl: 6    atorvastatin (LIPITOR) 40 mg tablet, Take 1 tablet (40 mg total) by mouth daily, Disp: 90 tablet, Rfl: 3    clopidogrel (PLAVIX) 75 mg tablet, TAKE ONE TABLET DAILY  , Disp: 90 tablet, Rfl: 2    doxazosin (CARDURA) 4 mg tablet, Take 1 tablet (4 mg total) by mouth daily, Disp: 90 tablet, Rfl: 3    Eliquis 5 MG, Take 1 tablet (5 mg total) by mouth 2 (two) times a day, Disp: 90 tablet, Rfl: 1    losartan (COZAAR) 50 mg tablet, Take 1 tablet (50 mg total) by mouth daily, Disp: 90 tablet, Rfl: 3    metFORMIN (GLUCOPHAGE) 500 mg tablet, Take 500 mg by mouth 3 (three) times a day 2 tablets in the morning and one in the afternoon, Disp: , Rfl:     metoprolol tartrate (LOPRESSOR) 25 mg tablet, Take 2 tablets in the morning and 1 tablet at night , Disp: 270 tablet, Rfl: 3    ACCU-CHEK KAILEY PLUS test strip, , Disp: , Rfl: 3    acetaminophen (TYLENOL) 325 mg tablet, Take 2 tablets (650 mg total) by mouth every 6 (six) hours as needed (pain, fever), Disp: 1 Bottle, Rfl: 0    Blood Glucose Monitoring Suppl (ACCU-CHEK KAILEY CONNECT) w/Device KIT, by Does not apply route, Disp: , Rfl:     glucose blood (ACCU-CHEK KAILEY PLUS) test strip, by In Vitro route, Disp: , Rfl:     Lancets Misc  (ACCU-CHEK MULTICLIX LANCET DEV) KIT, by Does not apply route 2 (two) times a day, Disp: , Rfl:     nitroglycerin (NITROSTAT) 0 4 mg SL tablet, Place 0 4 mg under the tongue, Disp: , Rfl:     Restasis 0 05 % ophthalmic emulsion, , Disp: , Rfl:     Current Allergies     Allergies as of 04/20/2021 - Reviewed 04/20/2021   Allergen Reaction Noted    Lisinopril Rash 02/07/2014    Amlodipine  11/27/2017            The following portions of the patient's history were reviewed and updated as appropriate: allergies, current medications, past family history, past medical history, past social history, past surgical history and problem list      Past Medical History:   Diagnosis Date    Abnormal stress test     Angina pectoris ([address] )     1 wk ago    Atherosclerosis of renal artery ([address] )     CAD (coronary artery disease)     Chronic kidney disease, stage 2 (mild)     Diabetes mellitus ([address] )     niddm    Edema     Endometriosis     History of palpitations     History of poliomyelitis     Hyperlipidemia     Hypertension     Hypertensive chronic kidney disease with stage 1 through stage 4 chronic kidney disease, or unspecified chronic kidney disease     Hypothyroid     Kidney stones     Myocardial infarction ([address] )     NSTEMI (non-ST elevated myocardial infarction) ([address] ) 01/05/2019    Obesity     [facility]ox[facility]     Renal insufficiency     Rosacea     Thyroid nodule     Type 2 diabetes mellitus ([address] )     Wears glasses        Past Surgical History:   Procedure Laterality Date    ACHILLES TENDON LENGTHENING Right     Achilles tendon stretch     CARDIAC CATHETERIZATION  2017    CARDIAC CATHETERIZATION  01/07/2019    Stent placed in RCA     CATARACT EXTRACTION Bilateral     COLONOSCOPY      HYSTERECTOMY      Total - Endometriosis     ROTATOR CUFF REPAIR Right        Family History   Problem Relation Age of Onset    Asthma Mother     Stomach cancer Father          Medications have been verified  Objective   /90 (BP Location: Right arm, Patient Position: Sitting, Cuff Size: Adult)   Pulse 66   Temp 98 °F (36 7 °C)   Resp 20   Ht 5' 3" (1 6 m)   Wt 81 6 kg (180 lb)   SpO2 96%   BMI 31 89 kg/m²   No LMP recorded  Patient has had a hysterectomy  Physical Exam     Physical Exam  Vitals signs and nursing note reviewed  Constitutional:       Appearance: Normal appearance  HENT:      Head: Normocephalic and atraumatic  Cardiovascular:      Rate and Rhythm: Normal rate and regular rhythm  Pulmonary:      Effort: Pulmonary effort is normal    Skin:     General: Skin is warm  Comments: Flesh-colored papule left bra line with superficial scab which was removed with forceps  No embedded tick or  Remnants  No bull's eye rash or surrounding erythema  No scaling  Neurological:      Mental Status: She is alert

## 2021-04-22 ENCOUNTER — OFFICE VISIT (OUTPATIENT)
Dept: CARDIOLOGY CLINIC | Facility: HOSPITAL | Age: 76
End: 2021-04-22
Payer: MEDICARE

## 2021-04-22 VITALS
WEIGHT: 181 LBS | HEIGHT: 63 IN | BODY MASS INDEX: 32.07 KG/M2 | SYSTOLIC BLOOD PRESSURE: 126 MMHG | DIASTOLIC BLOOD PRESSURE: 78 MMHG | HEART RATE: 60 BPM

## 2021-04-22 DIAGNOSIS — I25.10 CORONARY ARTERY DISEASE INVOLVING NATIVE CORONARY ARTERY OF NATIVE HEART WITHOUT ANGINA PECTORIS: Primary | ICD-10-CM

## 2021-04-22 DIAGNOSIS — I70.1 RAS (RENAL ARTERY STENOSIS) (HCC): ICD-10-CM

## 2021-04-22 DIAGNOSIS — E78.00 HYPERCHOLESTEROLEMIA: ICD-10-CM

## 2021-04-22 DIAGNOSIS — I10 ESSENTIAL HYPERTENSION: ICD-10-CM

## 2021-04-22 DIAGNOSIS — I47.1 PAROXYSMAL SVT (SUPRAVENTRICULAR TACHYCARDIA) (HCC): ICD-10-CM

## 2021-04-22 PROCEDURE — 99214 OFFICE O/P EST MOD 30 MIN: CPT | Performed by: INTERNAL MEDICINE

## 2021-04-22 NOTE — PROGRESS NOTES
Cardiology Follow Up    Brian Hagen  4/03/1969  [id number]  [address] [address]    1  Coronary artery disease involving native coronary artery of native heart without angina pectoris     2  Essential hypertension     3  Paroxysmal [facility]ra[facility] ([facility]  Ambulatory referral to Cardiac Electrophysiology   4  VICKEY (renal artery stenosis) ([facility] )     5  Hypercholesterolemia         Discussion/Summary:    Overall she has been feeling well but still has episodes of supraventricular tachycardia that are symptomatic  Loop recorder has not shown any atrial fibrillation  For now continue full anticoagulation due to small CVA recently  She is now agreeable to proceeding with SVT ablation will refer to electrophysiology  Coronary disease stable no complaints of angina  Blood pressure is well controlled  Lipids have been stable on current dose of statin  Will follow-up with her in about 6 months  Interval History:  79-year-old female history of coronary disease, hypertension, renal artery stenosis, paroxysmal supraventricular tachycardia Routine follow-up visit  In early January she was driving in the car and had an episode of tachycardia with chest tightness  She was brought to an outside hospital and was sent to [place] where they found a small troponin elevation  Left heart catheterization was performed the stent was placed in the right coronary artery  Overall has been doing well since her last office visit  Denies any chest pain, shortness of breath, lightheadedness, dizziness, or syncope  She still gets episodes of palpitations where she feels her heart racing  This has been documented on loop recorder has multiple episodes of supraventricular tachycardia heart rates around 160-170    There has been no lower extremity edema, PND, orthopnea  Problem List     Chest pain    Tachycardia    Hypertension    Diabetes mellitus (HCC)    NSTEMI (non-ST elevated myocardial infarction) ([place] [address] )    Coronary artery disease involving native coronary artery of native heart with angina pectoris ([facility])    Paroxysmal SVT (supraventricular tachycardia) ([facility])        Past Medical History:   Diagnosis Date    Abnormal stress test     Angina pectoris ([place] [address] )     1 wk ago    Atherosclerosis of renal artery ([place] [address] )     CAD (coronary artery disease)     Chronic kidney disease, stage 2 (mild)     Diabetes mellitus ([facility]     niddm    Edema     Endometriosis     History of palpitations     History of poliomyelitis     Hyperlipidemia     Hypertension     Hypertensive chronic kidney disease with stage 1 through stage 4 chronic kidney disease, or unspecified chronic kidney disease     Hypothyroid     Kidney stones     Myocardial infarction ([place] [address] )     NSTEMI (non-ST elevated myocardial infarction) ([place] [address] ) 01/05/2019    Obesity     Paroxysmal SVT (supraventricular tachycardia) ([facility]     Renal insufficiency     Rosacea     Thyroid nodule     Type 2 diabetes mellitus ([place] [address] )     Wears glasses      Social History     Socioeconomic History    Marital status:       Spouse name: Not on file    Number of children: Not on file    Years of education: Not on file    Highest education level: Not on file   Occupational History    Occupation: /    Social Needs    Financial resource strain: Not on file    Food insecurity     Worry: Not on file     Inability: Not on file   Motomotives needs     Medical: Not on file     Non-medical: Not on file   Tobacco Use    Smoking status: Never Smoker    Smokeless tobacco: Never Used   Substance and Sexual Activity    Alcohol use: No    Drug use: No    Sexual activity: Not on file   Lifestyle    Physical activity     Days per week: Not on file     Minutes per session: Not on file    Stress: Not on file   Relationships    Social connections     Talks on phone: Not on file     Gets together: Not on file     Attends [employer] service: Not on file     Active member of club or organization: Not on file     Attends meetings of clubs or organizations: Not on file     Relationship status: Not on file    Intimate partner violence     Fear of current or ex partner: Not on file     Emotionally abused: Not on file     Physically abused: Not on file     Forced sexual activity: Not on file   Other Topics Concern    Not on file   Social History Narrative    Consumes on average 2 cups of decaf coffee per day       Family History   Problem Relation Age of Onset    Asthma Mother     Stomach cancer Father      Past Surgical History:   Procedure Laterality Date    ACHILLES TENDON LENGTHENING Right     Achilles tendon stretch     CARDIAC CATHETERIZATION  2017    CARDIAC CATHETERIZATION  01/07/2019    Stent placed in RCA     CATARACT EXTRACTION Bilateral     COLONOSCOPY      HYSTERECTOMY      Total - Endometriosis     ROTATOR CUFF REPAIR Right        Current Outpatient Medications:     ACCU-CHEK KAILEY PLUS test strip, , Disp: , Rfl: 3    acetaminophen (TYLENOL) 325 mg tablet, Take 2 tablets (650 mg total) by mouth every 6 (six) hours as needed (pain, fever), Disp: 1 Bottle, Rfl: 0    ARMOUR THYROID 15 MG tablet, 15 mg every evening Before bedtime, Disp: , Rfl: 5    ARMOUR THYROID 30 MG tablet, Take 30 mg by mouth 2 (two) times a day Take one in the morning and one in the afternoon, Disp: , Rfl: 6    atorvastatin (LIPITOR) 40 mg tablet, Take 1 tablet (40 mg total) by mouth daily, Disp: 90 tablet, Rfl: 3    Blood Glucose Monitoring Suppl (ACCU-CHEK KAILEY CONNECT) w/Device KIT, by Does not apply route, Disp: , Rfl:     clopidogrel (PLAVIX) 75 mg tablet, TAKE ONE TABLET DAILY  , Disp: 90 tablet, Rfl: 2    doxazosin (CARDURA) 4 mg tablet, Take 1 tablet (4 mg total) by mouth daily, Disp: 90 tablet, Rfl: 3    Eliquis 5 MG, Take 1 tablet (5 mg total) by mouth 2 (two) times a day, Disp: 90 tablet, Rfl: 1    glucose blood (ACCU-CHEK KAILEY PLUS) test strip, by In Vitro route, Disp: , Rfl:     Lancets Misc  (ACCU-CHEK MULTICLIX LANCET DEV) KIT, by Does not apply route 2 (two) times a day, Disp: , Rfl:     losartan (COZAAR) 50 mg tablet, Take 1 tablet (50 mg total) by mouth daily, Disp: 90 tablet, Rfl: 3    metFORMIN (GLUCOPHAGE) 500 mg tablet, Take 500 mg by mouth 3 (three) times a day 2 tablets in the morning and one in the afternoon, Disp: , Rfl:     metoprolol tartrate (LOPRESSOR) 25 mg tablet, Take 2 tablets in the morning and 1 tablet at night , Disp: 270 tablet, Rfl: 3    nitroglycerin (NITROSTAT) 0 4 mg SL tablet, Place 0 4 mg under the tongue, Disp: , Rfl:     Restasis 0 05 % ophthalmic emulsion, , Disp: , Rfl:   Allergies   Allergen Reactions    Lisinopril Rash     Changed pigment of my skin     Amlodipine        Labs:     Chemistry        Component Value Date/Time     12/08/2015 1014    K 4 0 06/30/2020 1602    K 4 4 12/08/2015 1014     06/30/2020 1602     12/08/2015 1014    CO2 30 06/30/2020 1602    CO2 30 9 12/08/2015 1014    BUN 18 06/30/2020 1602    BUN 20 12/08/2015 1014    CREATININE 0 92 06/30/2020 1602    CREATININE 0 67 12/08/2015 1014        Component Value Date/Time    CALCIUM 9 0 06/30/2020 1602    CALCIUM 8 9 12/08/2015 1014    ALKPHOS 119 (H) 06/30/2020 1602    ALKPHOS 111 12/08/2015 1014    AST 17 06/30/2020 1602    AST 20 12/08/2015 1014    ALT 23 06/30/2020 1602    ALT 25 12/08/2015 1014    BILITOT 0 48 12/08/2015 1014            Lab Results   Component Value Date    CHOL 267 12/08/2015    CHOL 270 06/10/2015    CHOL 243 03/18/2015     Lab Results   Component Value Date    HDL 51 06/26/2017    HDL 49 03/27/2017    HDL 62 (H) 11/22/2016     Lab Results   Component Value Date    LDLCALC 158 (H) 06/26/2017    LDLCALC 153 (H) 03/27/2017    LDLCALC 157 (H) 11/22/2016     Lab Results Component Value Date    TRIG 231 (H) 06/26/2017    TRIG 220 (H) 03/27/2017    TRIG 225 (H) 11/22/2016     No results found for: CHOLHDL    Imaging: No results found  ECG:  Sinus bradycardia PACs      Review of Systems   Constitution: Negative  HENT: Negative  Eyes: Negative  Cardiovascular: Negative  Respiratory: Negative  Endocrine: Negative  Hematologic/Lymphatic: Negative  Skin: Negative  Musculoskeletal: Negative  Gastrointestinal: Negative  Genitourinary: Negative  Neurological: Negative  Psychiatric/Behavioral: Negative  Vitals:    04/22/21 1314   BP: 126/78   Pulse: 60     Vitals:    04/22/21 1314   Weight: 82 1 kg (181 lb)     Height: 5' 3" (160 cm)   Body mass index is 32 06 kg/m²  Physical Exam:  Vital signs reviewed  General:  Alert and cooperative, appears stated age, no acute distress  HEENT:  PERRLA, EOMI, no scleral icterus, no conjunctival pallor  Neck:  No lymphadenopathy, no thyromegaly, no carotid bruits, no elevated JVP  Heart:  Regular rate and rhythm, normal S1/S2, no S3/S4, no murmur, rubs or gallops  PMI nondisplaced  Lungs:  Clear to auscultation bilaterally, no wheezes rales or rhonchi  Abdomen:  Soft, non-tender, positive bowel sounds, no rebound or guarding,   no organomegaly   Extremities:  Normal range of motion    No clubbing, cyanosis or edema   Vascular:  2+ pedal pulses  Skin:  No rashes or lesions on exposed skin  Neurologic:  Cranial nerves II-XII grossly intact without focal deficits  Psych:  Normal mood and affect

## 2021-04-23 ENCOUNTER — TELEPHONE (OUTPATIENT)
Dept: CARDIOLOGY CLINIC | Facility: CLINIC | Age: 76
End: 2021-04-23

## 2021-04-23 NOTE — TELEPHONE ENCOUNTER
I asked for her to be set up with EP yesterday  She should have an appt    Want her seen first    Thanks CC

## 2021-04-23 NOTE — TELEPHONE ENCOUNTER
Good Morning ,    Patient is scheduled next week with Dr Jesica Wasserman has no availability      Thank you,  Wilfrido Curly

## 2021-04-23 NOTE — TELEPHONE ENCOUNTER
Patient called last night and LVM in regard a cardiac ablation recommend by you on the last appointment  Reading your note I see you recommend an SVT ablation, wonder if patient will need to see any of the EP doctors first and if not, wonder who will be the doctor to perform this procedure (Any preference?)  Thank you

## 2021-04-28 ENCOUNTER — CONSULT (OUTPATIENT)
Dept: CARDIOLOGY CLINIC | Facility: CLINIC | Age: 76
End: 2021-04-28
Payer: MEDICARE

## 2021-04-28 ENCOUNTER — OFFICE VISIT (OUTPATIENT)
Dept: NEPHROLOGY | Facility: CLINIC | Age: 76
End: 2021-04-28
Payer: MEDICARE

## 2021-04-28 VITALS
HEART RATE: 65 BPM | BODY MASS INDEX: 31.68 KG/M2 | HEIGHT: 63 IN | SYSTOLIC BLOOD PRESSURE: 130 MMHG | WEIGHT: 178.8 LBS | DIASTOLIC BLOOD PRESSURE: 64 MMHG

## 2021-04-28 VITALS
HEIGHT: 63 IN | DIASTOLIC BLOOD PRESSURE: 78 MMHG | HEART RATE: 50 BPM | OXYGEN SATURATION: 97 % | WEIGHT: 177 LBS | BODY MASS INDEX: 31.36 KG/M2 | TEMPERATURE: 97.5 F | SYSTOLIC BLOOD PRESSURE: 124 MMHG

## 2021-04-28 DIAGNOSIS — I10 ESSENTIAL HYPERTENSION: ICD-10-CM

## 2021-04-28 DIAGNOSIS — I47.1 PAROXYSMAL SVT (SUPRAVENTRICULAR TACHYCARDIA) (HCC): Primary | ICD-10-CM

## 2021-04-28 DIAGNOSIS — N18.2 STAGE 2 CHRONIC KIDNEY DISEASE: ICD-10-CM

## 2021-04-28 DIAGNOSIS — N20.0 NEPHROLITHIASIS: Primary | ICD-10-CM

## 2021-04-28 DIAGNOSIS — R60.9 EDEMA, UNSPECIFIED TYPE: ICD-10-CM

## 2021-04-28 DIAGNOSIS — I70.1 RAS (RENAL ARTERY STENOSIS) (HCC): ICD-10-CM

## 2021-04-28 PROCEDURE — 99214 OFFICE O/P EST MOD 30 MIN: CPT | Performed by: INTERNAL MEDICINE

## 2021-04-28 PROCEDURE — 99204 OFFICE O/P NEW MOD 45 MIN: CPT | Performed by: INTERNAL MEDICINE

## 2021-04-28 PROCEDURE — 93000 ELECTROCARDIOGRAM COMPLETE: CPT | Performed by: INTERNAL MEDICINE

## 2021-04-28 NOTE — ASSESSMENT & PLAN NOTE
Patient will continue to aim for a low-sodium diet  Edema other remains present is still mild at this time

## 2021-04-28 NOTE — ASSESSMENT & PLAN NOTE
Lab Results   Component Value Date    EGFR 61 06/30/2020    EGFR 63 06/10/2019    EGFR 83 04/30/2018    CREATININE 0 92 06/30/2020    CREATININE 0 90 06/10/2019    CREATININE 0 72 04/30/2018       Kidney function remained stable, last blood work was checked in December of 2020  Electrolytes are appropriate as well

## 2021-04-28 NOTE — PROGRESS NOTES
EPS Consultation/New Patient Evaluation - Joseph Mahmood 67 y o  female MRN: [id number]       Referring:Dr Neo Sherwood    CC/HPI:   It was a pleasure to see Joseph Mahmood in our arrhythmia clinic at [place] [address]  As you know she is a 67 y o  woman with HTN, DM II, CAD, VICKEY, stage 2 CKD, nephrolithiasis, who presents for management of SVT  She follows with our partner Dr Neo Sherwood  She has been having episodes of SVT that are very symptomatic  She had an episode of SVT while driving in January  She went to [facility] where they saw small elevation in troponin  She had LHC and stent was placed in the right coronary artery  She has had multiple episodes of SVT  Her loop recorder has shows heart rate elevated to 160-170 bpm   Last episode occurred about 2 weeks ago  She notes they last from 10 minutes to 1 hour  There are no triggering factors  She denies feeling dizziness, chest pain, blurry vision during the episodes but it is very uncomfortable feeling for her  She denies swelling in the legs, orthopnea, PND or syncope        Past Medical History:  Past Medical History:   Diagnosis Date    Abnormal stress test     Angina pectoris (HCC)     1 wk ago    Atherosclerosis of renal artery (HCC)     CAD (coronary artery disease)     Chronic kidney disease, stage 2 (mild)     Diabetes mellitus (HCC)     niddm    Edema     Endometriosis     History of palpitations     History of poliomyelitis     Hyperlipidemia     Hypertension     Hypertensive chronic kidney disease with stage 1 through stage 4 chronic kidney disease, or unspecified chronic kidney disease     Hypothyroid     Kidney stones     [facility]     [facility]    Obesity     [facility]oxysmal SV[facility]     Renal insufficiency     Rosacea     Thyroid nodule     Type 2 diabetes mellitus ([facility] )     Wears glasses        Medications: Current Outpatient Medications:     ACCU-CHEK KAILEY PLUS test strip, , Disp: , Rfl: 3    acetaminophen (TYLENOL) 325 mg tablet, Take 2 tablets (650 mg total) by mouth every 6 (six) hours as needed (pain, fever), Disp: 1 Bottle, Rfl: 0    ARMOUR THYROID 15 MG tablet, 15 mg every evening Before bedtime, Disp: , Rfl: 5    ARMOUR THYROID 30 MG tablet, Take 30 mg by mouth 2 (two) times a day Take one in the morning and one in the afternoon, Disp: , Rfl: 6    atorvastatin (LIPITOR) 40 mg tablet, Take 1 tablet (40 mg total) by mouth daily, Disp: 90 tablet, Rfl: 3    Blood Glucose Monitoring Suppl (ACCU-CHEK KAILEY CONNECT) w/Device KIT, by Does not apply route, Disp: , Rfl:     clopidogrel (PLAVIX) 75 mg tablet, TAKE ONE TABLET DAILY  , Disp: 90 tablet, Rfl: 2    doxazosin (CARDURA) 4 mg tablet, Take 1 tablet (4 mg total) by mouth daily, Disp: 90 tablet, Rfl: 3    Eliquis 5 MG, Take 1 tablet (5 mg total) by mouth 2 (two) times a day, Disp: 90 tablet, Rfl: 1    glucose blood (ACCU-CHEK KAILEY PLUS) test strip, by In Vitro route, Disp: , Rfl:     Lancets Misc  (ACCU-CHEK MULTICLIX LANCET DEV) KIT, by Does not apply route 2 (two) times a day, Disp: , Rfl:     losartan (COZAAR) 50 mg tablet, Take 1 tablet (50 mg total) by mouth daily, Disp: 90 tablet, Rfl: 3    metFORMIN (GLUCOPHAGE) 500 mg tablet, Take 500 mg by mouth 3 (three) times a day 2 tablets in the morning and one in the afternoon, Disp: , Rfl:     metoprolol tartrate (LOPRESSOR) 25 mg tablet, Take 2 tablets in the morning and 1 tablet at night , Disp: 270 tablet, Rfl: 3    nitroglycerin (NITROSTAT) 0 4 mg SL tablet, Place 0 4 mg under the tongue, Disp: , Rfl:     Restasis 0 05 % ophthalmic emulsion, , Disp: , Rfl:      Family History   Problem Relation Age of Onset    Asthma Mother     Stomach cancer Father      Social History     Socioeconomic History    Marital status:       Spouse name: Not on file    Number of children: Not on file    Years of education: Not on file    Highest education level: Not on file   Occupational History    Occupation: /    Social Needs    Financial resource strain: Not on file    Food insecurity     Worry: Not on file     Inability: Not on file   [employer] needs     Medical: Not on file     Non-medical: Not on file   Tobacco Use    Smoking status: Never Smoker    Smokeless tobacco: Never Used   Substance and Sexual Activity    Alcohol use: No    Drug use: No    Sexual activity: Not on file   Lifestyle    Physical activity     Days per week: Not on file     Minutes per session: Not on file    Stress: Not on file   Relationships    Social connections     Talks on phone: Not on file     Gets together: Not on file     Attends [employer] service: Not on file     Active member of club or organization: Not on file     Attends meetings of clubs or organizations: Not on file     Relationship status: Not on file    Intimate partner violence     Fear of current or ex partner: Not on file     Emotionally abused: Not on file     Physically abused: Not on file     Forced sexual activity: Not on file   Other Topics Concern    Not on file   Social History Narrative    Consumes on average 2 cups of decaf coffee per day      Social History     Tobacco Use   Smoking Status Never Smoker   Smokeless Tobacco Never Used     Social History     Substance and Sexual Activity   Alcohol Use No       ROS     Objective:     Vitals: There were no vitals taken for this visit  , There is no height or weight on file to calculate BMI ,        Physical Exam:    GEN: Wenceslao Fischer appears well, alert and oriented x 3, pleasant and cooperative   HEENT: pupils equal, round, and reactive to light; extraocular muscles intact  NECK: supple, no carotid bruits   HEART: regular rhythm, normal S1 and S2, no murmurs, clicks, gallops or rubs   LUNGS: clear to auscultation bilaterally; no wheezes, rales, or rhonchi   ABDOMEN: normal bowel sounds, soft, no tenderness, no distention  EXTREMITIES: peripheral pulses normal; no clubbing, cyanosis, or edema  NEURO: no focal findings   SKIN: normal without suspicious lesions on exposed skin      Labs & Results:  Below is the patient's most recent value for Albumin, ALT, AST, BUN, Calcium, Chloride, Cholesterol, CO2, Creatinine, GFR, Glucose, HDL, Hematocrit, Hemoglobin, Hemoglobin A1C, LDL, Magnesium, Phosphorus, Platelets, Potassium, PSA, Sodium, Triglycerides, and WBC  Lab Results   Component Value Date    ALT 23 2020    AST 17 2020    BUN 18 2020    CALCIUM 9 0 2020     2020    CHOL 267 2015    CO2 30 2020    CREATININE 0 92 2020    HDL 51 2017    HCT 42 3 2020    HGB 13 6 2020    HGBA1C 7 0 (H) 10/06/2020    MG 1 8 06/10/2019    PHOS 3 8 2017     2020    K 4 0 2020     2015    TRIG 231 (H) 2017    WBC 9 96 2020     Note: for a comprehensive list of the patient's lab results, access the Results Review activity  Cardiac testing:     I personally reviewed the ECG performed in the clinic on 21  It reveals  Normal sinus rhythm at 65 beats per minute      Echocardiograms:  Results for orders placed during the hospital encounter of 19   Echo complete with contrast if indicated    Narrative [address] [facility] [address] [facility] [address], [facility]  [phone]    Transthoracic Echocardiogram  2D, M-mode, Doppler, and Color Doppler    Study date:  2019    Patient: [facility]  MR number: [id number]  Account number: [de-identified]  : 1945  Age: 75 years  Gender: Female  Status: Outpatient  Location: Echo lab  Height: 63 in  Weight: 176 7 lb  BP: 170/ 76 mmHg    Indications: paroxysmal supraventricular tachycardia    Diagnoses: I47 1 - Supraventricular tachycardia    Sonographer:  Tory Barber RDCS  Primary Physician:  Rene Melendrez DO Yehuda  Referring Physician:  SANTIAGO Verduzco  Group:  [id number]  Interpreting Physician:  Yovany Weiss MD    SUMMARY    LEFT VENTRICLE:  Size was normal   Systolic function was normal  Ejection fraction was estimated to be 60 %  There were no regional wall motion abnormalities  Wall thickness was at the upper limits of normal     MITRAL VALVE:  There was moderate to marked posterior annular calcification  There was mild regurgitation  AORTIC VALVE:  There was mild regurgitation  TRICUSPID VALVE:  There was mild regurgitation  Estimated peak PA pressure was 35 mmHg  HISTORY: PRIOR HISTORY: coronary artery disease, NSTEMI, hypertension, mixed hyperlipidemia, palpitations    PROCEDURE: The procedure was performed in the echo lab  This was a routine study  The transthoracic approach was used  The study included complete 2D imaging, M-mode, complete spectral Doppler, and color Doppler  Images were obtained from  the parasternal, apical, subcostal, and suprasternal notch acoustic windows  Image quality was adequate  LEFT VENTRICLE: Size was normal  Systolic function was normal  Ejection fraction was estimated to be 60 %  There were no regional wall motion abnormalities  Wall thickness was at the upper limits of normal     RIGHT VENTRICLE: The size was normal  Systolic function was normal  Wall thickness was normal     LEFT ATRIUM: Size was normal     RIGHT ATRIUM: Size was normal     MITRAL VALVE: There was moderate to marked posterior annular calcification  DOPPLER: There was no evidence for stenosis  There was mild regurgitation  AORTIC VALVE: The valve was trileaflet  Leaflets exhibited normal thickness and normal cuspal separation  DOPPLER: Transaortic velocity was within the normal range  There was no evidence for stenosis  There was mild regurgitation  TRICUSPID VALVE: The valve structure was normal  There was normal leaflet separation   DOPPLER: The transtricuspid velocity was within the normal range  There was no evidence for stenosis  There was mild regurgitation  Estimated peak PA  pressure was 35 mmHg  PULMONIC VALVE: Leaflets exhibited normal thickness, no calcification, and normal cuspal separation  DOPPLER: The transpulmonic velocity was within the normal range  There was no regurgitation  PERICARDIUM: There was no pericardial effusion  The pericardium was normal in appearance  AORTA: The root exhibited normal size  SYSTEM MEASUREMENT TABLES    2D  %FS: 42 55 %  AV Diam: 2 91 cm  EDV(Teich): 130 98 ml  EF(Teich): 73 23 %  ESV(Teich): 35 06 ml  IVSd: 1 19 cm  LA Area: 19 62 cm2  LA Diam: 4 04 cm  LVEDV MOD A4C: 96 5 ml  LVEF MOD A4C: 54 15 %  LVESV MOD A4C: 44 25 ml  LVIDd: 5 23 cm  LVIDs: 3 cm  LVLd A4C: 7 29 cm  LVLs A4C: 6 62 cm  LVPWd: 1 05 cm  RA Area: 14 81 cm2  RV DIAM: 3 12 cm  SV MOD A4C: 52 25 ml  SV(Teich): 95 91 ml    CW  AV Vmax: 1 76 m/s  AV maxP 35 mmHg  PV Vmax: 1 15 m/s  PV maxP 31 mmHg  TR Vmax: 2 9 m/s  TR maxP 56 mmHg    MM  TAPSE: 2 63 cm    PW  E': 0 09 m/s  E/E': 12 63  LVOT Vmax: 0 67 m/s  LVOT maxP 81 mmHg  MV A Nolbe: 1 31 m/s  MV Dec Flagler: 4 26 m/s2  MV DecT: 254 88 ms  MV E Noble: 1 09 m/s  MV E/A Ratio: 0 83  RVOT Vmax: 0 96 m/s  RVOT maxPG: 3 65 mmHg    [facility] Accredited Echocardiography Laboratory    Prepared and electronically signed by    Nba Cancino MD  Signed 2019 11:43:23       No results found for this or any previous visit  Catheterizations:   No results found for this or any previous visit  Stress Tests:  No results found for this or any previous visit  Holter monitor -   Results for orders placed during the hospital encounter of 18   Holter monitor - 48 hour    Narrative PT NAME: Seven Leo  : 1945  AGE: 67 y o  GENDER: female  MRN: [id number]   PROCEDURE: Holter monitor - 48 hour        INDICATIONS:   Palpitations        Impression 1   The patient had predominantly sinus rhythm  2  Heart rate varied from 51 bpm to 100 bpm   The patients average heart   rate was 67 bpm     3  The patient had a holter monitor tracing done for 47 hours and 59   minutes  4  The patient had 159 ventricular ectopic activity versus Nicole   Phenomenon  There were 2 couplets  5  The patient had 2846 supraventricular ectopic beats  There were 69   couplets  There were 6 runs of SVT, the longest consisting of 8 beats and   occurring at 3:56:57 am     6  The longest R/R interval was 1 9 seconds  7  A diary was submitted with no symptoms recorded  Results for orders placed during the hospital encounter of 06/10/19   Holter monitor - 24 hour    Narrative PT NAME: Aki Reza  : 1945  AGE: 75 y o  GENDER: female  MRN: [id number]   PROCEDURE: Holter monitor - 24 hour        INDICATIONS:   A Holter monitor - 24 hour       Impression 1  The patient had predominantly sinus rhythm  2  The heart rate ranged from 57 to 103 beats per minute apart from runs  The average heart rate was 73 beats per minute  3  There were several atrial runs  In particular at 5 a m  There was an   approximately 30 second regular  run which was regular consistent with   SVT  The heart rate during this run was 141 beats per minute  4  No symptoms were reported during the monitoring phase  Cheryl Downey MD         ASSESSMENT/ PLAN:  1  Paroxysmal SVT  · Multiple episodes of SVT  · Narrow complex, rates in 160-170s  · Occurring despite taking 50 mg metoprolol in the morning and 25 mg in the evening  · Disucssed options to manage - ablation vs  AAD  · She otped to proceed ablation procedure   · Our office will be in touch with her to schedule the procedure   · She will hold metoprolol for 2 days  · Discussed Valsalva maneuvers to terminate episode if occurs again    2  HTN  · Normotensive in the office   · Maintained on losartan 50 mg daily    3   DM II  · Last A1c was 7 in October 2020   · Maintained on metformin    4  CAD  · Catheterization in October 2017 had show   Proximal LAD 40% stenosis, mid circumflex 40% stenosis  OM2 80% stenosis prior to the bifurcation, and 70% stenosis of the ostium of the upper branch, mid RCA had a 70% stenosis  · PCI to RCA  In January 2019  · Maintained on atorvastatin, metoprolol, Plavix    5  VICKEY  · stage 2 CKD  ·  history of nephrolithiasis    6   Stroke   · Had stroke in October 2020   · Received IV tPA   · Ischemic stroke from posterior circulation  · On Plavix and Eliquis  · Patient was told the stroke was from atrial fibrillation however no atrial fibrillation has been detected  · Continue to monitor the loop

## 2021-04-28 NOTE — PATIENT INSTRUCTIONS
Our schedulers will contact you to set-up date and time about the ablation procedure  Hold metoprolol for 2 days prior to the procedure  Supraventricular Tachycardia   WHAT YOU NEED TO KNOW:   What is supraventricular tachycardia (SVT)? SVT is a condition that causes your heart to beat much faster than it should  SVT is a type of abnormal heart rhythm, called an arrhythmia, that starts in the upper part of your heart  It may last from a few seconds or hours to several days  What increases my risk for SVT? · Health conditions such as anemia, a thyroid disorder, or heart problems    · Drinking caffeine, herbs, or using dietary supplements    · Dehydration    · Pregnancy    · Exercise, fever, or stress    · Smoking, drinking alcohol, or using illegal drugs such as cocaine    · Certain medicines, such as some heart medicines, bronchodilators, and decongestants    What are the signs and symptoms of SVT? · A pounding, racing, or fluttering heartbeat    · Fatigue, weakness, or shortness of breath    · Feeling lightheaded, dizzy, or faint    · Pain, pressure, or tightness in your chest, neck, jaw, arms, or upper back    · Nausea    · Feeling anxious, scared, or worried that something bad may happen    How is SVT diagnosed? Your healthcare provider will ask about other health conditions and your symptoms  He or she will also listen to your heart and lungs  You may need any of the following tests:  · Blood or urine tests  are done to check for causes of SVT  · An EKG  is a test to record your heart rhythm and how fast your heart beats  It is used to check for problems with your heart  Your healthcare provider may do an EKG when you are resting, then again after you exercise  You may also need to wear a portable heart monitor at home for a short time  · A chest x-ray  shows a picture of your heart and lungs  · An echocardiogram  is a type of ultrasound   Sound waves are used to show the structure and function of your heart  · A tilt table test  checks your heart and blood pressure when your body changes positions  · An electrophysiology study  is a procedure to map the electrical pathways in your heart that control your heartbeat  How is SVT treated? Treatment will depend on what is causing your SVT and your symptoms  You may not need treatment or you may need any of the following:  · Medicines  help control your heart rate and rhythm  · Vagal maneuvers  are ways to use your own body to slow down your heart rate  Your healthcare provider may have bear down like you are having a bowel movement  You healthcare provider may teach you how to do vagal maneuvers so you can do them at home  · Carotid sinus massage  is a type of massage to help slow your heart rate  Your healthcare provider will apply steady pressure to a blood vessel on one side of your neck  Do  not  do a carotid sinus massage on yourself or anyone else  · Cardioversion  is a procedure to return your heart rate and rhythm to normal  It can be done with medicine or an electric shock  You may need cardioversion if medicine does not work  · Ablation  is a procedure that uses a catheter to damage the small area of your heart that is causing abnormal electrical signals  This will stop the electrical problem and allow your heart to beat regularly  How do I check my heart rate (pulse)? Your healthcare provider will show you how to check your pulse, and how often to check it  Write down how fast your pulse is and if it feels regular or like it is skipping beats  Also write down the activity you were doing if your heart rate is above 100  Bring the information with you to your follow-up appointment  How can I manage or prevent SVT? · Perform vagal maneuvers as directed when you have symptoms of SVT  Lie down flat and bear down like you are having a bowel movement  Do this for 10 to 30 seconds      · Do not drink caffeine or alcohol  These can increase your risk for SVT  · Keep a record of your symptoms  Write down what you ate or what you were doing before an episode of SVT  Also write down anything you did to make the SVT stop  Bring your record to follow up visits with your healthcare provider  · Eat heart-healthy foods  These include fruits, vegetables, whole-grain breads, low-fat dairy products, beans, lean meats, and fish  Replace butter and margarine with heart-healthy oils such as olive oil and canola oil  · Exercise regularly and maintain a healthy weight  Ask about the best exercise plan for you  Ask your healthcare provider what a healthy weight is for you  Ask him or her to help you create a safe weight loss plan if you are overweight  · Do not smoke  Nicotine and other chemicals in cigarettes and cigars can cause heart and lung damage  Ask your healthcare provider for information if you currently smoke and need help to quit  E-cigarettes or smokeless tobacco still contain nicotine  Talk to your healthcare provider before you use these products  · Manage other health conditions  Take medicine as directed and follow your treatment plan  Your healthcare provider may need to change a medicine you are taking if it is causing your SVT  Do not  stop taking any medicine unless directed by your provider  Call your local emergency number [phone] in the [address] [facility] [facility] or have someone call if:   · You have any of the following signs of a heart attack:      ? Squeezing, pressure, or pain in your chest    ? You may  also have any of the following:     § Discomfort or pain in your back, neck, jaw, stomach, or arm    § Shortness of breath    § Nausea or vomiting    § Lightheadedness or a sudden cold sweat      When should I seek immediate care? · You are dizzy, lightheaded, or feel faint  · You have sudden numbness or weakness in your arms or legs  When should I call my doctor?    · You have new or worsening symptoms  · You have swelling in your ankles or feet  · You have questions or concerns about your condition or care  CARE AGREEMENT:   You have the right to help plan your care  Learn about your health condition and how it may be treated  Discuss treatment options with your healthcare providers to decide what care you want to receive  You always have the right to refuse treatment  The above information is an  only  It is not intended as medical advice for individual conditions or treatments  Talk to your doctor, nurse or pharmacist before following any medical regimen to see if it is safe and effective for you  © Copyright [employer] 2021 Information is for End User's use only and may not be sold, redistributed or otherwise used for commercial purposes   All illustrations and images included in CareNotes® are the copyrighted property of [employer]  or [address] [place] [address]

## 2021-04-28 NOTE — PATIENT INSTRUCTIONS
Review the low oxalate diet, and try to avoid foods that have a high amount of it on a regular basis  Please start a low sodium diet, this will also help to not let the stones form or getting larger  When possible please try to drink at least 2 quarts of water a day, this is the most important of all recommendations

## 2021-04-28 NOTE — ASSESSMENT & PLAN NOTE
Blood pressures are controlled this time, continue current medications  The patient's 24 hour fraction metanephrine lab was appropriate from December 2020

## 2021-04-28 NOTE — ASSESSMENT & PLAN NOTE
Patient's blood pressures remain very well controlled, no surgical intervention in the form of angioplasty and stent placement indicated at this time  Will continue to observe

## 2021-04-28 NOTE — PROGRESS NOTES
[facility] [place], [place]    Name: Yen Zapata  YOB: 1945      Assessment/Plan:    Stage 2 chronic kidney disease  Lab Results   Component Value Date    EGFR 61 06/30/2020    EGFR 63 06/10/2019    EGFR 83 04/30/2018    CREATININE 0 92 06/30/2020    CREATININE 0 90 06/10/2019    CREATININE 0 72 04/30/2018       Kidney function remained stable, last blood work was checked in December of 2020  Electrolytes are appropriate as well  Nephrolithiasis  Patient noted to have bilateral nephrolithiasis  She purposely drinks a small amount of fluid throughout the day due to urinary urgency and frequency  I asked her to try to increase total fluid intake to 2 L especially days that she has at home and has access to the bathroom  We provided a low oxalate diet for her to review and try to avoid the high oxalate foods  Patient was also advised to start a low-sodium diet  We will workup patient is a little further with labs noted below  Patient had blood work done in June, 2021 with further evaluation or medication recommendations depending on blood work results  Edema    Patient will continue to aim for a low-sodium diet  Edema other remains present is still mild at this time  Hypertension    Blood pressures are controlled this time, continue current medications  The patient's 24 hour fraction metanephrine lab was appropriate from December 2020  VICKEY (renal artery stenosis) (HCC)    Patient's blood pressures remain very well controlled, no surgical intervention in the form of angioplasty and stent placement indicated at this time  Will continue to observe  Problem List Items Addressed This Visit        Cardiovascular and Mediastinum    Hypertension       Blood pressures are controlled this time, continue current medications  The patient's 24 hour fraction metanephrine lab was appropriate from December 2020           VICKEY (renal artery stenosis) ([facility] [address] )       Patient's blood pressures remain very well controlled, no surgical intervention in the form of angioplasty and stent placement indicated at this time  Will continue to observe  Genitourinary    Stage 2 chronic kidney disease     Lab Results   Component Value Date    EGFR 61 06/30/2020    EGFR 63 06/10/2019    EGFR 83 04/30/2018    CREATININE 0 92 06/30/2020    CREATININE 0 90 06/10/2019    CREATININE 0 72 04/30/2018       Kidney function remained stable, last blood work was checked in December of 2020  Electrolytes are appropriate as well  Relevant Orders    Microalbumin / creatinine urine ratio    Urinalysis with microscopic    Comprehensive metabolic panel    Nephrolithiasis - Primary     Patient noted to have bilateral nephrolithiasis  She purposely drinks a small amount of fluid throughout the day due to urinary urgency and frequency  I asked her to try to increase total fluid intake to 2 L especially days that she has at home and has access to the bathroom  We provided a low oxalate diet for her to review and try to avoid the high oxalate foods  Patient was also advised to start a low-sodium diet  We will workup patient is a little further with labs noted below  Patient had blood work done in June, 2021 with further evaluation or medication recommendations depending on blood work results  Relevant Orders    Microalbumin / creatinine urine ratio    Urinalysis with microscopic    Comprehensive metabolic panel    Magnesium    Phosphorus    PTH, intact    Uric acid       Other    Edema       Patient will continue to aim for a low-sodium diet  Edema other remains present is still mild at this time  Patient is stable and doing well at this time  She was not happy to hear that there are several small bilateral kidney stones present from CT scan done last summer of 2020  Recommendations and further workup noted above        We will see the patient back for regular appointment in approximately 6 months  Subjective:      Patient ID: Zamzam Chisholm is a 67 y o  female  Patient presents for follow-up regarding hypertension  Patient has no renal artery stenosis, no surgical intervention performed due to well controlled blood pressures  Patient's labs from December 2020 reviewed, kidney function stable, electrolytes are appropriate, fractionated metanephrines in the 24 hr urine were normal     Hypertension  This is a chronic problem  The current episode started more than 1 year ago  The problem is unchanged  The problem is controlled  Pertinent negatives include no chest pain or orthopnea  There are no associated agents to hypertension  Risk factors for coronary artery disease include post-menopausal state  Past treatments include angiotensin blockers, beta blockers and lifestyle changes  There are no compliance problems  The following portions of the patient's history were reviewed and updated as appropriate: allergies, current medications, past family history, past medical history, past social history, past surgical history and problem list     Review of Systems   Cardiovascular: Negative for chest pain and orthopnea  All other systems reviewed and are negative  Social History     Socioeconomic History    Marital status:       Spouse name: None    Number of children: None    Years of education: None    Highest education level: None   Occupational History    Occupation: /    Social Needs    Financial resource strain: None    Food insecurity     Worry: None     Inability: None    Transportation needs     Medical: None     Non-medical: None   Tobacco Use    Smoking status: Never Smoker    Smokeless tobacco: Never Used   Substance and Sexual Activity    Alcohol use: No    Drug use: No    Sexual activity: None   Lifestyle    Physical activity     Days per week: None     Minutes per session: None  Stress: None   Relationships    Social connections     Talks on phone: None     Gets together: None     Attends Bahai service: None     Active member of club or organization: None     Attends meetings of clubs or organizations: None     Relationship status: None    Intimate partner violence     Fear of current or ex partner: None     Emotionally abused: None     Physically abused: None     Forced sexual activity: None   Other Topics Concern    None   Social History Narrative    Consumes on average 2 cups of decaf coffee per day      Past Medical History:   Diagnosis Date    Abnormal stress test     [facility]     1 wk ago    Atherosclerosis of renal artery ([facility] )     CAD (coronary artery disease)     Chronic kidney disease, stage 2 (mild)     Diabetes mellitus ([facility] )     niddm    Edema     Endometriosis     History of palpitations     History of poliomyelitis     Hyperlipidemia     Hypertension     Hypertensive chronic kidney disease with stage 1 through stage 4 chronic kidney disease, or unspecified chronic kidney disease     Hypothyroid     Kidney stones     Myocardial infarction ([facility] [address] )     NSTEMI (non-ST elevated myocardial infarction) ([facility] ) 01/05/2019    Obesity     Paroxysmal SVT (supraventricular tachycardia) ([facility])     Renal insufficiency     Rosacea     Thyroid nodule     Type 2 diabetes mellitus ([facility] [address] )     Wears glasses      Past Surgical History:   Procedure Laterality Date    ACHILLES TENDON LENGTHENING Right     Achilles tendon stretch     CARDIAC CATHETERIZATION  2017    CARDIAC CATHETERIZATION  01/07/2019    Stent placed in RCA     CATARACT EXTRACTION Bilateral     COLONOSCOPY      HYSTERECTOMY      Total - Endometriosis     ROTATOR CUFF REPAIR Right        Current Outpatient Medications:     ACCU-CHEK KAILEY PLUS test strip, , Disp: , Rfl: 3    acetaminophen (TYLENOL) 325 mg tablet, Take 2 tablets (650 mg total) by mouth every 6 (six) hours as needed (pain, fever), Disp: 1 Bottle, Rfl: 0    ARMOUR THYROID 15 MG tablet, 15 mg every evening Before bedtime, Disp: , Rfl: 5    ARMOUR THYROID 30 MG tablet, Take 30 mg by mouth 2 (two) times a day Take one in the morning and one in the afternoon, Disp: , Rfl: 6    atorvastatin (LIPITOR) 40 mg tablet, Take 1 tablet (40 mg total) by mouth daily, Disp: 90 tablet, Rfl: 3    Blood Glucose Monitoring Suppl (ACCU-CHEK KAILEY CONNECT) w/Device KIT, by Does not apply route, Disp: , Rfl:     clopidogrel (PLAVIX) 75 mg tablet, TAKE ONE TABLET DAILY  , Disp: 90 tablet, Rfl: 2    doxazosin (CARDURA) 4 mg tablet, Take 1 tablet (4 mg total) by mouth daily, Disp: 90 tablet, Rfl: 3    Eliquis 5 MG, Take 1 tablet (5 mg total) by mouth 2 (two) times a day, Disp: 90 tablet, Rfl: 1    glucose blood (ACCU-CHEK KAILEY PLUS) test strip, by In Vitro route, Disp: , Rfl:     Lancets Misc  (ACCU-CHEK MULTICLIX LANCET DEV) KIT, by Does not apply route 2 (two) times a day, Disp: , Rfl:     losartan (COZAAR) 50 mg tablet, Take 1 tablet (50 mg total) by mouth daily, Disp: 90 tablet, Rfl: 3    metFORMIN (GLUCOPHAGE) 500 mg tablet, Take 500 mg by mouth 3 (three) times a day 2 tablets in the morning and one in the afternoon, Disp: , Rfl:     metoprolol tartrate (LOPRESSOR) 25 mg tablet, Take 2 tablets in the morning and 1 tablet at night , Disp: 270 tablet, Rfl: 3    nitroglycerin (NITROSTAT) 0 4 mg SL tablet, Place 0 4 mg under the tongue, Disp: , Rfl:     Restasis 0 05 % ophthalmic emulsion, , Disp: , Rfl:     Lab Results   Component Value Date     12/08/2015    SODIUM 144 06/30/2020    K 4 0 06/30/2020     06/30/2020    CO2 30 06/30/2020    ANIONGAP 9 12/08/2015    AGAP 7 06/30/2020    BUN 18 06/30/2020    CREATININE 0 92 06/30/2020    GLUC 137 06/30/2020    GLUF 144 (H) 06/26/2017    CALCIUM 9 0 06/30/2020    AST 17 06/30/2020    ALT 23 06/30/2020    ALKPHOS 119 (H) 06/30/2020    PROT 7 0 12/08/2015    TP 7 1 06/30/2020 BILITOT 0 48 12/08/2015    TBILI 0 40 06/30/2020    EGFR 61 06/30/2020     Lab Results   Component Value Date    WBC 9 96 06/30/2020    HGB 13 6 06/30/2020    HCT 42 3 06/30/2020    MCV 97 06/30/2020     06/30/2020     Lab Results   Component Value Date    CHOLESTEROL 255 (H) 06/26/2017    CHOLESTEROL 246 (H) 03/27/2017    CHOLESTEROL 264 (H) 11/22/2016     Lab Results   Component Value Date    HDL 51 06/26/2017    HDL 49 03/27/2017    HDL 62 (H) 11/22/2016     Lab Results   Component Value Date    LDLCALC 158 (H) 06/26/2017    LDLCALC 153 (H) 03/27/2017    LDLCALC 157 (H) 11/22/2016     Lab Results   Component Value Date    TRIG 231 (H) 06/26/2017    TRIG 220 (H) 03/27/2017    TRIG 225 (H) 11/22/2016     No results found for: [place], [place]  Lab Results   Component Value Date    JDB6MAIRNTKS 0 793 06/10/2019     Lab Results   Component Value Date    CALCIUM 9 0 06/30/2020    PHOS 3 8 09/27/2017     No results found for: SPEP, UPEP  No results found for: HIRAM CARMEN4HUR        Objective:      /78 (BP Location: Left arm, Patient Position: Sitting, Cuff Size: Standard)   Pulse (!) 50   Temp 97 5 °F (36 4 °C) (Temporal)   Ht 5' 3" (1 6 m)   Wt 80 3 kg (177 lb)   SpO2 97%   BMI 31 35 kg/m²          Physical Exam  Vitals signs reviewed  Constitutional:       General: She is not in acute distress  Appearance: She is well-developed  HENT:      Head: Normocephalic and atraumatic  Eyes:      Conjunctiva/sclera: Conjunctivae normal    Neck:      Musculoskeletal: Neck supple  Cardiovascular:      Rate and Rhythm: Normal rate and regular rhythm  Pulmonary:      Effort: Pulmonary effort is normal       Breath sounds: Normal breath sounds  Abdominal:      Palpations: Abdomen is soft  Skin:     General: Skin is warm  Findings: No rash  Neurological:      Mental Status: She is alert and oriented to person, place, and time  Cranial Nerves: No cranial nerve deficit     Psychiatric: Behavior: Behavior normal

## 2021-04-28 NOTE — ASSESSMENT & PLAN NOTE
Patient noted to have bilateral nephrolithiasis  She purposely drinks a small amount of fluid throughout the day due to urinary urgency and frequency  I asked her to try to increase total fluid intake to 2 L especially days that she has at home and has access to the bathroom  We provided a low oxalate diet for her to review and try to avoid the high oxalate foods  Patient was also advised to start a low-sodium diet  We will workup patient is a little further with labs noted below  Patient had blood work done in June, 2021 with further evaluation or medication recommendations depending on blood work results

## 2021-04-29 ENCOUNTER — TELEPHONE (OUTPATIENT)
Dept: CARDIOLOGY CLINIC | Facility: CLINIC | Age: 76
End: 2021-04-29

## 2021-04-29 DIAGNOSIS — I47.1 PAROXYSMAL SVT (SUPRAVENTRICULAR TACHYCARDIA) (HCC): Primary | ICD-10-CM

## 2021-04-29 NOTE — TELEPHONE ENCOUNTER
----- Message from Fouzia Yadav MD sent at 4/28/2021  8:04 PM EDT -----   [facility] team,     Could you please schedule this patient for an SVT ablation?       Company:  [employer] metoprolol for 2 days  before the procedure    Routine labs    Thank you

## 2021-05-03 NOTE — TELEPHONE ENCOUNTER
COVID Pre-Visit Screening     1  Is this a family member screening? Yes  2  Have you traveled outside of your state in the past 2 weeks? No  3  Do you presently have a fever or flu-like symptoms? No  4  Do you have symptoms of an upper respiratory infection like runny nose, sore throat, or cough? No  5  Are you suffering from new headache that you have not had in the past?  No  6  Do you have/have you experienced any new shortness of breath recently? No  7  Do you have any new diarrhea, nausea or vomiting? No  8  Have you been in contact with anyone who has been sick or diagnosed with COVID-19? No  9  Do you have any new loss of taste or smell? No  10  Are you able to wear a mask without a valve for the entire visit? Yes       Patient schedule for SVT ablation on 6/4/21 at [facility] with Dr Carlie Boeck  Patient aware of general instructions, medications holds (Metformin and Metoprolol) and blood test require  Patient cover under medicare

## 2021-05-13 ENCOUNTER — TRANSCRIBE ORDERS (OUTPATIENT)
Dept: LAB | Facility: CLINIC | Age: 76
End: 2021-05-13

## 2021-05-14 ENCOUNTER — TRANSCRIBE ORDERS (OUTPATIENT)
Dept: LAB | Facility: CLINIC | Age: 76
End: 2021-05-14

## 2021-05-14 ENCOUNTER — APPOINTMENT (OUTPATIENT)
Dept: LAB | Facility: CLINIC | Age: 76
End: 2021-05-14
Payer: MEDICARE

## 2021-05-14 DIAGNOSIS — E11.9 TYPE 2 DIABETES MELLITUS WITHOUT COMPLICATION, WITHOUT LONG-TERM CURRENT USE OF INSULIN (HCC): Primary | ICD-10-CM

## 2021-05-14 DIAGNOSIS — E03.9 HYPOTHYROIDISM, UNSPECIFIED TYPE: ICD-10-CM

## 2021-05-14 DIAGNOSIS — E78.5 HYPERLIPIDEMIA, UNSPECIFIED HYPERLIPIDEMIA TYPE: ICD-10-CM

## 2021-05-14 LAB
EST. AVERAGE GLUCOSE BLD GHB EST-MCNC: 140 MG/DL
HBA1C MFR BLD: 6.5 %
HDLC SERPL-MCNC: 58 MG/DL
INSULIN SERPL-ACNC: 10 MU/L (ref 3–25)
T3FREE SERPL-MCNC: 2.93 PG/ML (ref 2.3–4.2)
T4 FREE SERPL-MCNC: 0.92 NG/DL (ref 0.76–1.46)
TSH SERPL DL<=0.05 MIU/L-ACNC: 2.07 UIU/ML (ref 0.36–3.74)

## 2021-05-14 PROCEDURE — 83036 HEMOGLOBIN GLYCOSYLATED A1C: CPT

## 2021-05-14 PROCEDURE — 83718 ASSAY OF LIPOPROTEIN: CPT

## 2021-05-14 PROCEDURE — 36415 COLL VENOUS BLD VENIPUNCTURE: CPT

## 2021-05-14 PROCEDURE — 83525 ASSAY OF INSULIN: CPT

## 2021-05-14 PROCEDURE — 84443 ASSAY THYROID STIM HORMONE: CPT

## 2021-05-14 PROCEDURE — 84439 ASSAY OF FREE THYROXINE: CPT

## 2021-05-14 PROCEDURE — 84481 FREE ASSAY (FT-3): CPT

## 2021-05-19 ENCOUNTER — REMOTE DEVICE CLINIC VISIT (OUTPATIENT)
Dept: CARDIOLOGY CLINIC | Facility: CLINIC | Age: 76
End: 2021-05-19
Payer: MEDICARE

## 2021-05-19 DIAGNOSIS — Z95.818 PRESENCE OF OTHER CARDIAC IMPLANTS AND GRAFTS: Primary | ICD-10-CM

## 2021-05-19 PROCEDURE — G2066 INTER DEVC REMOTE 30D: HCPCS | Performed by: INTERNAL MEDICINE

## 2021-05-19 PROCEDURE — 93298 REM INTERROG DEV EVAL SCRMS: CPT | Performed by: INTERNAL MEDICINE

## 2021-05-19 NOTE — PROGRESS NOTES
MDT LOOP   CARELINK TRANSMISSION: LOOP RECORDER  PRESENTING RHYTHM SB @ 57 BPM  BATTERY STATUS "OK"  2020 [facility] SVT @ 158-176 BPM  86 PAUSE EPISODES W/ EGRAM SHOWING UNDERSENSING  NO PATIENT ACTIVATED EPISODES  NORMAL DEVICE FUNCTION   DL

## 2021-05-21 ENCOUNTER — APPOINTMENT (OUTPATIENT)
Dept: LAB | Facility: CLINIC | Age: 76
End: 2021-05-21
Payer: MEDICARE

## 2021-05-21 LAB
ALBUMIN SERPL BCP-MCNC: 3.6 G/DL (ref 3.5–5)
ALP SERPL-CCNC: 102 U/L (ref 46–116)
ALT SERPL W P-5'-P-CCNC: 24 U/L (ref 12–78)
ANION GAP SERPL CALCULATED.3IONS-SCNC: 4 MMOL/L (ref 4–13)
AST SERPL W P-5'-P-CCNC: 18 U/L (ref 5–45)
BASOPHILS # BLD AUTO: 0.03 THOUSANDS/ΜL (ref 0–0.1)
BASOPHILS NFR BLD AUTO: 0 % (ref 0–1)
BILIRUB SERPL-MCNC: 0.36 MG/DL (ref 0.2–1)
BUN SERPL-MCNC: 20 MG/DL (ref 5–25)
CALCIUM SERPL-MCNC: 9.2 MG/DL (ref 8.3–10.1)
CHLORIDE SERPL-SCNC: 111 MMOL/L (ref 100–108)
CO2 SERPL-SCNC: 26 MMOL/L (ref 21–32)
CREAT SERPL-MCNC: 0.65 MG/DL (ref 0.6–1.3)
EOSINOPHIL # BLD AUTO: 0.22 THOUSAND/ΜL (ref 0–0.61)
EOSINOPHIL NFR BLD AUTO: 3 % (ref 0–6)
ERYTHROCYTE [DISTWIDTH] IN BLOOD BY AUTOMATED COUNT: 12.6 % (ref 11.6–15.1)
GFR SERPL CREATININE-BSD FRML MDRD: 87 ML/MIN/1.73SQ M
GLUCOSE P FAST SERPL-MCNC: 129 MG/DL (ref 65–99)
HCT VFR BLD AUTO: 40.4 % (ref 34.8–46.1)
HGB BLD-MCNC: 12.9 G/DL (ref 11.5–15.4)
IMM GRANULOCYTES # BLD AUTO: 0.05 THOUSAND/UL (ref 0–0.2)
IMM GRANULOCYTES NFR BLD AUTO: 1 % (ref 0–2)
INR PPP: 1.11 (ref 0.84–1.19)
LYMPHOCYTES # BLD AUTO: 1.48 THOUSANDS/ΜL (ref 0.6–4.47)
LYMPHOCYTES NFR BLD AUTO: 22 % (ref 14–44)
MCH RBC QN AUTO: 29.9 PG (ref 26.8–34.3)
MCHC RBC AUTO-ENTMCNC: 31.9 G/DL (ref 31.4–37.4)
MCV RBC AUTO: 94 FL (ref 82–98)
MONOCYTES # BLD AUTO: 0.35 THOUSAND/ΜL (ref 0.17–1.22)
MONOCYTES NFR BLD AUTO: 5 % (ref 4–12)
NEUTROPHILS # BLD AUTO: 4.66 THOUSANDS/ΜL (ref 1.85–7.62)
NEUTS SEG NFR BLD AUTO: 69 % (ref 43–75)
NRBC BLD AUTO-RTO: 0 /100 WBCS
PLATELET # BLD AUTO: 171 THOUSANDS/UL (ref 149–390)
PMV BLD AUTO: 12 FL (ref 8.9–12.7)
POTASSIUM SERPL-SCNC: 4 MMOL/L (ref 3.5–5.3)
PROT SERPL-MCNC: 6.8 G/DL (ref 6.4–8.2)
PROTHROMBIN TIME: 14.3 SECONDS (ref 11.6–14.5)
RBC # BLD AUTO: 4.31 MILLION/UL (ref 3.81–5.12)
SODIUM SERPL-SCNC: 141 MMOL/L (ref 136–145)
WBC # BLD AUTO: 6.79 THOUSAND/UL (ref 4.31–10.16)

## 2021-05-21 PROCEDURE — 85025 COMPLETE CBC W/AUTO DIFF WBC: CPT

## 2021-05-21 PROCEDURE — 85610 PROTHROMBIN TIME: CPT

## 2021-05-21 PROCEDURE — 36415 COLL VENOUS BLD VENIPUNCTURE: CPT

## 2021-05-21 PROCEDURE — 80053 COMPREHEN METABOLIC PANEL: CPT

## 2021-06-04 ENCOUNTER — HOSPITAL ENCOUNTER (OUTPATIENT)
Dept: NON INVASIVE DIAGNOSTICS | Facility: HOSPITAL | Age: 76
Discharge: HOME | End: 2021-06-04
Attending: INTERNAL MEDICINE | Admitting: INTERNAL MEDICINE
Payer: MEDICARE

## 2021-06-04 ENCOUNTER — ANESTHESIA EVENT (OUTPATIENT)
Dept: NON INVASIVE DIAGNOSTICS | Facility: HOSPITAL | Age: 76
End: 2021-06-04
Payer: MEDICARE

## 2021-06-04 ENCOUNTER — ANESTHESIA (OUTPATIENT)
Dept: NON INVASIVE DIAGNOSTICS | Facility: HOSPITAL | Age: 76
End: 2021-06-04
Payer: MEDICARE

## 2021-06-04 VITALS
HEART RATE: 72 BPM | RESPIRATION RATE: 18 BRPM | OXYGEN SATURATION: 93 % | DIASTOLIC BLOOD PRESSURE: 58 MMHG | SYSTOLIC BLOOD PRESSURE: 131 MMHG

## 2021-06-04 DIAGNOSIS — I47.1 PAROXYSMAL SVT (SUPRAVENTRICULAR TACHYCARDIA) (HCC): Primary | ICD-10-CM

## 2021-06-04 LAB
ANION GAP SERPL CALCULATED.3IONS-SCNC: 5 MMOL/L (ref 4–13)
ATRIAL RATE: 147 BPM
ATRIAL RATE: 75 BPM
BASOPHILS # BLD AUTO: 0.04 THOUSANDS/ΜL (ref 0–0.1)
BASOPHILS NFR BLD AUTO: 1 % (ref 0–1)
BUN SERPL-MCNC: 20 MG/DL (ref 5–25)
CALCIUM SERPL-MCNC: 9.7 MG/DL (ref 8.3–10.1)
CHLORIDE SERPL-SCNC: 110 MMOL/L (ref 100–108)
CO2 SERPL-SCNC: 28 MMOL/L (ref 21–32)
CREAT SERPL-MCNC: 0.74 MG/DL (ref 0.6–1.3)
EOSINOPHIL # BLD AUTO: 0.21 THOUSAND/ΜL (ref 0–0.61)
EOSINOPHIL NFR BLD AUTO: 3 % (ref 0–6)
ERYTHROCYTE [DISTWIDTH] IN BLOOD BY AUTOMATED COUNT: 12.7 % (ref 11.6–15.1)
GFR SERPL CREATININE-BSD FRML MDRD: 79 ML/MIN/1.73SQ M
GLUCOSE P FAST SERPL-MCNC: 157 MG/DL (ref 65–99)
GLUCOSE SERPL-MCNC: 157 MG/DL (ref 65–140)
HCT VFR BLD AUTO: 45.7 % (ref 34.8–46.1)
HGB BLD-MCNC: 14.8 G/DL (ref 11.5–15.4)
IMM GRANULOCYTES # BLD AUTO: 0.02 THOUSAND/UL (ref 0–0.2)
IMM GRANULOCYTES NFR BLD AUTO: 0 % (ref 0–2)
INR PPP: 1.15 (ref 0.84–1.19)
LYMPHOCYTES # BLD AUTO: 1.74 THOUSANDS/ΜL (ref 0.6–4.47)
LYMPHOCYTES NFR BLD AUTO: 22 % (ref 14–44)
MCH RBC QN AUTO: 30.5 PG (ref 26.8–34.3)
MCHC RBC AUTO-ENTMCNC: 32.4 G/DL (ref 31.4–37.4)
MCV RBC AUTO: 94 FL (ref 82–98)
MONOCYTES # BLD AUTO: 0.45 THOUSAND/ΜL (ref 0.17–1.22)
MONOCYTES NFR BLD AUTO: 6 % (ref 4–12)
NEUTROPHILS # BLD AUTO: 5.37 THOUSANDS/ΜL (ref 1.85–7.62)
NEUTS SEG NFR BLD AUTO: 68 % (ref 43–75)
NRBC BLD AUTO-RTO: 0 /100 WBCS
P AXIS: -13 DEGREES
P AXIS: 50 DEGREES
PLATELET # BLD AUTO: 213 THOUSANDS/UL (ref 149–390)
PMV BLD AUTO: 11.3 FL (ref 8.9–12.7)
POTASSIUM SERPL-SCNC: 4.1 MMOL/L (ref 3.5–5.3)
PR INTERVAL: 196 MS
PROTHROMBIN TIME: 14.7 SECONDS (ref 11.6–14.5)
QRS AXIS: 109 DEGREES
QRS AXIS: 94 DEGREES
QRSD INTERVAL: 94 MS
QRSD INTERVAL: 98 MS
QT INTERVAL: 328 MS
QT INTERVAL: 412 MS
QTC INTERVAL: 460 MS
QTC INTERVAL: 480 MS
RBC # BLD AUTO: 4.86 MILLION/UL (ref 3.81–5.12)
SODIUM SERPL-SCNC: 143 MMOL/L (ref 136–145)
T WAVE AXIS: -16 DEGREES
T WAVE AXIS: 60 DEGREES
VENTRICULAR RATE: 129 BPM
VENTRICULAR RATE: 75 BPM
WBC # BLD AUTO: 7.83 THOUSAND/UL (ref 4.31–10.16)

## 2021-06-04 PROCEDURE — C1894 INTRO/SHEATH, NON-LASER: HCPCS | Performed by: INTERNAL MEDICINE

## 2021-06-04 PROCEDURE — C1730 CATH, EP, 19 OR FEW ELECT: HCPCS | Performed by: INTERNAL MEDICINE

## 2021-06-04 PROCEDURE — 85610 PROTHROMBIN TIME: CPT | Performed by: PHYSICIAN ASSISTANT

## 2021-06-04 PROCEDURE — 93621 COMP EP EVL L PAC&REC C SINS: CPT | Performed by: INTERNAL MEDICINE

## 2021-06-04 PROCEDURE — 93005 ELECTROCARDIOGRAM TRACING: CPT

## 2021-06-04 PROCEDURE — 93653 COMPRE EP EVAL TX SVT: CPT | Performed by: INTERNAL MEDICINE

## 2021-06-04 PROCEDURE — 93010 ELECTROCARDIOGRAM REPORT: CPT | Performed by: INTERNAL MEDICINE

## 2021-06-04 PROCEDURE — 93613 INTRACARDIAC EPHYS 3D MAPG: CPT | Performed by: INTERNAL MEDICINE

## 2021-06-04 PROCEDURE — 93623 PRGRMD STIMJ&PACG IV RX NFS: CPT | Performed by: INTERNAL MEDICINE

## 2021-06-04 PROCEDURE — 80048 BASIC METABOLIC PNL TOTAL CA: CPT | Performed by: PHYSICIAN ASSISTANT

## 2021-06-04 PROCEDURE — C2630 CATH, EP, COOL-TIP: HCPCS | Performed by: INTERNAL MEDICINE

## 2021-06-04 PROCEDURE — C1732 CATH, EP, DIAG/ABL, 3D/VECT: HCPCS | Performed by: INTERNAL MEDICINE

## 2021-06-04 PROCEDURE — NC001 PR NO CHARGE: Performed by: INTERNAL MEDICINE

## 2021-06-04 PROCEDURE — 85025 COMPLETE CBC W/AUTO DIFF WBC: CPT | Performed by: PHYSICIAN ASSISTANT

## 2021-06-04 PROCEDURE — C1766 INTRO/SHEATH,STRBLE,NON-PEEL: HCPCS | Performed by: INTERNAL MEDICINE

## 2021-06-04 PROCEDURE — 76937 US GUIDE VASCULAR ACCESS: CPT | Performed by: INTERNAL MEDICINE

## 2021-06-04 RX ORDER — LIDOCAINE HYDROCHLORIDE 10 MG/ML
INJECTION, SOLUTION EPIDURAL; INFILTRATION; INTRACAUDAL; PERINEURAL CODE/TRAUMA/SEDATION MEDICATION
Status: COMPLETED | OUTPATIENT
Start: 2021-06-04 | End: 2021-06-04

## 2021-06-04 RX ORDER — EPHEDRINE SULFATE 50 MG/ML
INJECTION INTRAVENOUS AS NEEDED
Status: DISCONTINUED | OUTPATIENT
Start: 2021-06-04 | End: 2021-06-04

## 2021-06-04 RX ORDER — FENTANYL CITRATE 50 UG/ML
INJECTION, SOLUTION INTRAMUSCULAR; INTRAVENOUS AS NEEDED
Status: DISCONTINUED | OUTPATIENT
Start: 2021-06-04 | End: 2021-06-04

## 2021-06-04 RX ORDER — SODIUM CHLORIDE 9 MG/ML
INJECTION, SOLUTION INTRAVENOUS CONTINUOUS PRN
Status: DISCONTINUED | OUTPATIENT
Start: 2021-06-04 | End: 2021-06-04

## 2021-06-04 RX ORDER — PROPOFOL 10 MG/ML
INJECTION, EMULSION INTRAVENOUS CONTINUOUS PRN
Status: DISCONTINUED | OUTPATIENT
Start: 2021-06-04 | End: 2021-06-04

## 2021-06-04 RX ORDER — PROPOFOL 10 MG/ML
INJECTION, EMULSION INTRAVENOUS AS NEEDED
Status: DISCONTINUED | OUTPATIENT
Start: 2021-06-04 | End: 2021-06-04

## 2021-06-04 RX ORDER — ACETAMINOPHEN 325 MG/1
650 TABLET ORAL EVERY 4 HOURS PRN
Status: DISCONTINUED | OUTPATIENT
Start: 2021-06-04 | End: 2021-06-04 | Stop reason: HOSPADM

## 2021-06-04 RX ADMIN — LIDOCAINE HYDROCHLORIDE 10 ML: 10 INJECTION, SOLUTION EPIDURAL; INFILTRATION; INTRACAUDAL; PERINEURAL at 08:23

## 2021-06-04 RX ADMIN — PROPOFOL 60 MCG/KG/MIN: 10 INJECTION, EMULSION INTRAVENOUS at 07:58

## 2021-06-04 RX ADMIN — FENTANYL CITRATE 12.5 MCG: 50 INJECTION INTRAMUSCULAR; INTRAVENOUS at 09:10

## 2021-06-04 RX ADMIN — FENTANYL CITRATE 12.5 MCG: 50 INJECTION INTRAMUSCULAR; INTRAVENOUS at 09:13

## 2021-06-04 RX ADMIN — PROPOFOL 10 MG: 10 INJECTION, EMULSION INTRAVENOUS at 09:11

## 2021-06-04 RX ADMIN — PROPOFOL 20 MG: 10 INJECTION, EMULSION INTRAVENOUS at 09:17

## 2021-06-04 RX ADMIN — EPHEDRINE SULFATE 5 MG: 50 INJECTION, SOLUTION INTRAVENOUS at 08:15

## 2021-06-04 RX ADMIN — ISOPROTERENOL HYDROCHLORIDE 2 MCG/MIN: 0.2 INJECTION, SOLUTION INTRAMUSCULAR; INTRAVENOUS at 09:06

## 2021-06-04 RX ADMIN — SODIUM CHLORIDE: 0.9 INJECTION, SOLUTION INTRAVENOUS at 07:45

## 2021-06-04 RX ADMIN — EPHEDRINE SULFATE 5 MG: 50 INJECTION, SOLUTION INTRAVENOUS at 08:37

## 2021-06-04 RX ADMIN — PHENYLEPHRINE HYDROCHLORIDE 10 MCG/MIN: 10 INJECTION INTRAVENOUS at 08:00

## 2021-06-04 RX ADMIN — FENTANYL CITRATE 25 MCG: 50 INJECTION INTRAMUSCULAR; INTRAVENOUS at 07:58

## 2021-06-04 NOTE — DISCHARGE INSTRUCTIONS
RESTRICTIONS:  No lifting more than 10 lbs and no strenuous activity for one week  No soaking in a bath tub, hot tub, swimming pool, or any water for at least one week or until groins heal  You may shower  Please let soap and water run over the groins  No scrubbing  Pat the area dry  You may place band-aids on groins daily for up to five days, but you may remove sooner if no issues are noted  If you notice ongoing bleeding, swelling, or large firm lumps in groin near ablation incision, please contact the office of  Dr Brian Marvin at [phone]

## 2021-06-04 NOTE — ANESTHESIA POSTPROCEDURE EVALUATION
Post-Op Assessment Note    CV Status:  Stable  Pain Score: 0    Pain management: adequate     Mental Status:  Alert and awake   Hydration Status:  Stable   PONV Controlled:  None   Airway Patency:  Patent      Post Op Vitals Reviewed: Yes      Staff: CRNA   Comments: Report given to nely Perez  Cath lab RN transported patient  No complications documented      BP   105/55   Temp      Pulse  72   Resp   18   SpO2   99% 6L FM

## 2021-06-04 NOTE — ANESTHESIA PREPROCEDURE EVALUATION
Procedure:  CARDIAC EPS/SVT ABLATION    Relevant Problems   CARDIO   (+) Coronary artery disease involving native coronary artery of native heart without angina pectoris   (+) Hypercholesterolemia   (+) Hypertension   (+) NSTEMI (non-ST elevated myocardial infarction) ([facility])   (+) Paroxysmal SVT (supraventricular tachycardia) ([facility])   (+) VICKEY (renal artery stenosis) ([facility])      /RENAL   (+) Nephrolithiasis   (+) Stage 2 chronic kidney disease      Other   (+) Diabetes mellitus ([address] )   (+) Stenosis of both external auditory canals      Recent labs personally reviewed:  Lab Results   Component Value Date    WBC 6 79 05/21/2021    HGB 12 9 05/21/2021     05/21/2021     Lab Results   Component Value Date     12/08/2015    K 4 0 05/21/2021    BUN 20 05/21/2021    CREATININE 0 65 05/21/2021    GLUCOSE 139 12/08/2015     Lab Results   Component Value Date    PTT 23 (L) 06/10/2019      Lab Results   Component Value Date    INR 1 11 05/21/2021     Lab Results   Component Value Date    HGBA1C 6 5 (H) 05/14/2021 7/18/2019 TTE:  SUMMARY     LEFT VENTRICLE:  Size was normal   Systolic function was normal  Ejection fraction was estimated to be 60 %  There were no regional wall motion abnormalities  Wall thickness was at the upper limits of normal      MITRAL VALVE:  There was moderate to marked posterior annular calcification  There was mild regurgitation      AORTIC VALVE:  There was mild regurgitation      TRICUSPID VALVE:  There was mild regurgitation  Estimated peak PA pressure was 35 mmHg  Physical Exam    Airway    Mallampati score: II  TM Distance: >3 FB  Neck ROM: full     Dental       Cardiovascular  Rhythm: regular, Rate: normal,     Pulmonary  Breath sounds clear to auscultation,     Other Findings        Anesthesia Plan  ASA Score- 3     Anesthesia Type- IV sedation with anesthesia with ASA Monitors  Additional Monitors:   Airway Plan:           Plan Factors-    Chart reviewed   EKG reviewed  Existing labs reviewed  Patient summary reviewed  Induction- intravenous  Postoperative Plan-     Informed Consent- Anesthetic plan and risks discussed with patient  I personally reviewed this patient with the CRNA  Discussed and agreed on the Anesthesia Plan with the CRNA  Shagufta Velasco

## 2021-06-04 NOTE — H&P
H&P Exam - Cardiology   Zamzam Chisholm 67 y o  female MRN: [id number]  Unit/Bed#: [facility] Encounter: [id number]    Assessment/Plan :  1  Paroxysmal SVT  a  Maintained on Lopressor  b  Plan to undergo SVT ablation by Dr Mily Russell today, 6/4  2  HTN  a  Maintained on Losartan and Lopressor  3  HLD  a  Maintained on Lipitor  4  DM2  a  Maintained on metformin  5  CAD   a  PCI to RCA 1/2019  b  Maintained on Plavix and PRN Nitro  6  H/O CVA  a  Maintained on Eliquis  b  No residual deficits  7  VICKEY  8  CKD 2  9  Hypothyroid      History of Present Illness   HPI:  Zamzam Chisholm is a 67y o  year old female with a PMH as stated above who presents to [facility] today under the direction of Dr Mily Russell to undergo SVT ablation  She follows with Dr Lilly Friedman as an outpatient, first diagnosed with PSVT in 2017  She has riri having episodes of SVT that are symptomatic, causing uncomfortable palpitations  These were initially controlled with beta blockers but she has been experiencing more frequent bursts of SVT  Interrogation of loop recorder shows heart rate elevated to 160-170 bpm lasting 10 minutes to 1 hour  Patient denies chest pain/heaviness/tightness/pressure, shortness of breath, orthopnea, lightheadedness, presyncope, syncope or N/V  Rhythm History:   9/2017 - Presented to ED with CP and palpitations, diagnosed with SVT  Converted with IV cardizem  Discharged with metoprolol  6/2019 - Recurrence of palpitations, 24 hr Holter showed one episode SVT, metoprolol increased with resolution of symptoms    10/2020 - CVA, no afib noted on telemetry, started empirically on Eliquis    11/2020 - Loop recorder implanted, only showing SVT  Ablation recommended  Review of Systems   Constitutional: Negative for chills, diaphoresis and fatigue  HENT: Negative for ear discharge, nosebleeds and sore throat  Eyes: Negative for pain, discharge and visual disturbance     Respiratory: Negative for cough, chest tightness and shortness of breath  Cardiovascular: Positive for palpitations  Negative for chest pain and leg swelling  Gastrointestinal: Negative for abdominal pain, diarrhea, nausea and vomiting  Genitourinary: Negative for enuresis, flank pain and hematuria  Musculoskeletal: Negative for arthralgias, gait problem and myalgias  Skin: Negative for color change, pallor and rash  Neurological: Negative for dizziness, syncope and light-headedness  Hematological: Negative for adenopathy  Does not bruise/bleed easily  Psychiatric/Behavioral: Negative for behavioral problems, confusion and decreased concentration             Historical Information   Past Medical History:   Diagnosis Date    Abnormal stress test     Angina pectoris ([facility])     1 wk ago    Atherosclerosis of renal artery ([facility])     CAD (coronary artery disease)     Chronic kidney disease, stage 2 (mild)     Diabetes mellitus ([facility])     niddm    Edema     Endometriosis     History of palpitations     History of poliomyelitis     Hyperlipidemia     Hypertension     Hypertensive chronic kidney disease with stage 1 through stage 4 chronic kidney disease, or unspecified chronic kidney disease     Hypothyroid     Kidney stones     Myocardial infarction [facility]     NSTEMI (non-ST elevated myocardial infarction) ([facility] ) 01/05/2019    Obesity     Paroxysmal SVT (supraventricular tachycardia) ([facility])     Renal insufficiency     Rosacea     Thyroid nodule     Type 2 diabetes mellitus ([facility] )     Wears glasses        Past Surgical History:   Procedure Laterality Date    ACHILLES TENDON LENGTHENING Right     Achilles tendon stretch     CARDIAC CATHETERIZATION  2017    CARDIAC CATHETERIZATION  01/07/2019    Stent placed in RCA     CATARACT EXTRACTION Bilateral     COLONOSCOPY      HYSTERECTOMY      Total - Endometriosis     ROTATOR CUFF REPAIR Right        Family History   Problem Relation Age of Onset    Asthma Mother     Stomach cancer Father Social History   Social History     Substance and Sexual Activity   Alcohol Use No     Social History     Substance and Sexual Activity   Drug Use No     Social History     Tobacco Use   Smoking Status Never Smoker   Smokeless Tobacco Never Used         Meds/Allergies   all medications and allergies reviewed  Home Medications:   Medications Prior to Admission   Medication    acetaminophen (TYLENOL) 325 mg tablet    ARMOUR THYROID 15 MG tablet    ARMOUR THYROID 30 MG tablet    clopidogrel (PLAVIX) 75 mg tablet    Eliquis 5 MG    losartan (COZAAR) 50 mg tablet    metFORMIN (GLUCOPHAGE) 500 mg tablet    metoprolol tartrate (LOPRESSOR) 25 mg tablet    ACCU-CHEK KAILEY PLUS test strip    atorvastatin (LIPITOR) 40 mg tablet    Blood Glucose Monitoring Suppl (ACCU-CHEK KAILEY CONNECT) w/Device KIT    doxazosin (CARDURA) 4 mg tablet    glucose blood (ACCU-CHEK KAILEY PLUS) test strip    Lancets Misc  (ACCU-CHEK MULTICLIX LANCET DEV) KIT    nitroglycerin (NITROSTAT) 0 4 mg SL tablet    Restasis 0 05 % ophthalmic emulsion       Allergies   Allergen Reactions    Lisinopril Rash     Changed pigment of my skin     Amlodipine        Objective    Vitals: Blood pressure 98/57, pulse 68, resp  rate 18, SpO2 93 %  Intake/Output Summary (Last 24 hours) at 6/4/2021 1053  Last data filed at 6/4/2021 0947  Gross per 24 hour   Intake 500 ml   Output --   Net 500 ml       Invasive Devices     None                 Physical Exam  Vitals signs reviewed  Constitutional:       General: She is not in acute distress  Appearance: She is not ill-appearing or diaphoretic  HENT:      Head: Normocephalic and atraumatic  Right Ear: External ear normal       Left Ear: External ear normal       Nose: Nose normal    Eyes:      General:         Right eye: No discharge  Left eye: No discharge  Neck:      Musculoskeletal: No neck rigidity or muscular tenderness     Cardiovascular:      Rate and Rhythm: Normal rate and regular rhythm  Heart sounds: No murmur  No friction rub  Pulmonary:      Effort: Pulmonary effort is normal       Breath sounds: Normal breath sounds  No wheezing, rhonchi or rales  Abdominal:      General: There is no distension  Palpations: Abdomen is soft  Tenderness: There is no abdominal tenderness  Musculoskeletal:         General: No deformity or signs of injury  Right lower leg: No edema  Left lower leg: No edema  Skin:     General: Skin is warm and dry  Capillary Refill: Capillary refill takes less than 2 seconds  Coloration: Skin is not jaundiced or pale  Neurological:      General: No focal deficit present  Mental Status: She is alert and oriented to person, place, and time  Mental status is at baseline  Psychiatric:         Mood and Affect: Mood normal          Behavior: Behavior normal          Thought Content: Thought content normal              Lab Results: I have personally reviewed pertinent lab results  Results from last 7 days   Lab Units 06/04/21  0634   WBC Thousand/uL 7 83   HEMOGLOBIN g/dL 14 8   HEMATOCRIT % 45 7   PLATELETS Thousands/uL 213     Results from last 7 days   Lab Units 06/04/21  0634   POTASSIUM mmol/L 4 1   CHLORIDE mmol/L 110*   CO2 mmol/L 28   BUN mg/dL 20   CREATININE mg/dL 0 74   CALCIUM mg/dL 9 7     Results from last 7 days   Lab Units 06/04/21  0634   INR  1 15             Imaging: I have personally reviewed pertinent films in PACS  ECHO: 7/18/19  SUMMARY     LEFT VENTRICLE:  Size was normal   Systolic function was normal  Ejection fraction was estimated to be 60 %  There were no regional wall motion abnormalities  Wall thickness was at the upper limits of normal      MITRAL VALVE:  There was moderate to marked posterior annular calcification  There was mild regurgitation      AORTIC VALVE:  There was mild regurgitation      TRICUSPID VALVE:  There was mild regurgitation    Estimated peak PA pressure was 35 mmHg      HISTORY: PRIOR HISTORY: coronary artery disease, NSTEMI, hypertension, mixed hyperlipidemia, palpitations     PROCEDURE: The procedure was performed in the echo lab  This was a routine study  The transthoracic approach was used  The study included complete 2D imaging, M-mode, complete spectral Doppler, and color Doppler  Images were obtained from  the parasternal, apical, subcostal, and suprasternal notch acoustic windows  Image quality was adequate      LEFT VENTRICLE: Size was normal  Systolic function was normal  Ejection fraction was estimated to be 60 %  There were no regional wall motion abnormalities  Wall thickness was at the upper limits of normal      RIGHT VENTRICLE: The size was normal  Systolic function was normal  Wall thickness was normal      LEFT ATRIUM: Size was normal      RIGHT ATRIUM: Size was normal      MITRAL VALVE: There was moderate to marked posterior annular calcification  DOPPLER: There was no evidence for stenosis  There was mild regurgitation      AORTIC VALVE: The valve was trileaflet  Leaflets exhibited normal thickness and normal cuspal separation  DOPPLER: Transaortic velocity was within the normal range  There was no evidence for stenosis  There was mild regurgitation      TRICUSPID VALVE: The valve structure was normal  There was normal leaflet separation  DOPPLER: The transtricuspid velocity was within the normal range  There was no evidence for stenosis  There was mild regurgitation  Estimated peak PA  pressure was 35 mmHg      PULMONIC VALVE: Leaflets exhibited normal thickness, no calcification, and normal cuspal separation  DOPPLER: The transpulmonic velocity was within the normal range  There was no regurgitation      PERICARDIUM: There was no pericardial effusion  The pericardium was normal in appearance      AORTA: The root exhibited normal size        [id number]          Code Status: Level 1 - Full Code

## 2021-06-07 DIAGNOSIS — I10 HTN (HYPERTENSION): ICD-10-CM

## 2021-06-07 RX ORDER — LOSARTAN POTASSIUM 50 MG/1
50 TABLET ORAL DAILY
Qty: 90 TABLET | Refills: 3 | Status: SHIPPED | OUTPATIENT
Start: 2021-06-07 | End: 2022-03-14

## 2021-06-10 ENCOUNTER — OFFICE VISIT (OUTPATIENT)
Dept: UROLOGY | Facility: CLINIC | Age: 76
End: 2021-06-10
Payer: MEDICARE

## 2021-06-10 VITALS
SYSTOLIC BLOOD PRESSURE: 126 MMHG | DIASTOLIC BLOOD PRESSURE: 82 MMHG | HEART RATE: 70 BPM | WEIGHT: 175 LBS | BODY MASS INDEX: 31 KG/M2

## 2021-06-10 DIAGNOSIS — N32.81 OVERACTIVE BLADDER: ICD-10-CM

## 2021-06-10 DIAGNOSIS — R32 URINARY INCONTINENCE, UNSPECIFIED TYPE: Primary | ICD-10-CM

## 2021-06-10 DIAGNOSIS — N20.0 NEPHROLITHIASIS: ICD-10-CM

## 2021-06-10 LAB
SL AMB  POCT GLUCOSE, UA: NORMAL
SL AMB LEUKOCYTE ESTERASE,UA: NORMAL
SL AMB POCT BILIRUBIN,UA: NORMAL
SL AMB POCT BLOOD,UA: NORMAL
SL AMB POCT CLARITY,UA: CLEAR
SL AMB POCT COLOR,UA: YELLOW
SL AMB POCT KETONES,UA: NORMAL
SL AMB POCT NITRITE,UA: NORMAL
SL AMB POCT PH,UA: 5
SL AMB POCT SPECIFIC GRAVITY,UA: 1.01
SL AMB POCT URINE PROTEIN: NORMAL
SL AMB POCT UROBILINOGEN: 0.2

## 2021-06-10 PROCEDURE — 81002 URINALYSIS NONAUTO W/O SCOPE: CPT | Performed by: NURSE PRACTITIONER

## 2021-06-10 PROCEDURE — 99213 OFFICE O/P EST LOW 20 MIN: CPT | Performed by: NURSE PRACTITIONER

## 2021-06-10 NOTE — PATIENT INSTRUCTIONS
Dietary Management of Kidney Stone Disease    The dietary recommendations for most people who make kidney stones (especially the most common calcium oxalate stones) are uncomplicated and are not too tedious or bland  Most importantly, the following recommendations also promote better health for a variety of reasons  FLUIDS:  The single most important change for the majority patients is the need to greatly increase fluid intake  You should at least produce two liters (about two quarts) of urine each day  Depending on the heat outdoors and your level of physical activity, this usually means consuming ten, 10 ounce glasses (100 ounces) of fluid per day  Water is always a good choice, but other drinks including tea, coffee, soda, and juice are also allowed as long as no one beverage becomes the sole source of fluid  CALCIUM:  There is excellent evidence that calcium should not be avoided, but instead moderated  A range of 600 to 1,100 mg of calcium per day, especially consumed at meals is probably a reasonable target  (i e  2-3 dairy servings per day) This might include small servings of yogurt, milk or ice cream   This amount helps avoid over-absorption of oxalate from the digestive tract and also allows for healthy bone maintenance  SODIUM (SALT): Too much salt in your diet (both from the shaker and in the prepared foods that we buy) is bad for your blood pressure, bad for your heart, and also increases the amount of calcium in your urine  A reasonable sodium restriction to 2,000-2,500mg/day (about the amount in one teaspoon) is an excellent target  You should get into the habit of reading the Nutrients labels on all the foods that you eat and watch out for the foods that have a high sodium content (snack foods, smoked or processed foods, caned foods)  Fresh and frozen foods usually have the least amount of sodium      PROTEIN:  High protein diets from animal meat (beef, chicken, pork, fish) also increases the rate of kidney stone formation and is equally unhealthy for your heart  All patients should moderate their meat intake to 3-7 ounces per day, and particularly stay away from red meat protein  OXALATE:  Most stone-formers should avoid heavy intake of oxalate-rich foods  These include green roughage (spinach, mustard, kale), strawberries, chocolate, tea, iced tea, and nuts  In addition, heavy, excess doses of Vitamin C can also produce surges in urinary oxalate levels and should be avoided  ** THESE FOODS ARE HIGH IN OXALATE - TRY TO LIMIT HOW MUCH OF THESE YOU EAT:  Coke and Pepsi  Nuts  Dark Leafy Greens:     Spinach and Kale, Rhubarb, Chard  Asparagus  Beets  Sweet potatoes   Blueberries, Strawberries   Dark tea (Green tea is okay)  Tofu      DRINK 3 QUARTS (96 Oz ) LIQUIDS EACH DAY - ALL LIQUIDS COUNT        ADD LEMON JUICE 3 OZ  DAILY - Fresh squeezed or lemon juice concentrate - Not MinuteMaid, [place] ([facility] [place], Crystal Lite, etc         ** Recipe for INGRAM'S OLDLOPEZ TYME LEMONADE:         One ounce of lemon juice, glass of water, sweetener of your choice    Coffee is okay! Cranberry juice is good to prevent infections, but does not help for stones  BARE-BONES RECOMMENDATIONS:  1  Fluids, fluids, fluids  2  Low salt diet (your primary care doctor will love you)  3  Moderate red meat intake  4  Moderate calcium (dairy products), especially with meals  BEHAVIORAL THERAPY FOR IMPROVED BLADDER CONTROL      1  Urge Control Techniques       A  Stop whatever you are doing and concentrate on leanne your pelvic floor muscles  B   Contract your pelvic floor muscles repetitively (as in your "flick" exercises)  C   Once the urgency has subsided, realize that sometimes the urgency is because you have a             full bladder and have to urinate  Other times the urgency is a false signal and you do not have a full bladder  D    If you have urgency triggered by running water, "key in the door," getting up from a sitting position, etc , contract your pelvic floor muscles prior to       initiating the activity  2   Daily Fluid Intake       A  Avoid drinking too little (less than 3 cups/day) or drinking too much (more than 6             cups/day)  B   Avoid caffeinated beverages  C   If voiding frequently at night, limit your liquid intake after 7 p m  3   Bowel Regularity--Constipation Makes Bladder Symptoms Worse       A  Drink enough liquids, eat plenty of high fiber food  B  Exercise       C  Avoid laxatives and enemas on a regular basis, this decreases the bowel's normal function  4   Voiding Schedules       A  Empty your bladder at 1½ to 2 hour intervals even if you may not have the urge to urinate             at that time  You may gradually increase the time between voidings to 30  minutes, until you can comfortably void every 3 hours  B  If you are voiding more frequently than every 1½ to 2 hours, resist the urge to go  by using urge control techniques (described in 1 )

## 2021-06-10 NOTE — PROGRESS NOTES
Assessment and plan:       1  Overactive bladder with mixed urinary incontinence    urine dip unremarkable    continue to avoid bladder irritants    oxybutynin 10 mg ER daily-stopped taking this because she feels that this did  not help    has not been doing Kegel exercises    Willing to try pelvic floor physical therapy; physical therapy orders placed    Pads per day: 4    Frequency: urinates every 4 hours   Reports she sits and when she gets up she has a "gush" of urine   Recommended timed voiding every 2 hours    stop drinking liquids 2 hours before bed   Drinks : full glass of water 3 times a day with medications; does not drink coffee, tea or soda   Recommended increasing water intake to 64 ounces   nocturia x2   a1c 6 5 (5/2021)   Takes fiber supplements for prevention of constipation   She is not interested in medication at this time   Would like to try behavioral changes first and pelvic floor physical therapy   Follow up in 3 months for recheck      2  nephrolithiasis   Had ct scan 2020 that revealed bilateral nonobstructing small kidney stones   Reviewed AUA dietary recommendations reviewed   Encouraged increasing water intake   Follow up in 1 year with us/kub prior        PEDRO LUIS Lopez    History of Present Illness     Berny Gleason is a 67 y o  Patient presents for follow-up of overactive bladder with mixed urinary incontinence  Did ongoing problem she had for approximately 5 years  She does take doxazosin 4 mg daily at bedtime for her blood pressure control  Stepped taking oxybutynin over year ago case she felt was ineffective  Was in the ED 6/30/2020 and had a ct scan that revealed kidney stones, she was told to drink more water but feels she cannot due to her overactive bladder     explained to patient the importance of water intake to prevent irritation of the bladder by her medications, foods, other drinks, etc   Patient voiced understanding of the same will try to increase her water intake   Laboratory     Lab Results   Component Value Date    BUN 20 06/04/2021    CREATININE 0 74 06/04/2021       No components found for: GFR    Lab Results   Component Value Date    GLUCOSE 139 12/08/2015    CALCIUM 9 7 06/04/2021     12/08/2015    K 4 1 06/04/2021    CO2 28 06/04/2021     (H) 06/04/2021       Lab Results   Component Value Date    WBC 7 83 06/04/2021    HGB 14 8 06/04/2021    HCT 45 7 06/04/2021    MCV 94 06/04/2021     06/04/2021       No results found for: PSA    No results found for this or any previous visit (from the past 1 hour(s))  @RESULT(URINEMICROSCOPIC)@    @RESULT(URINECULTURE)@    Radiology     CT ABDOMEN AND PELVIS WITH IV CONTRAST 6/30/2020     INDICATION:   Bilateral lower quadrant and suprapubic abdominal pain with bloating and diarrhea x1 week      COMPARISON:  None      TECHNIQUE:  CT examination of the abdomen and pelvis was performed  In addition to portal venous phase postcontrast scanning through the abdomen and pelvis, delayed phase postcontrast scanning was performed through the upper abdominal viscera  Axial,   sagittal, and coronal 2D reformatted images were created from the source data and submitted for interpretation      Radiation dose length product (DLP) for this visit:  894 mGy-cm   This examination, like all CT scans performed in the [facility], was performed utilizing techniques to minimize radiation dose exposure, including the use of iterative   reconstruction and automated exposure control      IV Contrast:  100 mL of iohexol (OMNIPAQUE)  Enteric Contrast:  Enteric contrast was not administered      FINDINGS:     ABDOMEN     LOWER CHEST:  No clinically significant abnormality identified in the visualized lower chest      LIVER/BILIARY TREE:  Unremarkable      GALLBLADDER:  No calcified gallstones   No pericholecystic inflammatory change      SPLEEN:  Unremarkable      PANCREAS: Unremarkable      ADRENAL GLANDS:  Unremarkable      KIDNEYS/URETERS:  There are small bilateral nonobstructing calculi  There are multiple bilateral peripelvic cysts  No hydronephrosis      STOMACH AND BOWEL:  There is colonic diverticulosis without evidence of acute diverticulitis      APPENDIX:  There are expected postoperative changes of appendectomy      ABDOMINOPELVIC CAVITY:  No ascites  No pneumoperitoneum  No lymphadenopathy      VESSELS:  Unremarkable for patient's age      PELVIS     REPRODUCTIVE ORGANS:  Surgical changes of prior hysterectomy      URINARY BLADDER:  Unremarkable      ABDOMINAL WALL/INGUINAL REGIONS:  Unremarkable      OSSEOUS STRUCTURES:  No acute fracture or destructive osseous lesion      IMPRESSION:     1  No acute abnormality in the abdomen or pelvis      2   Small bilateral nonobstructing calculi      3  Diverticulosis coli      Review of Systems     Review of Systems   Constitutional: Negative for activity change, appetite change, chills, fatigue, fever and unexpected weight change  HENT: Negative for facial swelling  Eyes: Negative for discharge  Respiratory: Negative  Negative for cough and shortness of breath  Cardiovascular: Negative for chest pain and leg swelling  Gastrointestinal: Negative  Negative for abdominal distention, abdominal pain, constipation, diarrhea, nausea and vomiting  Endocrine: Negative  Genitourinary: Positive for urgency  Negative for decreased urine volume, difficulty urinating, dysuria, enuresis, flank pain, frequency, genital sores and hematuria  Nocturia x 2  Stress and urge incontinence   Musculoskeletal: Negative for back pain and myalgias  Skin: Negative for pallor and rash  Allergic/Immunologic: Negative  Negative for immunocompromised state  Neurological: Negative for facial asymmetry and speech difficulty  Psychiatric/Behavioral: Negative for agitation and confusion       Allergies     Allergies   Allergen Reactions    Lisinopril Rash     Changed pigment of my skin     Amlodipine        Physical Exam     Physical Exam  Vitals signs reviewed  Constitutional:       General: She is not in acute distress  Appearance: Normal appearance  She is obese  She is not ill-appearing, toxic-appearing or diaphoretic  HENT:      Head: Normocephalic and atraumatic  Eyes:      General: No scleral icterus  Neck:      Musculoskeletal: Normal range of motion  Cardiovascular:      Rate and Rhythm: Normal rate  Pulmonary:      Effort: Pulmonary effort is normal  No respiratory distress  Abdominal:      General: Abdomen is flat  There is no distension  Palpations: Abdomen is soft  Tenderness: There is no abdominal tenderness  Musculoskeletal:         General: No swelling  Right lower leg: No edema  Left lower leg: No edema  Skin:     General: Skin is warm and dry  Coloration: Skin is not jaundiced or pale  Findings: No rash  Neurological:      General: No focal deficit present  Mental Status: She is alert and oriented to person, place, and time  Gait: Gait normal    Psychiatric:         Mood and Affect: Mood normal          Behavior: Behavior normal          Thought Content:  Thought content normal          Judgment: Judgment normal          Vital Signs     Vitals:    06/10/21 1335   BP: 126/82   Pulse: 70   Weight: 79 4 kg (175 lb)       Current Medications       Current Outpatient Medications:     ACCU-CHEK KAILEY PLUS test strip, , Disp: , Rfl: 3    acetaminophen (TYLENOL) 325 mg tablet, Take 2 tablets (650 mg total) by mouth every 6 (six) hours as needed (pain, fever), Disp: 1 Bottle, Rfl: 0    ARMOUR THYROID 15 MG tablet, 15 mg every evening Before bedtime, Disp: , Rfl: 5    ARMOUR THYROID 30 MG tablet, Take 30 mg by mouth 2 (two) times a day Take one in the morning and one in the afternoon, Disp: , Rfl: 6    atorvastatin (LIPITOR) 40 mg tablet, Take 1 tablet (40 mg total) by mouth daily, Disp: 90 tablet, Rfl: 3    Blood Glucose Monitoring Suppl (ACCU-CHEK KAILEY CONNECT) w/Device KIT, by Does not apply route, Disp: , Rfl:     clopidogrel (PLAVIX) 75 mg tablet, TAKE ONE TABLET DAILY  , Disp: 90 tablet, Rfl: 2    doxazosin (CARDURA) 4 mg tablet, Take 1 tablet (4 mg total) by mouth daily, Disp: 90 tablet, Rfl: 3    Eliquis 5 MG, Take 1 tablet (5 mg total) by mouth 2 (two) times a day, Disp: 90 tablet, Rfl: 1    glucose blood (ACCU-CHEK KAILEY PLUS) test strip, by In Vitro route, Disp: , Rfl:     Lancets Misc  (ACCU-CHEK MULTICLIX LANCET DEV) KIT, by Does not apply route 2 (two) times a day, Disp: , Rfl:     losartan (COZAAR) 50 mg tablet, Take 1 tablet (50 mg total) by mouth daily, Disp: 90 tablet, Rfl: 3    metFORMIN (GLUCOPHAGE) 500 mg tablet, Take 500 mg by mouth 3 (three) times a day 2 tablets in the morning and one in the afternoon, Disp: , Rfl:     metoprolol tartrate (LOPRESSOR) 25 mg tablet, Take 2 tablets in the morning and 1 tablet at night , Disp: 270 tablet, Rfl: 3    nitroglycerin (NITROSTAT) 0 4 mg SL tablet, Place 0 4 mg under the tongue, Disp: , Rfl:     Restasis 0 05 % ophthalmic emulsion, , Disp: , Rfl:     Active Problems     Patient Active Problem List   Diagnosis    Hypertension    Diabetes mellitus [facility]    NSTEMI (non-ST elevated myocardial infarction) ([facility]    Coronary artery disease involving native coronary artery of native heart without angina pectoris    [facility]oxysmal SV[facility]ra[facility]ricular tachycardia) [facility]    Hypercholesterolemia    VICKEY (renal artery stenosis) ([facility])    Edema    Stenosis of both external auditory canals    Bilateral hearing loss due to cerumen impaction    Stage 2 chronic kidney disease    Nephrolithiasis       Past Medical History     Past Medical History:   Diagnosis Date    Abnormal stress test     Angina pectoris ([facility])     1 wk ago    Atherosclerosis of renal artery ([facility])     CAD (coronary artery disease)     Chronic kidney disease, stage 2 (mild)     Diabetes mellitus (HCC)     niddm    Edema     Endometriosis     History of palpitations     History of poliomyelitis     Hyperlipidemia     Hypertension     Hypertensive chronic kidney disease with stage 1 through stage 4 chronic kidney disease, or unspecified chronic kidney disease     Hypothyroid     Kidney stones     [facility]     [facility]STEM[facility]non-ST elevated [facility] 01/05/2019    Obesity     Paroxys[facility] SV[facility]supraventricular tachycardia) [facility]     Renal insufficiency     Rosacea     Thyroid nodule     Type 2 diabetes mellitus [facility]     Wears glasses        Surgical History     Past Surgical History:   Procedure Laterality Date    ACHILLES TENDON LENGTHENING Right     Achilles tendon stretch     CARDIAC CATHETERIZATION  2017    CARDIAC CATHETERIZATION  01/07/2019    Stent placed in RCA     CATARACT EXTRACTION Bilateral     COLONOSCOPY      HYSTERECTOMY      Total - Endometriosis     ROTATOR CUFF REPAIR Right        Family History     Family History   Problem Relation Age of Onset    Asthma Mother     Stomach cancer Father        Social History     Social History     Social History     Tobacco Use   Smoking Status Never Smoker   Smokeless Tobacco Never Used       Past Surgical History:   Procedure Laterality Date    ACHILLES TENDON LENGTHENING Right     Achilles tendon stretch     CARDIAC CATHETERIZATION  2017    CARDIAC CATHETERIZATION  01/07/2019    Stent placed in RCA     CATARACT EXTRACTION Bilateral     COLONOSCOPY      HYSTERECTOMY      Total - Endometriosis     ROTATOR CUFF REPAIR Right          The following portions of the patient's history were reviewed and updated as appropriate: allergies, current medications, past family history, past medical history, past social history, past surgical history and problem list    Please note :  Voice dictation software has been used to create this document  There may be inadvertent transcription errors      [id number] [id number] Gladys Estrella

## 2021-06-14 ENCOUNTER — APPOINTMENT (OUTPATIENT)
Dept: LAB | Facility: CLINIC | Age: 76
End: 2021-06-14
Payer: MEDICARE

## 2021-06-14 DIAGNOSIS — N18.2 STAGE 2 CHRONIC KIDNEY DISEASE: ICD-10-CM

## 2021-06-14 DIAGNOSIS — N20.0 NEPHROLITHIASIS: ICD-10-CM

## 2021-06-14 LAB
ALBUMIN SERPL BCP-MCNC: 3.3 G/DL (ref 3.5–5)
ALP SERPL-CCNC: 106 U/L (ref 46–116)
ALT SERPL W P-5'-P-CCNC: 24 U/L (ref 12–78)
ANION GAP SERPL CALCULATED.3IONS-SCNC: 6 MMOL/L (ref 4–13)
AST SERPL W P-5'-P-CCNC: 15 U/L (ref 5–45)
BACTERIA UR QL AUTO: ABNORMAL /HPF
BILIRUB SERPL-MCNC: 0.4 MG/DL (ref 0.2–1)
BILIRUB UR QL STRIP: NEGATIVE
BUN SERPL-MCNC: 19 MG/DL (ref 5–25)
CALCIUM ALBUM COR SERPL-MCNC: 10.2 MG/DL (ref 8.3–10.1)
CALCIUM SERPL-MCNC: 9.6 MG/DL (ref 8.3–10.1)
CHLORIDE SERPL-SCNC: 107 MMOL/L (ref 100–108)
CLARITY UR: ABNORMAL
CO2 SERPL-SCNC: 28 MMOL/L (ref 21–32)
COLOR UR: YELLOW
CREAT SERPL-MCNC: 0.71 MG/DL (ref 0.6–1.3)
CREAT UR-MCNC: 188 MG/DL
GFR SERPL CREATININE-BSD FRML MDRD: 83 ML/MIN/1.73SQ M
GLUCOSE P FAST SERPL-MCNC: 159 MG/DL (ref 65–99)
GLUCOSE UR STRIP-MCNC: NEGATIVE MG/DL
HGB UR QL STRIP.AUTO: NEGATIVE
HYALINE CASTS #/AREA URNS LPF: ABNORMAL /LPF
KETONES UR STRIP-MCNC: ABNORMAL MG/DL
LEUKOCYTE ESTERASE UR QL STRIP: NEGATIVE
MAGNESIUM SERPL-MCNC: 2.2 MG/DL (ref 1.6–2.6)
MICROALBUMIN UR-MCNC: 62 MG/L (ref 0–20)
MICROALBUMIN/CREAT 24H UR: 33 MG/G CREATININE (ref 0–30)
NITRITE UR QL STRIP: NEGATIVE
NON-SQ EPI CELLS URNS QL MICRO: ABNORMAL /HPF
PH UR STRIP.AUTO: 5.5 [PH]
PHOSPHATE SERPL-MCNC: 3.2 MG/DL (ref 2.3–4.1)
POTASSIUM SERPL-SCNC: 4.2 MMOL/L (ref 3.5–5.3)
PROT SERPL-MCNC: 6.5 G/DL (ref 6.4–8.2)
PROT UR STRIP-MCNC: NEGATIVE MG/DL
PTH-INTACT SERPL-MCNC: 40.3 PG/ML (ref 18.4–80.1)
RBC #/AREA URNS AUTO: ABNORMAL /HPF
SODIUM SERPL-SCNC: 141 MMOL/L (ref 136–145)
SP GR UR STRIP.AUTO: 1.02 (ref 1–1.03)
URATE SERPL-MCNC: 6.1 MG/DL (ref 2–6.8)
UROBILINOGEN UR QL STRIP.AUTO: 0.2 E.U./DL
WBC #/AREA URNS AUTO: ABNORMAL /HPF

## 2021-06-14 PROCEDURE — 82043 UR ALBUMIN QUANTITATIVE: CPT

## 2021-06-14 PROCEDURE — 83735 ASSAY OF MAGNESIUM: CPT

## 2021-06-14 PROCEDURE — 84550 ASSAY OF BLOOD/URIC ACID: CPT

## 2021-06-14 PROCEDURE — 36415 COLL VENOUS BLD VENIPUNCTURE: CPT

## 2021-06-14 PROCEDURE — 82570 ASSAY OF URINE CREATININE: CPT

## 2021-06-14 PROCEDURE — 81001 URINALYSIS AUTO W/SCOPE: CPT

## 2021-06-14 PROCEDURE — 80053 COMPREHEN METABOLIC PANEL: CPT

## 2021-06-14 PROCEDURE — 83970 ASSAY OF PARATHORMONE: CPT

## 2021-06-14 PROCEDURE — 84100 ASSAY OF PHOSPHORUS: CPT

## 2021-06-29 DIAGNOSIS — I47.1 PAROXYSMAL SVT (SUPRAVENTRICULAR TACHYCARDIA) (HCC): ICD-10-CM

## 2021-06-30 RX ORDER — APIXABAN 5 MG/1
TABLET, FILM COATED ORAL
Qty: 90 TABLET | Refills: 1 | Status: SHIPPED | OUTPATIENT
Start: 2021-06-30 | End: 2021-08-30 | Stop reason: SDUPTHER

## 2021-07-02 ENCOUNTER — OFFICE VISIT (OUTPATIENT)
Dept: OTOLARYNGOLOGY | Facility: CLINIC | Age: 76
End: 2021-07-02
Payer: MEDICARE

## 2021-07-02 VITALS
TEMPERATURE: 97.6 F | OXYGEN SATURATION: 98 % | WEIGHT: 179 LBS | HEIGHT: 63 IN | HEART RATE: 55 BPM | SYSTOLIC BLOOD PRESSURE: 130 MMHG | DIASTOLIC BLOOD PRESSURE: 72 MMHG | BODY MASS INDEX: 31.71 KG/M2

## 2021-07-02 DIAGNOSIS — H61.303 STENOSIS OF BOTH EXTERNAL AUDITORY CANALS: Primary | ICD-10-CM

## 2021-07-02 DIAGNOSIS — H61.23 BILATERAL IMPACTED CERUMEN: ICD-10-CM

## 2021-07-02 PROCEDURE — 99213 OFFICE O/P EST LOW 20 MIN: CPT | Performed by: OTOLARYNGOLOGY

## 2021-07-02 PROCEDURE — 69210 REMOVE IMPACTED EAR WAX UNI: CPT | Performed by: OTOLARYNGOLOGY

## 2021-07-02 NOTE — PROGRESS NOTES
Lalita Sauer is a 67 y  o female who presents for re-evaluation of ear canal stenosis and frequent cerumen impactions  Last visit was 3 months ago  She has not noticed any further hearing loss  She is concerned that she may again have further problems with cerumen impaction  No otalgia  No ear drainage  No sudden hearing changes  Past Medical History:   Diagnosis Date    Abnormal stress test     Angina pectoris (HCC)     1 wk ago    Atherosclerosis of renal artery (HCC)     CAD (coronary artery disease)     Chronic kidney disease, stage 2 (mild)     Diabetes mellitus (HCC)     niddm    Edema     Endometriosis     History of palpitations     History of poliomyelitis     Hyperlipidemia     Hypertension     Hypertensive chronic kidney disease with stage 1 through stage 4 chronic kidney disease, or unspecified chronic kidney disease     Hypothyroid     Kidney stones     [facility]     NSTEMI (non-ST elevated [facility] infarction) ([facility] ) 01/05/2019    Obesity     [facility]ox[facility]     Renal insufficiency     Rosacea     Thyroid nodule     Type 2 diabetes mellitus (HCC)     Wears glasses        /72 (BP Location: Left arm, Cuff Size: Standard)   Pulse 55   Temp 97 6 °F (36 4 °C) (Temporal)   Ht 5' 3" (1 6 m)   Wt 81 2 kg (179 lb)   SpO2 98%   BMI 31 71 kg/m²       Physical Exam   Constitutional: Oriented to person, place, and time  Well-developed and well-nourished, no apparent distress, non-toxic appearance  Cooperative, able to hear and answer questions without difficulty  Voice: Normal voice quality  Head: Normocephalic, atraumatic  No scars, masses or lesions  Face: Symmetric, no edema, no sinus tenderness  Eyes: Vision grossly intact, extra-ocular movement intact    Ears: External ear normal   Bilateral cerumen impaction after debridement the following was noted:  Bilateral tympanic membranes are intact with intact normal landmarks  No post-auricular erythema or tenderness  Nose: Septum midline, nares clear  Mucosa moist, turbinates well appearing  No crusting, polyps or discharge evident  Oral cavity: Dentition intact  Mucosa moist, lips normal   Tongue mobile, floor of mouth normal   Hard palate unremarkable  No masses or lesions  Oropharynx: Uvula is midline, soft palate normal   Unremarkable oropharyngeal inlet  Tonsils unremarkable  Posterior pharyngeal wall clear  No masses or lesions  Salivary glands:  Parotid glands and submandibular glands symmetric, no enlargement or tenderness  Neck: Normal laryngeal elevation with swallow  Trachea midline  No masses or lesions  No palpable adenopathy  Thyroid: normal in size, unremarkable without tenderness or palpable nodules  Pulmonary/Chest: Normal effort and rate  No respiratory distress  Musculoskeletal: Normal range of motion  Neurological: Cranial nerves 2-12 intact  Skin: Skin is warm and dry  Psychiatric: Normal mood and affect  Procedure: Cerumen debridement bilaterally    Indications: Cerumen impaction bilaterally    Procedure in detail: After informed verbal consent was obtained the ear was visualized using microscopy  Using cerumen loop, suction, and alligator forceps the cerumen was debrided and the findings below were seen  The patient tolerated the procedure well  FINDINGS:  Cerumen removed mostly with 5 suction  She tolerated this well  No otalgia  We were able to use a 4 mm speculum to evaluate the ear canal despite her stenosis  A/P: Cerumen impaction:  We discussed the nature of cerumen impaction  We discussed appropriate treat with hydrogen peroxide 4-5 drops once per week  We discussed discontinuing the use of any Q-Tips or other objects into the ear

## 2021-07-08 ENCOUNTER — OFFICE VISIT (OUTPATIENT)
Dept: CARDIOLOGY CLINIC | Facility: CLINIC | Age: 76
End: 2021-07-08
Payer: MEDICARE

## 2021-07-08 VITALS
DIASTOLIC BLOOD PRESSURE: 66 MMHG | SYSTOLIC BLOOD PRESSURE: 122 MMHG | HEART RATE: 81 BPM | BODY MASS INDEX: 31.71 KG/M2 | HEIGHT: 63 IN | WEIGHT: 179 LBS

## 2021-07-08 DIAGNOSIS — I47.1 PAROXYSMAL SVT (SUPRAVENTRICULAR TACHYCARDIA) (HCC): ICD-10-CM

## 2021-07-08 PROCEDURE — 93000 ELECTROCARDIOGRAM COMPLETE: CPT | Performed by: INTERNAL MEDICINE

## 2021-07-08 PROCEDURE — 99215 OFFICE O/P EST HI 40 MIN: CPT | Performed by: INTERNAL MEDICINE

## 2021-07-08 NOTE — PROGRESS NOTES
EPS Follow-up Patient Evaluation - Tamie Dudley 67 y o  female MRN: [id number]       Referring:Dr Ladi Richards    CC/HPI:   It was a pleasure to see Tamie Dudley in our arrhythmia clinic at [place] [address]  As you know she is a 67 y o  woman with HTN, DM II, CAD, VICKEY, stage 2 CKD, nephrolithiasis, who presented on 4/28/2021 for management of SVT  She follows with our partner Dr Ladi Richards  She had been having episodes of SVT that are very symptomatic  She had an episode of SVT while driving in January  She went to [facility] where they saw small elevation in troponin  She had LHC and stent was placed in the right coronary artery  She has had multiple episodes of SVT  Her loop recorder had shown heart rate elevated to 160-170 bpm   Last episode occurred about 2 weeks ago  She noted they last from 10 minutes to 1 hour  There were no triggering factors  After answering all questions she opted to proceed with EP study and SVT ablation  She had the procedure performed on June 4, 2021  She had para Hisian tachycardia which was ablated  Atrial tachycardia was not  inducible afterwards  Since the procedure, loop recorder has not shown any SVT episodes  Pauses noted on the monitor were due to undersensing  She denies feeling dizziness, chest pain, blurry vision during the episodes but it is very uncomfortable feeling for her  She denies swelling in the legs, orthopnea, PND or syncope        Past Medical History:  Past Medical History:   Diagnosis Date    Abnormal stress test     Angina pectoris (HCC)     1 wk ago    Atherosclerosis of renal artery (HCC)     CAD (coronary artery disease)     Chronic kidney disease, stage 2 (mild)     Diabetes mellitus (HCC)     niddm    Edema     Endometriosis     History of palpitations     History of poliomyelitis     Hyperlipidemia     Hypertension     Hypertensive chronic kidney disease with stage 1 through stage 4 chronic kidney disease, or unspecified chronic kidney disease     Hypothyroid     Kidney stones     Myocardial infarction ([place] [address] )     NSTEMI (non-ST elevated myocardial infarction) ([place] [address] ) 01/05/2019    Obesity     Paroxysmal SVT (supraventricular tachycardia) (HCC)     Renal insufficiency     Rosacea     Thyroid nodule     Type 2 diabetes mellitus ([place] [address] )     Wears glasses        Medications:      Current Outpatient Medications:     ACCU-CHEK KAILEY PLUS test strip, , Disp: , Rfl: 3    acetaminophen (TYLENOL) 325 mg tablet, Take 2 tablets (650 mg total) by mouth every 6 (six) hours as needed (pain, fever), Disp: 1 Bottle, Rfl: 0    ARMOUR THYROID 15 MG tablet, 15 mg every evening Before bedtime, Disp: , Rfl: 5    ARMOUR THYROID 30 MG tablet, Take 30 mg by mouth 2 (two) times a day Take one in the morning and one in the afternoon, Disp: , Rfl: 6    atorvastatin (LIPITOR) 40 mg tablet, Take 1 tablet (40 mg total) by mouth daily, Disp: 90 tablet, Rfl: 3    Blood Glucose Monitoring Suppl (ACCU-CHEK KAILEY CONNECT) w/Device KIT, by Does not apply route, Disp: , Rfl:     clopidogrel (PLAVIX) 75 mg tablet, TAKE ONE TABLET DAILY  , Disp: 90 tablet, Rfl: 2    doxazosin (CARDURA) 4 mg tablet, Take 1 tablet (4 mg total) by mouth daily, Disp: 90 tablet, Rfl: 3    Eliquis 5 MG, Take 1 tablet (5 mg total) by mouth 2 (two) times a day, Disp: 90 tablet, Rfl: 1    glucose blood (ACCU-CHEK KAILEY PLUS) test strip, by In Vitro route, Disp: , Rfl:     Lancets Misc  (ACCU-CHEK MULTICLIX LANCET DEV) KIT, by Does not apply route 2 (two) times a day, Disp: , Rfl:     losartan (COZAAR) 50 mg tablet, Take 1 tablet (50 mg total) by mouth daily, Disp: 90 tablet, Rfl: 3    metFORMIN (GLUCOPHAGE) 500 mg tablet, Take 500 mg by mouth 3 (three) times a day 2 tablets in the morning and one in the afternoon, Disp: , Rfl:     nitroglycerin (NITROSTAT) 0 4 mg SL tablet, Place 0 4 mg under the tongue, Disp: , Rfl:     metoprolol tartrate (LOPRESSOR) 25 mg tablet, Take 2 tablets in the morning and 1 tablet at night , Disp: 270 tablet, Rfl: 3    Restasis 0 05 % ophthalmic emulsion, , Disp: , Rfl:      Family History   Problem Relation Age of Onset    Asthma Mother     Stomach cancer Father      Social History     Socioeconomic History    Marital status:      Spouse name: Not on file    Number of children: Not on file    Years of education: Not on file    Highest education level: Not on file   Occupational History    Occupation: /    Tobacco Use    Smoking status: Never Smoker    Smokeless tobacco: Never Used   Vaping Use    Vaping Use: Never used   Substance and Sexual Activity    Alcohol use: No    Drug use: No    Sexual activity: Not on file   Other Topics Concern    Not on file   Social History Narrative    Consumes on average 2 cups of decaf coffee per day      Social Determinants of Health     Financial Resource Strain:     Difficulty of Paying Living Expenses:    Food Insecurity:     Worried About Running Out of Food in the Last Year:     [address] in the Last Year:    Transportation Needs:     Lack of Transportation (Medical):      Lack of Transportation (Non-Medical):    Physical Activity:     Days of Exercise per Week:     Minutes of Exercise per Session:    Stress:     Feeling of Stress :    Social Connections:     Frequency of Communication with Friends and Family:     Frequency of Social Gatherings with Friends and Family:     Attends [employer] Services:     Active Member of Clubs or Organizations:     Attends Club or Organization Meetings:     Marital Status:    Intimate Partner Violence:     Fear of Current or Ex-Partner:     Emotionally Abused:     Physically Abused:     Sexually Abused:      Social History     Tobacco Use   Smoking Status Never Smoker   Smokeless Tobacco Never Used     Social History     Substance and Sexual Activity   Alcohol Use No       Review of Systems   Constitutional: Negative for chills and fever  HENT: Negative  Eyes: Negative for blurred vision and double vision  Cardiovascular: Negative for chest pain, dyspnea on exertion, leg swelling, near-syncope, orthopnea, palpitations, paroxysmal nocturnal dyspnea and syncope  Respiratory: Negative for cough and sputum production  Endocrine: Negative  Skin: Negative  Negative for rash  Musculoskeletal: Negative  Negative for arthritis and joint pain  Gastrointestinal: Negative for abdominal pain, nausea and vomiting  Genitourinary: Negative  Neurological: Negative  Negative for dizziness and light-headedness  Psychiatric/Behavioral: Negative  The patient is not nervous/anxious  Objective:     Vitals: Blood pressure 122/66, pulse 81, height 5' 3" (1 6 m), weight 81 2 kg (179 lb)  , Body mass index is 31 71 kg/m²  ,        Physical Exam:    GEN: Lalita Sauer appears well, alert and oriented x 3, pleasant and cooperative   HEENT: pupils equal, round, and reactive to light; extraocular muscles intact  NECK: supple, no carotid bruits   HEART: regular rhythm, normal S1 and S2, no murmurs, clicks, gallops or rubs   LUNGS: clear to auscultation bilaterally; no wheezes, rales, or rhonchi   ABDOMEN: normal bowel sounds, soft, no tenderness, no distention  EXTREMITIES: peripheral pulses normal; no clubbing, cyanosis, or edema  NEURO: no focal findings   SKIN: normal without suspicious lesions on exposed skin      Labs & Results:  Below is the patient's most recent value for Albumin, ALT, AST, BUN, Calcium, Chloride, Cholesterol, CO2, Creatinine, GFR, Glucose, HDL, Hematocrit, Hemoglobin, Hemoglobin A1C, LDL, Magnesium, Phosphorus, Platelets, Potassium, PSA, Sodium, Triglycerides, and WBC     Lab Results   Component Value Date    ALT 24 06/14/2021    AST 15 06/14/2021    BUN 19 06/14/2021    CALCIUM 9 6 06/14/2021     06/14/2021    CHOL 267 12/08/2015    CO2 28 06/14/2021    CREATININE 0 71 06/14/2021    HDL 58 2021    HCT 45 7 2021    HGB 14 8 2021    HGBA1C 6 5 (H) 2021    MG 2 2 2021    PHOS 3 2 2021     2021    K 4 2 2021     2015    TRIG 231 (H) 2017    WBC 7 83 2021     Note: for a comprehensive list of the patient's lab results, access the Results Review activity  Cardiac testing:     I personally reviewed the ECG performed in the clinic on 21  It reveals  Normal sinus rhythm at  Eighty-one beats per minute  Echocardiograms:  Results for orders placed during the hospital encounter of 19   Echo complete with contrast if indicated    Narrative [address]  [address], [place]  [phone]    Transthoracic Echocardiogram  2D, M-mode, Doppler, and Color Doppler    Study date:  2019    Patient: [facility]  MR number: [id number]  Account number: [de-identified]  : 1945  Age: 75 years  Gender: Female  Status: Outpatient  Location: Echo lab  Height: 63 in  Weight: 176 7 lb  BP: 170/ 76 mmHg    Indications: paroxysmal supraventricular tachycardia    Diagnoses: I47 1 - Supraventricular tachycardia    Sonographer:  Latonya Mota RDCS  Primary Physician:  Guzman Jovel DO  Referring Physician:  SANTIAGO Molina  Group:  [id number]  Interpreting Physician:  Sandy Peterson MD    SUMMARY    LEFT VENTRICLE:  Size was normal   Systolic function was normal  Ejection fraction was estimated to be 60 %  There were no regional wall motion abnormalities  Wall thickness was at the upper limits of normal     MITRAL VALVE:  There was moderate to marked posterior annular calcification  There was mild regurgitation  AORTIC VALVE:  There was mild regurgitation  TRICUSPID VALVE:  There was mild regurgitation  Estimated peak PA pressure was 35 mmHg      HISTORY: PRIOR HISTORY: coronary artery disease, NSTEMI, hypertension, mixed hyperlipidemia, palpitations    PROCEDURE: The procedure was performed in the echo lab  This was a routine study  The transthoracic approach was used  The study included complete 2D imaging, M-mode, complete spectral Doppler, and color Doppler  Images were obtained from  the parasternal, apical, subcostal, and suprasternal notch acoustic windows  Image quality was adequate  LEFT VENTRICLE: Size was normal  Systolic function was normal  Ejection fraction was estimated to be 60 %  There were no regional wall motion abnormalities  Wall thickness was at the upper limits of normal     RIGHT VENTRICLE: The size was normal  Systolic function was normal  Wall thickness was normal     LEFT ATRIUM: Size was normal     RIGHT ATRIUM: Size was normal     MITRAL VALVE: There was moderate to marked posterior annular calcification  DOPPLER: There was no evidence for stenosis  There was mild regurgitation  AORTIC VALVE: The valve was trileaflet  Leaflets exhibited normal thickness and normal cuspal separation  DOPPLER: Transaortic velocity was within the normal range  There was no evidence for stenosis  There was mild regurgitation  TRICUSPID VALVE: The valve structure was normal  There was normal leaflet separation  DOPPLER: The transtricuspid velocity was within the normal range  There was no evidence for stenosis  There was mild regurgitation  Estimated peak PA  pressure was 35 mmHg  PULMONIC VALVE: Leaflets exhibited normal thickness, no calcification, and normal cuspal separation  DOPPLER: The transpulmonic velocity was within the normal range  There was no regurgitation  PERICARDIUM: There was no pericardial effusion  The pericardium was normal in appearance  AORTA: The root exhibited normal size      SYSTEM MEASUREMENT TABLES    2D  %FS: 42 55 %  AV Diam: 2 91 cm  EDV(Teich): 130 98 ml  EF(Teich): 73 23 %  ESV(Teich): 35 06 ml  IVSd: 1 19 cm  LA Area: 19 62 cm2  LA Diam: 4 04 cm  LVEDV MOD A4C: 96 5 ml  LVEF MOD A4C: 54 15 %  LVESV MOD A4C: 44 25 ml  LVIDd: 5 23 cm  LVIDs: 3 cm  LVLd A4C: 7 29 cm  LVLs A4C: 6 62 cm  LVPWd: 1 05 cm  RA Area: 14 81 cm2  RV DIAM: 3 12 cm  SV MOD A4C: 52 25 ml  SV(Teich): 95 91 ml    CW  AV Vmax: 1 76 m/s  AV maxP 35 mmHg  PV Vmax: 1 15 m/s  PV maxP 31 mmHg  TR Vmax: 2 9 m/s  TR maxP 56 mmHg    MM  TAPSE: 2 63 cm    PW  E': 0 09 m/s  E/E': 12 63  LVOT Vmax: 0 67 m/s  LVOT maxP 81 mmHg  MV A [place]: 1 31 m/s  MV Dec [place]: 4 26 m/s2  MV DecT: 254 88 ms  MV E [place]: 1 09 m/s  MV E/A Ratio: 0 83  RVOT Vmax: 0 96 m/s  RVOT maxPG: 3 65 mmHg    [facility]    Prepared and electronically signed by    Jacquelyn Goff MD  Signed 2019 11:43:23       No results found for this or any previous visit  Catheterizations:   No results found for this or any previous visit  Stress Tests:  No results found for this or any previous visit  Holter monitor -   Results for orders placed during the hospital encounter of 18   Holter monitor - 48 hour    Narrative PT NAME: Vasyl Conner  : 1945  AGE: 67 y o  GENDER: female  MRN: [id number]   PROCEDURE: Holter monitor - 48 hour        INDICATIONS:   Palpitations        Impression 1  The patient had predominantly sinus rhythm  2  Heart rate varied from 51 bpm to 100 bpm   The patients average heart   rate was 67 bpm     3  The patient had a holter monitor tracing done for 47 hours and 59   minutes  4  The patient had 159 ventricular ectopic activity versus Nicole   Phenomenon  There were 2 couplets  5  The patient had 2846 supraventricular ectopic beats  There were 69   couplets  There were 6 runs of SVT, the longest consisting of 8 beats and   occurring at 3:56:57 am     6  The longest R/R interval was 1 9 seconds  7  A diary was submitted with no symptoms recorded                 Results for orders placed during the hospital encounter of 06/10/19   Holter monitor - 24 hour Narrative PT NAME: Billy Odell  : 1945  AGE: 75 y o  GENDER: female  MRN: [id number]   PROCEDURE: Holter monitor - 24 hour        INDICATIONS:   A Holter monitor - 24 hour       Impression 1  The patient had predominantly sinus rhythm  2  The heart rate ranged from 57 to 103 beats per minute apart from runs  The average heart rate was 73 beats per minute  3  There were several atrial runs  In particular at 5 a m  There was an   approximately 30 second regular  run which was regular consistent with   SVT  The heart rate during this run was 141 beats per minute  4  No symptoms were reported during the monitoring phase  Chacho Dunlap MD         ASSESSMENT/ PLAN:  1  Paroxysmal SVT  · Multiple episodes of SVT  · Narrow complex, rates in 160-170s  · Occurring despite taking 50 mg metoprolol in the morning and 25 mg in the evening  · Disucssed options to manage - ablation vs  AAD  · She otped to proceed ablation procedure   · She underwent EP study and SVT ablation on 2021  · She had para-Hisian atrial tachycardia which was ablated  · No further episodes  · Continue to monitor on loop recorder    2  HTN  · Normotensive in the office   · Maintained on losartan 50 mg daily    3  DM II  · Last A1c was 7 in 2020   · Maintained on metformin    4  CAD  · Catheterization in 2017 had showed Proximal LAD 40% stenosis, mid circumflex 40% stenosis  OM2 80% stenosis prior to the bifurcation, and 70% stenosis of the ostium of the upper branch, mid RCA had a 70% stenosis  · PCI to RCA  In 2019  · Maintained on atorvastatin, metoprolol, Plavix    5  VICKEY  · stage 2 CKD  ·  history of nephrolithiasis    6   Stroke   · Had stroke in 2020   · Received IV tPA   · Ischemic stroke from posterior circulation  · On Plavix and Eliquis  · Patient was told the stroke was from atrial fibrillation however no atrial fibrillation has been detected  · Continue to monitor the loop    Follow-up as needed

## 2021-07-09 ENCOUNTER — OFFICE VISIT (OUTPATIENT)
Dept: CARDIOLOGY CLINIC | Facility: HOSPITAL | Age: 76
End: 2021-07-09
Payer: MEDICARE

## 2021-07-09 VITALS
BODY MASS INDEX: 31.71 KG/M2 | HEART RATE: 64 BPM | WEIGHT: 179 LBS | SYSTOLIC BLOOD PRESSURE: 126 MMHG | DIASTOLIC BLOOD PRESSURE: 64 MMHG | HEIGHT: 63 IN

## 2021-07-09 DIAGNOSIS — I47.1 PAROXYSMAL SVT (SUPRAVENTRICULAR TACHYCARDIA) (HCC): ICD-10-CM

## 2021-07-09 DIAGNOSIS — I25.10 CORONARY ARTERY DISEASE INVOLVING NATIVE CORONARY ARTERY OF NATIVE HEART WITHOUT ANGINA PECTORIS: Primary | ICD-10-CM

## 2021-07-09 DIAGNOSIS — I10 ESSENTIAL HYPERTENSION: ICD-10-CM

## 2021-07-09 PROCEDURE — 99214 OFFICE O/P EST MOD 30 MIN: CPT | Performed by: INTERNAL MEDICINE

## 2021-07-09 NOTE — PROGRESS NOTES
Cardiology Follow Up    Radha Ramirez  [birth year]  [id number]  [address] [place] [id number]    1  Coronary artery disease involving native coronary artery of native heart without angina pectoris     2  Essential hypertension     3  Paroxysmal SVT (supraventricular tachycardia) ([facility])         Discussion/Summary:    She recently underwent SVT ablation with good result  There was no further recurrence and she feels great  She had questions about ongoing medications  She wants to try to limit her number of pills  She was placed on Eliquis following a stroke at this point time I would like to continue surveillance with the loop recorder before discontinuing to make sure there is no atrial fibrillation  She can decreased metoprolol to 25 b i d  Follow-up at regular 6 month interval    Interval History:  79-year-old female history of coronary disease, hypertension, renal artery stenosis, paroxysmal supraventricular tachycardia Routine follow-up visit  In early January she was driving in the car and had an episode of tachycardia with chest tightness  She was brought to an outside hospital and was sent to [facility] where they found a small troponin elevation  Left heart catheterization was performed the stent was placed in the right coronary artery  Overall she has been doing great following her ablation  There has been no recurrence of SVT  She denies any chest pain, shortness of breath, palpitations, lightheadedness, dizziness, or syncope  Functionally she has been doing well    Has concerns about long-term medications    Problem List     Chest pain    Tachycardia    Hypertension    Diabetes mellitus ([facility] )    NSTEMI (non-ST elevated myocardial infarction) [facility])    Coronary artery disease involving native coronary artery of native heart with angina pectoris ([facility] ) Paroxysmal SVT (supraventricular tachycardia) (HCC)        Past Medical History:   Diagnosis Date    Abnormal stress test     Angina pectoris (HCC)     1 wk ago    Atherosclerosis of renal artery (HCC)     CAD (coronary artery disease)     Chronic kidney disease, stage 2 (mild)     Diabetes mellitus (HCC)     niddm    Edema     Endometriosis     History of palpitations     History of poliomyelitis     Hyperlipidemia     Hypertension     Hypertensive chronic kidney disease with stage 1 through stage 4 chronic kidney disease, or unspecified chronic kidney disease     Hypothyroid     Kidney stones     Myocardial infarction ([place] [address] )     NSTEMI (non-ST elevated myocardial infarction) ([place] [address] ) 01/05/2019    Obesity     Paroxysmal SVT (supraventricular tachycardia) ([facility])     Renal insufficiency     Rosacea     Thyroid nodule     Type 2 diabetes mellitus ([place] [address] )     Wears glasses      Social History     Socioeconomic History    Marital status:      Spouse name: Not on file    Number of children: Not on file    Years of education: Not on file    Highest education level: Not on file   Occupational History    Occupation: /    Tobacco Use    Smoking status: Never Smoker    Smokeless tobacco: Never Used   Vaping Use    Vaping Use: Never used   Substance and Sexual Activity    Alcohol use: No    Drug use: No    Sexual activity: Not on file   Other Topics Concern    Not on file   Social History Narrative    Consumes on average 2 cups of decaf coffee per day      Social Determinants of Health     Financial Resource Strain:     Difficulty of Paying Living Expenses:    Food Insecurity:     Worried About Running Out of Food in the Last Year:     [address] in the Last Year:    Transportation Needs:     Lack of Transportation (Medical):      Lack of Transportation (Non-Medical):    Physical Activity:     Days of Exercise per Week:     Minutes of Exercise per Session: Stress:     Feeling of Stress :    Social Connections:     Frequency of Communication with Friends and Family:     Frequency of Social Gatherings with Friends and Family:     Attends [employer] Services:     Active Member of Clubs or Organizations:     Attends Club or Organization Meetings:     Marital Status:    Intimate Partner Violence:     Fear of Current or Ex-Partner:     Emotionally Abused:     Physically Abused:     Sexually Abused:       Family History   Problem Relation Age of Onset    Asthma Mother     Stomach cancer Father      Past Surgical History:   Procedure Laterality Date    ACHILLES TENDON LENGTHENING Right     Achilles tendon stretch     CARDIAC CATHETERIZATION  2017    CARDIAC CATHETERIZATION  01/07/2019    Stent placed in RCA     CATARACT EXTRACTION Bilateral     COLONOSCOPY      HYSTERECTOMY      Total - Endometriosis     ROTATOR CUFF REPAIR Right        Current Outpatient Medications:     ACCU-CHEK KAILEY PLUS test strip, , Disp: , Rfl: 3    acetaminophen (TYLENOL) 325 mg tablet, Take 2 tablets (650 mg total) by mouth every 6 (six) hours as needed (pain, fever), Disp: 1 Bottle, Rfl: 0    ARMOUR THYROID 15 MG tablet, 15 mg every evening Before bedtime, Disp: , Rfl: 5    ARMOUR THYROID 30 MG tablet, Take 30 mg by mouth 2 (two) times a day Take one in the morning, Disp: , Rfl: 6    atorvastatin (LIPITOR) 40 mg tablet, Take 1 tablet (40 mg total) by mouth daily, Disp: 90 tablet, Rfl: 3    Blood Glucose Monitoring Suppl (ACCU-CHEK KAILEY CONNECT) w/Device KIT, by Does not apply route, Disp: , Rfl:     clopidogrel (PLAVIX) 75 mg tablet, TAKE ONE TABLET DAILY  , Disp: 90 tablet, Rfl: 2    doxazosin (CARDURA) 4 mg tablet, Take 1 tablet (4 mg total) by mouth daily, Disp: 90 tablet, Rfl: 3    Eliquis 5 MG, Take 1 tablet (5 mg total) by mouth 2 (two) times a day, Disp: 90 tablet, Rfl: 1    glucose blood (ACCU-CHEK KAILEY PLUS) test strip, by In Vitro route, Disp: , Rfl:    Lancets Misc  (ACCU-CHEK MULTICLIX LANCET DEV) KIT, by Does not apply route 2 (two) times a day, Disp: , Rfl:     losartan (COZAAR) 50 mg tablet, Take 1 tablet (50 mg total) by mouth daily, Disp: 90 tablet, Rfl: 3    metFORMIN (GLUCOPHAGE) 500 mg tablet, Take 500 mg by mouth 3 (three) times a day 2 tablets in the morning and one in the afternoon, Disp: , Rfl:     metoprolol tartrate (LOPRESSOR) 25 mg tablet, Take 2 tablets in the morning and 1 tablet at night , Disp: 270 tablet, Rfl: 3    nitroglycerin (NITROSTAT) 0 4 mg SL tablet, Place 0 4 mg under the tongue, Disp: , Rfl:     Restasis 0 05 % ophthalmic emulsion, , Disp: , Rfl:   Allergies   Allergen Reactions    Lisinopril Rash     Changed pigment of my skin     Amlodipine        Labs:     Chemistry        Component Value Date/Time     12/08/2015 1014    K 4 2 06/14/2021 0907    K 4 4 12/08/2015 1014     06/14/2021 0907     12/08/2015 1014    CO2 28 06/14/2021 0907    CO2 30 9 12/08/2015 1014    BUN 19 06/14/2021 0907    BUN 20 12/08/2015 1014    CREATININE 0 71 06/14/2021 0907    CREATININE 0 67 12/08/2015 1014        Component Value Date/Time    CALCIUM 9 6 06/14/2021 0907    CALCIUM 8 9 12/08/2015 1014    ALKPHOS 106 06/14/2021 0907    ALKPHOS 111 12/08/2015 1014    AST 15 06/14/2021 0907    AST 20 12/08/2015 1014    ALT 24 06/14/2021 0907    ALT 25 12/08/2015 1014    BILITOT 0 48 12/08/2015 1014            Lab Results   Component Value Date    CHOL 267 12/08/2015    CHOL 270 06/10/2015    CHOL 243 03/18/2015     Lab Results   Component Value Date    HDL 58 05/14/2021    HDL 51 06/26/2017    HDL 49 03/27/2017     Lab Results   Component Value Date    LDLCALC 158 (H) 06/26/2017    LDLCALC 153 (H) 03/27/2017    LDLCALC 157 (H) 11/22/2016     Lab Results   Component Value Date    TRIG 231 (H) 06/26/2017    TRIG 220 (H) 03/27/2017    TRIG 225 (H) 11/22/2016     No results found for: CHOLHDL    Imaging: No results found      ECG:  Sinus bradycardia PACs      Review of Systems   Constitutional: Negative  HENT: Negative  Eyes: Negative  Cardiovascular: Negative  Respiratory: Negative  Endocrine: Negative  Hematologic/Lymphatic: Negative  Skin: Negative  Musculoskeletal: Negative  Gastrointestinal: Negative  Genitourinary: Negative  Neurological: Negative  Psychiatric/Behavioral: Negative  Vitals:    07/09/21 1407   BP: 126/64   Pulse: 64     Vitals:    07/09/21 1407   Weight: 81 2 kg (179 lb)     Height: 5' 3" (160 cm)   Body mass index is 31 71 kg/m²  Physical Exam:  Vital signs reviewed  General:  Alert and cooperative, appears stated age, no acute distress  HEENT:  PERRLA, EOMI, no scleral icterus, no conjunctival pallor  Neck:  No lymphadenopathy, no thyromegaly, no carotid bruits, no elevated JVP  Heart:  Regular rate and rhythm, normal S1/S2, no S3/S4, no murmur, rubs or gallops  PMI nondisplaced  Lungs:  Clear to auscultation bilaterally, no wheezes rales or rhonchi  Abdomen:  Soft, non-tender, positive bowel sounds, no rebound or guarding,   no organomegaly   Extremities:  Normal range of motion    No clubbing, cyanosis or edema   Vascular:  2+ pedal pulses  Skin:  No rashes or lesions on exposed skin  Neurologic:  Cranial nerves II-XII grossly intact without focal deficits  Psych:  Normal mood and affect

## 2021-08-09 ENCOUNTER — APPOINTMENT (OUTPATIENT)
Dept: LAB | Facility: CLINIC | Age: 76
End: 2021-08-09
Payer: MEDICARE

## 2021-08-09 ENCOUNTER — TRANSCRIBE ORDERS (OUTPATIENT)
Dept: LAB | Facility: CLINIC | Age: 76
End: 2021-08-09

## 2021-08-09 DIAGNOSIS — E11.9 TYPE 2 DIABETES MELLITUS WITHOUT COMPLICATION, UNSPECIFIED WHETHER LONG TERM INSULIN USE (HCC): ICD-10-CM

## 2021-08-09 DIAGNOSIS — E11.9 TYPE 2 DIABETES MELLITUS WITHOUT COMPLICATION, UNSPECIFIED WHETHER LONG TERM INSULIN USE (HCC): Primary | ICD-10-CM

## 2021-08-09 LAB
EST. AVERAGE GLUCOSE BLD GHB EST-MCNC: 146 MG/DL
HBA1C MFR BLD: 6.7 %
INSULIN SERPL-ACNC: 14.4 MU/L (ref 3–25)
T3FREE SERPL-MCNC: 2.79 PG/ML (ref 2.3–4.2)
T4 FREE SERPL-MCNC: 0.85 NG/DL (ref 0.76–1.46)
TSH SERPL DL<=0.05 MIU/L-ACNC: 1.09 UIU/ML (ref 0.36–3.74)

## 2021-08-09 PROCEDURE — 84443 ASSAY THYROID STIM HORMONE: CPT

## 2021-08-09 PROCEDURE — 36415 COLL VENOUS BLD VENIPUNCTURE: CPT

## 2021-08-09 PROCEDURE — 84481 FREE ASSAY (FT-3): CPT

## 2021-08-09 PROCEDURE — 84439 ASSAY OF FREE THYROXINE: CPT

## 2021-08-09 PROCEDURE — 83036 HEMOGLOBIN GLYCOSYLATED A1C: CPT

## 2021-08-09 PROCEDURE — 83525 ASSAY OF INSULIN: CPT

## 2021-08-10 ENCOUNTER — HOSPITAL ENCOUNTER (OUTPATIENT)
Dept: MAMMOGRAPHY | Facility: HOSPITAL | Age: 76
Discharge: HOME | End: 2021-08-10
Payer: MEDICARE

## 2021-08-10 VITALS — WEIGHT: 179 LBS | HEIGHT: 63 IN | BODY MASS INDEX: 31.71 KG/M2

## 2021-08-10 DIAGNOSIS — Z12.31 OTHER SCREENING MAMMOGRAM: ICD-10-CM

## 2021-08-10 PROCEDURE — 77067 SCR MAMMO BI INCL CAD: CPT

## 2021-08-10 PROCEDURE — 77063 BREAST TOMOSYNTHESIS BI: CPT

## 2021-08-18 ENCOUNTER — TELEPHONE (OUTPATIENT)
Dept: UROLOGY | Facility: CLINIC | Age: 76
End: 2021-08-18

## 2021-08-30 ENCOUNTER — TELEPHONE (OUTPATIENT)
Dept: CARDIOLOGY CLINIC | Facility: CLINIC | Age: 76
End: 2021-08-30

## 2021-08-30 DIAGNOSIS — I47.1 PAROXYSMAL SVT (SUPRAVENTRICULAR TACHYCARDIA) (HCC): ICD-10-CM

## 2021-08-31 ENCOUNTER — REMOTE DEVICE CLINIC VISIT (OUTPATIENT)
Dept: CARDIOLOGY CLINIC | Facility: CLINIC | Age: 76
End: 2021-08-31
Payer: MEDICARE

## 2021-08-31 DIAGNOSIS — Z95.818 PRESENCE OF OTHER CARDIAC IMPLANTS AND GRAFTS: Primary | ICD-10-CM

## 2021-08-31 PROCEDURE — G2066 INTER DEVC REMOTE 30D: HCPCS | Performed by: INTERNAL MEDICINE

## 2021-08-31 PROCEDURE — 93298 REM INTERROG DEV EVAL SCRMS: CPT | Performed by: INTERNAL MEDICINE

## 2021-08-31 NOTE — PROGRESS NOTES
MDT LOOP   CARELINK TRANSMISSION: LOOP RECORDER  PRESENTING RHYTHM VS @ 68 BPM  BATTERY STATUS "OK " 32 TACHY EPISODES W/ EGRAMS SUGGESTING SVT @ 158-167 BPM  157 PAUSE EPISODES W/ EGRAMS SHOWING UNDERSENSING  NO PATIENT ACTIVATED EPISODES  NORMAL DEVICE FUNCTION   DL

## 2021-09-21 ENCOUNTER — TELEPHONE (OUTPATIENT)
Dept: CARDIOLOGY CLINIC | Facility: CLINIC | Age: 76
End: 2021-09-21

## 2021-09-21 NOTE — TELEPHONE ENCOUNTER
Please advise if patient needs pre dental antibiotics  Call from pt's dentist, Dr Elsy Javed  Pt there for routine cleaning now

## 2021-10-15 DIAGNOSIS — I47.1 PAROXYSMAL SVT (SUPRAVENTRICULAR TACHYCARDIA) (HCC): ICD-10-CM

## 2021-10-15 RX ORDER — APIXABAN 5 MG/1
TABLET, FILM COATED ORAL
Qty: 90 TABLET | Refills: 1 | Status: SHIPPED | OUTPATIENT
Start: 2021-10-15

## 2021-10-19 ENCOUNTER — OFFICE VISIT (OUTPATIENT)
Dept: OBGYN CLINIC | Facility: CLINIC | Age: 76
End: 2021-10-19
Payer: MEDICARE

## 2021-10-19 ENCOUNTER — APPOINTMENT (OUTPATIENT)
Dept: RADIOLOGY | Facility: MEDICAL CENTER | Age: 76
End: 2021-10-19
Payer: MEDICARE

## 2021-10-19 VITALS
HEART RATE: 64 BPM | WEIGHT: 179 LBS | SYSTOLIC BLOOD PRESSURE: 130 MMHG | BODY MASS INDEX: 31.71 KG/M2 | HEIGHT: 63 IN | DIASTOLIC BLOOD PRESSURE: 71 MMHG

## 2021-10-19 DIAGNOSIS — M75.121 COMPLETE TEAR OF RIGHT ROTATOR CUFF, UNSPECIFIED WHETHER TRAUMATIC: Primary | ICD-10-CM

## 2021-10-19 DIAGNOSIS — M75.01 ADHESIVE CAPSULITIS OF RIGHT SHOULDER: ICD-10-CM

## 2021-10-19 DIAGNOSIS — M25.511 RIGHT SHOULDER PAIN, UNSPECIFIED CHRONICITY: ICD-10-CM

## 2021-10-19 PROCEDURE — 73030 X-RAY EXAM OF SHOULDER: CPT

## 2021-10-19 PROCEDURE — 99203 OFFICE O/P NEW LOW 30 MIN: CPT | Performed by: ORTHOPAEDIC SURGERY

## 2021-10-19 RX ORDER — METFORMIN HYDROCHLORIDE 500 MG/1
TABLET, EXTENDED RELEASE ORAL
COMMUNITY
Start: 2021-08-31

## 2021-10-29 ENCOUNTER — TELEPHONE (OUTPATIENT)
Dept: INTERVENTIONAL RADIOLOGY/VASCULAR | Facility: HOSPITAL | Age: 76
End: 2021-10-29

## 2021-11-01 ENCOUNTER — HOSPITAL ENCOUNTER (OUTPATIENT)
Dept: RADIOLOGY | Facility: HOSPITAL | Age: 76
Discharge: HOME | End: 2021-11-01
Attending: ORTHOPAEDIC SURGERY | Admitting: ORTHOPAEDIC SURGERY
Payer: MEDICARE

## 2021-11-01 ENCOUNTER — HOSPITAL ENCOUNTER (OUTPATIENT)
Dept: MRI IMAGING | Facility: HOSPITAL | Age: 76
Discharge: HOME | End: 2021-11-01
Attending: ORTHOPAEDIC SURGERY
Payer: MEDICARE

## 2021-11-01 DIAGNOSIS — M75.121 COMPLETE TEAR OF RIGHT ROTATOR CUFF, UNSPECIFIED WHETHER TRAUMATIC: ICD-10-CM

## 2021-11-01 PROCEDURE — A9585 GADOBUTROL INJECTION: HCPCS | Performed by: ORTHOPAEDIC SURGERY

## 2021-11-01 PROCEDURE — 77002 NEEDLE LOCALIZATION BY XRAY: CPT

## 2021-11-01 PROCEDURE — 23350 INJECTION FOR SHOULDER X-RAY: CPT

## 2021-11-01 PROCEDURE — 73222 MRI JOINT UPR EXTREM W/DYE: CPT

## 2021-11-01 RX ORDER — SODIUM CHLORIDE 9 MG/ML
5 INJECTION INTRAVENOUS
Status: COMPLETED | OUTPATIENT
Start: 2021-11-01 | End: 2021-11-01

## 2021-11-01 RX ORDER — LIDOCAINE HYDROCHLORIDE 10 MG/ML
10 INJECTION, SOLUTION EPIDURAL; INFILTRATION; INTRACAUDAL; PERINEURAL
Status: COMPLETED | OUTPATIENT
Start: 2021-11-01 | End: 2021-11-01

## 2021-11-01 RX ADMIN — SODIUM CHLORIDE 15 ML: 9 INJECTION, SOLUTION INTRAMUSCULAR; INTRAVENOUS; SUBCUTANEOUS at 09:32

## 2021-11-01 RX ADMIN — GADOBUTROL 0.2 ML: 604.72 INJECTION INTRAVENOUS at 09:30

## 2021-11-01 RX ADMIN — IOHEXOL 5 ML: 300 INJECTION, SOLUTION INTRAVENOUS at 09:30

## 2021-11-01 RX ADMIN — LIDOCAINE HYDROCHLORIDE 7 ML: 10 INJECTION, SOLUTION EPIDURAL; INFILTRATION; INTRACAUDAL; PERINEURAL at 09:30

## 2021-11-02 DIAGNOSIS — I21.4 NSTEMI (NON-ST ELEVATED MYOCARDIAL INFARCTION) (HCC): ICD-10-CM

## 2021-11-02 RX ORDER — CLOPIDOGREL BISULFATE 75 MG/1
TABLET ORAL
Qty: 90 TABLET | Refills: 2 | Status: SHIPPED | OUTPATIENT
Start: 2021-11-02 | End: 2022-02-01 | Stop reason: SDUPTHER

## 2021-11-03 ENCOUNTER — REMOTE DEVICE CLINIC VISIT (OUTPATIENT)
Dept: CARDIOLOGY CLINIC | Facility: CLINIC | Age: 76
End: 2021-11-03
Payer: MEDICARE

## 2021-11-03 DIAGNOSIS — Z95.818 PRESENCE OF OTHER CARDIAC IMPLANTS AND GRAFTS: Primary | ICD-10-CM

## 2021-11-03 PROCEDURE — G2066 INTER DEVC REMOTE 30D: HCPCS | Performed by: INTERNAL MEDICINE

## 2021-11-03 PROCEDURE — 93298 REM INTERROG DEV EVAL SCRMS: CPT | Performed by: INTERNAL MEDICINE

## 2021-11-09 ENCOUNTER — OFFICE VISIT (OUTPATIENT)
Dept: OBGYN CLINIC | Facility: CLINIC | Age: 76
End: 2021-11-09
Payer: MEDICARE

## 2021-11-09 VITALS
SYSTOLIC BLOOD PRESSURE: 147 MMHG | HEIGHT: 63 IN | WEIGHT: 179 LBS | HEART RATE: 61 BPM | DIASTOLIC BLOOD PRESSURE: 71 MMHG | BODY MASS INDEX: 31.71 KG/M2

## 2021-11-09 DIAGNOSIS — M75.121 COMPLETE TEAR OF RIGHT ROTATOR CUFF, UNSPECIFIED WHETHER TRAUMATIC: Primary | ICD-10-CM

## 2021-11-09 DIAGNOSIS — E11.9 TYPE 2 DIABETES MELLITUS WITHOUT COMPLICATION, WITHOUT LONG-TERM CURRENT USE OF INSULIN (HCC): ICD-10-CM

## 2021-11-09 DIAGNOSIS — S40.011A CONTUSION OF RIGHT SHOULDER, INITIAL ENCOUNTER: ICD-10-CM

## 2021-11-09 DIAGNOSIS — E13.9 DIABETES 1.5, MANAGED AS TYPE 2 (HCC): ICD-10-CM

## 2021-11-09 PROCEDURE — 99213 OFFICE O/P EST LOW 20 MIN: CPT | Performed by: ORTHOPAEDIC SURGERY

## 2021-11-09 RX ORDER — CHLORHEXIDINE GLUCONATE 4 G/100ML
SOLUTION TOPICAL DAILY PRN
Status: CANCELLED | OUTPATIENT
Start: 2021-11-09

## 2021-11-10 ENCOUNTER — OFFICE VISIT (OUTPATIENT)
Dept: NEPHROLOGY | Facility: CLINIC | Age: 76
End: 2021-11-10
Payer: MEDICARE

## 2021-11-10 ENCOUNTER — TELEPHONE (OUTPATIENT)
Dept: OBGYN CLINIC | Facility: CLINIC | Age: 76
End: 2021-11-10

## 2021-11-10 VITALS
HEIGHT: 63 IN | DIASTOLIC BLOOD PRESSURE: 70 MMHG | HEART RATE: 74 BPM | WEIGHT: 178.6 LBS | BODY MASS INDEX: 31.64 KG/M2 | OXYGEN SATURATION: 98 % | SYSTOLIC BLOOD PRESSURE: 160 MMHG

## 2021-11-10 DIAGNOSIS — N18.2 STAGE 2 CHRONIC KIDNEY DISEASE: ICD-10-CM

## 2021-11-10 DIAGNOSIS — I70.1 RAS (RENAL ARTERY STENOSIS) (HCC): ICD-10-CM

## 2021-11-10 DIAGNOSIS — E53.8 VITAMIN B12 DEFICIENCY: ICD-10-CM

## 2021-11-10 DIAGNOSIS — E61.1 IRON DEFICIENCY: ICD-10-CM

## 2021-11-10 DIAGNOSIS — E11.9 TYPE 2 DIABETES MELLITUS WITHOUT COMPLICATION, WITHOUT LONG-TERM CURRENT USE OF INSULIN (HCC): Primary | ICD-10-CM

## 2021-11-10 DIAGNOSIS — I10 PRIMARY HYPERTENSION: ICD-10-CM

## 2021-11-10 DIAGNOSIS — R60.9 EDEMA, UNSPECIFIED TYPE: ICD-10-CM

## 2021-11-10 DIAGNOSIS — N20.0 NEPHROLITHIASIS: ICD-10-CM

## 2021-11-10 PROCEDURE — 99214 OFFICE O/P EST MOD 30 MIN: CPT | Performed by: INTERNAL MEDICINE

## 2021-11-10 NOTE — TELEPHONE ENCOUNTER
Torito Jeter scheduled elective shoulder surgery for Jan 13,2022  She would like to postpone her surgery due to weather (in case it's bad) and will call back when she decides on a new date

## 2021-11-26 ENCOUNTER — DOCUMENTATION (OUTPATIENT)
Dept: CARDIOLOGY CLINIC | Facility: HOSPITAL | Age: 76
End: 2021-11-26

## 2021-12-08 ENCOUNTER — APPOINTMENT (OUTPATIENT)
Dept: LAB | Facility: CLINIC | Age: 76
End: 2021-12-08
Payer: MEDICARE

## 2021-12-08 DIAGNOSIS — E11.9 TYPE 2 DIABETES MELLITUS WITHOUT COMPLICATION, WITHOUT LONG-TERM CURRENT USE OF INSULIN (HCC): ICD-10-CM

## 2021-12-08 DIAGNOSIS — E61.1 IRON DEFICIENCY: ICD-10-CM

## 2021-12-08 DIAGNOSIS — M75.121 COMPLETE TEAR OF RIGHT ROTATOR CUFF, UNSPECIFIED WHETHER TRAUMATIC: ICD-10-CM

## 2021-12-08 DIAGNOSIS — E53.8 VITAMIN B12 DEFICIENCY: ICD-10-CM

## 2021-12-08 DIAGNOSIS — E13.9 DIABETES 1.5, MANAGED AS TYPE 2 (HCC): ICD-10-CM

## 2021-12-08 LAB
BACTERIA UR QL AUTO: ABNORMAL /HPF
BILIRUB UR QL STRIP: NEGATIVE
CHOLEST SERPL-MCNC: 138 MG/DL
CLARITY UR: CLEAR
COLOR UR: YELLOW
CREAT UR-MCNC: 85.5 MG/DL
EST. AVERAGE GLUCOSE BLD GHB EST-MCNC: 146 MG/DL
FERRITIN SERPL-MCNC: 37 NG/ML (ref 8–388)
GLUCOSE UR STRIP-MCNC: NEGATIVE MG/DL
HBA1C MFR BLD: 6.7 %
HDLC SERPL-MCNC: 62 MG/DL
HGB UR QL STRIP.AUTO: NEGATIVE
HYALINE CASTS #/AREA URNS LPF: ABNORMAL /LPF
IRON SATN MFR SERPL: 33 % (ref 15–50)
IRON SERPL-MCNC: 94 UG/DL (ref 50–170)
KETONES UR STRIP-MCNC: NEGATIVE MG/DL
LDLC SERPL CALC-MCNC: 48 MG/DL (ref 0–100)
LEUKOCYTE ESTERASE UR QL STRIP: NEGATIVE
MICROALBUMIN UR-MCNC: 12.3 MG/L (ref 0–20)
MICROALBUMIN/CREAT 24H UR: 14 MG/G CREATININE (ref 0–30)
NITRITE UR QL STRIP: NEGATIVE
NON-SQ EPI CELLS URNS QL MICRO: ABNORMAL /HPF
NONHDLC SERPL-MCNC: 76 MG/DL
PH UR STRIP.AUTO: 6 [PH]
PROT UR STRIP-MCNC: NEGATIVE MG/DL
RBC #/AREA URNS AUTO: ABNORMAL /HPF
SP GR UR STRIP.AUTO: 1.02 (ref 1–1.03)
TIBC SERPL-MCNC: 282 UG/DL (ref 250–450)
TRIGL SERPL-MCNC: 142 MG/DL
UROBILINOGEN UR QL STRIP.AUTO: 0.2 E.U./DL
VIT B12 SERPL-MCNC: 694 PG/ML (ref 100–900)
WBC #/AREA URNS AUTO: ABNORMAL /HPF

## 2021-12-08 PROCEDURE — 83550 IRON BINDING TEST: CPT

## 2021-12-08 PROCEDURE — 81001 URINALYSIS AUTO W/SCOPE: CPT

## 2021-12-08 PROCEDURE — 83540 ASSAY OF IRON: CPT

## 2021-12-08 PROCEDURE — 82570 ASSAY OF URINE CREATININE: CPT

## 2021-12-08 PROCEDURE — 82607 VITAMIN B-12: CPT

## 2021-12-08 PROCEDURE — 82728 ASSAY OF FERRITIN: CPT

## 2021-12-08 PROCEDURE — 80061 LIPID PANEL: CPT

## 2021-12-08 PROCEDURE — 36415 COLL VENOUS BLD VENIPUNCTURE: CPT

## 2021-12-08 PROCEDURE — 83036 HEMOGLOBIN GLYCOSYLATED A1C: CPT

## 2021-12-08 PROCEDURE — 82043 UR ALBUMIN QUANTITATIVE: CPT

## 2022-01-25 DIAGNOSIS — I10 ESSENTIAL HYPERTENSION: ICD-10-CM

## 2022-01-25 DIAGNOSIS — I21.4 NSTEMI (NON-ST ELEVATED MYOCARDIAL INFARCTION) (HCC): ICD-10-CM

## 2022-01-25 RX ORDER — DOXAZOSIN MESYLATE 4 MG/1
4 TABLET ORAL DAILY
Qty: 90 TABLET | Refills: 3 | Status: SHIPPED | OUTPATIENT
Start: 2022-01-25 | End: 2022-04-21 | Stop reason: SDUPTHER

## 2022-01-25 RX ORDER — ATORVASTATIN CALCIUM 40 MG/1
40 TABLET, FILM COATED ORAL DAILY
Qty: 90 TABLET | Refills: 3 | Status: SHIPPED | OUTPATIENT
Start: 2022-01-25 | End: 2023-01-25

## 2022-02-01 DIAGNOSIS — I21.4 NSTEMI (NON-ST ELEVATED MYOCARDIAL INFARCTION) (HCC): ICD-10-CM

## 2022-02-01 RX ORDER — CLOPIDOGREL BISULFATE 75 MG/1
75 TABLET ORAL DAILY
Qty: 90 TABLET | Refills: 3 | Status: SHIPPED | OUTPATIENT
Start: 2022-02-01

## 2022-02-02 ENCOUNTER — REMOTE DEVICE CLINIC VISIT (OUTPATIENT)
Dept: CARDIOLOGY CLINIC | Facility: CLINIC | Age: 77
End: 2022-02-02
Payer: MEDICARE

## 2022-02-02 DIAGNOSIS — Z95.818 PRESENCE OF OTHER CARDIAC IMPLANTS AND GRAFTS: Primary | ICD-10-CM

## 2022-02-02 PROCEDURE — 93298 REM INTERROG DEV EVAL SCRMS: CPT | Performed by: INTERNAL MEDICINE

## 2022-02-02 PROCEDURE — G2066 INTER DEVC REMOTE 30D: HCPCS | Performed by: INTERNAL MEDICINE

## 2022-02-02 NOTE — PROGRESS NOTES
MDT LOOP   CARELINK TRANSMISSION: LOOP RECORDER  PRESENTING RHYTHM NSR @ 63 BPM  BATTERY STATUS "OK " 240 PAUSE EPISODES W/ EGRAMS SHOWING UNDERSENSING  NO PATIENT OR DEVICE ACTIVATED EPISODES  NORMAL DEVICE FUNCTION   DL Testosterone (By injection)   Testosterone (jorge [id number]-ter-one)  Treats low testosterone levels. Also treats breast cancer in women and delayed puberty in male teenagers. Brand Name(s): Aveed, Delatestryl, Depo-Testosterone, Depo-Testosterone Novaplus, PremierPro RX Depo-Testosterone, Testone CIK   There may be other brand names for this medicine. When This Medicine Should Not Be Used: This medicine is not right for everyone. You should not receive it if you had an allergic reaction to testosterone, benzyl benzoate, or refined castor oil. A man should not receive this medicine if he has breast cancer or prostate cancer. A woman should not receive this medicine if she is pregnant or breastfeeding. How to Use This Medicine:   Injectable  · Your doctor will prescribe your exact dose and tell you how often it should be given. This medicine is given as a shot into one of your muscles. It is usually given in the buttocks. · A nurse or other health provider will give you this medicine. · This medicine should come with a Medication Guide. Ask your pharmacist for a copy if you do not have one. · Missed dose: Call your doctor or pharmacist for instructions. Drugs and Foods to Avoid:   Ask your doctor or pharmacist before using any other medicine, including over-the-counter medicines, vitamins, and herbal products. · Some medicines can affect how testosterone works. Tell your doctor if you are using any of the following:   ¨ Oxyphenbutazone  ¨ Blood thinner (including warfarin)  ¨ Insulin or diabetes medicine that you take by mouth  ¨ Steroid medicine (including dexamethasone, hydrocortisone, methylprednisolone, prednisolone, prednisone)  Warnings While Using This Medicine:   · It is not safe to take this medicine during pregnancy. It could harm an unborn baby. Tell your doctor right away if you become pregnant.   · Tell your doctor if you have kidney disease, liver disease, diabetes, an enlarged prostate, heart disease, high cholesterol, lung disease, sleep apnea, or a history of heart attack or stroke. · This medicine may cause the following problems:  ¨ Serious lung reaction called pulmonary oil embolism (may be life-threatening)  ¨ Increased risk of prostate cancer  ¨ Increased numbers of red blood cells  ¨ Blood clot in your leg or lung  ¨ Slow growth in children  ¨ Increased risk of heart attack or stroke  ¨ Lower sperm count (with large doses)  · This medicine can be habit-forming. Do not use more than your prescribed dose. Call your doctor if you think your medicine is not working. · Your doctor will do lab tests at regular visits to check on the effects of this medicine. Keep all appointments. Possible Side Effects While Using This Medicine:   Call your doctor right away if you notice any of these side effects:  · Allergic reaction: Itching or hives, swelling in your face or hands, swelling or tingling in your mouth or throat, chest tightness, trouble breathing  · Change in how much or how often you urinate, trouble urinating  · Chest pain, cough, trouble breathing, dizziness, tightening of your throat, unusual sweating  · Dark urine or pale stools, nausea, vomiting, loss of appetite, stomach pain, yellow skin or eyes  · Pain, redness, or swelling in your arm or leg  · Swelling in your hands, ankles, or feet  · Unusual bleeding, bruising, or weakness  If you notice these less serious side effects, talk with your doctor:   · Acne, hoarse voice, facial hair growth (women)  · Changes in menstrual periods  · More erections than usual or erections that last a long time  · Pain or redness where the shot was given  · Swollen breasts (men)  If you notice other side effects that you think are caused by this medicine, tell your doctor. Call your doctor for medical advice about side effects.  You may report side effects to FDA at 7-477-FDA-0645  © 2017 [address] Information is for End User's use only and may not be sold, redistributed or otherwise used for commercial purposes. The above information is an  only. It is not intended as medical advice for individual conditions or treatments. Talk to your doctor, nurse or pharmacist before following any medical regimen to see if it is safe and effective for you.

## 2022-02-21 ENCOUNTER — TELEPHONE (OUTPATIENT)
Dept: CARDIOLOGY CLINIC | Facility: HOSPITAL | Age: 77
End: 2022-02-21

## 2022-02-21 NOTE — TELEPHONE ENCOUNTER
Samanta Longoria is approved for eliquis assistance through [employer] until 12/31/22   I will scan in her approval letter

## 2022-02-28 ENCOUNTER — APPOINTMENT (OUTPATIENT)
Dept: LAB | Facility: CLINIC | Age: 77
End: 2022-02-28
Payer: MEDICARE

## 2022-03-14 DIAGNOSIS — I10 HTN (HYPERTENSION): ICD-10-CM

## 2022-03-14 RX ORDER — LOSARTAN POTASSIUM 50 MG/1
50 TABLET ORAL DAILY
Qty: 90 TABLET | Refills: 3 | Status: SHIPPED | OUTPATIENT
Start: 2022-03-14 | End: 2022-06-14 | Stop reason: SDUPTHER

## 2022-04-21 ENCOUNTER — OFFICE VISIT (OUTPATIENT)
Dept: CARDIOLOGY CLINIC | Facility: HOSPITAL | Age: 77
End: 2022-04-21
Payer: MEDICARE

## 2022-04-21 VITALS
BODY MASS INDEX: 31.89 KG/M2 | DIASTOLIC BLOOD PRESSURE: 68 MMHG | WEIGHT: 180 LBS | OXYGEN SATURATION: 98 % | HEIGHT: 63 IN | SYSTOLIC BLOOD PRESSURE: 126 MMHG | HEART RATE: 67 BPM

## 2022-04-21 DIAGNOSIS — I70.1 RAS (RENAL ARTERY STENOSIS) (HCC): ICD-10-CM

## 2022-04-21 DIAGNOSIS — I10 ESSENTIAL HYPERTENSION: ICD-10-CM

## 2022-04-21 DIAGNOSIS — I10 PRIMARY HYPERTENSION: ICD-10-CM

## 2022-04-21 DIAGNOSIS — N18.2 STAGE 2 CHRONIC KIDNEY DISEASE: ICD-10-CM

## 2022-04-21 DIAGNOSIS — I25.10 CORONARY ARTERY DISEASE INVOLVING NATIVE CORONARY ARTERY OF NATIVE HEART WITHOUT ANGINA PECTORIS: Primary | ICD-10-CM

## 2022-04-21 DIAGNOSIS — I47.1 PAROXYSMAL SVT (SUPRAVENTRICULAR TACHYCARDIA) (HCC): ICD-10-CM

## 2022-04-21 DIAGNOSIS — R60.9 EDEMA, UNSPECIFIED TYPE: ICD-10-CM

## 2022-04-21 DIAGNOSIS — E78.00 HYPERCHOLESTEROLEMIA: ICD-10-CM

## 2022-04-21 PROCEDURE — 99214 OFFICE O/P EST MOD 30 MIN: CPT | Performed by: INTERNAL MEDICINE

## 2022-04-21 RX ORDER — DOXAZOSIN MESYLATE 4 MG/1
4 TABLET ORAL DAILY
Qty: 90 TABLET | Refills: 3 | Status: SHIPPED | OUTPATIENT
Start: 2022-04-21

## 2022-04-21 NOTE — PROGRESS NOTES
Cardiology Follow Up    Salvatore Henderson  [birth year]  [id number]  [address] [address]    1  Coronary artery disease involving native coronary artery of native heart without angina pectoris  Echo complete w/ contrast if indicated   2  Primary hypertension     3  Paroxysmal SVT (supraventricular tachycardia) ([facility])  Echo complete w/ contrast if indicated   4  VICKEY (renal artery stenosis) ([facility])     5  Stage 2 chronic kidney disease     6  Hypercholesterolemia     7  Edema, unspecified type  Echo complete w/ contrast if indicated       Discussion/Summary:    Overall she has been doing well from a cardiac standpoint  Coronary disease stable no complaints of angina  Following SVT ablation she has been doing excellent no further symptoms loop recorder has not shown any abnormalities  Blood pressure is well controlled lipids have been doing excellent on her current dose of statin  She is due for an echocardiogram as it has been about 3 years  There is a mild lower extremity edema will review  Follow-up in 8 months  Interval History:  58-year-old female history of coronary disease, hypertension, renal artery stenosis, paroxysmal supraventricular tachycardia Routine follow-up visit  In early January she was driving in the car and had an episode of tachycardia with chest tightness  She was brought to an outside hospital and was sent to [facility] where they found a small troponin elevation  Left heart catheterization was performed the stent was placed in the right coronary artery  Overall she has been doing excellent since the last visit  Denies any chest pain, shortness of breath, palpitations, lightheadedness, dizziness, or syncope  His been some mild lower extremity edema  Compression stockings have been helping  There is no PND orthopnea      Problem List Chest pain    Tachycardia    Hypertension    Diabetes mellitus (HCC)    NSTEMI (non-ST elevated myocardial infarction) ([place] [address] )    Coronary artery disease involving native coronary artery of native heart with angina pectoris ([facility])    Paroxysmal SVT (supraventricular tachycardia) ([facility])        Past Medical History:   Diagnosis Date    Abnormal stress test     Angina pectoris ([place] [address] )     1 wk ago    Atherosclerosis of renal artery ([place] [address] )     CAD (coronary artery disease)     Chronic kidney disease, stage 2 (mild)     Diabetes mellitus (HCC)     niddm    Edema     Endometriosis     History of palpitations     History of poliomyelitis     Hyperlipidemia     Hypertension     Hypertensive chronic kidney disease with stage 1 through stage 4 chronic kidney disease, or unspecified chronic kidney disease     Hypothyroid     Kidney stones     Myocardial infarction ([place] [address] )     NSTEMI (non-ST elevated myocardial infarction) ([place] [address] ) 01/05/2019    Obesity     Paroxysmal SVT (supraventricular tachycardia) ([facility]     Renal insufficiency     Rosacea     Thyroid nodule     Type 2 diabetes mellitus ([place] [address] )     Wears glasses      Social History     Socioeconomic History    Marital status:       Spouse name: Not on file    Number of children: Not on file    Years of education: Not on file    Highest education level: Not on file   Occupational History    Occupation: /    Tobacco Use    Smoking status: Never Smoker    Smokeless tobacco: Never Used   Vaping Use    Vaping Use: Never used   Substance and Sexual Activity    Alcohol use: No    Drug use: No    Sexual activity: Not on file   Other Topics Concern    Not on file   Social History Narrative    Consumes on average 2 cups of decaf coffee per day      Social Determinants of Health     Financial Resource Strain: Not on file   Food Insecurity: Not on file   Transportation Needs: Not on file   Physical Activity: Not on file   Stress: Not on file Social Connections: Not on file   Intimate Partner Violence: Not on file   Housing Stability: Not on file      Family History   Problem Relation Age of Onset    Asthma Mother     Stomach cancer Father     Breast cancer Sister 54    No Known Problems Daughter     No Known Problems Maternal Grandmother     No Known Problems Maternal Grandfather     No Known Problems Paternal Grandmother     Heart attack Paternal Grandfather     No Known Problems Daughter     No Known Problems Daughter     No Known Problems Maternal Aunt     No Known Problems Paternal Aunt     No Known Problems Paternal Aunt      Past Surgical History:   Procedure Laterality Date    ACHILLES TENDON LENGTHENING Right     Achilles tendon stretch     CARDIAC CATHETERIZATION  2017    CARDIAC CATHETERIZATION  01/07/2019    Stent placed in RCA     CATARACT EXTRACTION Bilateral     COLONOSCOPY      FL INJECTION RIGHT SHOULDER (ARTHROGRAM)  11/1/2021    HYSTERECTOMY      Total - Endometriosis     ROTATOR CUFF REPAIR Right        Current Outpatient Medications:     ACCU-CHEK KAILEY PLUS test strip, , Disp: , Rfl: 3    acetaminophen (TYLENOL) 325 mg tablet, Take 2 tablets (650 mg total) by mouth every 6 (six) hours as needed (pain, fever), Disp: 1 Bottle, Rfl: 0    ARMOUR THYROID 15 MG tablet, 15 mg every evening Before bedtime, Disp: , Rfl: 5    ARMOUR THYROID 30 MG tablet, Take 30 mg by mouth 2 (two) times a day Take one in the morning, Disp: , Rfl: 6    atorvastatin (LIPITOR) 40 mg tablet, Take 1 tablet (40 mg total) by mouth daily, Disp: 90 tablet, Rfl: 3    Blood Glucose Monitoring Suppl (ACCU-CHEK KAILEY CONNECT) w/Device KIT, by Does not apply route, Disp: , Rfl:     clopidogrel (PLAVIX) 75 mg tablet, Take 1 tablet (75 mg total) by mouth daily, Disp: 90 tablet, Rfl: 3    doxazosin (CARDURA) 4 mg tablet, Take 1 tablet (4 mg total) by mouth daily, Disp: 90 tablet, Rfl: 3    Eliquis 5 MG, Take 1 tablet (5 mg total) by mouth 2 (two) times a day, Disp: 90 tablet, Rfl: 1    glucose blood (ACCU-CHEK KAILEY PLUS) test strip, by In Vitro route, Disp: , Rfl:     Lancets Misc  (ACCU-CHEK MULTICLIX LANCET DEV) KIT, by Does not apply route 2 (two) times a day, Disp: , Rfl:     losartan (COZAAR) 50 mg tablet, Take 1 tablet (50 mg total) by mouth daily, Disp: 90 tablet, Rfl: 3    metFORMIN (GLUCOPHAGE) 500 mg tablet, Take 500 mg by mouth 3 (three) times a day 2 tablets in the morning and one in the afternoon, Disp: , Rfl:     metoprolol tartrate (LOPRESSOR) 25 mg tablet, Take 2 tablets in the morning and 1 tablet at night   (Patient taking differently: every 12 (twelve) hours  ), Disp: 270 tablet, Rfl: 3    nitroglycerin (NITROSTAT) 0 4 mg SL tablet, Place 0 4 mg under the tongue, Disp: , Rfl:     Restasis 0 05 % ophthalmic emulsion, , Disp: , Rfl:     metFORMIN (GLUCOPHAGE-XR) 500 mg 24 hr tablet, , Disp: , Rfl:   Allergies   Allergen Reactions    Lisinopril Rash     Changed pigment of my skin     Amlodipine        Labs:     Chemistry        Component Value Date/Time     12/08/2015 1014    K 4 2 06/14/2021 0907    K 4 4 12/08/2015 1014     06/14/2021 0907     12/08/2015 1014    CO2 28 06/14/2021 0907    CO2 30 9 12/08/2015 1014    BUN 19 06/14/2021 0907    BUN 20 12/08/2015 1014    CREATININE 0 71 06/14/2021 0907    CREATININE 0 67 12/08/2015 1014        Component Value Date/Time    CALCIUM 9 6 06/14/2021 0907    CALCIUM 8 9 12/08/2015 1014    ALKPHOS 106 06/14/2021 0907    ALKPHOS 111 12/08/2015 1014    AST 15 06/14/2021 0907    AST 20 12/08/2015 1014    ALT 24 06/14/2021 0907    ALT 25 12/08/2015 1014    BILITOT 0 48 12/08/2015 1014            Lab Results   Component Value Date    CHOL 267 12/08/2015    CHOL 270 06/10/2015    CHOL 243 03/18/2015     Lab Results   Component Value Date    HDL 62 12/08/2021    HDL 58 05/14/2021    HDL 51 06/26/2017     Lab Results   Component Value Date    LDLCALC 48 12/08/2021    [address] (H) 06/26/2017    LDLCALC 153 (H) 03/27/2017     Lab Results   Component Value Date    TRIG 142 12/08/2021    TRIG 231 (H) 06/26/2017    TRIG 220 (H) 03/27/2017     No results found for: CHOLHDL    Imaging: No results found  ECG:  Sinus bradycardia PACs      Review of Systems   Constitutional: Negative  HENT: Negative  Eyes: Negative  Cardiovascular: Negative  Respiratory: Negative  Endocrine: Negative  Hematologic/Lymphatic: Negative  Skin: Negative  Musculoskeletal: Negative  Gastrointestinal: Negative  Genitourinary: Negative  Neurological: Negative  Psychiatric/Behavioral: Negative  Vitals:    04/21/22 1332   BP: 126/68   Pulse: 67   SpO2: 98%     Vitals:    04/21/22 1332   Weight: 81 6 kg (180 lb)     Height: 5' 3" (160 cm)   Body mass index is 31 89 kg/m²  Physical Exam:  Vital signs reviewed  General:  Alert and cooperative, appears stated age, no acute distress  HEENT:  PERRLA, EOMI, no scleral icterus, no conjunctival pallor  Neck:  No lymphadenopathy, no thyromegaly, no carotid bruits, no elevated JVP  Heart:  Regular rate and rhythm, normal S1/S2, no S3/S4, no murmur, rubs or gallops  PMI nondisplaced  Lungs:  Clear to auscultation bilaterally, no wheezes rales or rhonchi  Abdomen:  Soft, non-tender, positive bowel sounds, no rebound or guarding,   no organomegaly   Extremities:  Normal range of motion    No clubbing, cyanosis or edema   Vascular:  2+ pedal pulses  Skin:  No rashes or lesions on exposed skin  Neurologic:  Cranial nerves II-XII grossly intact without focal deficits  Psych:  Normal mood and affect

## 2022-05-04 ENCOUNTER — REMOTE DEVICE CLINIC VISIT (OUTPATIENT)
Dept: CARDIOLOGY CLINIC | Facility: CLINIC | Age: 77
End: 2022-05-04
Payer: MEDICARE

## 2022-05-04 DIAGNOSIS — Z95.818 PRESENCE OF OTHER CARDIAC IMPLANTS AND GRAFTS: Primary | ICD-10-CM

## 2022-05-04 PROCEDURE — G2066 INTER DEVC REMOTE 30D: HCPCS | Performed by: INTERNAL MEDICINE

## 2022-05-04 PROCEDURE — 93298 REM INTERROG DEV EVAL SCRMS: CPT | Performed by: INTERNAL MEDICINE

## 2022-05-04 NOTE — PROGRESS NOTES
MDT LOOP   CARELINK TRANSMISSION: LOOP RECORDER  PRESENTING RHYTHM NSR @ 61 BPM  BATTERY STATUS "OK " 80 PAUSE EPISODES W/ EGRAMS SHOWING UNDERSENSING NO PATIENT ACTIVATED EPISODES  NORMAL DEVICE FUNCTION   DL

## 2022-05-31 ENCOUNTER — TELEPHONE (OUTPATIENT)
Dept: NEPHROLOGY | Facility: CLINIC | Age: 77
End: 2022-05-31

## 2022-05-31 DIAGNOSIS — N20.0 NEPHROLITHIASIS: ICD-10-CM

## 2022-05-31 DIAGNOSIS — I10 ESSENTIAL HYPERTENSION: Primary | ICD-10-CM

## 2022-05-31 DIAGNOSIS — N18.2 STAGE 2 CHRONIC KIDNEY DISEASE: ICD-10-CM

## 2022-05-31 NOTE — TELEPHONE ENCOUNTER
Left message for patient to have urine and fasting blood work done prior to appointment next week  Told patient to call back if any questions

## 2022-06-01 ENCOUNTER — APPOINTMENT (OUTPATIENT)
Dept: LAB | Facility: CLINIC | Age: 77
End: 2022-06-01
Payer: MEDICARE

## 2022-06-01 DIAGNOSIS — N20.0 NEPHROLITHIASIS: ICD-10-CM

## 2022-06-01 DIAGNOSIS — N18.2 STAGE 2 CHRONIC KIDNEY DISEASE: ICD-10-CM

## 2022-06-01 DIAGNOSIS — I10 ESSENTIAL HYPERTENSION: ICD-10-CM

## 2022-06-01 LAB
ANION GAP SERPL CALCULATED.3IONS-SCNC: 5 MMOL/L (ref 4–13)
BACTERIA UR QL AUTO: ABNORMAL /HPF
BILIRUB UR QL STRIP: NEGATIVE
BUN SERPL-MCNC: 23 MG/DL (ref 5–25)
CALCIUM SERPL-MCNC: 9.4 MG/DL (ref 8.3–10.1)
CHLORIDE SERPL-SCNC: 111 MMOL/L (ref 100–108)
CLARITY UR: ABNORMAL
CO2 SERPL-SCNC: 24 MMOL/L (ref 21–32)
COLOR UR: ABNORMAL
CREAT SERPL-MCNC: 0.79 MG/DL (ref 0.6–1.3)
CREAT UR-MCNC: 132 MG/DL
GFR SERPL CREATININE-BSD FRML MDRD: 72 ML/MIN/1.73SQ M
GLUCOSE P FAST SERPL-MCNC: 153 MG/DL (ref 65–99)
GLUCOSE UR STRIP-MCNC: NEGATIVE MG/DL
HGB UR QL STRIP.AUTO: NEGATIVE
KETONES UR STRIP-MCNC: NEGATIVE MG/DL
LEUKOCYTE ESTERASE UR QL STRIP: NEGATIVE
MICROALBUMIN UR-MCNC: 19.2 MG/L (ref 0–20)
MICROALBUMIN/CREAT 24H UR: 15 MG/G CREATININE (ref 0–30)
MUCOUS THREADS UR QL AUTO: ABNORMAL
NITRITE UR QL STRIP: NEGATIVE
NON-SQ EPI CELLS URNS QL MICRO: ABNORMAL /HPF
PH UR STRIP.AUTO: 5.5 [PH]
POTASSIUM SERPL-SCNC: 4.4 MMOL/L (ref 3.5–5.3)
PROT UR STRIP-MCNC: NEGATIVE MG/DL
RBC #/AREA URNS AUTO: ABNORMAL /HPF
SODIUM SERPL-SCNC: 140 MMOL/L (ref 136–145)
SP GR UR STRIP.AUTO: 1.02 (ref 1–1.03)
UROBILINOGEN UR STRIP-ACNC: <2 MG/DL
WBC #/AREA URNS AUTO: ABNORMAL /HPF

## 2022-06-01 PROCEDURE — 82043 UR ALBUMIN QUANTITATIVE: CPT

## 2022-06-01 PROCEDURE — 81001 URINALYSIS AUTO W/SCOPE: CPT

## 2022-06-01 PROCEDURE — 36415 COLL VENOUS BLD VENIPUNCTURE: CPT

## 2022-06-01 PROCEDURE — 82570 ASSAY OF URINE CREATININE: CPT

## 2022-06-01 PROCEDURE — 80048 BASIC METABOLIC PNL TOTAL CA: CPT

## 2022-06-03 ENCOUNTER — OFFICE VISIT (OUTPATIENT)
Dept: NEPHROLOGY | Facility: CLINIC | Age: 77
End: 2022-06-03
Payer: MEDICARE

## 2022-06-03 VITALS
HEART RATE: 62 BPM | WEIGHT: 177 LBS | OXYGEN SATURATION: 98 % | DIASTOLIC BLOOD PRESSURE: 58 MMHG | HEIGHT: 63 IN | BODY MASS INDEX: 31.36 KG/M2 | SYSTOLIC BLOOD PRESSURE: 126 MMHG

## 2022-06-03 DIAGNOSIS — N20.0 NEPHROLITHIASIS: ICD-10-CM

## 2022-06-03 DIAGNOSIS — N18.2 STAGE 2 CHRONIC KIDNEY DISEASE: Primary | ICD-10-CM

## 2022-06-03 DIAGNOSIS — I10 PRIMARY HYPERTENSION: ICD-10-CM

## 2022-06-03 DIAGNOSIS — E11.9 TYPE 2 DIABETES MELLITUS WITHOUT COMPLICATION, WITHOUT LONG-TERM CURRENT USE OF INSULIN (HCC): ICD-10-CM

## 2022-06-03 DIAGNOSIS — E55.9 VITAMIN D DEFICIENCY: ICD-10-CM

## 2022-06-03 DIAGNOSIS — E78.5 DYSLIPIDEMIA: ICD-10-CM

## 2022-06-03 DIAGNOSIS — E78.00 HYPERCHOLESTEROLEMIA: ICD-10-CM

## 2022-06-03 PROCEDURE — 99214 OFFICE O/P EST MOD 30 MIN: CPT | Performed by: INTERNAL MEDICINE

## 2022-06-03 NOTE — ASSESSMENT & PLAN NOTE
Lab Results   Component Value Date    HGBA1C 6 7 (H) 12/08/2021    she has well-controlled type 2 diabetes mellitus without micro albuminuria      Fortunately she has no proteinuria she cannot take an ACE I or an ARB due to skin lesions

## 2022-06-03 NOTE — ASSESSMENT & PLAN NOTE
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

## 2022-06-03 NOTE — ASSESSMENT & PLAN NOTE
Lab Results   Component Value Date    EGFR 72 06/01/2022    EGFR 83 06/14/2021    EGFR 79 06/04/2021    CREATININE 0 79 06/01/2022    CREATININE 0 71 06/14/2021    CREATININE 0 74 06/04/2021    she has stable stage 2 chronic kidney disease with a baseline creatinine of approximately   0 7 mg/dL   Explained the meaning of creatinine and GFR to her and she understood      Check a kidney ultrasound   No changes made to her medications

## 2022-06-03 NOTE — PROGRESS NOTES
[facility] [place], [place]    Name: Kirstie Mccracken  YOB: 1945      Assessment/Plan:           Problem List Items Addressed This Visit        Endocrine    Diabetes mellitus [facility])       Lab Results   Component Value Date    HGBA1C 6 7 (H) 12/08/2021    she has well-controlled type 2 diabetes mellitus without micro albuminuria  Fortunately she has no proteinuria she cannot take an ACE I or an ARB due to skin lesions              Cardiovascular and Mediastinum    Hypertension      Well controlled both here and at home              Genitourinary    Stage 2 chronic kidney disease - Primary     Lab Results   Component Value Date    EGFR 72 06/01/2022    EGFR 83 06/14/2021    EGFR 79 06/04/2021    CREATININE 0 79 06/01/2022    CREATININE 0 71 06/14/2021    CREATININE 0 74 06/04/2021    she has stable stage 2 chronic kidney disease with a baseline creatinine of approximately   0 7 mg/dL   Explained the meaning of creatinine and GFR to her and she understood  Check a kidney ultrasound   No changes made to her medications           Relevant Orders    US kidney and bladder    Vitamin D 25 hydroxy    Magnesium    Microalbumin / creatinine urine ratio    Protein / creatinine ratio, urine    Comprehensive metabolic panel    Lipid panel    Urinalysis with microscopic    Nephrolithiasis      She has small radiopaque stones on CT scan   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 oxalate stones   We reviewed the need for high fluid intake and its rationale, salt avoidance and not avoiding calcium in the diet              Other    Hypercholesterolemia      Under your supervision on atorvastatin             Other Visit Diagnoses     Vitamin D deficiency        Relevant Orders    Vitamin D 25 hydroxy    Dyslipidemia        Relevant Orders    Lipid panel            Subjective:      Patient ID: Kirstie Mccracken is a 67 y o  female      HPI Has a history of stage 2 chronic kidney disease with a baseline creatinine of 0 60 7 mg/dL, nephrolithiasis and hypertension  She has not had any stone attacks  She is tolerating her blood pressure medications without difficulty   home blood pressures range in the 120-130 range  FBS today 137    The following portions of the patient's history were reviewed and updated as appropriate: allergies, current medications, past family history, past medical history, past social history, past surgical history and problem list     Review of Systems   Constitutional: Negative for fatigue  HENT: Negative for hearing loss  Eyes: Negative for visual disturbance  Respiratory: Positive for shortness of breath  Has JORDAN with steps  Able to walk level ground   Cardiovascular: Negative for chest pain, palpitations and leg swelling  Gastrointestinal: Negative for abdominal pain, blood in stool, constipation and diarrhea  Genitourinary: Negative for difficulty urinating, dysuria and hematuria  Has stress incontinence and wears a pad  Urine is clear  Has 2 times nocturia and does not drink water in the evening   Musculoskeletal: Negative for arthralgias and back pain  Neurological: Negative for dizziness, weakness and light-headedness  Hematological: Bruises/bleeds easily  Psychiatric/Behavioral: Negative for dysphoric mood  Social History     Socioeconomic History    Marital status:       Spouse name: None    Number of children: None    Years of education: None    Highest education level: None   Occupational History    Occupation: /    Tobacco Use    Smoking status: Never Smoker    Smokeless tobacco: Never Used   Vaping Use    Vaping Use: Never used   Substance and Sexual Activity    Alcohol use: No    Drug use: No    Sexual activity: None   Other Topics Concern    None   Social History Narrative    Consumes on average 2 cups of decaf coffee per day      Social Determinants of Health     Financial Resource Strain: Not on file   Food Insecurity: Not on file   Transportation Needs: Not on file   Physical Activity: Not on file   Stress: Not on file   Social Connections: Not on file   Intimate Partner Violence: Not on file   Housing Stability: Not on file     Past Medical History:   Diagnosis Date    Abnormal stress test     Angina pectoris ([facility] )     1 wk ago    Atherosclerosis of renal artery ([facility] )     CAD (coronary artery disease)     Chronic kidney disease, stage 2 (mild)     Diabetes mellitus (HCC)     niddm    Edema     Endometriosis     History of palpitations     History of poliomyelitis     Hyperlipidemia     Hypertension     Hypertensive chronic kidney disease with stage 1 through stage 4 chronic kidney disease, or unspecified chronic kidney disease     Hypothyroid     Kidney stones     Myocardial infarction ([place] [address] )     NSTEMI (non-ST elevated myocardial infarction) ([place] [address] ) 01/05/2019    Obesity     [facility]oxys[facility] tachycardia) [facility]     Renal insufficiency     Rosacea     Thyroid nodule     Type 2 diabetes mellitus ([place] [address] )     Wears glasses      Past Surgical History:   Procedure Laterality Date    ACHILLES TENDON LENGTHENING Right     Achilles tendon stretch     CARDIAC CATHETERIZATION  2017    CARDIAC CATHETERIZATION  01/07/2019    Stent placed in RCA     CATARACT EXTRACTION Bilateral     COLONOSCOPY      FL INJECTION RIGHT SHOULDER (ARTHROGRAM)  11/1/2021    HYSTERECTOMY      Total - Endometriosis     ROTATOR CUFF REPAIR Right        Current Outpatient Medications:     ACCU-CHEK KAILEY PLUS test strip, , Disp: , Rfl: 3    acetaminophen (TYLENOL) 325 mg tablet, Take 2 tablets (650 mg total) by mouth every 6 (six) hours as needed (pain, fever), Disp: 1 Bottle, Rfl: 0    ARMOUR THYROID 15 MG tablet, 15 mg every evening Before bedtime, Disp: , Rfl: 5    ARMOUR THYROID 30 MG tablet, Take 30 mg by mouth 2 (two) times a day Take one in the morning, Disp: , Rfl: 6    atorvastatin (LIPITOR) 40 mg tablet, Take 1 tablet (40 mg total) by mouth daily, Disp: 90 tablet, Rfl: 3    Blood Glucose Monitoring Suppl (ACCU-CHEK KAILEY CONNECT) w/Device KIT, by Does not apply route, Disp: , Rfl:     clopidogrel (PLAVIX) 75 mg tablet, Take 1 tablet (75 mg total) by mouth daily, Disp: 90 tablet, Rfl: 3    doxazosin (CARDURA) 4 mg tablet, Take 1 tablet (4 mg total) by mouth daily, Disp: 90 tablet, Rfl: 3    Eliquis 5 MG, Take 1 tablet (5 mg total) by mouth 2 (two) times a day, Disp: 90 tablet, Rfl: 1    glucose blood test strip, by In Vitro route, Disp: , Rfl:     Lancets Misc  (ACCU-CHEK MULTICLIX LANCET DEV) KIT, by Does not apply route 2 (two) times a day, Disp: , Rfl:     losartan (COZAAR) 50 mg tablet, Take 1 tablet (50 mg total) by mouth daily, Disp: 90 tablet, Rfl: 3    metFORMIN (GLUCOPHAGE) 500 mg tablet, Take 500 mg by mouth 3 (three) times a day 2 tablets in the morning and one in the afternoon, Disp: , Rfl:     metFORMIN (GLUCOPHAGE-XR) 500 mg 24 hr tablet, , Disp: , Rfl:     metoprolol tartrate (LOPRESSOR) 25 mg tablet, Take 2 tablets in the morning and 1 tablet at night   (Patient taking differently: every 12 (twelve) hours), Disp: 270 tablet, Rfl: 3    nitroglycerin (NITROSTAT) 0 4 mg SL tablet, Place 0 4 mg under the tongue, Disp: , Rfl:     Restasis 0 05 % ophthalmic emulsion, , Disp: , Rfl:     Lab Results   Component Value Date     12/08/2015    SODIUM 140 06/01/2022    K 4 4 06/01/2022     (H) 06/01/2022    CO2 24 06/01/2022    ANIONGAP 9 12/08/2015    AGAP 5 06/01/2022    BUN 23 06/01/2022    CREATININE 0 79 06/01/2022    GLUC 157 (H) 06/04/2021    GLUF 153 (H) 06/01/2022    CALCIUM 9 4 06/01/2022    AST 15 06/14/2021    ALT 24 06/14/2021    ALKPHOS 106 06/14/2021    PROT 7 0 12/08/2015    TP 6 5 06/14/2021    BILITOT 0 48 12/08/2015    TBILI 0 40 06/14/2021    EGFR 72 06/01/2022     Lab Results   Component Value Date    WBC 7 83 06/04/2021    HGB 14 8 06/04/2021 HCT 45 7 06/04/2021    MCV 94 06/04/2021     06/04/2021     Lab Results   Component Value Date    CHOLESTEROL 138 12/08/2021    CHOLESTEROL 255 (H) 06/26/2017    CHOLESTEROL 246 (H) 03/27/2017     Lab Results   Component Value Date    HDL 62 12/08/2021    HDL 58 05/14/2021    HDL 51 06/26/2017     Lab Results   Component Value Date    LDLCALC 48 12/08/2021    LDLCALC 158 (H) 06/26/2017    LDLCALC 153 (H) 03/27/2017     Lab Results   Component Value Date    TRIG 142 12/08/2021    TRIG 231 (H) 06/26/2017    TRIG 220 (H) 03/27/2017     No results found for: [place], [place]  Lab Results   Component Value Date    [id number] 1 980 02/28/2022     Lab Results   Component Value Date    PTH 40 3 06/14/2021    CALCIUM 9 4 06/01/2022    PHOS 3 2 06/14/2021     No results found for: SPEP, UPEP  No results found for: LEALBUR, [id number]        Objective:      /58 (BP Location: Left arm, Patient Position: Sitting, Cuff Size: Large)   Pulse 62   Ht 5' 3" (1 6 m)   Wt 80 3 kg (177 lb)   SpO2 98%   BMI 31 35 kg/m²          Physical Exam  Vitals reviewed  Constitutional:       General: She is not in acute distress  Appearance: She is normal weight  She is not toxic-appearing  HENT:      Head: Normocephalic and atraumatic  Right Ear: External ear normal       Left Ear: External ear normal    Eyes:      Extraocular Movements: Extraocular movements intact  Conjunctiva/sclera: Conjunctivae normal       Pupils: Pupils are equal, round, and reactive to light  Neck:      Vascular: No carotid bruit  Cardiovascular:      Rate and Rhythm: Normal rate and regular rhythm  Pulmonary:      Effort: Pulmonary effort is normal  No respiratory distress  Breath sounds: Normal breath sounds  No wheezing or rales  Abdominal:      General: Bowel sounds are normal  There is no distension  Palpations: Abdomen is soft  Tenderness: There is no abdominal tenderness     Musculoskeletal:         General: Normal range of motion  Cervical back: Normal range of motion  No rigidity  Right lower leg: No edema  Left lower leg: No edema  Lymphadenopathy:      Cervical: No cervical adenopathy  Skin:     General: Skin is warm and dry  Neurological:      General: No focal deficit present  Mental Status: She is alert and oriented to person, place, and time  Gait: Gait normal    Psychiatric:         Mood and Affect: Mood normal          Behavior: Behavior normal          Thought Content:  Thought content normal          Judgment: Judgment normal

## 2022-06-08 ENCOUNTER — APPOINTMENT (OUTPATIENT)
Dept: LAB | Facility: HOSPITAL | Age: 77
End: 2022-06-08
Attending: INTERNAL MEDICINE
Payer: MEDICARE

## 2022-06-08 ENCOUNTER — HOSPITAL ENCOUNTER (OUTPATIENT)
Dept: ULTRASOUND IMAGING | Facility: HOSPITAL | Age: 77
Discharge: HOME | End: 2022-06-08
Attending: INTERNAL MEDICINE
Payer: MEDICARE

## 2022-06-08 DIAGNOSIS — E55.9 VITAMIN D DEFICIENCY: ICD-10-CM

## 2022-06-08 DIAGNOSIS — E78.5 DYSLIPIDEMIA: ICD-10-CM

## 2022-06-08 DIAGNOSIS — N18.2 STAGE 2 CHRONIC KIDNEY DISEASE: ICD-10-CM

## 2022-06-08 LAB
25(OH)D3 SERPL-MCNC: 41.4 NG/ML (ref 30–100)
ALBUMIN SERPL BCP-MCNC: 3.6 G/DL (ref 3.5–5)
ALP SERPL-CCNC: 99 U/L (ref 46–116)
ALT SERPL W P-5'-P-CCNC: 29 U/L (ref 12–78)
ANION GAP SERPL CALCULATED.3IONS-SCNC: 10 MMOL/L (ref 4–13)
AST SERPL W P-5'-P-CCNC: 23 U/L (ref 5–45)
BACTERIA UR QL AUTO: NORMAL /HPF
BILIRUB SERPL-MCNC: 0.48 MG/DL (ref 0.2–1)
BILIRUB UR QL STRIP: NEGATIVE
BUN SERPL-MCNC: 16 MG/DL (ref 5–25)
CALCIUM SERPL-MCNC: 9.3 MG/DL (ref 8.3–10.1)
CHLORIDE SERPL-SCNC: 103 MMOL/L (ref 100–108)
CHOLEST SERPL-MCNC: 136 MG/DL
CLARITY UR: CLEAR
CO2 SERPL-SCNC: 26 MMOL/L (ref 21–32)
COLOR UR: YELLOW
CREAT SERPL-MCNC: 0.85 MG/DL (ref 0.6–1.3)
CREAT UR-MCNC: <13 MG/DL
CREAT UR-MCNC: <13 MG/DL
GFR SERPL CREATININE-BSD FRML MDRD: 66 ML/MIN/1.73SQ M
GLUCOSE P FAST SERPL-MCNC: 164 MG/DL (ref 65–99)
GLUCOSE UR STRIP-MCNC: NEGATIVE MG/DL
HDLC SERPL-MCNC: 63 MG/DL
HGB UR QL STRIP.AUTO: NEGATIVE
KETONES UR STRIP-MCNC: NEGATIVE MG/DL
LDLC SERPL CALC-MCNC: 46 MG/DL (ref 0–100)
LEUKOCYTE ESTERASE UR QL STRIP: NEGATIVE
MAGNESIUM SERPL-MCNC: 2 MG/DL (ref 1.6–2.6)
MICROALBUMIN UR-MCNC: <5 MG/L (ref 0–20)
NITRITE UR QL STRIP: NEGATIVE
NON-SQ EPI CELLS URNS QL MICRO: NORMAL /HPF
NONHDLC SERPL-MCNC: 73 MG/DL
PH UR STRIP.AUTO: 6.5 [PH]
POTASSIUM SERPL-SCNC: 4.2 MMOL/L (ref 3.5–5.3)
PROT SERPL-MCNC: 7 G/DL (ref 6.4–8.2)
PROT UR STRIP-MCNC: NEGATIVE MG/DL
PROT UR-MCNC: <6 MG/DL
RBC #/AREA URNS AUTO: NORMAL /HPF
SODIUM SERPL-SCNC: 139 MMOL/L (ref 136–145)
SP GR UR STRIP.AUTO: <=1.005 (ref 1–1.03)
TRIGL SERPL-MCNC: 134 MG/DL
UROBILINOGEN UR QL STRIP.AUTO: 0.2 E.U./DL
WBC #/AREA URNS AUTO: NORMAL /HPF

## 2022-06-08 PROCEDURE — 83735 ASSAY OF MAGNESIUM: CPT

## 2022-06-08 PROCEDURE — 82043 UR ALBUMIN QUANTITATIVE: CPT | Performed by: INTERNAL MEDICINE

## 2022-06-08 PROCEDURE — 76770 US EXAM ABDO BACK WALL COMP: CPT

## 2022-06-08 PROCEDURE — 80053 COMPREHEN METABOLIC PANEL: CPT

## 2022-06-08 PROCEDURE — 36415 COLL VENOUS BLD VENIPUNCTURE: CPT

## 2022-06-08 PROCEDURE — 84156 ASSAY OF PROTEIN URINE: CPT | Performed by: INTERNAL MEDICINE

## 2022-06-08 PROCEDURE — 82306 VITAMIN D 25 HYDROXY: CPT

## 2022-06-08 PROCEDURE — 81001 URINALYSIS AUTO W/SCOPE: CPT | Performed by: INTERNAL MEDICINE

## 2022-06-08 PROCEDURE — 80061 LIPID PANEL: CPT

## 2022-06-08 PROCEDURE — 82570 ASSAY OF URINE CREATININE: CPT | Performed by: INTERNAL MEDICINE

## 2022-06-14 DIAGNOSIS — I10 HTN (HYPERTENSION): ICD-10-CM

## 2022-06-14 DIAGNOSIS — I47.1 PAROXYSMAL SVT (SUPRAVENTRICULAR TACHYCARDIA) (HCC): ICD-10-CM

## 2022-06-14 RX ORDER — LOSARTAN POTASSIUM 50 MG/1
50 TABLET ORAL DAILY
Qty: 90 TABLET | Refills: 3 | Status: SHIPPED | OUTPATIENT
Start: 2022-06-14

## 2022-06-16 ENCOUNTER — APPOINTMENT (OUTPATIENT)
Dept: LAB | Facility: CLINIC | Age: 77
End: 2022-06-16
Payer: MEDICARE

## 2022-06-20 ENCOUNTER — APPOINTMENT (OUTPATIENT)
Dept: LAB | Facility: CLINIC | Age: 77
End: 2022-06-20
Payer: MEDICARE

## 2022-06-28 ENCOUNTER — OFFICE VISIT (OUTPATIENT)
Dept: OTOLARYNGOLOGY | Facility: CLINIC | Age: 77
End: 2022-06-28

## 2022-06-28 VITALS
HEIGHT: 63 IN | HEART RATE: 69 BPM | TEMPERATURE: 97.2 F | DIASTOLIC BLOOD PRESSURE: 60 MMHG | SYSTOLIC BLOOD PRESSURE: 122 MMHG | OXYGEN SATURATION: 95 % | WEIGHT: 179.4 LBS | BODY MASS INDEX: 31.79 KG/M2

## 2022-06-28 DIAGNOSIS — H61.20 IMPACTED CERUMEN, UNSPECIFIED LATERALITY: Primary | ICD-10-CM

## 2022-06-28 PROCEDURE — 99024 POSTOP FOLLOW-UP VISIT: CPT | Performed by: OTOLARYNGOLOGY

## 2022-07-19 ENCOUNTER — HOSPITAL ENCOUNTER (OUTPATIENT)
Dept: NON INVASIVE DIAGNOSTICS | Facility: HOSPITAL | Age: 77
Discharge: HOME | End: 2022-07-19
Attending: INTERNAL MEDICINE
Payer: MEDICARE

## 2022-07-19 VITALS
HEIGHT: 63 IN | SYSTOLIC BLOOD PRESSURE: 126 MMHG | DIASTOLIC BLOOD PRESSURE: 82 MMHG | BODY MASS INDEX: 31.71 KG/M2 | HEART RATE: 80 BPM | WEIGHT: 179 LBS

## 2022-07-19 DIAGNOSIS — I47.1 PAROXYSMAL SVT (SUPRAVENTRICULAR TACHYCARDIA) (HCC): ICD-10-CM

## 2022-07-19 DIAGNOSIS — R60.9 EDEMA, UNSPECIFIED TYPE: ICD-10-CM

## 2022-07-19 DIAGNOSIS — I25.10 CORONARY ARTERY DISEASE INVOLVING NATIVE CORONARY ARTERY OF NATIVE HEART WITHOUT ANGINA PECTORIS: ICD-10-CM

## 2022-07-19 LAB
AORTIC ROOT: 2.4 CM
AV PEAK GRADIENT: 9 MMHG
E WAVE DECELERATION TIME: 388 MS
FRACTIONAL SHORTENING: 32 % (ref 28–44)
INTERVENTRICULAR SEPTUM IN DIASTOLE (PARASTERNAL SHORT AXIS VIEW): 1.1 CM
INTERVENTRICULAR SEPTUM: 1.1 CM (ref 0.6–1.1)
LEFT ATRIUM AREA SYSTOLE SINGLE PLANE A4C: 16.3 CM2
LEFT ATRIUM SIZE: 4.3 CM
LEFT INTERNAL DIMENSION IN SYSTOLE: 3.4 CM (ref 2.1–4)
LEFT VENTRICULAR INTERNAL DIMENSION IN DIASTOLE: 5 CM (ref 3.5–6)
LEFT VENTRICULAR POSTERIOR WALL IN END DIASTOLE: 1.2 CM
LEFT VENTRICULAR STROKE VOLUME: 72 ML
LVSV (TEICH): 72 ML
MV E'TISSUE VEL-SEP: 14 CM/S
MV PEAK A VEL: 1.23 M/S
MV PEAK E VEL: 115 CM/S
MV STENOSIS PRESSURE HALF TIME: 113 MS
MV VALVE AREA P 1/2 METHOD: 1.95 CM2
PA SYSTOLIC PRESSURE: 38 MMHG
RA PRESSURE ESTIMATED: 5 MMHG
RIGHT ATRIUM AREA SYSTOLE A4C: 13.6 CM2
RIGHT VENTRICLE ID DIMENSION: 2.6 CM
RV PSP: 38 MMHG
SL CV LV EF: 65
SL CV PED ECHO LEFT VENTRICLE DIASTOLIC VOLUME (MOD BIPLANE) 2D: 119 ML
SL CV PED ECHO LEFT VENTRICLE SYSTOLIC VOLUME (MOD BIPLANE) 2D: 47 ML
TR MAX PG: 33 MMHG
TR PEAK VELOCITY: 2.9 M/S
TRICUSPID ANNULAR PLANE SYSTOLIC EXCURSION: 2 CM
TRICUSPID VALVE PEAK REGURGITATION VELOCITY: 2.86 M/S

## 2022-07-19 PROCEDURE — 93306 TTE W/DOPPLER COMPLETE: CPT | Performed by: INTERNAL MEDICINE

## 2022-07-19 PROCEDURE — 93306 TTE W/DOPPLER COMPLETE: CPT

## 2022-08-03 ENCOUNTER — REMOTE DEVICE CLINIC VISIT (OUTPATIENT)
Dept: CARDIOLOGY CLINIC | Facility: CLINIC | Age: 77
End: 2022-08-03
Payer: MEDICARE

## 2022-08-03 DIAGNOSIS — Z95.818 PRESENCE OF OTHER CARDIAC IMPLANTS AND GRAFTS: Primary | ICD-10-CM

## 2022-08-03 PROCEDURE — 93298 REM INTERROG DEV EVAL SCRMS: CPT | Performed by: INTERNAL MEDICINE

## 2022-08-03 PROCEDURE — G2066 INTER DEVC REMOTE 30D: HCPCS | Performed by: INTERNAL MEDICINE

## 2022-08-03 NOTE — PROGRESS NOTES
MDT LOOP   CARELINK TRANSMISSION: LOOP RECORDER  PRESENTING RHYTHM NSR @ 61 BPM  BATTERY STATUS "OK " 89 PAUSE EPISODES W/ EGRAMS SHOWING UNDERSENSING  NO PATIENT ACTIVATED EPISODES  NORMAL DEVICE FUNCTION   DL

## 2022-08-19 ENCOUNTER — APPOINTMENT (OUTPATIENT)
Dept: LAB | Facility: HOSPITAL | Age: 77
End: 2022-08-19
Payer: MEDICARE

## 2022-08-19 DIAGNOSIS — E03.9 HYPOTHYROIDISM, UNSPECIFIED TYPE: ICD-10-CM

## 2022-08-19 LAB
T3FREE SERPL-MCNC: 3.51 PG/ML (ref 2.3–4.2)
T4 FREE SERPL-MCNC: 0.96 NG/DL (ref 0.76–1.46)
TSH SERPL DL<=0.05 MIU/L-ACNC: 1.57 UIU/ML (ref 0.45–4.5)

## 2022-08-19 PROCEDURE — 84432 ASSAY OF THYROGLOBULIN: CPT

## 2022-08-19 PROCEDURE — 84443 ASSAY THYROID STIM HORMONE: CPT

## 2022-08-19 PROCEDURE — 84439 ASSAY OF FREE THYROXINE: CPT

## 2022-08-19 PROCEDURE — 36415 COLL VENOUS BLD VENIPUNCTURE: CPT

## 2022-08-19 PROCEDURE — 86800 THYROGLOBULIN ANTIBODY: CPT

## 2022-08-19 PROCEDURE — 84482 T3 REVERSE: CPT

## 2022-08-19 PROCEDURE — 84481 FREE ASSAY (FT-3): CPT

## 2022-08-19 PROCEDURE — 86376 MICROSOMAL ANTIBODY EACH: CPT

## 2022-08-20 LAB — THYROPEROXIDASE AB SERPL-ACNC: <8 IU/ML (ref 0–34)

## 2022-08-23 LAB
THYROGLOB AB SERPL-ACNC: <1 IU/ML (ref 0–0.9)
THYROGLOB SERPL-MCNC: 7.6 NG/ML (ref 1.5–38.5)

## 2022-08-25 LAB — T3REVERSE SERPL-MCNC: 19.2 NG/DL (ref 9.2–24.1)

## 2022-10-19 ENCOUNTER — APPOINTMENT (OUTPATIENT)
Dept: LAB | Facility: HOSPITAL | Age: 77
End: 2022-10-19
Payer: MEDICARE

## 2022-10-19 DIAGNOSIS — E11.9 TYPE 2 DIABETES MELLITUS WITHOUT COMPLICATION, UNSPECIFIED WHETHER LONG TERM INSULIN USE (HCC): ICD-10-CM

## 2022-10-19 LAB
ALBUMIN SERPL BCP-MCNC: 3.4 G/DL (ref 3.5–5)
ALP SERPL-CCNC: 97 U/L (ref 46–116)
ALT SERPL W P-5'-P-CCNC: 24 U/L (ref 12–78)
ANION GAP SERPL CALCULATED.3IONS-SCNC: 8 MMOL/L (ref 4–13)
AST SERPL W P-5'-P-CCNC: 16 U/L (ref 5–45)
BASOPHILS # BLD AUTO: 0.05 THOUSANDS/ÂΜL (ref 0–0.1)
BASOPHILS NFR BLD AUTO: 1 % (ref 0–1)
BILIRUB SERPL-MCNC: 0.68 MG/DL (ref 0.2–1)
BUN SERPL-MCNC: 22 MG/DL (ref 5–25)
CALCIUM ALBUM COR SERPL-MCNC: 9.5 MG/DL (ref 8.3–10.1)
CALCIUM SERPL-MCNC: 9 MG/DL (ref 8.3–10.1)
CHLORIDE SERPL-SCNC: 104 MMOL/L (ref 96–108)
CHOLEST SERPL-MCNC: 134 MG/DL
CO2 SERPL-SCNC: 28 MMOL/L (ref 21–32)
CREAT SERPL-MCNC: 0.7 MG/DL (ref 0.6–1.3)
EOSINOPHIL # BLD AUTO: 0.22 THOUSAND/ÂΜL (ref 0–0.61)
EOSINOPHIL NFR BLD AUTO: 3 % (ref 0–6)
ERYTHROCYTE [DISTWIDTH] IN BLOOD BY AUTOMATED COUNT: 12.4 % (ref 11.6–15.1)
EST. AVERAGE GLUCOSE BLD GHB EST-MCNC: 146 MG/DL
GFR SERPL CREATININE-BSD FRML MDRD: 83 ML/MIN/1.73SQ M
GLUCOSE P FAST SERPL-MCNC: 154 MG/DL (ref 65–99)
HBA1C MFR BLD: 6.7 %
HCT VFR BLD AUTO: 39.6 % (ref 34.8–46.1)
HDLC SERPL-MCNC: 60 MG/DL
HGB BLD-MCNC: 12.7 G/DL (ref 11.5–15.4)
IMM GRANULOCYTES # BLD AUTO: 0.03 THOUSAND/UL (ref 0–0.2)
IMM GRANULOCYTES NFR BLD AUTO: 0 % (ref 0–2)
LDLC SERPL CALC-MCNC: 48 MG/DL (ref 0–100)
LYMPHOCYTES # BLD AUTO: 1.34 THOUSANDS/ÂΜL (ref 0.6–4.47)
LYMPHOCYTES NFR BLD AUTO: 17 % (ref 14–44)
MCH RBC QN AUTO: 31.1 PG (ref 26.8–34.3)
MCHC RBC AUTO-ENTMCNC: 32.1 G/DL (ref 31.4–37.4)
MCV RBC AUTO: 97 FL (ref 82–98)
MONOCYTES # BLD AUTO: 0.42 THOUSAND/ÂΜL (ref 0.17–1.22)
MONOCYTES NFR BLD AUTO: 5 % (ref 4–12)
NEUTROPHILS # BLD AUTO: 5.87 THOUSANDS/ÂΜL (ref 1.85–7.62)
NEUTS SEG NFR BLD AUTO: 74 % (ref 43–75)
NONHDLC SERPL-MCNC: 74 MG/DL
NRBC BLD AUTO-RTO: 0 /100 WBCS
PLATELET # BLD AUTO: 202 THOUSANDS/UL (ref 149–390)
PMV BLD AUTO: 10.5 FL (ref 8.9–12.7)
POTASSIUM SERPL-SCNC: 4 MMOL/L (ref 3.5–5.3)
PROT SERPL-MCNC: 6.5 G/DL (ref 6.4–8.4)
RBC # BLD AUTO: 4.09 MILLION/UL (ref 3.81–5.12)
SODIUM SERPL-SCNC: 140 MMOL/L (ref 135–147)
TRIGL SERPL-MCNC: 132 MG/DL
WBC # BLD AUTO: 7.93 THOUSAND/UL (ref 4.31–10.16)

## 2022-10-19 PROCEDURE — 80053 COMPREHEN METABOLIC PANEL: CPT

## 2022-10-19 PROCEDURE — 36415 COLL VENOUS BLD VENIPUNCTURE: CPT

## 2022-10-19 PROCEDURE — 85025 COMPLETE CBC W/AUTO DIFF WBC: CPT

## 2022-10-19 PROCEDURE — 80061 LIPID PANEL: CPT

## 2022-10-19 PROCEDURE — 83036 HEMOGLOBIN GLYCOSYLATED A1C: CPT

## 2022-10-31 DIAGNOSIS — I47.1 PAROXYSMAL SVT (SUPRAVENTRICULAR TACHYCARDIA) (HCC): ICD-10-CM

## 2022-11-01 DIAGNOSIS — I21.4 NSTEMI (NON-ST ELEVATED MYOCARDIAL INFARCTION) (HCC): ICD-10-CM

## 2022-11-01 RX ORDER — CLOPIDOGREL BISULFATE 75 MG/1
75 TABLET ORAL DAILY
Qty: 90 TABLET | Refills: 3 | Status: SHIPPED | OUTPATIENT
Start: 2022-11-01

## 2022-11-02 ENCOUNTER — REMOTE DEVICE CLINIC VISIT (OUTPATIENT)
Dept: CARDIOLOGY CLINIC | Facility: CLINIC | Age: 77
End: 2022-11-02

## 2022-11-02 DIAGNOSIS — Z95.818 PRESENCE OF OTHER CARDIAC IMPLANTS AND GRAFTS: Primary | ICD-10-CM

## 2022-11-02 NOTE — PROGRESS NOTES
MDT LOOP   CARELINK TRANSMISSION: LOOP RECORDER  PRESENTING RHYTHM SB @ 57 BPM  BATTERY STATUS "OK " 99 PAUSE EPISODES W/ EGRAM SHOWING UNDERSENSING  NO PATIENT ACTIVATED EPISODES  NORMAL DEVICE FUNCTION   DL

## 2022-11-29 ENCOUNTER — HOSPITAL ENCOUNTER (OUTPATIENT)
Dept: MAMMOGRAPHY | Facility: HOSPITAL | Age: 77
Discharge: HOME | End: 2022-11-29

## 2022-11-29 ENCOUNTER — HOSPITAL ENCOUNTER (OUTPATIENT)
Dept: BONE DENSITY | Facility: HOSPITAL | Age: 77
Discharge: HOME | End: 2022-11-29

## 2022-11-29 VITALS — HEIGHT: 63 IN | WEIGHT: 180 LBS | BODY MASS INDEX: 31.89 KG/M2

## 2022-11-29 DIAGNOSIS — Z13.820 ENCOUNTER FOR SCREENING FOR OSTEOPOROSIS: ICD-10-CM

## 2022-11-29 DIAGNOSIS — I47.1 PAROXYSMAL SVT (SUPRAVENTRICULAR TACHYCARDIA) (HCC): ICD-10-CM

## 2022-11-29 DIAGNOSIS — Z12.31 ENCOUNTER FOR SCREENING MAMMOGRAM FOR MALIGNANT NEOPLASM OF BREAST: ICD-10-CM

## 2022-12-01 ENCOUNTER — TELEPHONE (OUTPATIENT)
Dept: NEPHROLOGY | Facility: CLINIC | Age: 77
End: 2022-12-01

## 2022-12-01 DIAGNOSIS — N18.2 STAGE 2 CHRONIC KIDNEY DISEASE: Primary | ICD-10-CM

## 2022-12-06 ENCOUNTER — TELEPHONE (OUTPATIENT)
Dept: NEPHROLOGY | Facility: CLINIC | Age: 77
End: 2022-12-06

## 2022-12-27 ENCOUNTER — TELEPHONE (OUTPATIENT)
Dept: CARDIOLOGY CLINIC | Facility: HOSPITAL | Age: 77
End: 2022-12-27

## 2022-12-27 NOTE — TELEPHONE ENCOUNTER
Pauline Dominguez has been approved for the eliquis assistance program through [employer] for 2023  I will scan the approval letter into her chart

## 2023-01-16 NOTE — PROGRESS NOTES
Cardiology Follow Up    Shelley Robertson  1945  Heydi Cunningham Principal [facility] [address]  [phone]    1  Coronary artery disease involving native coronary artery of native heart without angina pectoris        2  Paroxysmal SVT (supraventricular tachycardia) (HCC)  POCT ECG      3  Primary hypertension        4  S/P ablation operation for arrhythmia        5  Dyslipidemia        6  Bruit of right carotid artery  VAS carotid complete study          Discussion/Summary:  Coronary artery disease: Has multivessel coronary artery disease per prior [facility]  In 2019 underwent TIM to the RCA  Currently stable without any symptoms of angina  Continue plavix, statin, beta-blocker    Hypertension:  Elevated initially and upon my recheck  Will increase losartan to 100 mg daily  BMP next week to reassesses renal function/electrolytes  Will have BP checked at nephrology visit next week, further adjustments can be made at that time if necessary  Her home BP cuff is consistently about 20 points higher than when checked manually  Paroxysmal SVT:  Underwent ablation in 2021 by Dr Yanet Chavez   No evidence of recurrence per loop recorder interrogations    Dyslipidemia:  Well controlled  Continue present regimen     I have auscultated a carotid bruit on exam  I have ordered a carotid duplex for further evaluation  Patient had a prior CVA for which a loop recorder was implanted to detect any possible atrial fibrillation  No atrial fibrillation has been noted in the >2 years she has had the loop recorder implanted  She remains on anticoagulation with Eliquis  Decision to discontinue can be determined by her primary cardiologist     She will RTO in 6 months with Dr Phoenix Person or sooner if necessary  She will call with any concerns       Interval History:   Shelley Robertson is a 65 y o  female with multivessel coronary artery disease status post TIM placement to the RCA in 2019, paroxysmal SVT s/p ablation in 2021, hypertension, renal artery stenosis, dyslipidemia, prior CVA who presents to the office today for routine follow up  Since her last office visit she overall has been feeling okay  She denies any exertional limitations such as shortness of breath or chest pain/pressure/discomfort  She has not had any recurrent palpitations since her SVT ablation  She denies lightheadedness, dizziness, and syncope  She denies lower extremity edema, orthopnea, and PND  She has noticed her blood pressure has been running higher at home-in the 812 systolic range  She was told at a prior office visit that her home blood pressure monitor was about 20 points higher than our readings  BP in office today when checked manually was 170/71  Her home monitor reading was 090 systolic      Medical Problems     Problem List     Hypertension    Diabetes mellitus ([facility] )      Lab Results   Component Value Date    HGBA1C 6 7 (H) 10/19/2022         NSTEMI (non-ST elevated myocardial infarction) ([facility] )    Coronary artery disease involving native coronary artery of native heart without angina pectoris    Paroxysmal SVT (supraventricular tachycardia) ([facility])    Hypercholesterolemia    VICKEY (renal artery stenosis) ([facility])    Edema    Stenosis of both external auditory canals    Bilateral hearing loss due to cerumen impaction    Stage 2 chronic kidney disease    Lab Results   Component Value Date    EGFR 83 10/19/2022    EGFR 73 06/16/2022    EGFR 66 06/08/2022    CREATININE 0 70 10/19/2022    CREATININE 0 78 06/16/2022    CREATININE 0 85 06/08/2022         Nephrolithiasis        Past Medical History:   Diagnosis Date   • Abnormal stress test    • Angina pectoris ([facility] )     1 wk ago   • Atherosclerosis of renal artery ([facility])    • CAD (coronary artery disease)    • Chronic kidney disease, stage 2 (mild)    • Diabetes mellitus (HCC)     niddm   • Edema    • Endometriosis    • History of palpitations    • History of poliomyelitis    • Hyperlipidemia    • Hypertension    • Hypertensive chronic kidney disease with stage 1 through stage 4 chronic kidney disease, or unspecified chronic kidney disease    • Hypothyroid    • Kidney stones    • Myocardial infarction [facility])    • NSTEMI (non-ST elevated myocardial infarction) ([facility] ) 01/05/2019   • Obesity    • Paroxysmal SVT (supraventricular tachycardia) ([facility])    • Renal insufficiency    • Rosacea    • Thyroid nodule    • Type 2 diabetes mellitus ([place] [address] )    • Wears glasses      Social History     Socioeconomic History   • Marital status:       Spouse name: Not on file   • Number of children: Not on file   • Years of education: Not on file   • Highest education level: Not on file   Occupational History   • Occupation: /    Tobacco Use   • Smoking status: Never   • Smokeless tobacco: Never   Vaping Use   • Vaping Use: Never used   Substance and Sexual Activity   • Alcohol use: No   • Drug use: No   • Sexual activity: Not on file   Other Topics Concern   • Not on file   Social History Narrative    Consumes on average 2 cups of decaf coffee per day      Social Determinants of Health     Financial Resource Strain: Not on file   Food Insecurity: Not on file   Transportation Needs: Not on file   Physical Activity: Not on file   Stress: Not on file   Social Connections: Not on file   Intimate Partner Violence: Not on file   Housing Stability: Not on file      Family History   Problem Relation Age of Onset   • Asthma Mother    • Stomach cancer Father    • Breast cancer Sister 54   • No Known Problems Daughter    • No Known Problems Daughter    • No Known Problems Daughter    • No Known Problems Maternal Grandmother    • No Known Problems Maternal Grandfather    • No Known Problems Paternal Grandmother    • Heart attack Paternal Grandfather    • No Known Problems Maternal Aunt    • No Known Problems Paternal Aunt    • No Known Problems Paternal Aunt    • No Known Problems Son    • No Known Problems Son      Past Surgical History:   Procedure Laterality Date   • ACHILLES TENDON LENGTHENING Right     Achilles tendon stretch    • CARDIAC CATHETERIZATION  2017   • CARDIAC CATHETERIZATION  01/07/2019    Stent placed in RCA    • CATARACT EXTRACTION Bilateral    • COLONOSCOPY     • FL INJECTION RIGHT SHOULDER (ARTHROGRAM)  11/1/2021   • HYSTERECTOMY      Total - Endometriosis    • ROTATOR CUFF REPAIR Right        Current Outpatient Medications:   •  ACCU-CHEK KAILEY PLUS test strip, , Disp: , Rfl: 3  •  apixaban (Eliquis) 5 mg, Take 1 tablet (5 mg total) by mouth 2 (two) times a day, Disp: 180 tablet, Rfl: 3  •  ARMOUR THYROID 15 MG tablet, 15 mg every evening Before bedtime, Disp: , Rfl: 5  •  ARMOUR THYROID 30 MG tablet, Take 30 mg by mouth 2 (two) times a day Take one in the morning, Disp: , Rfl: 6  •  atorvastatin (LIPITOR) 40 mg tablet, Take 1 tablet (40 mg total) by mouth daily, Disp: 90 tablet, Rfl: 3  •  Blood Glucose Monitoring Suppl (ACCU-CHEK KAILEY CONNECT) w/Device KIT, by Does not apply route, Disp: , Rfl:   •  clopidogrel (PLAVIX) 75 mg tablet, Take 1 tablet (75 mg total) by mouth daily, Disp: 90 tablet, Rfl: 3  •  doxazosin (CARDURA) 4 mg tablet, Take 1 tablet (4 mg total) by mouth daily, Disp: 90 tablet, Rfl: 3  •  glucose blood test strip, by In Vitro route, Disp: , Rfl:   •  Lancets Misc  (ACCU-CHEK MULTICLIX LANCET DEV) KIT, by Does not apply route 2 (two) times a day, Disp: , Rfl:   •  losartan (COZAAR) 50 mg tablet, Take 1 tablet (50 mg total) by mouth daily, Disp: 90 tablet, Rfl: 3  •  metFORMIN (GLUCOPHAGE) 500 mg tablet, Take 500 mg by mouth 3 (three) times a day 2 tablets in the morning and one in the afternoon, Disp: , Rfl:   •  metoprolol tartrate (LOPRESSOR) 25 mg tablet, Take 1 tablet (25 mg total) by mouth every 12 (twelve) hours, Disp: 180 tablet, Rfl: 3  •  nitroglycerin (NITROSTAT) 0 4 mg SL tablet, Place 0 4 mg under the tongue, Disp: , Rfl:   •  acetaminophen (TYLENOL) 325 mg tablet, Take 2 tablets (650 mg total) by mouth every 6 (six) hours as needed (pain, fever) (Patient not taking: Reported on 1/17/2023), Disp: 1 Bottle, Rfl: 0  •  metFORMIN (GLUCOPHAGE-XR) 500 mg 24 hr tablet, , Disp: , Rfl:   •  Restasis 0 05 % ophthalmic emulsion, , Disp: , Rfl:   Allergies   Allergen Reactions   • Lisinopril Rash     Changed pigment of my skin    • Amlodipine        Labs:     Chemistry        Component Value Date/Time     12/08/2015 1014    K 4 0 10/19/2022 0811    K 4 4 12/08/2015 1014     10/19/2022 0811     12/08/2015 1014    CO2 28 10/19/2022 0811    CO2 30 9 12/08/2015 1014    BUN 22 10/19/2022 0811    BUN 20 12/08/2015 1014    CREATININE 0 70 10/19/2022 0811    CREATININE 0 67 12/08/2015 1014        Component Value Date/Time    CALCIUM 9 0 10/19/2022 0811    CALCIUM 8 9 12/08/2015 1014    ALKPHOS 97 10/19/2022 0811    ALKPHOS 111 12/08/2015 1014    AST 16 10/19/2022 0811    AST 20 12/08/2015 1014    ALT 24 10/19/2022 0811    ALT 25 12/08/2015 1014    BILITOT 0 48 12/08/2015 1014            Lab Results   Component Value Date    CHOL 267 12/08/2015    CHOL 270 06/10/2015    CHOL 243 03/18/2015     Lab Results   Component Value Date    HDL 60 10/19/2022    HDL 55 06/16/2022    HDL 63 06/08/2022     Lab Results   Component Value Date    LDLCALC 48 10/19/2022    LDLCALC 54 06/16/2022    LDLCALC 46 06/08/2022     Lab Results   Component Value Date    TRIG 132 10/19/2022    TRIG 160 (H) 06/16/2022    TRIG 134 06/08/2022     No results found for: CHOLHDL    Imaging: No results found  ECG: normal sinus rhythm  Poor anterior R wave progression    Review of Systems   Constitutional: Positive for malaise/fatigue  All other systems reviewed and are negative        Vitals:    01/17/23 1350   BP: 170/71   Pulse: 67     Vitals:    01/17/23 1350   Weight: 84 kg (185 lb 3 2 oz)     Height: 5' 3" (160 cm)   Body mass index is 32 81 kg/m²  Physical Exam:  Physical Exam  Vitals reviewed  Constitutional:       General: She is not in acute distress  Appearance: She is not diaphoretic  HENT:      Head: Normocephalic and atraumatic  Eyes:      Pupils: Pupils are equal, round, and reactive to light  Neck:      Vascular: Carotid bruit (on the right) present  Cardiovascular:      Rate and Rhythm: Normal rate and regular rhythm  Pulses:           Radial pulses are 2+ on the right side and 2+ on the left side  Heart sounds: S1 normal and S2 normal  No murmur heard  Pulmonary:      Effort: Pulmonary effort is normal  No respiratory distress  Breath sounds: Normal breath sounds  No wheezing or rales  Abdominal:      General: There is no distension  Palpations: Abdomen is soft  Tenderness: There is no abdominal tenderness  Musculoskeletal:         General: No deformity  Normal range of motion  Cervical back: Normal range of motion  Right lower leg: No edema  Left lower leg: No edema  Skin:     General: Skin is warm and dry  Findings: No erythema  Neurological:      General: No focal deficit present  Mental Status: She is alert and oriented to person, place, and time     Psychiatric:         Mood and Affect: Mood normal          Behavior: Behavior normal

## 2023-01-17 ENCOUNTER — OFFICE VISIT (OUTPATIENT)
Dept: CARDIOLOGY CLINIC | Facility: HOSPITAL | Age: 78
End: 2023-01-17

## 2023-01-17 VITALS
BODY MASS INDEX: 32.82 KG/M2 | DIASTOLIC BLOOD PRESSURE: 71 MMHG | HEIGHT: 63 IN | SYSTOLIC BLOOD PRESSURE: 170 MMHG | HEART RATE: 67 BPM | WEIGHT: 185.2 LBS

## 2023-01-17 DIAGNOSIS — I47.1 PAROXYSMAL SVT (SUPRAVENTRICULAR TACHYCARDIA) (HCC): ICD-10-CM

## 2023-01-17 DIAGNOSIS — R09.89 BRUIT OF RIGHT CAROTID ARTERY: ICD-10-CM

## 2023-01-17 DIAGNOSIS — I25.10 CORONARY ARTERY DISEASE INVOLVING NATIVE CORONARY ARTERY OF NATIVE HEART WITHOUT ANGINA PECTORIS: Primary | ICD-10-CM

## 2023-01-17 DIAGNOSIS — Z86.79 S/P ABLATION OPERATION FOR ARRHYTHMIA: ICD-10-CM

## 2023-01-17 DIAGNOSIS — E78.5 DYSLIPIDEMIA: ICD-10-CM

## 2023-01-17 DIAGNOSIS — I10 PRIMARY HYPERTENSION: ICD-10-CM

## 2023-01-17 DIAGNOSIS — Z98.890 S/P ABLATION OPERATION FOR ARRHYTHMIA: ICD-10-CM

## 2023-01-24 ENCOUNTER — APPOINTMENT (OUTPATIENT)
Dept: LAB | Facility: CLINIC | Age: 78
End: 2023-01-24

## 2023-01-24 DIAGNOSIS — N18.2 STAGE 2 CHRONIC KIDNEY DISEASE: ICD-10-CM

## 2023-01-24 LAB
ALBUMIN SERPL BCP-MCNC: 3.5 G/DL (ref 3.5–5)
ALP SERPL-CCNC: 98 U/L (ref 46–116)
ALT SERPL W P-5'-P-CCNC: 28 U/L (ref 12–78)
ANION GAP SERPL CALCULATED.3IONS-SCNC: 8 MMOL/L (ref 4–13)
AST SERPL W P-5'-P-CCNC: 21 U/L (ref 5–45)
BILIRUB SERPL-MCNC: 0.48 MG/DL (ref 0.2–1)
BUN SERPL-MCNC: 27 MG/DL (ref 5–25)
CALCIUM SERPL-MCNC: 9.2 MG/DL (ref 8.3–10.1)
CHLORIDE SERPL-SCNC: 108 MMOL/L (ref 96–108)
CO2 SERPL-SCNC: 25 MMOL/L (ref 21–32)
CREAT SERPL-MCNC: 0.82 MG/DL (ref 0.6–1.3)
GFR SERPL CREATININE-BSD FRML MDRD: 68 ML/MIN/1.73SQ M
GLUCOSE P FAST SERPL-MCNC: 145 MG/DL (ref 65–99)
POTASSIUM SERPL-SCNC: 4 MMOL/L (ref 3.5–5.3)
PROT SERPL-MCNC: 6.8 G/DL (ref 6.4–8.4)
SODIUM SERPL-SCNC: 141 MMOL/L (ref 135–147)

## 2023-01-30 ENCOUNTER — OFFICE VISIT (OUTPATIENT)
Dept: NEPHROLOGY | Facility: CLINIC | Age: 78
End: 2023-01-30

## 2023-01-30 VITALS
DIASTOLIC BLOOD PRESSURE: 80 MMHG | WEIGHT: 182 LBS | HEART RATE: 68 BPM | SYSTOLIC BLOOD PRESSURE: 132 MMHG | HEIGHT: 63 IN | BODY MASS INDEX: 32.25 KG/M2 | OXYGEN SATURATION: 96 %

## 2023-01-30 DIAGNOSIS — N25.81 SECONDARY HYPERPARATHYROIDISM (HCC): ICD-10-CM

## 2023-01-30 DIAGNOSIS — N18.2 CKD (CHRONIC KIDNEY DISEASE) STAGE 2, GFR 60-89 ML/MIN: ICD-10-CM

## 2023-01-30 DIAGNOSIS — E11.9 TYPE 2 DIABETES MELLITUS WITHOUT COMPLICATION, WITHOUT LONG-TERM CURRENT USE OF INSULIN (HCC): ICD-10-CM

## 2023-01-30 DIAGNOSIS — I70.1 RIGHT RENAL ARTERY STENOSIS (HCC): Primary | ICD-10-CM

## 2023-01-30 DIAGNOSIS — E55.9 VITAMIN D DEFICIENCY: ICD-10-CM

## 2023-01-30 NOTE — PROGRESS NOTES
Nephrology   Office [address] [facility] Carmelita Eastman 65 y o  female MRN: [id number]    Encounter: [id number]        Brian Brown 411 was seen in the [place] office today  All diagnoses and orders for visit:     1  Right renal artery stenosis (HCC)  · Blood pressure is appropriate with medical management  Losartan increased at recent cardiology appointment with improvement noted and stability in kidney function  2  CKD (chronic kidney disease) stage 2, GFR 60-89 ml/min  · Kidney function is stable and within typical baseline  Repeat lab work in 6 months and then return to office with nephrologist    -     Comprehensive metabolic panel; Future; Expected date: 07/03/2023   -     CBC and differential; Future; Expected date: 07/03/2023   -     Microalbumin / creatinine urine ratio; Future; Expected date: 07/03/2023   -     Urinalysis with microscopic; Future; Expected date: 07/03/2023  3  Type 2 diabetes mellitus without complication, without long-term current use of insulin ([facility])  · POCT A1C 7 1% in office  No proteinuria  On losartan  Had skin changes with lisinopril    -     POCT hemoglobin A1c  4  Vitamin D deficiency   -     Vitamin D 25 hydroxy; Future; Expected date: 07/03/2023  5  Secondary hyperparathyroidism ([facility] [address] )  · No indication for activated vitamin D agent   -     PTH, intact; Future; Expected date: 07/03/2023  6  History of nephrolithiasis  · Continue low sodium diet  No stones on recent imaging however this was ultrasound        HPI: Thomas Good is a 65 y o  female who follows with Dr aGetano Mishra for CKD 2 in setting of right VICKEY, hypertension and t2dm  Losartan increased by cardiology with improvement in blood pressure  Kidney function is stable  Repeat labs in 6 months  Continue to focus on blood pressure control, low sodium and glycemic controlled diet  ROS:   Review of Systems   Constitutional: Negative for chills and fever  HENT: Negative for ear pain and sore throat      Eyes: Negative for pain and visual disturbance  Respiratory: Negative for cough and shortness of breath  Cardiovascular: Negative for chest pain and palpitations  Gastrointestinal: Negative for abdominal pain and vomiting  Genitourinary: Negative for dysuria and hematuria  Musculoskeletal: Negative for arthralgias and back pain  Skin: Negative for color change and rash  Neurological: Negative for seizures and syncope  All other systems reviewed and are negative        Allergies: Lisinopril and Amlodipine    Medications:   Current Outpatient Medications:   •  ACCU-CHEK KAILEY PLUS test strip, , Disp: , Rfl: 3  •  acetaminophen (TYLENOL) 325 mg tablet, Take 2 tablets (650 mg total) by mouth every 6 (six) hours as needed (pain, fever) (Patient taking differently: Take 650 mg by mouth every 6 (six) hours as needed (pain, fever) Taking as needed), Disp: 1 Bottle, Rfl: 0  •  apixaban (Eliquis) 5 mg, Take 1 tablet (5 mg total) by mouth 2 (two) times a day, Disp: 180 tablet, Rfl: 3  •  ARMOUR THYROID 15 MG tablet, 15 mg every evening Before bedtime, Disp: , Rfl: 5  •  ARMOUR THYROID 30 MG tablet, Take 30 mg by mouth 2 (two) times a day Take one in the morning, Disp: , Rfl: 6  •  Blood Glucose Monitoring Suppl (ACCU-CHEK KAILEY CONNECT) w/Device KIT, by Does not apply route, Disp: , Rfl:   •  clopidogrel (PLAVIX) 75 mg tablet, Take 1 tablet (75 mg total) by mouth daily, Disp: 90 tablet, Rfl: 3  •  doxazosin (CARDURA) 4 mg tablet, Take 1 tablet (4 mg total) by mouth daily, Disp: 90 tablet, Rfl: 3  •  Lancets Misc  (ACCU-CHEK MULTICLIX LANCET DEV) KIT, by Does not apply route 2 (two) times a day, Disp: , Rfl:   •  losartan (COZAAR) 50 mg tablet, Take 1 tablet (50 mg total) by mouth daily (Patient taking differently: Take 50 mg by mouth 2 (two) times a day), Disp: 90 tablet, Rfl: 3  •  metFORMIN (GLUCOPHAGE) 500 mg tablet, Take 500 mg by mouth 3 (three) times a day 2 tablets in the morning and one in the afternoon, Disp: , Rfl: •  metoprolol tartrate (LOPRESSOR) 25 mg tablet, Take 1 tablet (25 mg total) by mouth every 12 (twelve) hours, Disp: 180 tablet, Rfl: 3  •  nitroglycerin (NITROSTAT) 0 4 mg SL tablet, Place 0 4 mg under the tongue Taking as needed, Disp: , Rfl:   •  atorvastatin (LIPITOR) 40 mg tablet, Take 1 tablet (40 mg total) by mouth daily, Disp: 90 tablet, Rfl: 3  •  glucose blood test strip, by In Vitro route (Patient not taking: Reported on 1/30/2023), Disp: , Rfl:   •  metFORMIN (GLUCOPHAGE-XR) 500 mg 24 hr tablet, , Disp: , Rfl:   •  Restasis 0 05 % ophthalmic emulsion, , Disp: , Rfl:     Past Medical History:   Diagnosis Date   • Abnormal stress test    • Angina pectoris (HCC)     1 wk ago   • Atherosclerosis of renal artery (HCC)    • CAD (coronary artery disease)    • Chronic kidney disease, stage 2 (mild)    • Diabetes mellitus (HCC)     niddm   • Edema    • Endometriosis    • History of palpitations    • History of poliomyelitis    • Hyperlipidemia    • Hypertension    • Hypertensive chronic kidney disease with stage 1 through stage 4 chronic kidney disease, or unspecified chronic kidney disease    • Hypothyroid    • Kidney stones    • Myocardial infarction [facility])    • NSTEMI (non-ST elevated myocardial infarction) ([facility] ) 01/05/2019   • Obesity    • Paroxysmal SVT (supraventricular tachycardia) ([facility])    • Renal insufficiency    • Rosacea    • Thyroid nodule    • Type 2 diabetes mellitus ([facility] )    • Wears glasses      Past Surgical History:   Procedure Laterality Date   • ACHILLES TENDON LENGTHENING Right     Achilles tendon stretch    • CARDIAC CATHETERIZATION  2017   • CARDIAC CATHETERIZATION  01/07/2019    Stent placed in RCA    • CATARACT EXTRACTION Bilateral    • COLONOSCOPY     • FL INJECTION RIGHT SHOULDER (ARTHROGRAM)  11/1/2021   • HYSTERECTOMY      Total - Endometriosis    • ROTATOR CUFF REPAIR Right      Family History   Problem Relation Age of Onset   • Asthma Mother    • Stomach cancer Father    • Breast cancer Sister 54   • No Known Problems Daughter    • No Known Problems Daughter    • No Known Problems Daughter    • No Known Problems Maternal Grandmother    • No Known Problems Maternal Grandfather    • No Known Problems Paternal Grandmother    • Heart attack Paternal Grandfather    • No Known Problems Maternal Aunt    • No Known Problems Paternal Aunt    • No Known Problems Paternal Aunt    • No Known Problems Son    • No Known Problems Son       reports that she has never smoked  She has never used smokeless tobacco  She reports that she does not drink alcohol and does not use drugs  Physical Exam:   Vitals:    01/30/23 1450   BP: 132/80   BP Location: Left arm   Patient Position: Sitting   Cuff Size: Standard   Pulse: 68   SpO2: 96%   Weight: 82 6 kg (182 lb)   Height: 5' 3" (1 6 m)     Body mass index is 32 24 kg/m²  General: conscious, cooperative, in no acute distress, appears stated age  Eyes: conjunctivae pale, anicteric sclerae  ENT: lips and mucous membranes moist  Neck: supple, no JVD, no masses  Chest:  essentially clear breath sounds bilaterally, no crackles, ronchus or wheezings  CVS: S1 & S2, normal rate, regular rhythm  Abdomen: soft, non-tender, non-distended, normoactive bowel sounds, rounded  Extremities: no edema of both legs  Skin: no rash   Neuro: awake, alert, oriented       Diagnostic Data:  Lab: I have personally reviewed pertinent lab results  ,   CBC:       CMP: No results found for: SODIUM, K, CL, CO2, ANIONGAP, BUN, CREATININE, GLUCOSE, CALCIUM, AST, ALT, ALKPHOS, PROT, BILITOT, EGFR,   PT/INR: No results found for: PT, INR,   Magnesium: No components found for: MAG,  Phosphorous: No results found for: PHOS    Patient Instructions   Your A1C is 7 1% which means your average glucose is around 150     Lab work in six months          Portions of the record may have been created with voice recognition software   Occasional wrong word or "sound a like" substitutions may have occurred due to the inherent limitations of voice recognition software  Read the chart carefully and recognize, using context, where substitutions have occurred  If you have any questions, please contact the dictating provider

## 2023-02-01 ENCOUNTER — HOSPITAL ENCOUNTER (OUTPATIENT)
Dept: NON INVASIVE DIAGNOSTICS | Facility: HOSPITAL | Age: 78
Discharge: HOME | End: 2023-02-01

## 2023-02-01 DIAGNOSIS — R09.89 BRUIT OF RIGHT CAROTID ARTERY: ICD-10-CM

## 2023-02-01 LAB — SL AMB POCT HEMOGLOBIN AIC: 7.1 (ref ?–6.5)

## 2023-02-06 DIAGNOSIS — I10 ESSENTIAL HYPERTENSION: ICD-10-CM

## 2023-02-06 DIAGNOSIS — I21.4 NSTEMI (NON-ST ELEVATED MYOCARDIAL INFARCTION) (HCC): ICD-10-CM

## 2023-02-06 RX ORDER — DOXAZOSIN MESYLATE 4 MG/1
4 TABLET ORAL DAILY
Qty: 90 TABLET | Refills: 3 | Status: SHIPPED | OUTPATIENT
Start: 2023-02-06

## 2023-02-06 RX ORDER — ATORVASTATIN CALCIUM 40 MG/1
40 TABLET, FILM COATED ORAL DAILY
Qty: 90 TABLET | Refills: 3 | Status: SHIPPED | OUTPATIENT
Start: 2023-02-06 | End: 2024-02-06

## 2023-03-01 DIAGNOSIS — I10 HTN (HYPERTENSION): ICD-10-CM

## 2023-03-01 RX ORDER — LOSARTAN POTASSIUM 50 MG/1
50 TABLET ORAL 2 TIMES DAILY
Qty: 90 TABLET | Refills: 3 | Status: SHIPPED | OUTPATIENT
Start: 2023-03-01

## 2023-03-02 NOTE — TELEPHONE ENCOUNTER
Noted If In Chart Pt stated you are aware of recent ER visit  King Brooks Cozaar 50mg increased to BID  King Brooks pt stated no change   BP this am 208/85   Please advise

## 2023-03-09 DIAGNOSIS — I47.1 PAROXYSMAL SVT (SUPRAVENTRICULAR TACHYCARDIA) (HCC): ICD-10-CM

## 2023-03-13 ENCOUNTER — APPOINTMENT (OUTPATIENT)
Dept: LAB | Facility: CLINIC | Age: 78
End: 2023-03-13

## 2023-03-13 DIAGNOSIS — E03.9 HYPOTHYROIDISM, UNSPECIFIED TYPE: ICD-10-CM

## 2023-03-13 LAB
T3FREE SERPL-MCNC: 3.32 PG/ML (ref 2.3–4.2)
T4 FREE SERPL-MCNC: 0.86 NG/DL (ref 0.76–1.46)
TSH SERPL DL<=0.05 MIU/L-ACNC: 2.4 UIU/ML (ref 0.45–4.5)

## 2023-03-15 LAB
THYROGLOB AB SERPL-ACNC: <1 IU/ML (ref 0–0.9)
THYROGLOB SERPL-MCNC: 7.6 NG/ML (ref 1.5–38.5)

## 2023-03-21 LAB — T3REVERSE SERPL-MCNC: 21.8 NG/DL (ref 9.2–24.1)

## 2023-03-24 ENCOUNTER — APPOINTMENT (OUTPATIENT)
Dept: LAB | Facility: HOSPITAL | Age: 78
End: 2023-03-24

## 2023-05-05 ENCOUNTER — REMOTE DEVICE CLINIC VISIT (OUTPATIENT)
Dept: CARDIOLOGY CLINIC | Facility: CLINIC | Age: 78
End: 2023-05-05

## 2023-05-05 DIAGNOSIS — Z95.818 PRESENCE OF OTHER CARDIAC IMPLANTS AND GRAFTS: Primary | ICD-10-CM

## 2023-05-05 NOTE — PROGRESS NOTES
"MDT LOOP   CARELINK TRANSMISSION: LOOP RECORDER  PRESENTING RHYTHM SB @ 56 BPM  BATTERY STATUS \"OK  \" Tammie UNDERSENSING  NO PATIENT ACTIVATED EPISODES  NORMAL DEVICE FUNCTION   DL   "

## 2023-06-05 DIAGNOSIS — I47.1 PAROXYSMAL SVT (SUPRAVENTRICULAR TACHYCARDIA) (HCC): ICD-10-CM

## 2023-06-16 ENCOUNTER — OFFICE VISIT (OUTPATIENT)
Dept: URGENT CARE | Facility: CLINIC | Age: 78
End: 2023-06-16
Payer: MEDICARE

## 2023-06-16 VITALS
HEART RATE: 68 BPM | SYSTOLIC BLOOD PRESSURE: 140 MMHG | OXYGEN SATURATION: 99 % | DIASTOLIC BLOOD PRESSURE: 80 MMHG | WEIGHT: 179 LBS | TEMPERATURE: 98 F | RESPIRATION RATE: 18 BRPM | HEIGHT: 63 IN | BODY MASS INDEX: 31.71 KG/M2

## 2023-06-16 DIAGNOSIS — H66.92 LEFT ACUTE OTITIS MEDIA: Primary | ICD-10-CM

## 2023-06-16 PROCEDURE — G0463 HOSPITAL OUTPT CLINIC VISIT: HCPCS | Performed by: PHYSICIAN ASSISTANT

## 2023-06-16 PROCEDURE — 99213 OFFICE O/P EST LOW 20 MIN: CPT | Performed by: PHYSICIAN ASSISTANT

## 2023-06-16 RX ORDER — AMOXICILLIN 500 MG/1
500 CAPSULE ORAL EVERY 12 HOURS SCHEDULED
Qty: 14 CAPSULE | Refills: 0 | Status: SHIPPED | OUTPATIENT
Start: 2023-06-16 | End: 2023-06-23

## 2023-06-16 NOTE — PATIENT INSTRUCTIONS
Take antibiotics as prescribed  Use nasal saline spray for symptomatic treatment  Stay hydrated with lots of water/fluids  Follow-up with PCP in the next 1-2 days for reexamination and to ensure resolution of symptoms  Go to the ED if any fevers, unable to stay hydrated, abdominal pain, chest pain, shortness of breath, change in voice, pain or difficulty swallowing, pain swelling redness behind the ear, ear drainage, hearing changes, new or worsening symptoms or other concerning symptoms

## 2023-06-16 NOTE — PROGRESS NOTES
[facility]        NAME: Manan Hollis is a 65 y o  female  : 1945    MRN: [id number]  DATE: 2023  TIME: 9:54 AM    Assessment and Plan   Left acute otitis media [H66 92]  1  Left acute otitis media  amoxicillin (AMOXIL) 500 mg capsule        Declined covid testing    Patient Instructions     Take antibiotics as prescribed  Use nasal saline spray for symptomatic treatment  Stay hydrated with lots of water/fluids  Follow-up with PCP in the next 1-2 days for reexamination and to ensure resolution of symptoms  Go to the ED if any fevers, unable to stay hydrated, abdominal pain, chest pain, shortness of breath, change in voice, pain or difficulty swallowing, pain swelling redness behind the ear, ear drainage, hearing changes, new or worsening symptoms or other concerning symptoms  Chief Complaint     Chief Complaint   Patient presents with   • Nasal Congestion     X 3 days    • Earache     Left ear pain x 3 days          History of Present Illness       64 y/o female presents with left ear pain x 3 days  Also notes nasal congestion, runny nose, postnasal drip, mild intermittent cough that is sometimes dry sometimes productive of clearish phlegm  Has not tried anything for it  Denies any fevers, chills or body aches  Denies any recent travel or known sick contacts  Declines COVID testing  States it feels like her previous ear infections  Denies any chest pain, chest tightness, shortness of breath, wheezing, headaches, ear drainage, hearing changes, pain swelling redness behind the ear, sore throat, GI/ symptoms or other complaints  Review of Systems   Review of Systems   Constitutional: Negative for activity change, appetite change, chills, fatigue and fever  HENT: Positive for congestion, ear pain, postnasal drip and rhinorrhea  Negative for drooling, ear discharge, facial swelling, hearing loss, sinus pressure, sore throat, trouble swallowing and voice change  Eyes: Negative for photophobia, pain, discharge, redness, itching and visual disturbance  Respiratory: Positive for cough  Negative for chest tightness, shortness of breath and wheezing  Cardiovascular: Negative for chest pain  Gastrointestinal: Negative for abdominal pain, diarrhea, nausea and vomiting  Genitourinary: Negative for decreased urine volume  Musculoskeletal: Negative for back pain, myalgias, neck pain and neck stiffness  Skin: Negative for rash  Neurological: Negative for dizziness, syncope, weakness, light-headedness, numbness and headaches  All other systems reviewed and are negative          Current Medications       Current Outpatient Medications:   •  amoxicillin (AMOXIL) 500 mg capsule, Take 1 capsule (500 mg total) by mouth every 12 (twelve) hours for 7 days, Disp: 14 capsule, Rfl: 0  •  ACCU-CHEK KAILEY PLUS test strip, , Disp: , Rfl: 3  •  acetaminophen (TYLENOL) 325 mg tablet, Take 2 tablets (650 mg total) by mouth every 6 (six) hours as needed (pain, fever) (Patient taking differently: Take 650 mg by mouth every 6 (six) hours as needed (pain, fever) Taking as needed), Disp: 1 Bottle, Rfl: 0  •  apixaban (Eliquis) 5 mg, Take 1 tablet (5 mg total) by mouth 2 (two) times a day, Disp: 180 tablet, Rfl: 3  •  ARMOUR THYROID 15 MG tablet, 15 mg every evening Before bedtime, Disp: , Rfl: 5  •  ARMOUR THYROID 30 MG tablet, Take 30 mg by mouth 2 (two) times a day Take one in the morning, Disp: , Rfl: 6  •  atorvastatin (LIPITOR) 40 mg tablet, Take 1 tablet (40 mg total) by mouth daily, Disp: 90 tablet, Rfl: 3  •  Blood Glucose Monitoring Suppl (ACCU-CHEK KAILEY CONNECT) w/Device KIT, by Does not apply route, Disp: , Rfl:   •  clopidogrel (PLAVIX) 75 mg tablet, Take 1 tablet (75 mg total) by mouth daily, Disp: 90 tablet, Rfl: 3  •  doxazosin (CARDURA) 4 mg tablet, Take 1 tablet (4 mg total) by mouth daily, Disp: 90 tablet, Rfl: 3  •  glucose blood test strip, by In Vitro route (Patient not taking: Reported on 1/30/2023), Disp: , Rfl:   •  Lancets Misc  (ACCU-CHEK MULTICLIX LANCET DEV) KIT, by Does not apply route 2 (two) times a day, Disp: , Rfl:   •  losartan (COZAAR) 50 mg tablet, Take 1 tablet (50 mg total) by mouth 2 (two) times a day, Disp: 90 tablet, Rfl: 3  •  metFORMIN (GLUCOPHAGE) 500 mg tablet, Take 500 mg by mouth 3 (three) times a day 2 tablets in the morning and one in the afternoon, Disp: , Rfl:   •  metFORMIN (GLUCOPHAGE-XR) 500 mg 24 hr tablet, , Disp: , Rfl:   •  metoprolol tartrate (LOPRESSOR) 25 mg tablet, Take 1 tablet (25 mg total) by mouth every 12 (twelve) hours, Disp: 180 tablet, Rfl: 3  •  nitroglycerin (NITROSTAT) 0 4 mg SL tablet, Place 0 4 mg under the tongue Taking as needed, Disp: , Rfl:   •  Restasis 0 05 % ophthalmic emulsion, , Disp: , Rfl:     Current Allergies     Allergies as of 06/16/2023 - Reviewed 01/30/2023   Allergen Reaction Noted   • Lisinopril Rash 02/07/2014   • Amlodipine Other (See Comments) 11/27/2017            The following portions of the patient's history were reviewed and updated as appropriate: allergies, current medications, past family history, past medical history, past social history, past surgical history and problem list      Past Medical History:   Diagnosis Date   • Abnormal stress test    • Angina pectoris (HCC)     1 wk ago   • Atherosclerosis of renal artery (HCC)    • CAD (coronary artery disease)    • Chronic kidney disease, stage 2 (mild)    • Diabetes mellitus ([facility] [address] )     niddm   • Edema    • Endometriosis    • History of palpitations    • History of poliomyelitis    • Hyperlipidemia    • Hypertension    • Hypertensive chronic kidney disease with stage 1 through stage 4 chronic kidney disease, or unspecified chronic kidney disease    • Hypothyroid    • Kidney stones    • Myocardial infarction [facility])    • [facility]non[facility]ST elevated [facility] 01/[facility]2019   • Obesity    • Paroxysmal SVT (supraventricular tachycardia) "([facility])    • Renal insufficiency    • Rosacea    • Thyroid nodule    • Type 2 diabetes mellitus ([address] )    • Wears glasses        Past Surgical History:   Procedure Laterality Date   • ACHILLES TENDON LENGTHENING Right     Achilles tendon stretch    • CARDIAC CATHETERIZATION  2017   • CARDIAC CATHETERIZATION  01/07/2019    Stent placed in RCA    • CATARACT EXTRACTION Bilateral    • COLONOSCOPY     • FL INJECTION RIGHT SHOULDER (ARTHROGRAM)  11/1/2021   • HYSTERECTOMY      Total - Endometriosis    • ROTATOR CUFF REPAIR Right        Family History   Problem Relation Age of Onset   • Asthma Mother    • Stomach cancer Father    • Breast cancer Sister 54   • No Known Problems Daughter    • No Known Problems Daughter    • No Known Problems Daughter    • No Known Problems Maternal Grandmother    • No Known Problems Maternal Grandfather    • No Known Problems Paternal Grandmother    • Heart attack Paternal Grandfather    • No Known Problems Maternal Aunt    • No Known Problems Paternal Aunt    • No Known Problems Paternal Aunt    • No Known Problems Son    • No Known Problems Son          Medications have been verified  Objective   /80   Pulse 68   Temp 98 °F (36 7 °C)   Resp 18   Ht 5' 3\" (1 6 m)   Wt 81 2 kg (179 lb)   SpO2 99%   BMI 31 71 kg/m²        Physical Exam     Physical Exam  Vitals and nursing note reviewed  Constitutional:       General: She is not in acute distress  Appearance: Normal appearance  She is not ill-appearing or toxic-appearing  HENT:      Head: Normocephalic and atraumatic  Right Ear: Tympanic membrane, ear canal and external ear normal  No mastoid tenderness  Left Ear: Ear canal and external ear normal  No mastoid tenderness  Tympanic membrane is erythematous and bulging  Mouth/Throat:      Mouth: Mucous membranes are moist       Pharynx: No oropharyngeal exudate or posterior oropharyngeal erythema        Comments: Mild post nasal drip  Eyes:      Pupils: " Pupils are equal, round, and reactive to light  Cardiovascular:      Rate and Rhythm: Normal rate and regular rhythm  Heart sounds: Normal heart sounds  Pulmonary:      Effort: Pulmonary effort is normal       Breath sounds: Normal breath sounds  No wheezing  Skin:     Capillary Refill: Capillary refill takes less than 2 seconds  Neurological:      Mental Status: She is alert and oriented to person, place, and time     Psychiatric:         Mood and Affect: Mood normal          Behavior: Behavior normal

## 2023-06-20 ENCOUNTER — APPOINTMENT (OUTPATIENT)
Dept: LAB | Facility: HOSPITAL | Age: 78
End: 2023-06-20
Payer: MEDICARE

## 2023-06-20 DIAGNOSIS — N18.2 CKD (CHRONIC KIDNEY DISEASE) STAGE 2, GFR 60-89 ML/MIN: ICD-10-CM

## 2023-06-20 DIAGNOSIS — N25.81 SECONDARY HYPERPARATHYROIDISM (HCC): ICD-10-CM

## 2023-06-20 DIAGNOSIS — E55.9 VITAMIN D DEFICIENCY: ICD-10-CM

## 2023-06-20 LAB
25(OH)D3 SERPL-MCNC: 35.2 NG/ML (ref 30–100)
ALBUMIN SERPL BCP-MCNC: 4 G/DL (ref 3.5–5)
ALP SERPL-CCNC: 88 U/L (ref 34–104)
ALT SERPL W P-5'-P-CCNC: 14 U/L (ref 7–52)
ANION GAP SERPL CALCULATED.3IONS-SCNC: 9 MMOL/L (ref 4–13)
AST SERPL W P-5'-P-CCNC: 15 U/L (ref 13–39)
BACTERIA UR QL AUTO: ABNORMAL /HPF
BASOPHILS # BLD AUTO: 0.05 THOUSANDS/ÂΜL (ref 0–0.1)
BASOPHILS NFR BLD AUTO: 1 % (ref 0–1)
BILIRUB SERPL-MCNC: 0.42 MG/DL (ref 0.2–1)
BILIRUB UR QL STRIP: NEGATIVE
BUN SERPL-MCNC: 26 MG/DL (ref 5–25)
CALCIUM SERPL-MCNC: 9.3 MG/DL (ref 8.4–10.2)
CHLORIDE SERPL-SCNC: 106 MMOL/L (ref 96–108)
CLARITY UR: CLEAR
CO2 SERPL-SCNC: 25 MMOL/L (ref 21–32)
COLOR UR: YELLOW
CREAT SERPL-MCNC: 0.7 MG/DL (ref 0.6–1.3)
CREAT UR-MCNC: 139 MG/DL
EOSINOPHIL # BLD AUTO: 0.31 THOUSAND/ÂΜL (ref 0–0.61)
EOSINOPHIL NFR BLD AUTO: 4 % (ref 0–6)
ERYTHROCYTE [DISTWIDTH] IN BLOOD BY AUTOMATED COUNT: 12.5 % (ref 11.6–15.1)
GFR SERPL CREATININE-BSD FRML MDRD: 83 ML/MIN/1.73SQ M
GLUCOSE P FAST SERPL-MCNC: 149 MG/DL (ref 65–99)
GLUCOSE UR STRIP-MCNC: NEGATIVE MG/DL
HCT VFR BLD AUTO: 38.8 % (ref 34.8–46.1)
HGB BLD-MCNC: 12.5 G/DL (ref 11.5–15.4)
HGB UR QL STRIP.AUTO: NEGATIVE
IMM GRANULOCYTES # BLD AUTO: 0.01 THOUSAND/UL (ref 0–0.2)
IMM GRANULOCYTES NFR BLD AUTO: 0 % (ref 0–2)
KETONES UR STRIP-MCNC: NEGATIVE MG/DL
LEUKOCYTE ESTERASE UR QL STRIP: NEGATIVE
LYMPHOCYTES # BLD AUTO: 1.82 THOUSANDS/ÂΜL (ref 0.6–4.47)
LYMPHOCYTES NFR BLD AUTO: 24 % (ref 14–44)
MCH RBC QN AUTO: 30.3 PG (ref 26.8–34.3)
MCHC RBC AUTO-ENTMCNC: 32.2 G/DL (ref 31.4–37.4)
MCV RBC AUTO: 94 FL (ref 82–98)
MICROALBUMIN UR-MCNC: 56.7 MG/L (ref 0–20)
MICROALBUMIN/CREAT 24H UR: 41 MG/G CREATININE (ref 0–30)
MONOCYTES # BLD AUTO: 0.4 THOUSAND/ÂΜL (ref 0.17–1.22)
MONOCYTES NFR BLD AUTO: 5 % (ref 4–12)
NEUTROPHILS # BLD AUTO: 4.99 THOUSANDS/ÂΜL (ref 1.85–7.62)
NEUTS SEG NFR BLD AUTO: 66 % (ref 43–75)
NITRITE UR QL STRIP: NEGATIVE
NON-SQ EPI CELLS URNS QL MICRO: ABNORMAL /HPF
NRBC BLD AUTO-RTO: 0 /100 WBCS
PH UR STRIP.AUTO: 6 [PH]
PLATELET # BLD AUTO: 203 THOUSANDS/UL (ref 149–390)
PMV BLD AUTO: 10.9 FL (ref 8.9–12.7)
POTASSIUM SERPL-SCNC: 4.4 MMOL/L (ref 3.5–5.3)
PROT SERPL-MCNC: 6.6 G/DL (ref 6.4–8.4)
PROT UR STRIP-MCNC: NEGATIVE MG/DL
PTH-INTACT SERPL-MCNC: 62.1 PG/ML (ref 12–88)
RBC # BLD AUTO: 4.13 MILLION/UL (ref 3.81–5.12)
RBC #/AREA URNS AUTO: ABNORMAL /HPF
SODIUM SERPL-SCNC: 140 MMOL/L (ref 135–147)
SP GR UR STRIP.AUTO: 1.02 (ref 1–1.03)
UROBILINOGEN UR QL STRIP.AUTO: 0.2 E.U./DL
WBC # BLD AUTO: 7.58 THOUSAND/UL (ref 4.31–10.16)
WBC #/AREA URNS AUTO: ABNORMAL /HPF

## 2023-06-20 PROCEDURE — 82306 VITAMIN D 25 HYDROXY: CPT

## 2023-06-20 PROCEDURE — 85025 COMPLETE CBC W/AUTO DIFF WBC: CPT

## 2023-06-20 PROCEDURE — 83970 ASSAY OF PARATHORMONE: CPT

## 2023-06-20 PROCEDURE — 36415 COLL VENOUS BLD VENIPUNCTURE: CPT

## 2023-06-20 PROCEDURE — 82043 UR ALBUMIN QUANTITATIVE: CPT

## 2023-06-20 PROCEDURE — 82570 ASSAY OF URINE CREATININE: CPT

## 2023-06-20 PROCEDURE — 81001 URINALYSIS AUTO W/SCOPE: CPT

## 2023-06-20 PROCEDURE — 80053 COMPREHEN METABOLIC PANEL: CPT

## 2023-07-28 ENCOUNTER — OFFICE VISIT (OUTPATIENT)
Dept: NEPHROLOGY | Facility: CLINIC | Age: 78
End: 2023-07-28
Payer: MEDICARE

## 2023-07-28 VITALS
HEART RATE: 54 BPM | HEIGHT: 63 IN | SYSTOLIC BLOOD PRESSURE: 132 MMHG | WEIGHT: 176 LBS | DIASTOLIC BLOOD PRESSURE: 58 MMHG | OXYGEN SATURATION: 96 % | BODY MASS INDEX: 31.18 KG/M2

## 2023-07-28 DIAGNOSIS — I10 PRIMARY HYPERTENSION: ICD-10-CM

## 2023-07-28 DIAGNOSIS — R80.9 ALBUMINURIA: ICD-10-CM

## 2023-07-28 DIAGNOSIS — I70.1 RAS (RENAL ARTERY STENOSIS) (HCC): ICD-10-CM

## 2023-07-28 DIAGNOSIS — N18.2 STAGE 2 CHRONIC KIDNEY DISEASE: Primary | ICD-10-CM

## 2023-07-28 DIAGNOSIS — E11.9 TYPE 2 DIABETES MELLITUS WITHOUT COMPLICATION, WITHOUT LONG-TERM CURRENT USE OF INSULIN (HCC): ICD-10-CM

## 2023-07-28 DIAGNOSIS — E55.9 VITAMIN D DEFICIENCY: ICD-10-CM

## 2023-07-28 LAB — SL AMB POCT HEMOGLOBIN AIC: 6.8 (ref ?–6.5)

## 2023-07-28 PROCEDURE — 83036 HEMOGLOBIN GLYCOSYLATED A1C: CPT | Performed by: INTERNAL MEDICINE

## 2023-07-28 PROCEDURE — 99214 OFFICE O/P EST MOD 30 MIN: CPT | Performed by: INTERNAL MEDICINE

## 2023-07-28 RX ORDER — LOSARTAN POTASSIUM 100 MG/1
100 TABLET ORAL DAILY
Start: 2023-07-28

## 2023-07-28 NOTE — PROGRESS NOTES
[facility] of [address] [place] [address]    Name: Zeeshan Jean  YOB: 1945      Assessment/Plan:    Stage 2 chronic kidney disease  Lab Results   Component Value Date    EGFR 83 06/20/2023    EGFR 68 01/24/2023    EGFR 83 10/19/2022    CREATININE 0.70 06/20/2023    CREATININE 0.82 01/24/2023    CREATININE 0.70 10/19/2022     Patient's kidney function stable, continue to monitor at this time, with potential nephrotoxins. Hypertension  Blood pressures well controlled at this time, continue current medications. Albuminuria  We will rule out a monoclonal gammopathy of the serum and your electrophoresis. Continue losartan. Etiology of albuminuria most likely due to diabetic nephropathy. Patient may benefit from the use of an SGLT-2 inhibitor going forward, will defer at this moment. Vitamin D deficiency  Patient recommended to initiate 5000 units of vitamin D3 once daily for now. Reassess vitamin D levels in about 1 calendar year. VICKEY (renal artery stenosis) ([address])  Continue to observe at this time, patient's blood pressures are well controlled on medications noted. Encouraged to continue to focus on reducing sodium intake in her diet, no medication changes made during today's visit. Problem List Items Addressed This Visit        Endocrine    Diabetes mellitus ([address])    Relevant Orders    POCT hemoglobin A1c (Completed)       Cardiovascular and Mediastinum    Hypertension     Blood pressures well controlled at this time, continue current medications. Relevant Medications    losartan (COZAAR) 100 MG tablet    VICKEY (renal artery stenosis) ([address])     Continue to observe at this time, patient's blood pressures are well controlled on medications noted. Encouraged to continue to focus on reducing sodium intake in her diet, no medication changes made during today's visit.             Genitourinary    Stage 2 chronic kidney disease - Primary     Lab Results   Component Value Date    EGFR 83 06/20/2023    EGFR 68 01/24/2023    EGFR 83 10/19/2022    CREATININE 0.70 06/20/2023    CREATININE 0.82 01/24/2023    CREATININE 0.70 10/19/2022     Patient's kidney function stable, continue to monitor at this time, with potential nephrotoxins. Relevant Orders    Albumin / creatinine urine ratio    Urinalysis with microscopic    Protein / creatinine ratio, urine    Protein electrophoresis, serum    Protein electrophoresis, urine    Basic metabolic panel       Other    Albuminuria     We will rule out a monoclonal gammopathy of the serum and your electrophoresis. Continue losartan. Etiology of albuminuria most likely due to diabetic nephropathy. Patient may benefit from the use of an SGLT-2 inhibitor going forward, will defer at this moment. Relevant Orders    Albumin / creatinine urine ratio    Urinalysis with microscopic    Protein / creatinine ratio, urine    Protein electrophoresis, serum    Protein electrophoresis, urine    Basic metabolic panel    Vitamin D deficiency     Patient recommended to initiate 5000 units of vitamin D3 once daily for now. Reassess vitamin D levels in about 1 calendar year. Relevant Medications    Cholecalciferol 125 MCG (5000 UT) TABS       Patient is stable from a renal standpoint, we will see her back for regular appointment approximately 6 months. Subjective:      Patient ID: Scout Francisco is a 65 y.o. female. Patient presents for follow-up appoint. We reviewed the patient's labs in detail, most recent creatinine 0.7 mg/dL, which gives her an estimated GFR of around 80 mL/min. There were no significant electrolyte abnormalities noted. She is avoiding all nonsteroid anti-inflammatory medications. She is taking all medications as prescribed with no specific side effects at this time. Additional labs of note include an albumin/creatinine urine which noted about 41 mg/gr.   Previously the patient did not have albuminuria noted. She has glucose intolerance, she is currently taking metformin 500 mg twice daily. Hypertension  This is a chronic problem. The current episode started more than 1 year ago. The problem has been waxing and waning since onset. The problem is controlled. Pertinent negatives include no chest pain, orthopnea or peripheral edema. There are no associated agents to hypertension. Risk factors for coronary artery disease include post-menopausal state and sedentary lifestyle. Past treatments include lifestyle changes, calcium channel blockers, beta blockers, angiotensin blockers and alpha 1 blockers. There are no compliance problems. There is no history of kidney disease. There is no history of chronic renal disease. The following portions of the patient's history were reviewed and updated as appropriate: allergies, current medications, past family history, past medical history, past social history, past surgical history and problem list.    Review of Systems   Cardiovascular: Negative for chest pain and orthopnea. All other systems reviewed and are negative. Social History     Socioeconomic History   • Marital status:       Spouse name: None   • Number of children: None   • Years of education: None   • Highest education level: None   Occupational History   • Occupation: /    Tobacco Use   • Smoking status: Never   • Smokeless tobacco: Never   Vaping Use   • Vaping Use: Never used   Substance and Sexual Activity   • Alcohol use: No   • Drug use: No   • Sexual activity: None   Other Topics Concern   • None   Social History Narrative    Consumes on average 2 cups of decaf coffee per day      Social Determinants of Health     Financial Resource Strain: Not on file   Food Insecurity: Not on file   Transportation Needs: Not on file   Physical Activity: Not on file   Stress: Not on file   Social Connections: Not on file   Intimate Partner Violence: Not on file Housing Stability: Not on file     Past Medical History:   Diagnosis Date   • Abnormal stress test    • Angina pectoris (HCC)     1 wk ago   • Atherosclerosis of renal artery (HCC)    • CAD (coronary artery disease)    • Chronic kidney disease, stage 2 (mild)    • Diabetes mellitus (HCC)     niddm   • Edema    • Endometriosis    • History of palpitations    • History of poliomyelitis    • Hyperlipidemia    • Hypertension    • Hypertensive chronic kidney disease with stage 1 through stage 4 chronic kidney disease, or unspecified chronic kidney disease    • Hypothyroid    • Kidney stones    • Myocardial infarction [facility])    • NSTEMI (non-ST elevated myocardial infarction) ([address]) 01/05/2019   • Obesity    • Paroxysmal SVT (supraventricular tachycardia) ([facility])    • Renal insufficiency    • Rosacea    • Thyroid nodule    • Type 2 diabetes mellitus ([address])    • Wears glasses      Past Surgical History:   Procedure Laterality Date   • ACHILLES TENDON LENGTHENING Right     Achilles tendon stretch    • CARDIAC CATHETERIZATION  2017   • CARDIAC CATHETERIZATION  01/07/2019    Stent placed in RCA    • CATARACT EXTRACTION Bilateral    • COLONOSCOPY     • FL INJECTION RIGHT SHOULDER (ARTHROGRAM)  11/1/2021   • HYSTERECTOMY      Total - Endometriosis    • ROTATOR CUFF REPAIR Right        Current Outpatient Medications:   •  ACCU-CHEK KAILEY PLUS test strip, , Disp: , Rfl: 3  •  acetaminophen (TYLENOL) 325 mg tablet, Take 2 tablets (650 mg total) by mouth every 6 (six) hours as needed (pain, fever) (Patient taking differently: Take 650 mg by mouth every 6 (six) hours as needed (pain, fever) Taking as needed), Disp: 1 Bottle, Rfl: 0  •  apixaban (Eliquis) 5 mg, Take 1 tablet (5 mg total) by mouth 2 (two) times a day, Disp: 180 tablet, Rfl: 3  •  ARMOUR THYROID 15 MG tablet, 15 mg every evening Before bedtime, Disp: , Rfl: 5  •  ARMOUR THYROID 30 MG tablet, Take 30 mg by mouth 2 (two) times a day Take one in the morning, Disp: , Rfl: 6  •  atorvastatin (LIPITOR) 40 mg tablet, Take 1 tablet (40 mg total) by mouth daily, Disp: 90 tablet, Rfl: 3  •  Blood Glucose Monitoring Suppl (ACCU-CHEK KAILEY CONNECT) w/Device KIT, by Does not apply route, Disp: , Rfl:   •  Cholecalciferol 125 MCG (5000 UT) TABS, Take 1 tablet (5,000 Units total) by mouth in the morning, Disp: , Rfl:   •  clopidogrel (PLAVIX) 75 mg tablet, Take 1 tablet (75 mg total) by mouth daily, Disp: 90 tablet, Rfl: 3  •  doxazosin (CARDURA) 4 mg tablet, Take 1 tablet (4 mg total) by mouth daily, Disp: 90 tablet, Rfl: 3  •  Lancets Misc. (ACCU-CHEK MULTICLIX LANCET DEV) KIT, by Does not apply route 2 (two) times a day, Disp: , Rfl:   •  losartan (COZAAR) 100 MG tablet, Take 1 tablet (100 mg total) by mouth daily, Disp: , Rfl:   •  metFORMIN (GLUCOPHAGE) 500 mg tablet, Take 500 mg by mouth 3 (three) times a day 2 tablets in the morning and one in the afternoon, Disp: , Rfl:   •  metoprolol tartrate (LOPRESSOR) 25 mg tablet, Take 1 tablet (25 mg total) by mouth every 12 (twelve) hours, Disp: 180 tablet, Rfl: 3  •  nitroglycerin (NITROSTAT) 0.4 mg SL tablet, Place 0.4 mg under the tongue Taking as needed, Disp: , Rfl:     Lab Results   Component Value Date     12/08/2015    SODIUM 140 06/20/2023    K 4.4 06/20/2023     06/20/2023    CO2 25 06/20/2023    ANIONGAP 9 12/08/2015    AGAP 9 06/20/2023    BUN 26 (H) 06/20/2023    CREATININE 0.70 06/20/2023    GLUC 157 (H) 06/04/2021    GLUF 149 (H) 06/20/2023    CALCIUM 9.3 06/20/2023    AST 15 06/20/2023    ALT 14 06/20/2023    ALKPHOS 88 06/20/2023    PROT 7.0 12/08/2015    TP 6.6 06/20/2023    BILITOT 0.48 12/08/2015    TBILI 0.42 06/20/2023    EGFR 83 06/20/2023     Lab Results   Component Value Date    WBC 7.58 06/20/2023    HGB 12.5 06/20/2023    HCT 38.8 06/20/2023    MCV 94 06/20/2023     06/20/2023     Lab Results   Component Value Date    CHOLESTEROL 134 10/19/2022    CHOLESTEROL 141 06/16/2022    CHOLESTEROL 136 06/08/2022     Lab Results   Component Value Date    HDL 60 10/19/2022    HDL 55 06/16/2022    HDL 63 06/08/2022     Lab Results   Component Value Date    LDLCALC 48 10/19/2022    LDLCALC 54 06/16/2022    LDLCALC 46 06/08/2022     Lab Results   Component Value Date    TRIG 132 10/19/2022    TRIG 160 (H) 06/16/2022    TRIG 134 06/08/2022     No results found for: "CHOLHDL"  Lab Results   Component Value Date    [id number] 2.400 03/13/2023     Lab Results   Component Value Date    PTH 62.1 06/20/2023    CALCIUM 9.3 06/20/2023    PHOS 3.2 06/14/2021     No results found for: "SPEP", "UPEP"  Lab Results   Component Value Date    [id number] 12 06/20/2022           Objective:      /58 (BP Location: Left arm, Patient Position: Sitting, Cuff Size: Standard)   Pulse (!) 54   Ht 5' 3" (1.6 m)   Wt 79.8 kg (176 lb)   SpO2 96%   BMI 31.18 kg/m²          Physical Exam  Vitals reviewed. Constitutional:       General: She is not in acute distress. Appearance: She is well-developed. HENT:      Head: Normocephalic and atraumatic. Eyes:      Conjunctiva/sclera: Conjunctivae normal.   Cardiovascular:      Rate and Rhythm: Normal rate and regular rhythm. Pulmonary:      Effort: Pulmonary effort is normal.      Breath sounds: Normal breath sounds. Abdominal:      Palpations: Abdomen is soft. Musculoskeletal:      Cervical back: Neck supple. Skin:     General: Skin is warm. Findings: No rash. Neurological:      Mental Status: She is alert and oriented to person, place, and time. Cranial Nerves: No cranial nerve deficit.    Psychiatric:         Behavior: Behavior normal.

## 2023-08-01 NOTE — ASSESSMENT & PLAN NOTE
Lab Results   Component Value Date    EGFR 83 06/20/2023    EGFR 68 01/24/2023    EGFR 83 10/19/2022    CREATININE 0.70 06/20/2023    CREATININE 0.82 01/24/2023    CREATININE 0.70 10/19/2022     Patient's kidney function stable, continue to monitor at this time, with potential nephrotoxins.

## 2023-08-01 NOTE — ASSESSMENT & PLAN NOTE
Patient recommended to initiate 5000 units of vitamin D3 once daily for now. Reassess vitamin D levels in about 1 calendar year.

## 2023-08-01 NOTE — ASSESSMENT & PLAN NOTE
We will rule out a monoclonal gammopathy of the serum and your electrophoresis. Continue losartan. Etiology of albuminuria most likely due to diabetic nephropathy. Patient may benefit from the use of an SGLT-2 inhibitor going forward, will defer at this moment.

## 2023-08-01 NOTE — ASSESSMENT & PLAN NOTE
Continue to observe at this time, patient's blood pressures are well controlled on medications noted. Encouraged to continue to focus on reducing sodium intake in her diet, no medication changes made during today's visit.

## 2023-08-04 ENCOUNTER — REMOTE DEVICE CLINIC VISIT (OUTPATIENT)
Dept: CARDIOLOGY CLINIC | Facility: CLINIC | Age: 78
End: 2023-08-04
Payer: MEDICARE

## 2023-08-04 DIAGNOSIS — Z95.818 PRESENCE OF OTHER CARDIAC IMPLANTS AND GRAFTS: Primary | ICD-10-CM

## 2023-08-04 PROCEDURE — G2066 INTER DEVC REMOTE 30D: HCPCS | Performed by: INTERNAL MEDICINE

## 2023-08-04 PROCEDURE — 93298 REM INTERROG DEV EVAL SCRMS: CPT | Performed by: INTERNAL MEDICINE

## 2023-08-04 NOTE — PROGRESS NOTES
MDT LOOP   CARELINK TRANSMISSION: LOOP RECORDER. PRESENTING RHYTHM SB @ 48 BPM. BATTERY STATUS "OK." 118 PAUSE EPISODES W/ EGRAMS SHOWING UNDERSENSING. NO PATIENT ACTIVATED EPISODES. NORMAL DEVICE FUNCTION.  DL

## 2023-08-29 DIAGNOSIS — I10 PRIMARY HYPERTENSION: ICD-10-CM

## 2023-08-29 DIAGNOSIS — I10 HTN (HYPERTENSION): ICD-10-CM

## 2023-08-29 RX ORDER — LOSARTAN POTASSIUM 100 MG/1
100 TABLET ORAL DAILY
Qty: 90 TABLET | Refills: 3 | OUTPATIENT
Start: 2023-08-29

## 2023-08-30 RX ORDER — LOSARTAN POTASSIUM 100 MG/1
100 TABLET ORAL DAILY
Qty: 90 TABLET | Refills: 3 | Status: SHIPPED | OUTPATIENT
Start: 2023-08-30 | End: 2023-08-31 | Stop reason: SDUPTHER

## 2023-08-31 DIAGNOSIS — I10 PRIMARY HYPERTENSION: ICD-10-CM

## 2023-08-31 RX ORDER — LOSARTAN POTASSIUM 100 MG/1
100 TABLET ORAL DAILY
Qty: 90 TABLET | Refills: 3 | Status: SHIPPED | OUTPATIENT
Start: 2023-08-31

## 2023-10-24 ENCOUNTER — OFFICE VISIT (OUTPATIENT)
Dept: CARDIOLOGY CLINIC | Facility: HOSPITAL | Age: 78
End: 2023-10-24
Payer: MEDICARE

## 2023-10-24 ENCOUNTER — APPOINTMENT (OUTPATIENT)
Age: 78
End: 2023-10-24
Payer: MEDICARE

## 2023-10-24 VITALS
SYSTOLIC BLOOD PRESSURE: 150 MMHG | HEIGHT: 63 IN | HEART RATE: 62 BPM | OXYGEN SATURATION: 98 % | WEIGHT: 181.2 LBS | DIASTOLIC BLOOD PRESSURE: 70 MMHG | BODY MASS INDEX: 32.11 KG/M2

## 2023-10-24 DIAGNOSIS — I25.10 CORONARY ARTERY DISEASE INVOLVING NATIVE CORONARY ARTERY OF NATIVE HEART WITHOUT ANGINA PECTORIS: Primary | ICD-10-CM

## 2023-10-24 DIAGNOSIS — I10 PRIMARY HYPERTENSION: ICD-10-CM

## 2023-10-24 DIAGNOSIS — I70.1 RAS (RENAL ARTERY STENOSIS) (HCC): ICD-10-CM

## 2023-10-24 DIAGNOSIS — I47.10 PAROXYSMAL SVT (SUPRAVENTRICULAR TACHYCARDIA): ICD-10-CM

## 2023-10-24 DIAGNOSIS — I65.23 CAROTID STENOSIS, ASYMPTOMATIC, BILATERAL: ICD-10-CM

## 2023-10-24 DIAGNOSIS — I25.119 CORONARY ARTERY DISEASE INVOLVING NATIVE CORONARY ARTERY OF NATIVE HEART WITH ANGINA PECTORIS (HCC): ICD-10-CM

## 2023-10-24 DIAGNOSIS — N18.2 STAGE 2 CHRONIC KIDNEY DISEASE: ICD-10-CM

## 2023-10-24 DIAGNOSIS — R80.9 ALBUMINURIA: ICD-10-CM

## 2023-10-24 DIAGNOSIS — E78.00 HYPERCHOLESTEROLEMIA: ICD-10-CM

## 2023-10-24 LAB
ANION GAP SERPL CALCULATED.3IONS-SCNC: 7 MMOL/L
BACTERIA UR QL AUTO: ABNORMAL /HPF
BILIRUB UR QL STRIP: NEGATIVE
BUN SERPL-MCNC: 23 MG/DL (ref 5–25)
CALCIUM SERPL-MCNC: 9.4 MG/DL (ref 8.4–10.2)
CHLORIDE SERPL-SCNC: 104 MMOL/L (ref 96–108)
CLARITY UR: CLEAR
CO2 SERPL-SCNC: 28 MMOL/L (ref 21–32)
COLOR UR: ABNORMAL
CREAT SERPL-MCNC: 0.71 MG/DL (ref 0.6–1.3)
CREAT UR-MCNC: 65.6 MG/DL
CREAT UR-MCNC: 65.6 MG/DL
GFR SERPL CREATININE-BSD FRML MDRD: 81 ML/MIN/1.73SQ M
GLUCOSE P FAST SERPL-MCNC: 153 MG/DL (ref 65–99)
GLUCOSE UR STRIP-MCNC: NEGATIVE MG/DL
HGB UR QL STRIP.AUTO: NEGATIVE
KETONES UR STRIP-MCNC: NEGATIVE MG/DL
LEUKOCYTE ESTERASE UR QL STRIP: ABNORMAL
MICROALBUMIN UR-MCNC: 69.2 MG/L
MICROALBUMIN/CREAT 24H UR: 105 MG/G CREATININE (ref 0–30)
MUCOUS THREADS UR QL AUTO: ABNORMAL
NITRITE UR QL STRIP: NEGATIVE
NON-SQ EPI CELLS URNS QL MICRO: ABNORMAL /HPF
PH UR STRIP.AUTO: 5.5 [PH]
POTASSIUM SERPL-SCNC: 4.8 MMOL/L (ref 3.5–5.3)
PROT UR STRIP-MCNC: ABNORMAL MG/DL
PROT UR-MCNC: 15 MG/DL
PROT/CREAT UR: 0.23 MG/G{CREAT} (ref 0–0.1)
RBC #/AREA URNS AUTO: ABNORMAL /HPF
SODIUM SERPL-SCNC: 139 MMOL/L (ref 135–147)
SP GR UR STRIP.AUTO: 1.01 (ref 1–1.03)
UROBILINOGEN UR STRIP-ACNC: <2 MG/DL
WBC #/AREA URNS AUTO: ABNORMAL /HPF
WBC CLUMPS # UR AUTO: PRESENT /UL

## 2023-10-24 PROCEDURE — 82043 UR ALBUMIN QUANTITATIVE: CPT

## 2023-10-24 PROCEDURE — 82570 ASSAY OF URINE CREATININE: CPT

## 2023-10-24 PROCEDURE — 81001 URINALYSIS AUTO W/SCOPE: CPT

## 2023-10-24 PROCEDURE — 99214 OFFICE O/P EST MOD 30 MIN: CPT | Performed by: INTERNAL MEDICINE

## 2023-10-24 PROCEDURE — 84156 ASSAY OF PROTEIN URINE: CPT

## 2023-10-24 PROCEDURE — 84166 PROTEIN E-PHORESIS/URINE/CSF: CPT

## 2023-10-24 PROCEDURE — 36415 COLL VENOUS BLD VENIPUNCTURE: CPT

## 2023-10-24 PROCEDURE — 84165 PROTEIN E-PHORESIS SERUM: CPT

## 2023-10-24 PROCEDURE — 80048 BASIC METABOLIC PNL TOTAL CA: CPT

## 2023-10-24 NOTE — PROGRESS NOTES
Cardiology Follow Up    Denise Hamilton  [birth year]  [id number]  [address]  [address] [facility] [id number]    1. Coronary artery disease involving native coronary artery of native heart without angina pectoris        2. Primary hypertension        3. Paroxysmal SVT (supraventricular tachycardia)        4. VICKEY (renal artery stenosis) ([address])        5. Hypercholesterolemia        6. Carotid stenosis, asymptomatic, bilateral  VAS carotid complete study      7. Coronary artery disease involving native coronary artery of native heart with angina pectoris ([address])            Discussion/Summary:   Overall she has been feeling well and offers no complaints coronary disease is stable with no complaints of angina. She asked me about long-term use of Eliquis. She was originally put on at [facility] for a small CVA she had an unclear rhythm whether it was A. tach or A-fib. She has had extensive monitoring with a loop recorder with no events other than atrial tachycardia. She underwent ablation for this and has been feeling well. From my standpoint she can remain on Plavix alone long-term. I have asked her to check with her neurologist and her primary physician as well. She is due for carotid Doppler for moderate stenosis. Interval History:  77 she is due for carotid Doppler for follow-up on moderate stenosis. -year-old female history of coronary disease, hypertension, renal artery stenosis, paroxysmal supraventricular tachycardia Routine follow-up visit. In early January she was driving in the car and had an episode of tachycardia with chest tightness. She was brought to an outside hospital and was sent to [facility] where they found a small troponin elevation. Left heart catheterization was performed the stent was placed in the right coronary artery.       Overall she has been doing well since her last visit. Denies any chest pain, shortness of breath, palpitations, lightheadedness, dizziness, or syncope. There has been no lower extremity edema, PND, orthopnea orthopnea    Problem List       Chest pain    Tachycardia    Hypertension    Diabetes mellitus (HCC)    NSTEMI (non-ST elevated myocardial infarction) ([address])    Coronary artery disease involving native coronary artery of native heart with angina pectoris (HCC)    Paroxysmal SVT (supraventricular tachycardia) ([facility])          Past Medical History:   Diagnosis Date    Abnormal stress test     Angina pectoris (HCC)     1 wk ago    Atherosclerosis of renal artery (HCC)     CAD (coronary artery disease)     Chronic kidney disease, stage 2 (mild)     Diabetes mellitus (HCC)     niddm    Edema     Endometriosis     History of palpitations     History of poliomyelitis     Hyperlipidemia     Hypertension     Hypertensive chronic kidney disease with stage 1 through stage 4 chronic kidney disease, or unspecified chronic kidney disease     Hypothyroid     Kidney stones     Myocardial infarction [facility])     NSTEMI (non-ST elevated myocardial infarction) ([address] 01/05/2019    Obesity     Paroxysmal SVT (supraventricular tachycardia)     Renal insufficiency     Rosacea     Thyroid nodule     Type 2 diabetes mellitus ([address] [facility])     Wears glasses      Social History     Socioeconomic History    Marital status:       Spouse name: Not on file    Number of children: Not on file    Years of education: Not on file    Highest education level: Not on file   Occupational History    Occupation: /    Tobacco Use    Smoking status: Never    Smokeless tobacco: Never   Vaping Use    Vaping Use: Never used   Substance and Sexual Activity    Alcohol use: No    Drug use: No    Sexual activity: Not on file   Other Topics Concern    Not on file   Social History Narrative    Consumes on average 2 cups of decaf coffee per day      Social Determinants of Health Financial Resource Strain: Not on file   Food Insecurity: Not on file   Transportation Needs: Not on file   Physical Activity: Not on file   Stress: Not on file   Social Connections: Not on file   Intimate Partner Violence: Not on file   Housing Stability: Not on file      Family History   Problem Relation Age of Onset    Asthma Mother     Stomach cancer Father     Breast cancer Sister 54    No Known Problems Daughter     No Known Problems Daughter     No Known Problems Daughter     No Known Problems Maternal Grandmother     No Known Problems Maternal Grandfather     No Known Problems Paternal Grandmother     Heart attack Paternal Grandfather     No Known Problems Maternal Aunt     No Known Problems Paternal Aunt     No Known Problems Paternal Aunt     No Known Problems Son     No Known Problems Son      Past Surgical History:   Procedure Laterality Date    ACHILLES TENDON LENGTHENING Right     Achilles tendon stretch     CARDIAC CATHETERIZATION  2017    CARDIAC CATHETERIZATION  01/07/2019    Stent placed in RCA     CATARACT EXTRACTION Bilateral     COLONOSCOPY      FL INJECTION RIGHT SHOULDER (ARTHROGRAM)  11/1/2021    HYSTERECTOMY      Total - Endometriosis     ROTATOR CUFF REPAIR Right        Current Outpatient Medications:     ACCU-CHEK KAILEY PLUS test strip, , Disp: , Rfl: 3    acetaminophen (TYLENOL) 325 mg tablet, Take 2 tablets (650 mg total) by mouth every 6 (six) hours as needed (pain, fever) (Patient taking differently: Take 650 mg by mouth every 6 (six) hours as needed (pain, fever) Taking as needed), Disp: 1 Bottle, Rfl: 0    apixaban (Eliquis) 5 mg, Take 1 tablet (5 mg total) by mouth 2 (two) times a day, Disp: 180 tablet, Rfl: 3    ARMOUR THYROID 15 MG tablet, 15 mg every evening Before bedtime, Disp: , Rfl: 5    ARMOUR THYROID 30 MG tablet, Take 30 mg by mouth 2 (two) times a day Take one in the morning, Disp: , Rfl: 6    atorvastatin (LIPITOR) 40 mg tablet, Take 1 tablet (40 mg total) by mouth daily, Disp: 90 tablet, Rfl: 3    Blood Glucose Monitoring Suppl (ACCU-CHEK KAILEY CONNECT) w/Device KIT, by Does not apply route, Disp: , Rfl:     Cholecalciferol 125 MCG (5000 UT) TABS, Take 1 tablet (5,000 Units total) by mouth in the morning, Disp: , Rfl:     clopidogrel (PLAVIX) 75 mg tablet, Take 1 tablet (75 mg total) by mouth daily, Disp: 90 tablet, Rfl: 3    doxazosin (CARDURA) 4 mg tablet, Take 1 tablet (4 mg total) by mouth daily, Disp: 90 tablet, Rfl: 3    Lancets Misc. (ACCU-CHEK MULTICLIX LANCET DEV) KIT, by Does not apply route 2 (two) times a day, Disp: , Rfl:     losartan (COZAAR) 100 MG tablet, Take 1 tablet (100 mg total) by mouth daily, Disp: 90 tablet, Rfl: 3    metFORMIN (GLUCOPHAGE) 500 mg tablet, Take 500 mg by mouth 3 (three) times a day 2 tablets in the morning and one in the afternoon, Disp: , Rfl:     metoprolol tartrate (LOPRESSOR) 25 mg tablet, Take 1 tablet (25 mg total) by mouth every 12 (twelve) hours, Disp: 180 tablet, Rfl: 3    nitroglycerin (NITROSTAT) 0.4 mg SL tablet, Place 0.4 mg under the tongue Taking as needed, Disp: , Rfl:   Allergies   Allergen Reactions    Lisinopril Rash     Changed pigment of my skin     Amlodipine Other (See Comments)     Patient does not remember        Labs:     Chemistry        Component Value Date/Time     12/08/2015 1014    K 4.4 06/20/2023 0830    K 4.4 12/08/2015 1014     06/20/2023 0830     12/08/2015 1014    CO2 25 06/20/2023 0830    CO2 30.9 12/08/2015 1014    BUN 26 (H) 06/20/2023 0830    BUN 20 12/08/2015 1014    CREATININE 0.70 06/20/2023 0830    CREATININE 0.67 12/08/2015 1014        Component Value Date/Time    CALCIUM 9.3 06/20/2023 0830    CALCIUM 8.9 12/08/2015 1014    ALKPHOS 88 06/20/2023 0830    ALKPHOS 111 12/08/2015 1014    AST 15 06/20/2023 0830    AST 20 12/08/2015 1014    ALT 14 06/20/2023 0830    ALT 25 12/08/2015 1014    BILITOT 0.48 12/08/2015 1014            Lab Results   Component Value Date    CHOL 267 12/08/2015    CHOL 270 06/10/2015    CHOL 243 03/18/2015     Lab Results   Component Value Date    HDL 60 10/19/2022    HDL 55 06/16/2022    HDL 63 06/08/2022     Lab Results   Component Value Date    LDLCALC 48 10/19/2022    LDLCALC 54 06/16/2022    LDLCALC 46 06/08/2022     Lab Results   Component Value Date    TRIG 132 10/19/2022    TRIG 160 (H) 06/16/2022    TRIG 134 06/08/2022     No results found for: "CHOLHDL"    Imaging: No results found. ECG:  Sinus bradycardia PACs      Review of Systems   Constitutional: Negative. HENT: Negative. Eyes: Negative. Cardiovascular: Negative. Respiratory: Negative. Endocrine: Negative. Hematologic/Lymphatic: Negative. Skin: Negative. Musculoskeletal: Negative. Gastrointestinal: Negative. Genitourinary: Negative. Neurological: Negative. Psychiatric/Behavioral: Negative. Vitals:    10/24/23 1345   BP: 150/70   Pulse: 62   SpO2: 98%     Vitals:    10/24/23 1345   Weight: 82.2 kg (181 lb 3.2 oz)     Height: 5' 3" (160 cm)   Body mass index is 32.1 kg/m². Physical Exam:  Vital signs reviewed  General:  Alert and cooperative, appears stated age, no acute distress  HEENT:  PERRLA, EOMI, no scleral icterus, no conjunctival pallor  Neck:  No lymphadenopathy, no thyromegaly, no carotid bruits, no elevated JVP  Heart:  Regular rate and rhythm, normal S1/S2, no S3/S4, no murmur, rubs or gallops. PMI nondisplaced  Lungs:  Clear to auscultation bilaterally, no wheezes rales or rhonchi  Abdomen:  Soft, non-tender, positive bowel sounds, no rebound or guarding,   no organomegaly   Extremities:  Normal range of motion.   No clubbing, cyanosis or edema   Vascular:  2+ pedal pulses  Skin:  No rashes or lesions on exposed skin  Neurologic:  Cranial nerves II-XII grossly intact without focal deficits  Psych:  Normal mood and affect

## 2023-10-25 LAB
ALBUMIN SERPL ELPH-MCNC: 3.61 G/DL (ref 3.2–5.1)
ALBUMIN SERPL ELPH-MCNC: 59.1 % (ref 48–70)
ALBUMIN UR ELPH-MCNC: 100 %
ALPHA1 GLOB MFR UR ELPH: 0 %
ALPHA1 GLOB SERPL ELPH-MCNC: 0.27 G/DL (ref 0.15–0.47)
ALPHA1 GLOB SERPL ELPH-MCNC: 4.5 % (ref 1.8–7)
ALPHA2 GLOB MFR UR ELPH: 0 %
ALPHA2 GLOB SERPL ELPH-MCNC: 0.73 G/DL (ref 0.42–1.04)
ALPHA2 GLOB SERPL ELPH-MCNC: 11.9 % (ref 5.9–14.9)
B-GLOBULIN MFR UR ELPH: 0 %
BETA GLOB ABNORMAL SERPL ELPH-MCNC: 0.35 G/DL (ref 0.31–0.57)
BETA1 GLOB SERPL ELPH-MCNC: 5.8 % (ref 4.7–7.7)
BETA2 GLOB SERPL ELPH-MCNC: 6.4 % (ref 3.1–7.9)
BETA2+GAMMA GLOB SERPL ELPH-MCNC: 0.39 G/DL (ref 0.2–0.58)
GAMMA GLOB ABNORMAL SERPL ELPH-MCNC: 0.75 G/DL (ref 0.4–1.66)
GAMMA GLOB MFR UR ELPH: 0 %
GAMMA GLOB SERPL ELPH-MCNC: 12.3 % (ref 6.9–22.3)
IGG/ALB SER: 1.44 {RATIO} (ref 1.1–1.8)
PROT PATTERN SERPL ELPH-IMP: ABNORMAL
PROT PATTERN UR ELPH-IMP: NORMAL
PROT SERPL-MCNC: 6.1 G/DL (ref 6.4–8.2)
PROT UR-MCNC: 16.2 MG/DL

## 2023-10-25 PROCEDURE — 84165 PROTEIN E-PHORESIS SERUM: CPT | Performed by: PATHOLOGY

## 2023-10-25 PROCEDURE — 84166 PROTEIN E-PHORESIS/URINE/CSF: CPT | Performed by: PATHOLOGY

## 2023-10-28 DIAGNOSIS — I21.4 NSTEMI (NON-ST ELEVATED MYOCARDIAL INFARCTION) (HCC): ICD-10-CM

## 2023-10-28 DIAGNOSIS — I10 ESSENTIAL HYPERTENSION: ICD-10-CM

## 2023-10-29 RX ORDER — DOXAZOSIN MESYLATE 4 MG/1
4 TABLET ORAL DAILY
Qty: 90 TABLET | Refills: 3 | Status: SHIPPED | OUTPATIENT
Start: 2023-10-29 | End: 2023-11-02 | Stop reason: SDUPTHER

## 2023-10-30 RX ORDER — ATORVASTATIN CALCIUM 40 MG/1
40 TABLET, FILM COATED ORAL DAILY
Qty: 90 TABLET | Refills: 3 | Status: SHIPPED | OUTPATIENT
Start: 2023-10-30 | End: 2024-10-29

## 2023-11-02 ENCOUNTER — OFFICE VISIT (OUTPATIENT)
Dept: NEPHROLOGY | Facility: CLINIC | Age: 78
End: 2023-11-02
Payer: MEDICARE

## 2023-11-02 VITALS
HEART RATE: 63 BPM | WEIGHT: 177 LBS | BODY MASS INDEX: 31.36 KG/M2 | OXYGEN SATURATION: 97 % | DIASTOLIC BLOOD PRESSURE: 78 MMHG | HEIGHT: 63 IN | SYSTOLIC BLOOD PRESSURE: 166 MMHG

## 2023-11-02 DIAGNOSIS — R80.9 ALBUMINURIA: ICD-10-CM

## 2023-11-02 DIAGNOSIS — I10 ESSENTIAL HYPERTENSION: ICD-10-CM

## 2023-11-02 DIAGNOSIS — E11.9 TYPE 2 DIABETES MELLITUS WITHOUT COMPLICATION, WITHOUT LONG-TERM CURRENT USE OF INSULIN (HCC): ICD-10-CM

## 2023-11-02 DIAGNOSIS — E78.00 HYPERCHOLESTEROLEMIA: ICD-10-CM

## 2023-11-02 DIAGNOSIS — I10 PRIMARY HYPERTENSION: Primary | ICD-10-CM

## 2023-11-02 PROCEDURE — 99214 OFFICE O/P EST MOD 30 MIN: CPT | Performed by: INTERNAL MEDICINE

## 2023-11-02 RX ORDER — DOXAZOSIN MESYLATE 4 MG/1
8 TABLET ORAL DAILY
Qty: 90 TABLET | Refills: 3
Start: 2023-11-02

## 2023-11-02 RX ORDER — IRBESARTAN 300 MG/1
300 TABLET ORAL
Qty: 90 TABLET | Refills: 3 | Status: SHIPPED | OUTPATIENT
Start: 2023-11-02

## 2023-11-02 NOTE — PROGRESS NOTES
[facility] MD    Name: Judah Garcia  YOB: 1945      Assessment/Plan:           Problem List Items Addressed This Visit          Endocrine    Diabetes mellitus [facility])       Lab Results   Component Value Date    HGBA1C 6.8 (A) 07/28/2023   Neil Seat may help with diabetic control         Relevant Medications    Empagliflozin (Jardiance) 10 MG TABS tablet       Cardiovascular and Mediastinum    Hypertension - Primary     Blood pressure end exam left arm is 146/68  She was a little anxious as went to the incorrect office for  her appt  Home readings are high - this morning was 175/69  Reaction to amlodipine in the past  Increase doxazosin to 8mg at night  Change losartan to irbesartan 300mg daily         Relevant Medications    doxazosin (CARDURA) 4 mg tablet    irbesartan (AVAPRO) 300 mg tablet       Other    Hypercholesterolemia     Aim for an LDL < 70 to dec CV risk factors         Albuminuria     She has increasing albuminuria  SPEP and UPEP are negative  Will add Jardiance 10mg dialy  Explained mechanism of action and potential side effects  She will call if she has any vaginal itching or s/s of a UTI  She will use fragrance free soap in the vaginal area         Relevant Medications    Empagliflozin (Jardiance) 10 MG TABS tablet     Other Visit Diagnoses       Essential hypertension        Relevant Medications    doxazosin (CARDURA) 4 mg tablet    irbesartan (AVAPRO) 300 mg tablet              Subjective:      Patient ID: Judah Garcia is a 77 y.o. female. referred by Dr Carie Walker a history of hypertension, CKD 2 and renal artery stenosis     The following portions of the patient's history were reviewed and updated as appropriate: allergies, current medications, past family history, past medical history, past social history, past surgical history and problem list.    Review of Systems   Constitutional:  Negative for fatigue.    HENT: Negative for hearing loss. Eyes:  Negative for visual disturbance. Respiratory:  Positive for shortness of breath. Has JORDAN   Cardiovascular:  Negative for chest pain, palpitations and leg swelling. Had an ablation a year ago   Gastrointestinal:  Negative for abdominal pain, blood in stool, constipation and diarrhea. Genitourinary:  Negative for difficulty urinating, dysuria, frequency and hematuria. Neurological:  Negative for dizziness and weakness. Hematological:  Does not bruise/bleed easily. Psychiatric/Behavioral:  Negative for dysphoric mood. Social History     Socioeconomic History    Marital status:       Spouse name: None    Number of children: None    Years of education: None    Highest education level: None   Occupational History    Occupation: /    Tobacco Use    Smoking status: Never    Smokeless tobacco: Never   Vaping Use    Vaping Use: Never used   Substance and Sexual Activity    Alcohol use: No    Drug use: No    Sexual activity: None   Other Topics Concern    None   Social History Narrative    Consumes on average 2 cups of decaf coffee per day      Social Determinants of Health     Financial Resource Strain: Not on file   Food Insecurity: Not on file   Transportation Needs: Not on file   Physical Activity: Not on file   Stress: Not on file   Social Connections: Not on file   Intimate Partner Violence: Not on file   Housing Stability: Not on file     Past Medical History:   Diagnosis Date    Abnormal stress test     Angina pectoris ([address])     1 wk ago    Atherosclerosis of renal artery (HCC)     CAD (coronary artery disease)     Chronic kidney disease, stage 2 (mild)     Diabetes mellitus (HCC)     niddm    Edema     Endometriosis     History of palpitations     History of poliomyelitis     Hyperlipidemia     Hypertension     Hypertensive chronic kidney disease with stage 1 through stage 4 chronic kidney disease, or unspecified chronic kidney disease     Hypothyroid     Kidney stones     Myocardial infarction [facility])     NSTEMI (non-ST elevated myocardial infarction) ([address]) 01/05/2019    Obesity     Paroxysmal SVT (supraventricular tachycardia)     Renal insufficiency     Rosacea     Thyroid nodule     Type 2 diabetes mellitus (HCC)     Wears glasses      Past Surgical History:   Procedure Laterality Date    ACHILLES TENDON LENGTHENING Right     Achilles tendon stretch     CARDIAC CATHETERIZATION  2017    CARDIAC CATHETERIZATION  01/07/2019    Stent placed in RCA     CATARACT EXTRACTION Bilateral     COLONOSCOPY      FL INJECTION RIGHT SHOULDER (ARTHROGRAM)  11/1/2021    HYSTERECTOMY      Total - Endometriosis     ROTATOR CUFF REPAIR Right        Current Outpatient Medications:     ACCU-CHEK KAILEY PLUS test strip, , Disp: , Rfl: 3    acetaminophen (TYLENOL) 325 mg tablet, Take 2 tablets (650 mg total) by mouth every 6 (six) hours as needed (pain, fever) (Patient taking differently: Take 650 mg by mouth every 6 (six) hours as needed (pain, fever) Taking as needed), Disp: 1 Bottle, Rfl: 0    apixaban (Eliquis) 5 mg, Take 1 tablet (5 mg total) by mouth 2 (two) times a day, Disp: 180 tablet, Rfl: 3    ARMOUR THYROID 15 MG tablet, 15 mg every evening Before bedtime, Disp: , Rfl: 5    ARMOUR THYROID 30 MG tablet, Take 30 mg by mouth 2 (two) times a day Take one in the morning, Disp: , Rfl: 6    atorvastatin (LIPITOR) 40 mg tablet, Take 1 tablet (40 mg total) by mouth daily, Disp: 90 tablet, Rfl: 3    Blood Glucose Monitoring Suppl (ACCU-CHEK KAILEY CONNECT) w/Device KIT, by Does not apply route, Disp: , Rfl:     Cholecalciferol 125 MCG (5000 UT) TABS, Take 1 tablet (5,000 Units total) by mouth in the morning, Disp: , Rfl:     clopidogrel (PLAVIX) 75 mg tablet, Take 1 tablet (75 mg total) by mouth daily, Disp: 90 tablet, Rfl: 3    doxazosin (CARDURA) 4 mg tablet, Take 2 tablets (8 mg total) by mouth daily, Disp: 90 tablet, Rfl: 3    Empagliflozin (Jardiance) 10 MG TABS tablet, Take 1 tablet (10 mg total) by mouth every morning, Disp: 30 tablet, Rfl: 4    irbesartan (AVAPRO) 300 mg tablet, Take 1 tablet (300 mg total) by mouth daily at bedtime, Disp: 90 tablet, Rfl: 3    Lancets Misc. (ACCU-CHEK MULTICLIX LANCET DEV) KIT, by Does not apply route 2 (two) times a day, Disp: , Rfl:     metFORMIN (GLUCOPHAGE) 500 mg tablet, Take 500 mg by mouth 3 (three) times a day 2 tablets in the morning and one in the afternoon, Disp: , Rfl:     metoprolol tartrate (LOPRESSOR) 25 mg tablet, Take 1 tablet (25 mg total) by mouth every 12 (twelve) hours, Disp: 180 tablet, Rfl: 3    nitroglycerin (NITROSTAT) 0.4 mg SL tablet, Place 0.4 mg under the tongue Taking as needed, Disp: , Rfl:     Lab Results   Component Value Date     12/08/2015    SODIUM 139 10/24/2023    K 4.8 10/24/2023     10/24/2023    CO2 28 10/24/2023    ANIONGAP 9 12/08/2015    AGAP 7 10/24/2023    BUN 23 10/24/2023    CREATININE 0.71 10/24/2023    GLUC 157 (H) 06/04/2021    GLUF 153 (H) 10/24/2023    CALCIUM 9.4 10/24/2023    AST 15 06/20/2023    ALT 14 06/20/2023    ALKPHOS 88 06/20/2023    PROT 7.0 12/08/2015    TP 6.1 (L) 10/24/2023    BILITOT 0.48 12/08/2015    TBILI 0.42 06/20/2023    EGFR 81 10/24/2023     Lab Results   Component Value Date    WBC 7.58 06/20/2023    HGB 12.5 06/20/2023    HCT 38.8 06/20/2023    MCV 94 06/20/2023     06/20/2023     Lab Results   Component Value Date    CHOLESTEROL 134 10/19/2022    CHOLESTEROL 141 06/16/2022    CHOLESTEROL 136 06/08/2022     Lab Results   Component Value Date    HDL 60 10/19/2022    HDL 55 06/16/2022    HDL 63 06/08/2022     Lab Results   Component Value Date    LDLCALC 48 10/19/2022    LDLCALC 54 06/16/2022    LDLCALC 46 06/08/2022     Lab Results   Component Value Date    TRIG 132 10/19/2022    TRIG 160 (H) 06/16/2022    TRIG 134 06/08/2022     No results found for: "CHOLHDL"  Lab Results   Component Value Date    NEG6LLPCZIDF 2.400 03/13/2023     Lab Results   Component Value Date    PTH 62.1 06/20/2023    CALCIUM 9.4 10/24/2023    PHOS 3.2 06/14/2021     Lab Results   Component Value Date    SPEP See Comment 10/24/2023    UPEP See Comment 10/24/2023     Lab Results   Component Value Date    LSPF24PEO 12 06/20/2022   SPEP and UPEP negative  MAC  19--> 41-->105 mg/g    TECHNIQUE:   Ultrasound of the retroperitoneum was performed with a curvilinear transducer utilizing volumetric sweeps and still imaging techniques. FINDINGS:     KIDNEYS:  Symmetric and normal size. Right kidney:  10.9 x 6.4 x 5.2 cm. Volume 188.9 mL  Left kidney:  11.1 x 6.1 x 5.8 cm. Volume 204.7 mL     Right kidney  Normal echogenicity and contour. No mass is identified. No hydronephrosis. No shadowing calculi. No perinephric fluid collections. Left kidney  Normal echogenicity and contour. No mass is identified. Cyst in the midportion measures 3.9 x 2.4 x 2.1 cm. Parapelvic cyst measures 1.4 x 1.4 x 1.4 cm. No hydronephrosis. No shadowing calculi. No perinephric fluid collections. URETERS:  Nonvisualized. BLADDER:   Normally distended. No focal thickening or mass lesions. Objective:      /78 (BP Location: Left arm, Patient Position: Sitting, Cuff Size: Standard)   Pulse 63   Ht 5' 3" (1.6 m)   Wt 80.3 kg (177 lb)   SpO2 97%   BMI 31.35 kg/m²          Physical Exam  Vitals reviewed. Constitutional:       General: She is not in acute distress. Appearance: Normal appearance. She is not toxic-appearing. HENT:      Head: Normocephalic and atraumatic. Right Ear: External ear normal.      Left Ear: External ear normal.   Eyes:      Extraocular Movements: Extraocular movements intact. Pupils: Pupils are equal, round, and reactive to light. Neck:      Vascular: No carotid bruit. Cardiovascular:      Rate and Rhythm: Normal rate and regular rhythm. Pulmonary:      Effort: Pulmonary effort is normal. No respiratory distress. Breath sounds: Normal breath sounds. No wheezing or rales. Abdominal:      General: Bowel sounds are normal. There is no distension. Palpations: Abdomen is soft. Tenderness: There is no abdominal tenderness. Musculoskeletal:         General: Normal range of motion. Right lower leg: No edema. Left lower leg: No edema. Lymphadenopathy:      Cervical: No cervical adenopathy. Skin:     General: Skin is warm and dry. Neurological:      General: No focal deficit present. Mental Status: She is alert and oriented to person, place, and time. Psychiatric:         Mood and Affect: Mood normal.         Behavior: Behavior normal.         Thought Content:  Thought content normal.         Judgment: Judgment normal.

## 2023-11-02 NOTE — ASSESSMENT & PLAN NOTE
She has increasing albuminuria  SPEP and UPEP are negative  Will add Jardiance 10mg dialy  Explained mechanism of action and potential side effects  She will call if she has any vaginal itching or s/s of a UTI  She will use fragrance free soap in the vaginal area

## 2023-11-02 NOTE — ASSESSMENT & PLAN NOTE
Blood pressure end exam left arm is 146/68  She was a little anxious as went to the incorrect office for  her appt  Home readings are high - this morning was 175/69  Reaction to amlodipine in the past  Increase doxazosin to 8mg at night  Change losartan to irbesartan 300mg daily

## 2023-11-02 NOTE — ASSESSMENT & PLAN NOTE
Lab Results   Component Value Date    HGBA1C 6.8 (A) 07/28/2023   Jardiance may help with diabetic control

## 2023-11-02 NOTE — PATIENT INSTRUCTIONS
Call next Monday or Tuesday with the BP readings  Get a lab in 1-2 weeks nonfasting to check your kidney function on the new medicationd

## 2023-11-03 ENCOUNTER — REMOTE DEVICE CLINIC VISIT (OUTPATIENT)
Dept: CARDIOLOGY CLINIC | Facility: CLINIC | Age: 78
End: 2023-11-03
Payer: MEDICARE

## 2023-11-03 DIAGNOSIS — Z95.818 PRESENCE OF OTHER CARDIAC IMPLANTS AND GRAFTS: Primary | ICD-10-CM

## 2023-11-03 PROCEDURE — 93298 REM INTERROG DEV EVAL SCRMS: CPT | Performed by: INTERNAL MEDICINE

## 2023-11-03 PROCEDURE — G2066 INTER DEVC REMOTE 30D: HCPCS | Performed by: INTERNAL MEDICINE

## 2023-11-03 NOTE — PROGRESS NOTES
MDT LOOP   CARELINK TRANSMISSION: LOOP RECORDER. PRESENTING RHYTHM SB @ 48 BPM. BATTERY STATUS "OK." 142 PAUSE EPISODES W/ EGRAMS SHOWING UNDERSENSING. NO PATIENT ACTIVATED EPISODES. NORMAL DEVICE FUNCTION.  DL

## 2023-11-21 ENCOUNTER — HOSPITAL ENCOUNTER (OUTPATIENT)
Dept: NON INVASIVE DIAGNOSTICS | Facility: HOSPITAL | Age: 78
Discharge: HOME | End: 2023-11-21
Attending: INTERNAL MEDICINE
Payer: MEDICARE

## 2023-11-21 DIAGNOSIS — I65.23 CAROTID STENOSIS, ASYMPTOMATIC, BILATERAL: ICD-10-CM

## 2023-11-21 PROCEDURE — 93880 EXTRACRANIAL BILAT STUDY: CPT

## 2023-11-21 PROCEDURE — 93880 EXTRACRANIAL BILAT STUDY: CPT | Performed by: SURGERY

## 2024-01-24 DIAGNOSIS — I21.4 NSTEMI (NON-ST ELEVATED MYOCARDIAL INFARCTION) (HCC): ICD-10-CM

## 2024-01-24 RX ORDER — ATORVASTATIN CALCIUM 40 MG/1
40 TABLET, FILM COATED ORAL DAILY
Qty: 90 TABLET | Refills: 3 | Status: SHIPPED | OUTPATIENT
Start: 2024-01-24 | End: 2025-01-23

## 2024-01-24 NOTE — TELEPHONE ENCOUNTER
Requested medication(s) are due for refill today: Yes  Patient has already received a courtesy refill: No  Other reason request has been forwarded to provider:

## 2024-02-02 ENCOUNTER — OFFICE VISIT (OUTPATIENT)
Dept: OBGYN CLINIC | Facility: CLINIC | Age: 79
End: 2024-02-02
Payer: MEDICARE

## 2024-02-02 ENCOUNTER — REMOTE DEVICE CLINIC VISIT (OUTPATIENT)
Dept: CARDIOLOGY CLINIC | Facility: CLINIC | Age: 79
End: 2024-02-02
Payer: MEDICARE

## 2024-02-02 ENCOUNTER — APPOINTMENT (OUTPATIENT)
Dept: RADIOLOGY | Facility: CLINIC | Age: 79
End: 2024-02-02
Payer: MEDICARE

## 2024-02-02 VITALS
TEMPERATURE: 97 F | HEART RATE: 87 BPM | WEIGHT: 172.5 LBS | BODY MASS INDEX: 30.56 KG/M2 | HEIGHT: 63 IN | DIASTOLIC BLOOD PRESSURE: 79 MMHG | SYSTOLIC BLOOD PRESSURE: 133 MMHG

## 2024-02-02 DIAGNOSIS — G89.29 CHRONIC PAIN OF RIGHT KNEE: ICD-10-CM

## 2024-02-02 DIAGNOSIS — M17.11 PRIMARY OSTEOARTHRITIS OF ONE KNEE, RIGHT: Primary | ICD-10-CM

## 2024-02-02 DIAGNOSIS — M25.561 CHRONIC PAIN OF RIGHT KNEE: ICD-10-CM

## 2024-02-02 DIAGNOSIS — E11.22 TYPE 2 DIABETES MELLITUS WITH CHRONIC KIDNEY DISEASE, WITHOUT LONG-TERM CURRENT USE OF INSULIN, UNSPECIFIED CKD STAGE (HCC): ICD-10-CM

## 2024-02-02 DIAGNOSIS — Z95.818 PRESENCE OF OTHER CARDIAC IMPLANTS AND GRAFTS: Primary | ICD-10-CM

## 2024-02-02 DIAGNOSIS — M25.561 ACUTE PAIN OF RIGHT KNEE: ICD-10-CM

## 2024-02-02 PROCEDURE — 99214 OFFICE O/P EST MOD 30 MIN: CPT | Performed by: FAMILY MEDICINE

## 2024-02-02 PROCEDURE — 20610 DRAIN/INJ JOINT/BURSA W/O US: CPT | Performed by: FAMILY MEDICINE

## 2024-02-02 PROCEDURE — 73562 X-RAY EXAM OF KNEE 3: CPT

## 2024-02-02 PROCEDURE — 93298 REM INTERROG DEV EVAL SCRMS: CPT | Performed by: INTERNAL MEDICINE

## 2024-02-02 RX ORDER — ROPIVACAINE HYDROCHLORIDE 5 MG/ML
10 INJECTION, SOLUTION EPIDURAL; INFILTRATION; PERINEURAL
Status: COMPLETED | OUTPATIENT
Start: 2024-02-02 | End: 2024-02-02

## 2024-02-02 RX ORDER — TRIAMCINOLONE ACETONIDE 40 MG/ML
40 INJECTION, SUSPENSION INTRA-ARTICULAR; INTRAMUSCULAR
Status: COMPLETED | OUTPATIENT
Start: 2024-02-02 | End: 2024-02-02

## 2024-02-02 RX ADMIN — TRIAMCINOLONE ACETONIDE 40 MG: 40 INJECTION, SUSPENSION INTRA-ARTICULAR; INTRAMUSCULAR at 11:00

## 2024-02-02 RX ADMIN — ROPIVACAINE HYDROCHLORIDE 10 ML: 5 INJECTION, SOLUTION EPIDURAL; INFILTRATION; PERINEURAL at 11:00

## 2024-02-02 NOTE — PATIENT INSTRUCTIONS
F/u as needed  R knee steroid injection given  Icing/heat/OTC pain meds as needed.  Physical therapy

## 2024-02-02 NOTE — PROGRESS NOTES
"[facility] [address] [address] [phone]      Assessment:  1. Primary osteoarthritis of one knee, right  -     Ambulatory Referral to Physical Therapy; Future    2. Acute pain of right knee  -     XR knee 3 vw right non injury; Future; Expected date: 02/02/2024  -     Ambulatory Referral to Physical Therapy; Future    3. Type 2 diabetes mellitus with chronic kidney disease, without long-term current use of insulin, unspecified CKD stage (HCC)        Plan:  Patient Instructions   F/u as needed  R knee steroid injection given  Icing/heat/OTC pain meds as needed.  Physical therapy   Return if symptoms worsen or fail to improve.    Chief Complaint:  Chief Complaint   Patient presents with    Right Knee - Pain       Subjective:   HPI    Patient ID: Angie Eastman is a 78 y.o. female     Here c/o R knee pain  Denies trauma  Pain for 1 wk  Swelled up/resolved  No locking/giving out  Pain walking  Sharp pain  No pain meds  Hx of DM- last A1c 6.2, today .    Review of Systems   Constitutional:  Negative for fatigue and fever.   Respiratory:  Negative for shortness of breath.    Cardiovascular:  Negative for chest pain.   Gastrointestinal:  Negative for abdominal pain and nausea.   Genitourinary:  Negative for dysuria.   Musculoskeletal:  Positive for arthralgias.   Skin:  Negative for rash and wound.   Neurological:  Negative for weakness and headaches.       Objective:  Vitals:  /79 (BP Location: Left arm, Patient Position: Sitting, Cuff Size: Standard)   Pulse 87   Temp (!) 97 °F (36.1 °C) (Tympanic)   Ht 5' 3\" (1.6 m)   Wt 78.2 kg (172 lb 8 oz)   BMI 30.56 kg/m²     The following portions of the patient's history were reviewed and updated as appropriate: allergies, current medications, past family history, past medical history, past social history, past surgical history, and problem list.    Physical exam:  Physical " "Exam  Constitutional:       Appearance: Normal appearance. She is normal weight.   HENT:      Head: Normocephalic.   Eyes:      Extraocular Movements: Extraocular movements intact.   Pulmonary:      Effort: Pulmonary effort is normal.   Musculoskeletal:      Cervical back: Normal range of motion.      Right knee: Effusion present.      Instability Tests: Medial Wolf test negative and lateral Wolf test negative.   Skin:     General: Skin is warm and dry.   Neurological:      General: No focal deficit present.      Mental Status: She is alert and oriented to person, place, and time. Mental status is at baseline.   Psychiatric:         Mood and Affect: Mood normal.         Behavior: Behavior normal.         Thought Content: Thought content normal.         Judgment: Judgment normal.       Right Knee Exam     Tenderness   The patient is experiencing tenderness in the lateral joint line.    Range of Motion   The patient has normal right knee ROM.    Tests   Wolf:  Medial - negative Lateral - negative  Varus: negative Valgus: negative    Other   Swelling: mild  Effusion: effusion present          Observations     Right Knee   Positive for effusion.   Large joint arthrocentesis: R knee  Universal Protocol:  Consent: Verbal consent obtained.  Risks and benefits: risks, benefits and alternatives were discussed  Consent given by: patient  Time out: Immediately prior to procedure a \"time out\" was called to verify the correct patient, procedure, equipment, support staff and site/side marked as required.  Timeout called at: 2/2/2024 11:17 AM.  Site marked: the operative site was marked  Supporting Documentation  Indications: pain   Procedure Details  Location: knee - R knee  Preparation: Patient was prepped and draped in the usual sterile fashion  Needle size: 25 G  Ultrasound guidance: no  Approach: anterolateral  Medications administered: 40 mg triamcinolone acetonide 40 mg/mL; 10 mL ropivacaine 0.5 %    Patient " tolerance: patient tolerated the procedure well with no immediate complications  Dressing:  Sterile dressing applied            I have personally reviewed pertinent films in PACS and my interpretation is XR R knee- mod OA, dec joint space medial/lateral joint line/ patefemoral OA.      Chema Ruiz MD

## 2024-02-02 NOTE — PROGRESS NOTES
"MDT LOOP   CARELINK TRANSMISSION: LOOP RECORDER. PRESENTING RHYTHM NSR @ 78 BPM. BATTERY STATUS \"OK.\" 68 PAUSE EPISODES W/ EGRAMS SHOWING UNDERSENSING. DL   " full weight-bearing

## 2024-02-07 ENCOUNTER — APPOINTMENT (OUTPATIENT)
Dept: LAB | Facility: HOSPITAL | Age: 79
End: 2024-02-07
Payer: MEDICARE

## 2024-02-07 ENCOUNTER — HOSPITAL ENCOUNTER (OUTPATIENT)
Dept: MAMMOGRAPHY | Facility: HOSPITAL | Age: 79
Discharge: HOME | End: 2024-02-07
Payer: MEDICARE

## 2024-02-07 VITALS — BODY MASS INDEX: 30.48 KG/M2 | HEIGHT: 63 IN | WEIGHT: 172 LBS

## 2024-02-07 DIAGNOSIS — E11.9 TYPE 2 DIABETES MELLITUS WITHOUT COMPLICATION, UNSPECIFIED WHETHER LONG TERM INSULIN USE (HCC): ICD-10-CM

## 2024-02-07 DIAGNOSIS — Z12.31 ENCOUNTER FOR SCREENING MAMMOGRAM FOR MALIGNANT NEOPLASM OF BREAST: ICD-10-CM

## 2024-02-07 DIAGNOSIS — E03.9 HYPOTHYROIDISM, UNSPECIFIED TYPE: ICD-10-CM

## 2024-02-07 DIAGNOSIS — E78.5 HYPERLIPIDEMIA, UNSPECIFIED HYPERLIPIDEMIA TYPE: ICD-10-CM

## 2024-02-07 LAB
BACTERIA UR QL AUTO: ABNORMAL /HPF
BASOPHILS # BLD AUTO: 0.05 THOUSANDS/ÂΜL (ref 0–0.1)
BASOPHILS NFR BLD AUTO: 1 % (ref 0–1)
BILIRUB UR QL STRIP: NEGATIVE
CHOLEST SERPL-MCNC: 142 MG/DL
CLARITY UR: ABNORMAL
COLOR UR: ABNORMAL
CREAT UR-MCNC: 45.8 MG/DL
EOSINOPHIL # BLD AUTO: 0.2 THOUSAND/ÂΜL (ref 0–0.61)
EOSINOPHIL NFR BLD AUTO: 2 % (ref 0–6)
ERYTHROCYTE [DISTWIDTH] IN BLOOD BY AUTOMATED COUNT: 12.3 % (ref 11.6–15.1)
EST. AVERAGE GLUCOSE BLD GHB EST-MCNC: 157 MG/DL
GLUCOSE UR STRIP-MCNC: ABNORMAL MG/DL
HBA1C MFR BLD: 7.1 %
HCT VFR BLD AUTO: 43.4 % (ref 34.8–46.1)
HDLC SERPL-MCNC: 50 MG/DL
HGB BLD-MCNC: 13.9 G/DL (ref 11.5–15.4)
HGB UR QL STRIP.AUTO: ABNORMAL
IMM GRANULOCYTES # BLD AUTO: 0.04 THOUSAND/UL (ref 0–0.2)
IMM GRANULOCYTES NFR BLD AUTO: 1 % (ref 0–2)
KETONES UR STRIP-MCNC: NEGATIVE MG/DL
LDLC SERPL CALC-MCNC: 51 MG/DL (ref 0–100)
LEUKOCYTE ESTERASE UR QL STRIP: ABNORMAL
LYMPHOCYTES # BLD AUTO: 1.64 THOUSANDS/ÂΜL (ref 0.6–4.47)
LYMPHOCYTES NFR BLD AUTO: 19 % (ref 14–44)
MCH RBC QN AUTO: 30.4 PG (ref 26.8–34.3)
MCHC RBC AUTO-ENTMCNC: 32 G/DL (ref 31.4–37.4)
MCV RBC AUTO: 95 FL (ref 82–98)
MICROALBUMIN UR-MCNC: 40.8 MG/L
MICROALBUMIN/CREAT 24H UR: 89 MG/G CREATININE (ref 0–30)
MONOCYTES # BLD AUTO: 0.41 THOUSAND/ÂΜL (ref 0.17–1.22)
MONOCYTES NFR BLD AUTO: 5 % (ref 4–12)
NEUTROPHILS # BLD AUTO: 6.23 THOUSANDS/ÂΜL (ref 1.85–7.62)
NEUTS SEG NFR BLD AUTO: 72 % (ref 43–75)
NITRITE UR QL STRIP: NEGATIVE
NON-SQ EPI CELLS URNS QL MICRO: ABNORMAL /HPF
NONHDLC SERPL-MCNC: 92 MG/DL
NRBC BLD AUTO-RTO: 0 /100 WBCS
PH UR STRIP.AUTO: 5.5 [PH]
PLATELET # BLD AUTO: 193 THOUSANDS/UL (ref 149–390)
PMV BLD AUTO: 11.1 FL (ref 8.9–12.7)
PROT UR STRIP-MCNC: NEGATIVE MG/DL
RBC # BLD AUTO: 4.57 MILLION/UL (ref 3.81–5.12)
RBC #/AREA URNS AUTO: ABNORMAL /HPF
SP GR UR STRIP.AUTO: 1.01 (ref 1–1.03)
T3FREE SERPL-MCNC: 3.34 PG/ML (ref 2.5–3.9)
T4 FREE SERPL-MCNC: 0.72 NG/DL (ref 0.61–1.12)
TRIGL SERPL-MCNC: 204 MG/DL
TSH SERPL DL<=0.05 MIU/L-ACNC: 2.3 UIU/ML (ref 0.45–4.5)
UROBILINOGEN UR QL STRIP.AUTO: 0.2 E.U./DL
WBC # BLD AUTO: 8.57 THOUSAND/UL (ref 4.31–10.16)
WBC #/AREA URNS AUTO: ABNORMAL /HPF

## 2024-02-07 PROCEDURE — 86800 THYROGLOBULIN ANTIBODY: CPT

## 2024-02-07 PROCEDURE — 82043 UR ALBUMIN QUANTITATIVE: CPT

## 2024-02-07 PROCEDURE — 82570 ASSAY OF URINE CREATININE: CPT

## 2024-02-07 PROCEDURE — 84443 ASSAY THYROID STIM HORMONE: CPT

## 2024-02-07 PROCEDURE — 84481 FREE ASSAY (FT-3): CPT

## 2024-02-07 PROCEDURE — 84432 ASSAY OF THYROGLOBULIN: CPT

## 2024-02-07 PROCEDURE — 80061 LIPID PANEL: CPT

## 2024-02-07 PROCEDURE — 83036 HEMOGLOBIN GLYCOSYLATED A1C: CPT

## 2024-02-07 PROCEDURE — 85025 COMPLETE CBC W/AUTO DIFF WBC: CPT

## 2024-02-07 PROCEDURE — 36415 COLL VENOUS BLD VENIPUNCTURE: CPT

## 2024-02-07 PROCEDURE — 77063 BREAST TOMOSYNTHESIS BI: CPT

## 2024-02-07 PROCEDURE — 77067 SCR MAMMO BI INCL CAD: CPT

## 2024-02-07 PROCEDURE — 86376 MICROSOMAL ANTIBODY EACH: CPT

## 2024-02-07 PROCEDURE — 84439 ASSAY OF FREE THYROXINE: CPT

## 2024-02-07 PROCEDURE — 84482 T3 REVERSE: CPT

## 2024-02-07 PROCEDURE — 81001 URINALYSIS AUTO W/SCOPE: CPT

## 2024-02-08 LAB
THYROGLOB AB SERPL-ACNC: <1 IU/ML (ref 0–0.9)
THYROGLOB SERPL-MCNC: 6.1 NG/ML (ref 1.5–38.5)
THYROPEROXIDASE AB SERPL-ACNC: <9 IU/ML (ref 0–34)

## 2024-02-12 LAB — T3REVERSE SERPL-MCNC: 20.1 NG/DL (ref 9.2–24.1)

## 2024-02-19 ENCOUNTER — EVALUATION (OUTPATIENT)
Dept: PHYSICAL THERAPY | Facility: CLINIC | Age: 79
End: 2024-02-19
Payer: MEDICARE

## 2024-02-19 DIAGNOSIS — M17.11 PRIMARY OSTEOARTHRITIS OF RIGHT KNEE: ICD-10-CM

## 2024-02-19 DIAGNOSIS — G89.29 CHRONIC PAIN OF RIGHT KNEE: Primary | ICD-10-CM

## 2024-02-19 DIAGNOSIS — M25.561 CHRONIC PAIN OF RIGHT KNEE: Primary | ICD-10-CM

## 2024-02-19 PROCEDURE — 97163 PT EVAL HIGH COMPLEX 45 MIN: CPT | Performed by: PHYSICAL THERAPIST

## 2024-02-19 PROCEDURE — 97110 THERAPEUTIC EXERCISES: CPT | Performed by: PHYSICAL THERAPIST

## 2024-02-19 NOTE — LETTER
2024    Chema Ruiz MD  [address] [place] [id number]    Patient: Angie Eastman   YOB: 1945   Date of Visit: 2024     Encounter Diagnosis     ICD-10-CM    1. Chronic pain of right knee  M25.561     G89.29       2. Primary osteoarthritis of right knee  M17.11           Dear Dr. Ruiz:    Thank you for your recent referral of Angie Eastman. Please review the attached evaluation summary from Angie's recent visit.     Please verify that you agree with the plan of care by signing the attached order.     If you have any questions or concerns, please do not hesitate to call.     I sincerely appreciate the opportunity to share in the care of one of your patients and hope to have another opportunity to work with you in the near future.       Sincerely,    Trav Lopez, PT      Referring Provider:      I certify that I have read the below Plan of Care and certify the need for these services furnished under this plan of treatment while under my care.                    Chema Ruiz MD  [address] [place] [id number]  Via In Basket          PT Evaluation     Today's date: 2024  Patient name: Angie Eastman  : 1945  MRN: [id number]  Referring provider: Chema Ruiz MD  Dx:   Encounter Diagnosis     ICD-10-CM    1. Chronic pain of right knee  M25.561     G89.29       2. Primary osteoarthritis of right knee  M17.11           Start Time: 1400  Stop Time: 1500  Total time in clinic (min): 60 minutes    Assessment  Assessment details: Patient is a 77 y/o with chief c/o R knee pain. Patient has noted muscle wasting of the R LE secondary to childhood dx of polio. Good patellar mobility is present without pain at end ranges. There is slight lack of knee terminal knee extension to the R LE with mild extensor lag present during SLR indicating weakness of the quad musculature. She has mild/moderate weakness noted to the R hip and knee during resistance testing. Reviewed  exercises with the patient today with verbal understanding noted.   Impairments: abnormal gait, abnormal muscle tone, abnormal or restricted ROM, impaired physical strength and lacks appropriate home exercise program    Symptom irritability: lowUnderstanding of Dx/Px/POC: good   Prognosis: good    Goals  STGs:  Patient will be independent with HEP in 1-2 visits  Improve R knee strength to 4-/5 for improved tolerance with adls and transfers by 3-4 weeks.   Improve R knee extension to 0 degrees for improved tolerance with adls and transfers by 3-4 weeks.       LTGs:  Improve FOTO score from 55 To 64 Indicating improved tolerance with activities involving the LE by discharge.   Improve R knee strength and rom to wnl for improved tolerance with adls, home duties, and recreation by discharge.   Patient will be able to return to normal ambulation without deviation over all terrains without pain or limitation from the knee by discharge.   Patient will be able to return to normal home duties without limitation from the knee by discharge.      Plan  Plan details: Patient informed that from this point forward, to ensure adherence to the aforementioned plan of care, all or some of the treatment may be performed and carried out by a Physical Therapy Assistant (PTA) with supervision from a licensed Physical Therapist (PT) in accordance with [facility] Physical Therapy Practice Act.  Patient will continue to benefit from skilled physical therapy to address the functional deficits that were identified during the evaluation today. We will continue to progress the therapy program to address these functional deficits and achieve the established goals.     Patient would benefit from: skilled physical therapy  Planned modality interventions: cryotherapy and thermotherapy: hydrocollator packs  Planned therapy interventions: ADL retraining, balance/weight bearing training, flexibility, functional ROM exercises, gait training,  home exercise program, joint mobilization, manual therapy, neuromuscular re-education, patient education, strengthening, stretching, therapeutic activities and therapeutic exercise  Frequency: 1-2x week.  Duration in weeks: 6  Plan of Care beginning date: 2024  Plan of Care expiration date: 2024  Treatment plan discussed with: patient      Subjective Evaluation    History of Present Illness  Mechanism of injury: Patient presents to out patient physical therapy with chief c/o R knee pain. Patient reports onset of R knee pain approximately 3 weeks ago without incident. She notes that she she was walking after waking one day and she felt soreness in her knee that she never experienced before. She does disclose a history of polio that did effect her R LE when she was a child. She feels that since the injection in her knee she has noticed a significant improvement in knee pain but feels that it is only temporary.           Not a recurrent problem   Quality of life: good    Patient Goals  Patient goals for therapy: decreased pain, increased strength, increased motion and independence with ADLs/IADLs  Patient goal: Patient wishes to prevent return of pain in her knee by improving her mobility and strength in her legs.  Pain  Current pain ratin  At best pain ratin  At worst pain ratin  Location: R knee  Quality: discomfort, sharp and dull ache  Alleviating factors: Injection.  Aggravating factors: standing and walking (kneeling)    Social Support  Steps to enter house: yes  2  Stairs in house: yes (to basement)   Lives in: one-story house  Lives with: alone    Employment status: not working  Treatments  Previous treatment: injection treatment      Objective     Active Range of Motion     Right Knee   Flexion: 138 degrees   Extension: -8 degrees   Extensor la degrees     Passive Range of Motion     Right Knee   Extension: -10 degrees     Strength/Myotome Testing     Right Hip   Planes of Motion  "  Flexion: 3+  Extension: 4  Abduction: 4-  Adduction: 4-  External rotation: 4-  Internal rotation: 4-    Right Knee   Flexion: 3+  Extension: 3             Precautions: Hx of polio with R LE involvement.       Date 2/19 IE       FOTO 55 SC       Manuals                                        Neuro Re-Ed                                                                Ther Ex        Seated hamstring stretch 10\" 10x       LAQ 5\" 15x       SHC 15x                                               Ther Activity                        Gait Training                        Modalities                                               "

## 2024-02-19 NOTE — PROGRESS NOTES
PT Evaluation     Today's date: 2024  Patient name: Angie Eastman  : 1945  MRN: [id number]  Referring provider: Chema Ruiz MD  Dx:   Encounter Diagnosis     ICD-10-CM    1. Chronic pain of right knee  M25.561     G89.29       2. Primary osteoarthritis of right knee  M17.11           Start Time: 1400  Stop Time: 1500  Total time in clinic (min): 60 minutes    Assessment  Assessment details: Patient is a 77 y/o with chief c/o R knee pain. Patient has noted muscle wasting of the R LE secondary to childhood dx of polio. Good patellar mobility is present without pain at end ranges. There is slight lack of knee terminal knee extension to the R LE with mild extensor lag present during SLR indicating weakness of the quad musculature. She has mild/moderate weakness noted to the R hip and knee during resistance testing. Reviewed exercises with the patient today with verbal understanding noted.   Impairments: abnormal gait, abnormal muscle tone, abnormal or restricted ROM, impaired physical strength and lacks appropriate home exercise program    Symptom irritability: lowUnderstanding of Dx/Px/POC: good   Prognosis: good    Goals  STGs:  Patient will be independent with HEP in 1-2 visits  Improve R knee strength to 4-/5 for improved tolerance with adls and transfers by 3-4 weeks.   Improve R knee extension to 0 degrees for improved tolerance with adls and transfers by 3-4 weeks.       LTGs:  Improve FOTO score from 55 To 64 Indicating improved tolerance with activities involving the LE by discharge.   Improve R knee strength and rom to wnl for improved tolerance with adls, home duties, and recreation by discharge.   Patient will be able to return to normal ambulation without deviation over all terrains without pain or limitation from the knee by discharge.   Patient will be able to return to normal home duties without limitation from the knee by discharge.      Plan  Plan details: Patient informed that from  this point forward, to ensure adherence to the aforementioned plan of care, all or some of the treatment may be performed and carried out by a Physical Therapy Assistant (PTA) with supervision from a licensed Physical Therapist (PT) in accordance with [facility] Physical Therapy Practice Act.  Patient will continue to benefit from skilled physical therapy to address the functional deficits that were identified during the evaluation today. We will continue to progress the therapy program to address these functional deficits and achieve the established goals.     Patient would benefit from: skilled physical therapy  Planned modality interventions: cryotherapy and thermotherapy: hydrocollator packs  Planned therapy interventions: ADL retraining, balance/weight bearing training, flexibility, functional ROM exercises, gait training, home exercise program, joint mobilization, manual therapy, neuromuscular re-education, patient education, strengthening, stretching, therapeutic activities and therapeutic exercise  Frequency: 1-2x week.  Duration in weeks: 6  Plan of Care beginning date: 2/19/2024  Plan of Care expiration date: 4/1/2024  Treatment plan discussed with: patient      Subjective Evaluation    History of Present Illness  Mechanism of injury: Patient presents to out patient physical therapy with chief c/o R knee pain. Patient reports onset of R knee pain approximately 3 weeks ago without incident. She notes that she she was walking after waking one day and she felt soreness in her knee that she never experienced before. She does disclose a history of polio that did effect her R LE when she was a child. She feels that since the injection in her knee she has noticed a significant improvement in knee pain but feels that it is only temporary.           Not a recurrent problem   Quality of life: good    Patient Goals  Patient goals for therapy: decreased pain, increased strength, increased motion and  "independence with ADLs/IADLs  Patient goal: Patient wishes to prevent return of pain in her knee by improving her mobility and strength in her legs.  Pain  Current pain ratin  At best pain ratin  At worst pain ratin  Location: R knee  Quality: discomfort, sharp and dull ache  Alleviating factors: Injection.  Aggravating factors: standing and walking (kneeling)    Social Support  Steps to enter house: yes  2  Stairs in house: yes (to basement)   Lives in: one-story house  Lives with: alone    Employment status: not working  Treatments  Previous treatment: injection treatment      Objective     Active Range of Motion     Right Knee   Flexion: 138 degrees   Extension: -8 degrees   Extensor la degrees     Passive Range of Motion     Right Knee   Extension: -10 degrees     Strength/Myotome Testing     Right Hip   Planes of Motion   Flexion: 3+  Extension: 4  Abduction: 4-  Adduction: 4-  External rotation: 4-  Internal rotation: 4-    Right Knee   Flexion: 3+  Extension: 3             Precautions: Hx of polio with R LE involvement.       Date  IE       FOTO 55 SC       Manuals                                        Neuro Re-Ed                                                                Ther Ex        Seated hamstring stretch 10\" 10x       LAQ 5\" 15x       SHC 15x                                               Ther Activity                        Gait Training                        Modalities                               "

## 2024-02-21 PROBLEM — H61.23 BILATERAL HEARING LOSS DUE TO CERUMEN IMPACTION: Status: RESOLVED | Noted: 2020-01-03 | Resolved: 2024-02-21

## 2024-02-26 ENCOUNTER — OFFICE VISIT (OUTPATIENT)
Dept: PHYSICAL THERAPY | Facility: CLINIC | Age: 79
End: 2024-02-26
Payer: MEDICARE

## 2024-02-26 DIAGNOSIS — G89.29 CHRONIC PAIN OF RIGHT KNEE: Primary | ICD-10-CM

## 2024-02-26 DIAGNOSIS — M17.11 PRIMARY OSTEOARTHRITIS OF RIGHT KNEE: ICD-10-CM

## 2024-02-26 DIAGNOSIS — M25.561 CHRONIC PAIN OF RIGHT KNEE: Primary | ICD-10-CM

## 2024-02-26 DIAGNOSIS — I10 ESSENTIAL HYPERTENSION: ICD-10-CM

## 2024-02-26 PROCEDURE — 97140 MANUAL THERAPY 1/> REGIONS: CPT | Performed by: PHYSICAL THERAPIST

## 2024-02-26 PROCEDURE — 97110 THERAPEUTIC EXERCISES: CPT | Performed by: PHYSICAL THERAPIST

## 2024-02-26 PROCEDURE — 97112 NEUROMUSCULAR REEDUCATION: CPT | Performed by: PHYSICAL THERAPIST

## 2024-02-26 RX ORDER — DOXAZOSIN MESYLATE 4 MG/1
8 TABLET ORAL DAILY
Qty: 90 TABLET | Refills: 3 | Status: SHIPPED | OUTPATIENT
Start: 2024-02-26

## 2024-02-26 NOTE — PROGRESS NOTES
"Daily Note     Today's date: 2024  Patient name: Angie Eastman  : 1945  MRN: [id number]  Referring provider: Chema Ruiz MD  Dx:   Encounter Diagnosis     ICD-10-CM    1. Chronic pain of right knee  M25.561     G89.29       2. Primary osteoarthritis of right knee  M17.11           Start Time: 1400  Stop Time: 1500  Total time in clinic (min): 60 minutes    Subjective: Patient reports that she has been working on her home stretches which seem to be helping as she does not have any pain entering therapy today.      Objective: See treatment diary below      Assessment: Tolerated treatment well. Patient demonstrated fatigue post treatment, exhibited good technique with therapeutic exercises, and would benefit from continued PT Patient continues to report minimal discomfort to the R knee throughout her therapy session. She was challenged with progression of LE strengthening.       Plan: Continue per plan of care.  Progress treatment as tolerated.       Precautions: Hx of polio with R LE involvement.       Date  IE       FOTO 55 SC       Manuals        Passive hamstring stretch  30\" 5x                              Neuro Re-Ed        SLS  15\" 4x ea.                                                       Ther Ex        Seated hamstring stretch 10\" 10x       LAQ 5\" 15x 2# 20x      SHC 15x 2# 20x      NuStep for strengthening  L5 5 min      Standing hip abduction and ext  2# 20x ea.       SAQ  2# 20x                      Ther Activity                        Gait Training                        Modalities                               "

## 2024-02-26 NOTE — TELEPHONE ENCOUNTER
Angie called into the office stating she tried to refill the prescription for Doxazosin 4 mg at the pharmacy. She was informed she needs a new RX. She states she is completely out of her medication. She is requesting a refill be sent to [facility].

## 2024-02-28 ENCOUNTER — OFFICE VISIT (OUTPATIENT)
Dept: PHYSICAL THERAPY | Facility: CLINIC | Age: 79
End: 2024-02-28
Payer: MEDICARE

## 2024-02-28 DIAGNOSIS — M25.561 CHRONIC PAIN OF RIGHT KNEE: Primary | ICD-10-CM

## 2024-02-28 DIAGNOSIS — M17.11 PRIMARY OSTEOARTHRITIS OF RIGHT KNEE: ICD-10-CM

## 2024-02-28 DIAGNOSIS — G89.29 CHRONIC PAIN OF RIGHT KNEE: Primary | ICD-10-CM

## 2024-02-28 PROCEDURE — 97110 THERAPEUTIC EXERCISES: CPT

## 2024-02-28 PROCEDURE — 97112 NEUROMUSCULAR REEDUCATION: CPT

## 2024-02-28 NOTE — PROGRESS NOTES
"Daily Note     Today's date: 2024  Patient name: Angie Eastman  : 1945  MRN: [id number]  Referring provider: Chema Ruiz MD  Dx:   Encounter Diagnosis     ICD-10-CM    1. Chronic pain of right knee  M25.561     G89.29       2. Primary osteoarthritis of right knee  M17.11           Start Time: 1400  Stop Time: 1500  Total time in clinic (min): 60 minutes    Subjective: Pt comments on less R knee pain since her injection.      Objective: See treatment diary below      Assessment: Tolerated treatment well. Patient exhibited good technique with therapeutic exercises. PT increased LE weight ; PTA added tandem walk       Plan: Continue per plan of care.      Precautions: Hx of polio with R LE involvement.       Date  IE      FOTO 55 SC       Manuals        Passive hamstring stretch  30\" 5x ds                             Neuro Re-Ed        SLS  15\" 4x ea.  4x 15\"     Tandem Walk-foam    5 laps                                             Ther Ex        Seated hamstring stretch 10\" 10x  home     LAQ 5\" 15x 2# 20x 3# 20x     SHC 15x 2# 20x 3# 20x     NuStep for strengthening  L5 5 min 8m L5     Standing hip abduction and ext  2# 20x ea.  3# 20x ea     SAQ  2# 20x 3# 20x                     Ther Activity                        Gait Training                        Modalities                                 "

## 2024-03-04 ENCOUNTER — OFFICE VISIT (OUTPATIENT)
Dept: PHYSICAL THERAPY | Facility: CLINIC | Age: 79
End: 2024-03-04
Payer: MEDICARE

## 2024-03-04 ENCOUNTER — APPOINTMENT (OUTPATIENT)
Dept: LAB | Facility: HOSPITAL | Age: 79
End: 2024-03-04
Payer: MEDICARE

## 2024-03-04 DIAGNOSIS — G89.29 CHRONIC PAIN OF RIGHT KNEE: ICD-10-CM

## 2024-03-04 DIAGNOSIS — I10 PRIMARY HYPERTENSION: ICD-10-CM

## 2024-03-04 DIAGNOSIS — M25.561 CHRONIC PAIN OF RIGHT KNEE: ICD-10-CM

## 2024-03-04 DIAGNOSIS — R80.9 ALBUMINURIA: ICD-10-CM

## 2024-03-04 DIAGNOSIS — M17.11 PRIMARY OSTEOARTHRITIS OF RIGHT KNEE: Primary | ICD-10-CM

## 2024-03-04 LAB
ANION GAP SERPL CALCULATED.3IONS-SCNC: 8 MMOL/L
BACTERIA UR QL AUTO: ABNORMAL /HPF
BILIRUB UR QL STRIP: NEGATIVE
BUN SERPL-MCNC: 20 MG/DL (ref 5–25)
CALCIUM SERPL-MCNC: 9.1 MG/DL (ref 8.4–10.2)
CHLORIDE SERPL-SCNC: 108 MMOL/L (ref 96–108)
CLARITY UR: CLEAR
CO2 SERPL-SCNC: 25 MMOL/L (ref 21–32)
COLOR UR: YELLOW
CREAT SERPL-MCNC: 0.7 MG/DL (ref 0.6–1.3)
CREAT UR-MCNC: 51.1 MG/DL
GFR SERPL CREATININE-BSD FRML MDRD: 82 ML/MIN/1.73SQ M
GLUCOSE P FAST SERPL-MCNC: 123 MG/DL (ref 65–99)
GLUCOSE UR STRIP-MCNC: ABNORMAL MG/DL
HGB UR QL STRIP.AUTO: NEGATIVE
KETONES UR STRIP-MCNC: NEGATIVE MG/DL
LEUKOCYTE ESTERASE UR QL STRIP: ABNORMAL
MICROALBUMIN UR-MCNC: 21.7 MG/L
MICROALBUMIN/CREAT 24H UR: 42 MG/G CREATININE (ref 0–30)
NITRITE UR QL STRIP: POSITIVE
NON-SQ EPI CELLS URNS QL MICRO: ABNORMAL /HPF
PH UR STRIP.AUTO: 6 [PH]
POTASSIUM SERPL-SCNC: 4.1 MMOL/L (ref 3.5–5.3)
PROT UR STRIP-MCNC: NEGATIVE MG/DL
RBC #/AREA URNS AUTO: ABNORMAL /HPF
SODIUM SERPL-SCNC: 141 MMOL/L (ref 135–147)
SP GR UR STRIP.AUTO: 1.01 (ref 1–1.03)
UROBILINOGEN UR QL STRIP.AUTO: 0.2 E.U./DL
WBC #/AREA URNS AUTO: ABNORMAL /HPF

## 2024-03-04 PROCEDURE — 80048 BASIC METABOLIC PNL TOTAL CA: CPT

## 2024-03-04 PROCEDURE — 97110 THERAPEUTIC EXERCISES: CPT

## 2024-03-04 PROCEDURE — 97112 NEUROMUSCULAR REEDUCATION: CPT

## 2024-03-04 PROCEDURE — 36415 COLL VENOUS BLD VENIPUNCTURE: CPT

## 2024-03-04 NOTE — PROGRESS NOTES
"Daily Note     Today's date: 3/4/2024  Patient name: Angie Eastman  : 1945  MRN: [id number]  Referring provider: Chema Ruiz MD  Dx:   Encounter Diagnosis     ICD-10-CM    1. Primary osteoarthritis of right knee  M17.11       2. Chronic pain of right knee  M25.561     G89.29           Start Time: 1400  Stop Time: 1455  Total time in clinic (min): 55 minutes    Subjective: I dont have any pain right now., I do have some trouble with the stairs and I Need to hold onto the hand rails      Objective: See treatment diary below      Assessment: Tolerated treatment well. Patient would benefit from continued PT   pt completed her full program today with good tolerance. She felt some fatigue in both legs through out. She states feeling fatigue mostly in her right leg with her exercises today.  We will continue to work on her strength and balance.       Plan: Continue per plan of care.      Precautions: Hx of polio with R LE involvement.       Date  IE 2/26 2/28 3/4    FOTO 55 SC       Manuals        Passive hamstring stretch  30\" 5x ds JF                            Neuro Re-Ed        SLS  15\" 4x ea.  4x 15\" 4 x 15 \"     Tandem Walk-foam    5 laps 5 laps                                            Ther Ex        Seated hamstring stretch 10\" 10x  home     LAQ 5\" 15x 2# 20x 3# 20x 3#  20 x    SHC 15x 2# 20x 3# 20x 3 #  20 x    NuStep for strengthening  L5 5 min 8m L5 8 min L5    Standing hip abduction and ext  2# 20x ea.  3# 20x ea 3#  20 x    SAQ  2# 20x 3# 20x 3 # 20 x                    Ther Activity                        Gait Training                        Modalities                                   "

## 2024-03-06 ENCOUNTER — OFFICE VISIT (OUTPATIENT)
Dept: PHYSICAL THERAPY | Facility: CLINIC | Age: 79
End: 2024-03-06
Payer: MEDICARE

## 2024-03-06 DIAGNOSIS — G89.29 CHRONIC PAIN OF RIGHT KNEE: Primary | ICD-10-CM

## 2024-03-06 DIAGNOSIS — M17.11 PRIMARY OSTEOARTHRITIS OF RIGHT KNEE: ICD-10-CM

## 2024-03-06 DIAGNOSIS — M25.561 CHRONIC PAIN OF RIGHT KNEE: Primary | ICD-10-CM

## 2024-03-06 PROCEDURE — 97140 MANUAL THERAPY 1/> REGIONS: CPT

## 2024-03-06 PROCEDURE — 97110 THERAPEUTIC EXERCISES: CPT

## 2024-03-06 PROCEDURE — 97112 NEUROMUSCULAR REEDUCATION: CPT

## 2024-03-06 NOTE — PROGRESS NOTES
"Daily Note     Today's date: 3/6/2024  Patient name: Angie Eastman  : 1945  MRN: [id number]  Referring provider: Chema Ruiz MD  Dx:   Encounter Diagnosis     ICD-10-CM    1. Chronic pain of right knee  M25.561     G89.29       2. Primary osteoarthritis of right knee  M17.11           Start Time: 1400  Stop Time: 1500  Total time in clinic (min): 60 minutes    Subjective: My knee is doing ok today., I dont have any pain right now.      Objective: See treatment diary below      Assessment: Tolerated treatment well. Patient would benefit from continued PT   pt was able to increase some exercises to 25 reps today . She felt some fatigue with the standing exercises today.  She was unable to increase wt for her leg exercises today., pt had most trouble with SLB and standing abduction, we will continue to work on her strength and balance,       Plan: Continue per plan of care.      Precautions: Hx of polio with R LE involvement.       Date  IE 2/26 2/28 3/4 3/6   FOTO 55 SC       Manuals        Passive hamstring stretch  30\" 5x ds JF JF                           Neuro Re-Ed        SLS  15\" 4x ea.  4x 15\" 4 x 15 \"  4 x 15 \"  airex   Tandem Walk-foam    5 laps 5 laps 5 laps                                           Ther Ex        Seated hamstring stretch 10\" 10x  home     LAQ 5\" 15x 2# 20x 3# 20x 3#  20 x 3# 25 x    SHC 15x 2# 20x 3# 20x 3 #  20 x 3 #  25 x    NuStep for strengthening  L5 5 min 8m L5 8 min L5 8 min L 5   Standing hip abduction and ext  2# 20x ea.  3# 20x ea 3#  20 x  3#  25x    SAQ  2# 20x 3# 20x 3 # 20 x 3 #  25 x    Marching      3#   20 x  2x    Step up      NV   Ther Activity                        Gait Training                        Modalities                                     "

## 2024-03-07 DIAGNOSIS — I47.10 PAROXYSMAL SVT (SUPRAVENTRICULAR TACHYCARDIA): ICD-10-CM

## 2024-03-11 ENCOUNTER — OFFICE VISIT (OUTPATIENT)
Dept: PHYSICAL THERAPY | Facility: CLINIC | Age: 79
End: 2024-03-11
Payer: MEDICARE

## 2024-03-11 DIAGNOSIS — M17.11 PRIMARY OSTEOARTHRITIS OF RIGHT KNEE: Primary | ICD-10-CM

## 2024-03-11 DIAGNOSIS — G89.29 CHRONIC PAIN OF RIGHT KNEE: ICD-10-CM

## 2024-03-11 DIAGNOSIS — M25.561 CHRONIC PAIN OF RIGHT KNEE: ICD-10-CM

## 2024-03-11 PROCEDURE — 97110 THERAPEUTIC EXERCISES: CPT

## 2024-03-11 PROCEDURE — 97140 MANUAL THERAPY 1/> REGIONS: CPT

## 2024-03-11 PROCEDURE — 97112 NEUROMUSCULAR REEDUCATION: CPT

## 2024-03-11 NOTE — PROGRESS NOTES
"Daily Note     Today's date: 3/11/2024  Patient name: Angie Eastman  : 1945  MRN: [id number]  Referring provider: Chema Ruiz MD  Dx:   Encounter Diagnosis     ICD-10-CM    1. Primary osteoarthritis of right knee  M17.11       2. Chronic pain of right knee  M25.561     G89.29           Start Time: 0800  Stop Time: 0900  Total time in clinic (min): 60 minutes    Subjective: My knee is doing ok today. I dont have any pain right now. I still have trouble going down stairs.       Objective: See treatment diary below      Assessment: Tolerated treatment well. Patient would benefit from continued PT   pt completed her full program today with little problems., she had some difficulty with tandem walking on the airex and single leg balance . She did well with her standing exercises , but, did feel some fatigue in both legs . She feels her strength and balance are improving. We will continue to work on her strength,.       Plan: Continue per plan of care.      Precautions: Hx of polio with R LE involvement.       Date 3/11 2/26 2/28 3/4 3/   FOTO        Manuals        Passive hamstring stretch JF 30\" 5x ds JF JF                           Neuro Re-Ed        SLS 4x 15 \" airex 15\" 4x ea.  4x 15\" 4 x 15 \"  4 x 15 \"  airex   Tandem Walk-foam 5 laps   5 laps 5 laps 5 laps                                           Ther Ex        Seated hamstring stretch 10\" 10x  home     LAQ 5\" 15x 2# 20x 3# 20x 3#  20 x 3# 25 x    SHC 15x 2# 20x 3# 20x 3 #  20 x 3 #  25 x    NuStep for strengthening 8 min L 5 L5 5 min 8m L5 8 min L5 8 min L 5   Standing hip abduction and ext 3 #  25 x 2# 20x ea.  3# 20x ea 3#  20 x  3#  25x    SAQ 3 #  25 x 2# 20x 3# 20x 3 # 20 x 3 #  25 x    Marching  3#  20 x 2    3#   20 x  2x    Step up  6\" 20 x    NV   Ther Activity                        Gait Training                        Modalities                                       "

## 2024-03-13 ENCOUNTER — OFFICE VISIT (OUTPATIENT)
Dept: PHYSICAL THERAPY | Facility: CLINIC | Age: 79
End: 2024-03-13
Payer: MEDICARE

## 2024-03-13 DIAGNOSIS — M25.561 CHRONIC PAIN OF RIGHT KNEE: ICD-10-CM

## 2024-03-13 DIAGNOSIS — G89.29 CHRONIC PAIN OF RIGHT KNEE: ICD-10-CM

## 2024-03-13 DIAGNOSIS — M17.11 PRIMARY OSTEOARTHRITIS OF RIGHT KNEE: Primary | ICD-10-CM

## 2024-03-13 PROCEDURE — 97140 MANUAL THERAPY 1/> REGIONS: CPT

## 2024-03-13 PROCEDURE — 97110 THERAPEUTIC EXERCISES: CPT

## 2024-03-13 PROCEDURE — 97112 NEUROMUSCULAR REEDUCATION: CPT

## 2024-03-13 NOTE — PROGRESS NOTES
"Daily Note     Today's date: 3/13/2024  Patient name: Angie Eastman  : 1945  MRN: [id number]  Referring provider: Chema Ruiz MD  Dx:   Encounter Diagnosis     ICD-10-CM    1. Primary osteoarthritis of right knee  M17.11       2. Chronic pain of right knee  M25.561     G89.29           Start Time: 1000  Stop Time: 1100  Total time in clinic (min): 60 minutes    Subjective: Pt comments on absent R knee pain.      Objective: See treatment diary below      Assessment: Tolerated treatment well. Patient demonstrated fatigue post treatment, she is unable to  unsupported on the R LE.      Plan: Continue per plan of care.      Precautions: Hx of polio with R LE involvement.       Date 3/11 3/13 2/28 3/4 3/6   FOTO        Manuals        Passive hamstring stretch JF ds ds JF JF           Neuro Re-Ed        SLS- foam 4x 15 \" airex 15\" 4x ea.  4x 15\" 4 x 15 \"  4 x 15 \"  airex   Tandem Walk-foam 5 laps 5 laps f/b  5 laps 5 laps 5 laps                                   Ther Ex        LAQ 5\" 15x 2# 20x 3# 20x 3#  20 x 3# 25 x    SHC 15x 3# 20x 3# 20x 3 #  20 x 3 #  25 x    NuStep for strengthening 8 min L 5 8m L5 8m L5 8 min L5 8 min L 5   Standing hip abduction and ext 3 #  25 x 3# 20x ea.  3# 20x ea 3#  20 x  3#  25x    SAQ 3 #  25 x 3# 20x 3# 20x 3 # 20 x 3 #  25 x    Marching  3#  20 x 2 3# 20 x2   3#   20 x  2x    Step up  6\" 20 x 6\" 20x   NV   Ther Activity                        Gait Training                Modalities                                         "

## 2024-03-18 ENCOUNTER — OFFICE VISIT (OUTPATIENT)
Dept: NEPHROLOGY | Facility: CLINIC | Age: 79
End: 2024-03-18
Payer: MEDICARE

## 2024-03-18 VITALS
OXYGEN SATURATION: 97 % | DIASTOLIC BLOOD PRESSURE: 60 MMHG | HEIGHT: 63 IN | WEIGHT: 174 LBS | HEART RATE: 51 BPM | BODY MASS INDEX: 30.83 KG/M2 | SYSTOLIC BLOOD PRESSURE: 138 MMHG

## 2024-03-18 DIAGNOSIS — R80.9 ALBUMINURIA: ICD-10-CM

## 2024-03-18 DIAGNOSIS — S93.602A SPRAIN OF LEFT FOOT, INITIAL ENCOUNTER: ICD-10-CM

## 2024-03-18 DIAGNOSIS — I70.1 RAS (RENAL ARTERY STENOSIS) (HCC): ICD-10-CM

## 2024-03-18 DIAGNOSIS — N18.2 CKD (CHRONIC KIDNEY DISEASE) STAGE 2, GFR 60-89 ML/MIN: Primary | ICD-10-CM

## 2024-03-18 DIAGNOSIS — N20.0 NEPHROLITHIASIS: ICD-10-CM

## 2024-03-18 DIAGNOSIS — R60.0 BILATERAL LOWER EXTREMITY EDEMA: ICD-10-CM

## 2024-03-18 DIAGNOSIS — E55.9 VITAMIN D INSUFFICIENCY: ICD-10-CM

## 2024-03-18 PROCEDURE — 99214 OFFICE O/P EST MOD 30 MIN: CPT | Performed by: INTERNAL MEDICINE

## 2024-03-18 PROCEDURE — G2211 COMPLEX E/M VISIT ADD ON: HCPCS | Performed by: INTERNAL MEDICINE

## 2024-03-18 RX ORDER — DOXYCYCLINE 100 MG/1
CAPSULE ORAL
COMMUNITY
Start: 2024-03-13

## 2024-03-18 RX ORDER — ACETAMINOPHEN 325 MG/1
650 TABLET ORAL EVERY 6 HOURS PRN
Start: 2024-03-18

## 2024-03-18 RX ORDER — METRONIDAZOLE 7.5 MG/G
GEL TOPICAL
COMMUNITY
Start: 2024-03-13

## 2024-03-18 NOTE — ASSESSMENT & PLAN NOTE
Continue with Jardiance and irbesartan, patient's albuminuria is improving, kidney function is stable.

## 2024-03-18 NOTE — ASSESSMENT & PLAN NOTE
Blood pressures remain well-controlled at this time, no additional investigation or interventions indicated.  Of note, the patient has 60% narrowing on the right side.

## 2024-03-18 NOTE — ASSESSMENT & PLAN NOTE
Lab Results   Component Value Date    EGFR 82 03/04/2024    EGFR 81 10/24/2023    EGFR 83 06/20/2023    CREATININE 0.70 03/04/2024    CREATININE 0.71 10/24/2023    CREATININE 0.70 06/20/2023     Kidney function stable at this time, continue current medications, continue to avoid potential for toxins.

## 2024-03-18 NOTE — ASSESSMENT & PLAN NOTE
Continue to encourage 2 L of water intake daily.  Further investigation as indicated going forward.

## 2024-03-18 NOTE — ASSESSMENT & PLAN NOTE
Patient has mild bilateral lower extremity edema, we asked her to reduce sodium intake in her diet.  Continue Jardiance.

## 2024-03-18 NOTE — PROGRESS NOTES
[facility] [place]  Eren Sen DO    Name: Angie Eastman  YOB: 1945      Assessment/Plan:    CKD (chronic kidney disease) stage 2, GFR 60-89 ml/min  Lab Results   Component Value Date    EGFR 82 03/04/2024    EGFR 81 10/24/2023    EGFR 83 06/20/2023    CREATININE 0.70 03/04/2024    CREATININE 0.71 10/24/2023    CREATININE 0.70 06/20/2023     Kidney function stable at this time, continue current medications, continue to avoid potential for toxins.    Albuminuria  Continue with Jardiance and irbesartan, patient's albuminuria is improving, kidney function is stable.    Bilateral lower extremity edema  Patient has mild bilateral lower extremity edema, we asked her to reduce sodium intake in her diet.  Continue Jardiance.        VICKEY (renal artery stenosis) (Formerly [facility])  Blood pressures remain well-controlled at this time, no additional investigation or interventions indicated.  Of note, the patient has 60% narrowing on the right side.    Nephrolithiasis  Continue to encourage 2 L of water intake daily.  Further investigation as indicated going forward.         Problem List Items Addressed This Visit          Cardiovascular and Mediastinum    VICKEY (renal artery stenosis) (HCC)     Blood pressures remain well-controlled at this time, no additional investigation or interventions indicated.  Of note, the patient has 60% narrowing on the right side.            Genitourinary    CKD (chronic kidney disease) stage 2, GFR 60-89 ml/min - Primary     Lab Results   Component Value Date    EGFR 82 03/04/2024    EGFR 81 10/24/2023    EGFR 83 06/20/2023    CREATININE 0.70 03/04/2024    CREATININE 0.71 10/24/2023    CREATININE 0.70 06/20/2023     Kidney function stable at this time, continue current medications, continue to avoid potential for toxins.         Relevant Orders    Comprehensive metabolic panel    CBC and differential    Albumin / creatinine urine ratio    Urinalysis with  microscopic    Nephrolithiasis     Continue to encourage 2 L of water intake daily.  Further investigation as indicated going forward.         Relevant Orders    PTH, intact    Vitamin D 25 hydroxy       Surgery/Wound/Pain    Bilateral lower extremity edema     Patient has mild bilateral lower extremity edema, we asked her to reduce sodium intake in her diet.  Continue Jardiance.                Other    Albuminuria     Continue with Jardiance and irbesartan, patient's albuminuria is improving, kidney function is stable.         Relevant Orders    Albumin / creatinine urine ratio    Urinalysis with microscopic    Vitamin D insufficiency    Relevant Orders    Vitamin D 25 hydroxy     Other Visit Diagnoses       Sprain of left foot, initial encounter        Relevant Medications    acetaminophen (TYLENOL) 325 mg tablet            Patient is stable for the renal standpoint, we will see her back for regular appointment in approximately 1 year.  Blood work to be done prior to that visit provided during this appointment.  In the meantime, we will continue to monitor urine studies every 6 months for now.    Subjective:      Patient ID: Angie Eastman is a 78 y.o. female.    Patient presents for follow up.    We reviewed labs in detail, most recent creatinine noted to be 0.7 mg/dL, estimate GFR is 82 mL/min.  Albuminuria is now down to 42 mg/gr from 88 mg/gr which is down further from just over 100 mg/gr.  She is taking Jardiance as prescribed.    There were no significant electrolyte abnormalities noted.  Patient is taking all medications as prescribed with no specific side effects and denies use of nonsteroid anti-inflammatory medications.              The following portions of the patient's history were reviewed and updated as appropriate: allergies, current medications, past family history, past medical history, past social history, past surgical history and problem list.    Review of Systems   All other systems reviewed  and are negative.        Social History     Socioeconomic History    Marital status:      Spouse name: None    Number of children: None    Years of education: None    Highest education level: None   Occupational History    Occupation: /    Tobacco Use    Smoking status: Never    Smokeless tobacco: Never   Vaping Use    Vaping status: Never Used   Substance and Sexual Activity    Alcohol use: No    Drug use: No    Sexual activity: None   Other Topics Concern    None   Social History Narrative    Consumes on average 2 cups of decaf coffee per day      Social Determinants of Health     Financial Resource Strain: Not on file   Food Insecurity: Not on file   Transportation Needs: Not on file   Physical Activity: Not on file   Stress: Not on file   Social Connections: Not on file   Intimate Partner Violence: Not on file   Housing Stability: Not on file     Past Medical History:   Diagnosis Date    Abnormal stress test     Angina pectoris (HCC)     1 wk ago    Atherosclerosis of renal artery (HCC)     CAD (coronary artery disease)     Chronic kidney disease, stage 2 (mild)     Diabetes mellitus (HCC)     niddm    Edema     Endometriosis     History of palpitations     History of poliomyelitis     Hyperlipidemia     Hypertension     Hypertensive chronic kidney disease with stage 1 through stage 4 chronic kidney disease, or unspecified chronic kidney disease     Hypothyroid     Kidney stones     Myocardial infarction (HCC)     NSTEMI (non-ST elevated myocardial infarction) (HCC) 01/05/2019    Obesity     Paroxysmal SVT (supraventricular tachycardia)     Renal insufficiency     Rosacea     Thyroid nodule     Type 2 diabetes mellitus (HCC)     Wears glasses      Past Surgical History:   Procedure Laterality Date    ACHILLES TENDON LENGTHENING Right     Achilles tendon stretch     CARDIAC CATHETERIZATION  2017    CARDIAC CATHETERIZATION  01/07/2019    Stent placed in RCA     CATARACT EXTRACTION Bilateral      COLONOSCOPY      FL INJECTION RIGHT SHOULDER (ARTHROGRAM)  11/1/2021    HYSTERECTOMY      Total - Endometriosis     ROTATOR CUFF REPAIR Right        Current Outpatient Medications:     ACCU-CHEK KAILEY PLUS test strip, , Disp: , Rfl: 3    acetaminophen (TYLENOL) 325 mg tablet, Take 2 tablets (650 mg total) by mouth every 6 (six) hours as needed (pain, fever) Taking as needed, Disp: , Rfl:     ARMOUR THYROID 15 MG tablet, 15 mg every evening Before bedtime, Disp: , Rfl: 5    ARMOUR THYROID 30 MG tablet, Take 30 mg by mouth 2 (two) times a day Take one in the morning, Disp: , Rfl: 6    atorvastatin (LIPITOR) 40 mg tablet, Take 1 tablet (40 mg total) by mouth daily, Disp: 90 tablet, Rfl: 3    Blood Glucose Monitoring Suppl (ACCU-CHEK KAILEY CONNECT) w/Device KIT, by Does not apply route, Disp: , Rfl:     Cholecalciferol 125 MCG (5000 UT) TABS, Take 1 tablet (5,000 Units total) by mouth in the morning, Disp: , Rfl:     clopidogrel (PLAVIX) 75 mg tablet, Take 1 tablet (75 mg total) by mouth daily, Disp: 90 tablet, Rfl: 3    doxazosin (CARDURA) 4 mg tablet, Take 2 tablets (8 mg total) by mouth daily, Disp: 90 tablet, Rfl: 3    doxycycline monohydrate (MONODOX) 100 mg capsule, , Disp: , Rfl:     Empagliflozin (Jardiance) 10 MG TABS tablet, Take 1 tablet (10 mg total) by mouth every morning, Disp: 30 tablet, Rfl: 4    irbesartan (AVAPRO) 300 mg tablet, Take 1 tablet (300 mg total) by mouth daily at bedtime, Disp: 90 tablet, Rfl: 3    Lancets Misc. (ACCU-CHEK MULTICLIX LANCET DEV) KIT, by Does not apply route 2 (two) times a day, Disp: , Rfl:     metFORMIN (GLUCOPHAGE) 500 mg tablet, Take 500 mg by mouth 3 (three) times a day 2 tablets in the morning and one in the afternoon, Disp: , Rfl:     metoprolol tartrate (LOPRESSOR) 25 mg tablet, Take 1 tablet (25 mg total) by mouth every 12 (twelve) hours, Disp: 180 tablet, Rfl: 3    metroNIDAZOLE (METROGEL) 0.75 % gel, , Disp: , Rfl:     nitroglycerin (NITROSTAT) 0.4 mg SL tablet,  "Place 0.4 mg under the tongue Taking as needed, Disp: , Rfl:     Lab Results   Component Value Date     12/08/2015    SODIUM 141 03/04/2024    K 4.1 03/04/2024     03/04/2024    CO2 25 03/04/2024    ANIONGAP 9 12/08/2015    AGAP 8 03/04/2024    BUN 20 03/04/2024    CREATININE 0.70 03/04/2024    GLUC 157 (H) 06/04/2021    GLUF 123 (H) 03/04/2024    CALCIUM 9.1 03/04/2024    AST 15 06/20/2023    ALT 14 06/20/2023    ALKPHOS 88 06/20/2023    PROT 7.0 12/08/2015    TP 6.1 (L) 10/24/2023    BILITOT 0.48 12/08/2015    TBILI 0.42 06/20/2023    EGFR 82 03/04/2024     Lab Results   Component Value Date    WBC 8.57 02/07/2024    HGB 13.9 02/07/2024    HCT 43.4 02/07/2024    MCV 95 02/07/2024     02/07/2024     Lab Results   Component Value Date    CHOLESTEROL 142 02/07/2024    CHOLESTEROL 134 10/19/2022    CHOLESTEROL 141 06/16/2022     Lab Results   Component Value Date    HDL 50 02/07/2024    HDL 60 10/19/2022    HDL 55 06/16/2022     Lab Results   Component Value Date    LDLCALC 51 02/07/2024    LDLCALC 48 10/19/2022    LDLCALC 54 06/16/2022     Lab Results   Component Value Date    TRIG 204 (H) 02/07/2024    TRIG 132 10/19/2022    TRIG 160 (H) 06/16/2022     No results found for: \"CHOLHDL\"  Lab Results   Component Value Date    DOU0IPBRYYWE 2.297 02/07/2024    TSH 2.18 10/06/2020     Lab Results   Component Value Date    PTH 62.1 06/20/2023    CALCIUM 9.1 03/04/2024    PHOS 3.2 06/14/2021     Lab Results   Component Value Date    SPEP See Comment 10/24/2023    UPEP See Comment 10/24/2023     Lab Results   Component Value Date    IPTP16IXU 12 06/20/2022           Objective:      /60 (BP Location: Left arm, Patient Position: Sitting, Cuff Size: Standard)   Pulse (!) 51   Ht 5' 3\" (1.6 m)   Wt 78.9 kg (174 lb)   SpO2 97%   BMI 30.82 kg/m²          Physical Exam  Vitals reviewed.   Constitutional:       General: She is not in acute distress.     Appearance: Normal appearance.   HENT:      Head: " Normocephalic and atraumatic.      Right Ear: External ear normal.      Left Ear: External ear normal.   Eyes:      Conjunctiva/sclera: Conjunctivae normal.   Cardiovascular:      Rate and Rhythm: Normal rate and regular rhythm.      Heart sounds: Normal heart sounds.   Pulmonary:      Effort: No respiratory distress.      Breath sounds: No wheezing.   Abdominal:      Palpations: Abdomen is soft.   Skin:     General: Skin is warm and dry.   Neurological:      General: No focal deficit present.      Mental Status: She is alert and oriented to person, place, and time.   Psychiatric:         Mood and Affect: Mood normal.         Behavior: Behavior normal.

## 2024-03-20 DIAGNOSIS — E11.9 TYPE 2 DIABETES MELLITUS WITHOUT COMPLICATION, WITHOUT LONG-TERM CURRENT USE OF INSULIN (HCC): ICD-10-CM

## 2024-03-20 DIAGNOSIS — R80.9 ALBUMINURIA: ICD-10-CM

## 2024-03-20 NOTE — TELEPHONE ENCOUNTER
Reason for call:   [x] Refill   [] Prior Auth  [] Other:     Office:   [] PCP/Provider -   [x] Specialty/Provider - Nephro/ Varvarlis     Medication: Empagliflozin (Jardiance) 10 MG TABS tablet     Dose/Frequency:  Take 1 tablet (10 mg total) by mouth every morning     Quantity: 30    Pharmacy: [facility] - [facility].     Does the patient have enough for 3 days?   [x] Yes   [] No - Send as HP to POD

## 2024-03-21 ENCOUNTER — OFFICE VISIT (OUTPATIENT)
Dept: PHYSICAL THERAPY | Facility: CLINIC | Age: 79
End: 2024-03-21
Payer: MEDICARE

## 2024-03-21 DIAGNOSIS — M17.11 PRIMARY OSTEOARTHRITIS OF RIGHT KNEE: Primary | ICD-10-CM

## 2024-03-21 DIAGNOSIS — G89.29 CHRONIC PAIN OF RIGHT KNEE: ICD-10-CM

## 2024-03-21 DIAGNOSIS — M25.561 CHRONIC PAIN OF RIGHT KNEE: ICD-10-CM

## 2024-03-21 PROCEDURE — 97140 MANUAL THERAPY 1/> REGIONS: CPT

## 2024-03-21 PROCEDURE — 97110 THERAPEUTIC EXERCISES: CPT

## 2024-03-21 NOTE — PROGRESS NOTES
"=  Daily Note     Today's date: 3/21/2024  Patient name: Angie Eastman  : 1945  MRN: [id number]  Referring provider: Chema Ruiz MD  Dx:   Encounter Diagnosis     ICD-10-CM    1. Primary osteoarthritis of right knee  M17.11       2. Chronic pain of right knee  M25.561     G89.29           Start Time: 1100  Stop Time: 1150  Total time in clinic (min): 50 minutes    Subjective: Pt comments on overall reduction in her R knee pain.      Objective: See treatment diary below. FOTO 50, slightly less today.      Assessment: Tolerated treatment well. Patient would benefit from continued PT      Plan: Continue per plan of care.      Precautions: Hx of polio with R LE involvement.       Date 3/11 3/13 3/21 3/4 3/6   FOTO   DS 50     Manuals        Passive hamstring stretch JF ds ds JF JF           Neuro Re-Ed        SLS- foam 4x 15 \" airex 15\" 4x ea.  4x 15\" ea 4 x 15 \"  4 x 15 \"  airex   Tandem Walk-foam 5 laps 5 laps f/b  5 laps 5 laps 5 laps   Towel  press   15x 5\"                             Ther Ex        LAQ 5\" 15x 2# 20x 3# 20x 3#  20 x 3# 25 x    SHC 15x 3# 20x 3# 20x 3 #  20 x 3 #  25 x    NuStep for strengthening 8 min L 5 8m L5 8m L5 8 min L5 8 min L 5   Standing hip abduction and ext 3 #  25 x 3# 20x ea.  3# 20x ea 3#  20 x  3#  25x    SAQ 3 #  25 x 3# 20x 3# 2/15 3 # 20 x 3 #  25 x    Marching  3#  20 x 2 3# 20 x2 3# 20x x 2  3#   20 x  2x    Step up  6\" 20 x 6\" 20x 6\" 20x  NV   Ther Activity                        Gait Training                Modalities                                           "

## 2024-03-25 ENCOUNTER — OFFICE VISIT (OUTPATIENT)
Dept: URGENT CARE | Facility: CLINIC | Age: 79
End: 2024-03-25
Payer: MEDICARE

## 2024-03-25 ENCOUNTER — APPOINTMENT (OUTPATIENT)
Dept: URGENT CARE | Facility: CLINIC | Age: 79
End: 2024-03-25
Payer: MEDICARE

## 2024-03-25 VITALS
OXYGEN SATURATION: 96 % | RESPIRATION RATE: 18 BRPM | DIASTOLIC BLOOD PRESSURE: 60 MMHG | HEART RATE: 58 BPM | SYSTOLIC BLOOD PRESSURE: 118 MMHG

## 2024-03-25 DIAGNOSIS — B37.31 YEAST VAGINITIS: Primary | ICD-10-CM

## 2024-03-25 PROCEDURE — G0463 HOSPITAL OUTPT CLINIC VISIT: HCPCS | Performed by: STUDENT IN AN ORGANIZED HEALTH CARE EDUCATION/TRAINING PROGRAM

## 2024-03-25 PROCEDURE — 99213 OFFICE O/P EST LOW 20 MIN: CPT | Performed by: STUDENT IN AN ORGANIZED HEALTH CARE EDUCATION/TRAINING PROGRAM

## 2024-03-25 RX ORDER — FLUCONAZOLE 150 MG/1
150 TABLET ORAL ONCE
Qty: 1 TABLET | Refills: 1 | Status: SHIPPED | OUTPATIENT
Start: 2024-03-25 | End: 2024-03-25

## 2024-03-25 NOTE — PROGRESS NOTES
[facility]        NAME: Angie Eastman is a 78 y.o. female  : 1945    MRN: [id number]    Assessment and Plan   Yeast vaginitis [B37.31]  1. Yeast vaginitis  fluconazole (DIFLUCAN) 150 mg tablet        Symptoms appear consistent with yeast vaginitis due to irritation along with recent antibiotic use.  I do not suspect Jardiance as an etiology as patient has been on this longer but I did discuss with her that if she does have recurrent infections, she needs to discuss with her prescriber regarding continuing Jardiance.  Recommended discontinuing antibiotic doxycycline at this time or starting a probiotic-instructed to contact prescribing provider for further recommendations..  Will treat with Diflucan-provided refill to get in 72 hours if symptoms do not resolve.      Patient Instructions     See wrap up for details  Follow up with PCP in 3-5 days.  Proceed to  ER if symptoms worsen.    Chief Complaint     Chief Complaint   Patient presents with    Vaginal Itching    Vaginal Pain         History of Present Illness     HPI    Patient presents to the office due to symptoms ongoing for a week now.  Reports vaginal itching and pain.  Denies any discharge.  Denies dysuria, hematuria, pelvic pain, frequency, urgency.  Started on doxycycline 2 weeks ago for rosacea  Started on jardiance 6 weeks ago  Had a knee steroid infection in January but no recent oral steroids.     Review of Systems   Review of Systems   Constitutional:  Negative for chills and fever.   HENT:  Negative for ear pain and sore throat.    Eyes:  Negative for pain and visual disturbance.   Respiratory:  Negative for cough and shortness of breath.    Cardiovascular:  Negative for chest pain and palpitations.   Gastrointestinal:  Negative for abdominal pain and vomiting.   Genitourinary:  Negative for dysuria, hematuria and vaginal discharge.   Musculoskeletal:  Negative for arthralgias and back pain.   Skin:  Negative for color change and  rash.   Neurological:  Negative for seizures and syncope.   All other systems reviewed and are negative.    Current Medications       Current Outpatient Medications:     fluconazole (DIFLUCAN) 150 mg tablet, Take 1 tablet (150 mg total) by mouth once for 1 dose Take 1 tablet now. If symptoms are not improving or resolved in 72 hours after taking the 1st dose, refill another tablet., Disp: 1 tablet, Rfl: 1    ACCU-CHEK KAILEY PLUS test strip, , Disp: , Rfl: 3    acetaminophen (TYLENOL) 325 mg tablet, Take 2 tablets (650 mg total) by mouth every 6 (six) hours as needed (pain, fever) Taking as needed, Disp: , Rfl:     ARMOUR THYROID 15 MG tablet, 15 mg every evening Before bedtime, Disp: , Rfl: 5    ARMOUR THYROID 30 MG tablet, Take 30 mg by mouth 2 (two) times a day Take one in the morning, Disp: , Rfl: 6    atorvastatin (LIPITOR) 40 mg tablet, Take 1 tablet (40 mg total) by mouth daily, Disp: 90 tablet, Rfl: 3    Blood Glucose Monitoring Suppl (ACCU-CHEK KAILEY CONNECT) w/Device KIT, by Does not apply route, Disp: , Rfl:     Cholecalciferol 125 MCG (5000 UT) TABS, Take 1 tablet (5,000 Units total) by mouth in the morning, Disp: , Rfl:     clopidogrel (PLAVIX) 75 mg tablet, Take 1 tablet (75 mg total) by mouth daily, Disp: 90 tablet, Rfl: 3    doxazosin (CARDURA) 4 mg tablet, Take 2 tablets (8 mg total) by mouth daily, Disp: 90 tablet, Rfl: 3    doxycycline monohydrate (MONODOX) 100 mg capsule, , Disp: , Rfl:     Empagliflozin (Jardiance) 10 MG TABS tablet, Take 1 tablet (10 mg total) by mouth every morning, Disp: 30 tablet, Rfl: 5    irbesartan (AVAPRO) 300 mg tablet, Take 1 tablet (300 mg total) by mouth daily at bedtime, Disp: 90 tablet, Rfl: 3    Lancets Misc. (ACCU-CHEK MULTICLIX LANCET DEV) KIT, by Does not apply route 2 (two) times a day, Disp: , Rfl:     metFORMIN (GLUCOPHAGE) 500 mg tablet, Take 500 mg by mouth 3 (three) times a day 2 tablets in the morning and one in the afternoon, Disp: , Rfl:     metoprolol  tartrate (LOPRESSOR) 25 mg tablet, Take 1 tablet (25 mg total) by mouth every 12 (twelve) hours, Disp: 180 tablet, Rfl: 3    metroNIDAZOLE (METROGEL) 0.75 % gel, , Disp: , Rfl:     nitroglycerin (NITROSTAT) 0.4 mg SL tablet, Place 0.4 mg under the tongue Taking as needed, Disp: , Rfl:     Current Allergies     Allergies as of 03/25/2024 - Reviewed 03/25/2024   Allergen Reaction Noted    Lisinopril Rash 02/07/2014    Amlodipine Other (See Comments) 11/27/2017            The following portions of the patient's history were reviewed and updated as appropriate: allergies, current medications, past family history, past medical history, past social history, past surgical history and problem list.     Past Medical History:   Diagnosis Date    Abnormal stress test     Angina pectoris (HCC)     1 wk ago    Atherosclerosis of renal artery (HCC)     CAD (coronary artery disease)     Chronic kidney disease, stage 2 (mild)     Diabetes mellitus (HCC)     niddm    Edema     Endometriosis     History of palpitations     History of poliomyelitis     Hyperlipidemia     Hypertension     Hypertensive chronic kidney disease with stage 1 through stage 4 chronic kidney disease, or unspecified chronic kidney disease     Hypothyroid     Kidney stones     Myocardial infarction (HCC)     NSTEMI (non-ST elevated myocardial infarction) ([facility]) 01/05/2019    Obesity     Paroxysmal SVT (supraventricular tachycardia)     Renal insufficiency     Rosacea     Thyroid nodule     Type 2 diabetes mellitus (HCC)     Wears glasses        Past Surgical History:   Procedure Laterality Date    ACHILLES TENDON LENGTHENING Right     Achilles tendon stretch     CARDIAC CATHETERIZATION  2017    CARDIAC CATHETERIZATION  01/07/2019    Stent placed in RCA     CATARACT EXTRACTION Bilateral     COLONOSCOPY      FL INJECTION RIGHT SHOULDER (ARTHROGRAM)  11/1/2021    HYSTERECTOMY      Total - Endometriosis     ROTATOR CUFF REPAIR Right        Family History   Problem  Relation Age of Onset    Asthma Mother     Stomach cancer Father     Breast cancer Sister 55    No Known Problems Daughter     No Known Problems Daughter     No Known Problems Daughter     No Known Problems Maternal Grandmother     No Known Problems Maternal Grandfather     No Known Problems Paternal Grandmother     Heart attack Paternal Grandfather     No Known Problems Maternal Aunt     No Known Problems Paternal Aunt     No Known Problems Paternal Aunt     No Known Problems Son     No Known Problems Son          Medications have been verified.        Objective   /60   Pulse 58   Resp 18   SpO2 96%        Physical Exam     Physical Exam  Constitutional:       General: She is not in acute distress.     Appearance: Normal appearance.   HENT:      Head: Normocephalic and atraumatic.   Eyes:      Extraocular Movements: Extraocular movements intact.      Conjunctiva/sclera: Conjunctivae normal.   Cardiovascular:      Rate and Rhythm: Normal rate.   Pulmonary:      Effort: Pulmonary effort is normal. No respiratory distress.   Skin:     General: Skin is warm and dry.   Neurological:      General: No focal deficit present.      Mental Status: She is alert and oriented to person, place, and time.   Psychiatric:         Mood and Affect: Mood normal.         Behavior: Behavior normal.

## 2024-03-27 ENCOUNTER — OFFICE VISIT (OUTPATIENT)
Dept: PHYSICAL THERAPY | Facility: CLINIC | Age: 79
End: 2024-03-27
Payer: MEDICARE

## 2024-03-27 ENCOUNTER — TELEPHONE (OUTPATIENT)
Age: 79
End: 2024-03-27

## 2024-03-27 DIAGNOSIS — M25.561 CHRONIC PAIN OF RIGHT KNEE: ICD-10-CM

## 2024-03-27 DIAGNOSIS — G89.29 CHRONIC PAIN OF RIGHT KNEE: ICD-10-CM

## 2024-03-27 DIAGNOSIS — M17.11 PRIMARY OSTEOARTHRITIS OF RIGHT KNEE: Primary | ICD-10-CM

## 2024-03-27 PROCEDURE — 97110 THERAPEUTIC EXERCISES: CPT

## 2024-03-27 NOTE — TELEPHONE ENCOUNTER
Yes, it can be from the jardiance and it is something we spoke about as a risk.  I will leave it up to her if she wishes to stop the medication or give it another month or two to see how she does.  I will say that if she decides to continue with the medication and she then experiences another yeast infection, then she should definitively stop the Jardiance.

## 2024-03-27 NOTE — PROGRESS NOTES
"=  Daily Note     Today's date: 3/27/2024  Patient name: Angie Eastman  : 1945  MRN: [id number]  Referring provider: Chema Ruiz MD  Dx:   Encounter Diagnosis     ICD-10-CM    1. Primary osteoarthritis of right knee  M17.11       2. Chronic pain of right knee  M25.561     G89.29           Start Time: 1000  Stop Time: 1015  Total time in clinic (min): 15 minutes    Subjective: Pt states she is under the weather; She shortened tx to nu-step only.      Objective: See treatment diary below      Assessment: Tolerated treatment poor. Patient would benefit from continued PT      Plan: Continue per plan of care.      Precautions: Hx of polio with R LE involvement.       Date 3/11 3/13 3/21 3/27 3/6   FOTO   DS 50     Manuals        Passive hamstring stretch JF ds ds  JF           Neuro Re-Ed        SLS- foam 4x 15 \" airex 15\" 4x ea.  4x 15\" ea  4 x 15 \"  airex   Tandem Walk-foam 5 laps 5 laps f/b  5 laps  5 laps   Towel  press   15x 5\"                             Ther Ex        LAQ 5\" 15x 2# 20x 3# 20x  3# 25 x    SHC 15x 3# 20x 3# 20x  3 #  25 x    NuStep for strengthening 8 min L 5 8m L5 8m L5 8m L5 8 min L 5   Standing hip abduction and ext 3 #  25 x 3# 20x ea.  3# 20x ea   3#  25x    SAQ 3 #  25 x 3# 20x 3# 2/15  3 #  25 x    Marching  3#  20 x 2 3# 20 x2 3# 20x x 2  3#   20 x  2x    Step up  6\" 20 x 6\" 20x 6\" 20x  NV   Ther Activity                        Gait Training                Modalities                                             "

## 2024-03-27 NOTE — TELEPHONE ENCOUNTER
I called and spoke with Angie. She states she stopped taking Jardiance as of today. She states she does not want to take the chance of developing another yeast infection.

## 2024-03-27 NOTE — TELEPHONE ENCOUNTER
Very good, I updated her medication list to remove that medication, I also added Jardiance to her allergy list as a medication that she has an intolerance to due to yeast infections

## 2024-03-27 NOTE — TELEPHONE ENCOUNTER
Patient states she is being treated for a yeast infection and her PCP feels it may be from Jardiance. She would like to speak to provider regarding his opinion. Thank you.

## 2024-03-28 ENCOUNTER — EVALUATION (OUTPATIENT)
Dept: PHYSICAL THERAPY | Facility: CLINIC | Age: 79
End: 2024-03-28
Payer: MEDICARE

## 2024-03-28 DIAGNOSIS — G89.29 CHRONIC PAIN OF RIGHT KNEE: ICD-10-CM

## 2024-03-28 DIAGNOSIS — M25.561 CHRONIC PAIN OF RIGHT KNEE: ICD-10-CM

## 2024-03-28 DIAGNOSIS — M17.11 PRIMARY OSTEOARTHRITIS OF RIGHT KNEE: Primary | ICD-10-CM

## 2024-03-28 PROCEDURE — 97140 MANUAL THERAPY 1/> REGIONS: CPT | Performed by: PHYSICAL THERAPIST

## 2024-03-28 PROCEDURE — 97110 THERAPEUTIC EXERCISES: CPT | Performed by: PHYSICAL THERAPIST

## 2024-03-28 PROCEDURE — 97530 THERAPEUTIC ACTIVITIES: CPT | Performed by: PHYSICAL THERAPIST

## 2024-03-28 PROCEDURE — 97112 NEUROMUSCULAR REEDUCATION: CPT | Performed by: PHYSICAL THERAPIST

## 2024-03-28 NOTE — PROGRESS NOTES
PT Re-Evaluation     Today's date: 3/28/2024  Patient name: Angie Eastman  : 1945  MRN: [id number]  Referring provider: Chema Ruiz MD  Dx:   Encounter Diagnosis     ICD-10-CM    1. Primary osteoarthritis of right knee  M17.11       2. Chronic pain of right knee  M25.561     G89.29           Start Time: 1000  Stop Time: 1100  Total time in clinic (min): 60 minutes    Assessment  Assessment details: Patient has attended 10 physical therapy visits to date. She is demonstrating improved to R knee extension rom along with R knee and hip strength during the evaluation today. She continues to have greatest strength limitation noted to R hip flexion and knee extension. She reports no pain during the evaluation today and notes that pain has been well controlled since the injection in her knee. Difficulties noted during single leg stance interventions to the R LE secondary to weakness.   Impairments: abnormal gait, abnormal muscle tone, abnormal or restricted ROM, impaired physical strength and lacks appropriate home exercise program    Symptom irritability: lowUnderstanding of Dx/Px/POC: good   Prognosis: good    Goals  STGs:  Patient will be independent with HEP in 1-2 visits - MET  Improve R knee strength to 4-/5 for improved tolerance with adls and transfers by 3-4 weeks. - Progressing  Improve R knee extension to 0 degrees for improved tolerance with adls and transfers by 3-4 weeks. - Progressing      LTGs:  Improve FOTO score from 55 To 64 Indicating improved tolerance with activities involving the LE by discharge. - Not MET  Improve R knee strength and rom to wnl for improved tolerance with adls, home duties, and recreation by discharge. - Progressing  Patient will be able to return to normal ambulation without deviation over all terrains without pain or limitation from the knee by discharge. - Progressing  Patient will be able to return to normal home duties without limitation from the knee by discharge. -  Progressing      Plan  Plan details: Patient informed that from this point forward, to ensure adherence to the aforementioned plan of care, all or some of the treatment may be performed and carried out by a Physical Therapy Assistant (PTA) with supervision from a licensed Physical Therapist (PT) in accordance with [facility] Physical Therapy Practice Act.  Patient will continue to benefit from skilled physical therapy to address the functional deficits that were identified during the evaluation today. We will continue to progress the therapy program to address these functional deficits and achieve the established goals.     Patient would benefit from: skilled physical therapy  Planned modality interventions: cryotherapy and thermotherapy: hydrocollator packs  Planned therapy interventions: ADL retraining, balance/weight bearing training, flexibility, functional ROM exercises, gait training, home exercise program, joint mobilization, manual therapy, neuromuscular re-education, patient education, strengthening, stretching, therapeutic activities and therapeutic exercise  Frequency: 1-2x week.  Duration in weeks: 4  Plan of Care beginning date: 3/28/2024  Plan of Care expiration date: 4/25/2024  Treatment plan discussed with: patient      Subjective Evaluation    History of Present Illness  Mechanism of injury: Patient presents to out patient physical therapy with chief c/o R knee pain. Patient reports onset of R knee pain approximately 3 weeks ago without incident. She notes that she she was walking after waking one day and she felt soreness in her knee that she never experienced before. She does disclose a history of polio that did effect her R LE when she was a child. She feels that since the injection in her knee she has noticed a significant improvement in knee pain but feels that it is only temporary.     Update 3/28/2024:  Patient reports that she is still having a hard time getting up from the floor and  "she needs to use her arms on a chair to get up from the floor. She feels that doing stairs and performing car transfers has gotten easier for her as her legs are getting stronger.           Not a recurrent problem   Quality of life: good    Patient Goals  Patient goals for therapy: decreased pain, increased strength, increased motion and independence with ADLs/IADLs  Patient goal: Patient wishes to prevent return of pain in her knee by improving her mobility and strength in her legs.  Pain  Current pain ratin  At best pain ratin  At worst pain ratin  Location: R knee  Quality: discomfort and dull ache  Alleviating factors: Injection.  Aggravating factors: standing and walking (kneeling)    Social Support  Steps to enter house: yes  2  Stairs in house: yes (to basement)   Lives in: one-story house  Lives with: alone    Employment status: not working  Treatments  Previous treatment: injection treatment      Objective     Active Range of Motion     Right Knee   Flexion: 143 degrees   Extension: -6 degrees   Extensor la degrees     Passive Range of Motion     Right Knee   Extension: -2 degrees     Strength/Myotome Testing     Right Hip   Planes of Motion   Flexion: 3+  Extension: 4  Abduction: 4  Adduction: 4  External rotation: 4  Internal rotation: 4    Right Knee   Flexion: 4-  Extension: 3+             Precautions: Hx of polio with R LE involvement.       Date 3/11 3/13 3/21 3/27 3/28 Reassess   FOTO   DS 50  51 SC   Manuals        Passive hamstring stretch JF ds ds  30\" 5x           Neuro Re-Ed        SLS- foam 4x 15 \" airex 15\" 4x ea.  4x 15\" ea  20\" 4x    Tandem Walk-foam 5 laps 5 laps f/b 5 laps  5 laps   Towel  press   15x 5\"                             Ther Ex        LAQ 5\" 15x 2# 20x 3# 20x  3# 20x   SHC 15x 3# 20x 3# 20x     NuStep for strengthening 8 min L 5 8m L5 8m L5 8m L5 Bike L3 8 min   Standing hip abduction and ext 3 #  25 x 3# 20x ea.  3# 20x ea     SAQ 3 #  25 x 3# 20x 3# 2/15  3# " "5\" 20x   Marching  3#  20 x 2 3# 20 x2 3# 20x x 2     Ther Activity        Squats     20x   Lunges     NV   Step down     4\" 10x   Step up     6\" 20x    Gait Training                Modalities                               "

## 2024-03-28 NOTE — LETTER
2024    Chema Ruiz MD  [address] [place] [phone]    Patient: Angie Eastman   YOB: 1945   Date of Visit: 3/28/2024     Encounter Diagnosis     ICD-10-CM    1. Primary osteoarthritis of right knee  M17.11       2. Chronic pain of right knee  M25.561     G89.29           Dear Dr. Ruiz:    Thank you for your recent referral of Angie Eastman. Please review the attached evaluation summary from Angie's recent visit.     Please verify that you agree with the plan of care by signing the attached order.     If you have any questions or concerns, please do not hesitate to call.     I sincerely appreciate the opportunity to share in the care of one of your patients and hope to have another opportunity to work with you in the near future.       Sincerely,    Trav Lopez, PT      Referring Provider:      I certify that I have read the below Plan of Care and certify the need for these services furnished under this plan of treatment while under my care.                    Chema Ruiz MD  [address] [place] [id number]  Via In Basket          PT Re-Evaluation     Today's date: 3/28/2024  Patient name: Angie Eastman  : 1945  MRN: [id number]  Referring provider: Chema Ruiz MD  Dx:   Encounter Diagnosis     ICD-10-CM    1. Primary osteoarthritis of right knee  M17.11       2. Chronic pain of right knee  M25.561     G89.29           Start Time: 1000  Stop Time: 1100  Total time in clinic (min): 60 minutes    Assessment  Assessment details: Patient has attended 10 physical therapy visits to date. She is demonstrating improved to R knee extension rom along with R knee and hip strength during the evaluation today. She continues to have greatest strength limitation noted to R hip flexion and knee extension. She reports no pain during the evaluation today and notes that pain has been well controlled since the injection in her knee. Difficulties noted during single leg stance  interventions to the R LE secondary to weakness.   Impairments: abnormal gait, abnormal muscle tone, abnormal or restricted ROM, impaired physical strength and lacks appropriate home exercise program    Symptom irritability: lowUnderstanding of Dx/Px/POC: good   Prognosis: good    Goals  STGs:  Patient will be independent with HEP in 1-2 visits - MET  Improve R knee strength to 4-/5 for improved tolerance with adls and transfers by 3-4 weeks. - Progressing  Improve R knee extension to 0 degrees for improved tolerance with adls and transfers by 3-4 weeks. - Progressing      LTGs:  Improve FOTO score from 55 To 64 Indicating improved tolerance with activities involving the LE by discharge. - Not MET  Improve R knee strength and rom to wnl for improved tolerance with adls, home duties, and recreation by discharge. - Progressing  Patient will be able to return to normal ambulation without deviation over all terrains without pain or limitation from the knee by discharge. - Progressing  Patient will be able to return to normal home duties without limitation from the knee by discharge. - Progressing      Plan  Plan details: Patient informed that from this point forward, to ensure adherence to the aforementioned plan of care, all or some of the treatment may be performed and carried out by a Physical Therapy Assistant (PTA) with supervision from a licensed Physical Therapist (PT) in accordance with [facility] Physical Therapy Practice Act.  Patient will continue to benefit from skilled physical therapy to address the functional deficits that were identified during the evaluation today. We will continue to progress the therapy program to address these functional deficits and achieve the established goals.     Patient would benefit from: skilled physical therapy  Planned modality interventions: cryotherapy and thermotherapy: hydrocollator packs  Planned therapy interventions: ADL retraining, balance/weight bearing  training, flexibility, functional ROM exercises, gait training, home exercise program, joint mobilization, manual therapy, neuromuscular re-education, patient education, strengthening, stretching, therapeutic activities and therapeutic exercise  Frequency: 1-2x week.  Duration in weeks: 4  Plan of Care beginning date: 3/28/2024  Plan of Care expiration date: 2024  Treatment plan discussed with: patient      Subjective Evaluation    History of Present Illness  Mechanism of injury: Patient presents to out patient physical therapy with chief c/o R knee pain. Patient reports onset of R knee pain approximately 3 weeks ago without incident. She notes that she she was walking after waking one day and she felt soreness in her knee that she never experienced before. She does disclose a history of polio that did effect her R LE when she was a child. She feels that since the injection in her knee she has noticed a significant improvement in knee pain but feels that it is only temporary.     Update 3/28/2024:  Patient reports that she is still having a hard time getting up from the floor and she needs to use her arms on a chair to get up from the floor. She feels that doing stairs and performing car transfers has gotten easier for her as her legs are getting stronger.           Not a recurrent problem   Quality of life: good    Patient Goals  Patient goals for therapy: decreased pain, increased strength, increased motion and independence with ADLs/IADLs  Patient goal: Patient wishes to prevent return of pain in her knee by improving her mobility and strength in her legs.  Pain  Current pain ratin  At best pain ratin  At worst pain ratin  Location: R knee  Quality: discomfort and dull ache  Alleviating factors: Injection.  Aggravating factors: standing and walking (kneeling)    Social Support  Steps to enter house: yes  2  Stairs in house: yes (to basement)   Lives in: one-story house  Lives with:  "alone    Employment status: not working  Treatments  Previous treatment: injection treatment      Objective     Active Range of Motion     Right Knee   Flexion: 143 degrees   Extension: -6 degrees   Extensor la degrees     Passive Range of Motion     Right Knee   Extension: -2 degrees     Strength/Myotome Testing     Right Hip   Planes of Motion   Flexion: 3+  Extension: 4  Abduction: 4  Adduction: 4  External rotation: 4  Internal rotation: 4    Right Knee   Flexion: 4-  Extension: 3+             Precautions: Hx of polio with R LE involvement.       Date 3/11 3/13 3/21 3/27 3/28 Reassess   FOTO   DS 50  51 SC   Manuals        Passive hamstring stretch JF ds ds  30\" 5x           Neuro Re-Ed        SLS- foam 4x 15 \" airex 15\" 4x ea.  4x 15\" ea  20\" 4x    Tandem Walk-foam 5 laps 5 laps f/b 5 laps  5 laps   Towel  press   15x 5\"                             Ther Ex        LAQ 5\" 15x 2# 20x 3# 20x  3# 20x   SHC 15x 3# 20x 3# 20x     NuStep for strengthening 8 min L 5 8m L5 8m L5 8m L5 Bike L3 8 min   Standing hip abduction and ext 3 #  25 x 3# 20x ea.  3# 20x ea     SAQ 3 #  25 x 3# 20x 3# 2/15  3# 5\" 20x   Marching  3#  20 x 2 3# 20 x2 3# 20x x 2     Ther Activity        Squats     20x   Lunges     NV   Step down     4\" 10x   Step up     6\" 20x    Gait Training                Modalities                                               "

## 2024-04-02 ENCOUNTER — OFFICE VISIT (OUTPATIENT)
Dept: PHYSICAL THERAPY | Facility: CLINIC | Age: 79
End: 2024-04-02
Payer: MEDICARE

## 2024-04-02 DIAGNOSIS — G89.29 CHRONIC PAIN OF RIGHT KNEE: ICD-10-CM

## 2024-04-02 DIAGNOSIS — M25.561 CHRONIC PAIN OF RIGHT KNEE: ICD-10-CM

## 2024-04-02 DIAGNOSIS — M17.11 PRIMARY OSTEOARTHRITIS OF RIGHT KNEE: Primary | ICD-10-CM

## 2024-04-02 PROCEDURE — 97110 THERAPEUTIC EXERCISES: CPT

## 2024-04-02 PROCEDURE — 97112 NEUROMUSCULAR REEDUCATION: CPT

## 2024-04-02 NOTE — PROGRESS NOTES
"Daily Note     Today's date: 2024  Patient name: Angie Eastman  : 1945  MRN: [id number]  Referring provider: Chema Ruiz MD  Dx:   Encounter Diagnosis     ICD-10-CM    1. Primary osteoarthritis of right knee  M17.11       2. Chronic pain of right knee  M25.561     G89.29           Start Time: 1000  Stop Time: 1100  Total time in clinic (min): 60 minutes    Subjective: Pt notes overall improvement       Objective: See treatment diary below      Assessment: Tolerated treatment well. Patient exhibited good technique with therapeutic exercises. PTA expanded program per PT POC.      Plan: Continue per plan of care.      Precautions: Hx of polio with R LE involvement.       Date 4/2 3/13 3/21 3/27 3/28 Reassess   FOTO   DS 50  51 SC   Manuals        Passive hamstring stretch ds ds ds  30\" 5x           Neuro Re-Ed        SLS- foam 4x 15\" 15\" 4x ea.  4x 15\" ea  20\" 4x    Tandem Walk-foam 5 laps 5 laps f/b 5 laps  5 laps   Towel  press 20x 5\"  15x 5\"     Adduction 20x 5\"                       Ther Ex        LAQ 3# 2/10 2# 20x 3# 20x  3# 20x   SHC 3# 20x  3# 20x 3# 20x     NuStep for strengthening 8m L5 8m L5 8m L5 8m L5 Bike L3 8 min   Standing hip abduction and ext 3# 20x ea 3# 20x ea.  3# 20x ea     SAQ 3 #  25 x 3# 20x 3# 2/15  3# 5\" 20x   Marching  3#  20x seated 3# 20 x2 3# 20x x 2     Ther Activity        Squats 10x    20x   Lunges 10x    NV   Step down 4\" 10x     4\" 10x   Step up Nv    6\" 20x    Gait Training                Modalities                               "

## 2024-04-04 ENCOUNTER — OFFICE VISIT (OUTPATIENT)
Dept: PHYSICAL THERAPY | Facility: CLINIC | Age: 79
End: 2024-04-04
Payer: MEDICARE

## 2024-04-04 DIAGNOSIS — M17.11 PRIMARY OSTEOARTHRITIS OF RIGHT KNEE: Primary | ICD-10-CM

## 2024-04-04 DIAGNOSIS — G89.29 CHRONIC PAIN OF RIGHT KNEE: ICD-10-CM

## 2024-04-04 DIAGNOSIS — M25.561 CHRONIC PAIN OF RIGHT KNEE: ICD-10-CM

## 2024-04-04 PROCEDURE — 97140 MANUAL THERAPY 1/> REGIONS: CPT

## 2024-04-04 PROCEDURE — 97112 NEUROMUSCULAR REEDUCATION: CPT

## 2024-04-04 PROCEDURE — 97110 THERAPEUTIC EXERCISES: CPT

## 2024-04-04 PROCEDURE — 97530 THERAPEUTIC ACTIVITIES: CPT

## 2024-04-04 NOTE — PROGRESS NOTES
"Daily Note     Today's date: 2024  Patient name: Angie Eastman  : 1945  MRN: [id number]  Referring provider: Chema Ruiz MD  Dx:   Encounter Diagnosis     ICD-10-CM    1. Primary osteoarthritis of right knee  M17.11       2. Chronic pain of right knee  M25.561     G89.29           Start Time: 1100  Stop Time: 1153  Total time in clinic (min): 53 minutes    Subjective: Pt notes overall improvement in her strength.      Objective: See treatment diary below      Assessment: Tolerated treatment well. Patient exhibited good technique with therapeutic exercises. She had difficulty with lunge; held same.      Plan: Continue per plan of care.      Precautions: Hx of polio with R LE involvement.       Date 4/2 4/4 3/21 3/27 3/28 Reassess   FOTO   DS 50  51 SC   Manuals        Passive hamstring stretch ds ds ds  30\" 5x           Neuro Re-Ed        SLS- foam 4x 15\" 15\" 4x ea.  4x 15\" ea  20\" 4x    Tandem Walk-foam 5 laps 5 laps f/b 5 laps  5 laps   Towel  press 20x 5\" 20x 5\"      Side to side  5 laps              Ther Ex        LAQ 3# 2/10 3# 20x 3# 20x  3# 20x   SHC 3# 20x  3# 20x 3# 20x     NuStep for strengthening 8m L5 8m L5 8m L5 8m L5 Bike L3 8 min   Standing hip abd/ ext 3# 20x ea 3# 20x ea.  3# 20x ea     SAQ 3 #  20 x 3# 20x 3# 2/15  3# 5\" 20x   Marching  3#  20x seated 3# 20  seated 3# 20x x 2     Bridge c add  15x 3\"      Ther Activity        Squats 10x 10x    20x   Lunges 10x held   NV   Step down 4\" 10x  4\" 10x   4\" 10x   Step up Nv 6\" 20x   6\" 20x    Gait Training                Modalities                                 "

## 2024-04-09 ENCOUNTER — OFFICE VISIT (OUTPATIENT)
Dept: PHYSICAL THERAPY | Facility: CLINIC | Age: 79
End: 2024-04-09
Payer: MEDICARE

## 2024-04-09 DIAGNOSIS — M17.11 PRIMARY OSTEOARTHRITIS OF RIGHT KNEE: ICD-10-CM

## 2024-04-09 DIAGNOSIS — M25.561 CHRONIC PAIN OF RIGHT KNEE: Primary | ICD-10-CM

## 2024-04-09 DIAGNOSIS — G89.29 CHRONIC PAIN OF RIGHT KNEE: Primary | ICD-10-CM

## 2024-04-09 PROCEDURE — 97530 THERAPEUTIC ACTIVITIES: CPT

## 2024-04-09 PROCEDURE — 97112 NEUROMUSCULAR REEDUCATION: CPT

## 2024-04-09 PROCEDURE — 97110 THERAPEUTIC EXERCISES: CPT

## 2024-04-09 NOTE — PROGRESS NOTES
"Daily Note     Today's date: 2024  Patient name: Angie Eastman  : 1945  MRN: [id number]  Referring provider: Chema Ruiz MD  Dx:   Encounter Diagnosis     ICD-10-CM    1. Chronic pain of right knee  M25.561     G89.29       2. Primary osteoarthritis of right knee  M17.11           Start Time: 1100  Stop Time: 1200  Total time in clinic (min): 60 minutes    Subjective: Pt notes feeling stronger.      Objective: See treatment diary below      Assessment: Tolerated treatment well. Patient exhibited good technique with therapeutic exercises      Plan: Continue per plan of care.      Precautions: Hx of polio with R LE involvement.       Date 4/2 4/4 4/9 3/27 3/28 Reassess   FOTO     51 SC   Manuals        Passive hamstring stretch ds ds jf  30\" 5x           Neuro Re-Ed        SLS- foam 4x 15\" 15\" 4x ea.  4x 15\" ea  20\" 4x    Tandem Walk-foam 5 laps 5 laps f/b 5 laps  5 laps   Towel  press 20x 5\" 20x 5\" 20x 5\"     Side to side  5 laps 5 laps             Ther Ex        LAQ 3# 2/10 3# 20x 3# 20x  3# 20x   SHC 3# 20x  3# 20x 3# 20x     NuStep for strengthening 8m L5 8m L5 8m L5 8m L5 Bike L3 8 min   Standing hip abd/ ext 3# 20x ea 3# 20x ea.  3# 20x ea     SAQ 3 #  20 x 3# 20x 3# 20x   3# 5\" 20x   Marching  3#  20x seated 3# 20  seated 3# 20x ea     Bridge c add   20x 5\" add only     Ther Activity        Squats 10x 10x  10x  20x   Lunges 10x held held  NV   Step down 4\" 10x  4\" 10x np  4\" 10x   Step up Nv 6\" 20x 6' 20x   6\" 20x    Gait Training                Modalities                                   "

## 2024-04-11 ENCOUNTER — OFFICE VISIT (OUTPATIENT)
Dept: PHYSICAL THERAPY | Facility: CLINIC | Age: 79
End: 2024-04-11
Payer: MEDICARE

## 2024-04-11 DIAGNOSIS — M25.561 CHRONIC PAIN OF RIGHT KNEE: ICD-10-CM

## 2024-04-11 DIAGNOSIS — M17.11 PRIMARY OSTEOARTHRITIS OF RIGHT KNEE: Primary | ICD-10-CM

## 2024-04-11 DIAGNOSIS — G89.29 CHRONIC PAIN OF RIGHT KNEE: ICD-10-CM

## 2024-04-11 PROCEDURE — 97112 NEUROMUSCULAR REEDUCATION: CPT

## 2024-04-11 PROCEDURE — 97530 THERAPEUTIC ACTIVITIES: CPT

## 2024-04-16 ENCOUNTER — OFFICE VISIT (OUTPATIENT)
Dept: PHYSICAL THERAPY | Facility: CLINIC | Age: 79
End: 2024-04-16
Payer: MEDICARE

## 2024-04-16 DIAGNOSIS — M17.11 PRIMARY OSTEOARTHRITIS OF RIGHT KNEE: ICD-10-CM

## 2024-04-16 DIAGNOSIS — G89.29 CHRONIC PAIN OF RIGHT KNEE: Primary | ICD-10-CM

## 2024-04-16 DIAGNOSIS — M25.561 CHRONIC PAIN OF RIGHT KNEE: Primary | ICD-10-CM

## 2024-04-16 PROCEDURE — 97110 THERAPEUTIC EXERCISES: CPT

## 2024-04-16 PROCEDURE — 97530 THERAPEUTIC ACTIVITIES: CPT

## 2024-04-16 PROCEDURE — 97112 NEUROMUSCULAR REEDUCATION: CPT

## 2024-04-16 NOTE — PROGRESS NOTES
"Daily Note     Today's date: 2024  Patient name: Angie Eastman  : 1945  MRN: [id number]  Referring provider: Chema Ruiz MD  Dx:   Encounter Diagnosis     ICD-10-CM    1. Chronic pain of right knee  M25.561     G89.29       2. Primary osteoarthritis of right knee  M17.11           Start Time: 1100  Stop Time: 1203  Total time in clinic (min): 63 minutes    Subjective: my knee is doing well , It feels a lot better since I had the shot.       Objective: See treatment diary below      Assessment: Tolerated treatment well. Patient would benefit from continued PT  pt states feeling fatigue through out her session . She had some difficulty with balance activities today and with her quarter squats. We continue to work on her strength and balance. She states having no right knee pain post.       Plan: Continue per plan of care.      Precautions: Hx of polio with R LE involvement.       Date    FOTO        Manuals        Passive hamstring stretch ds ds jf JF 30\" 5x           Neuro Re-Ed        SLS- foam 4x 15\" 15\" 4x ea.  4x 15\" ea 4 x 15 \" 20\" 4x    Tandem Walk-foam 5 laps 5 laps f/b 5 laps 5 laps 5 laps   Towel  press 20x 5\" 20x 5\" 20x 5\" 20 x 5 \" 20 x 5\"   Side to side  5 laps 5 laps 5 laps 5 laps           Ther Ex        LAQ 3# 2/10 3# 20x 3# 20x 3# 20 x 3# 20x   SHC 3# 20x  3# 20x 3# 20x 3#  20 x    NuStep for strengthening 8m L5 8m L5 8m L5 8m L5 Bike L3 8 min   Standing hip abd/ ext 3# 20x ea 3# 20x ea.  3# 20x ea 3# 20 x 3# 20 x   SAQ 3 #  20 x 3# 20x 3# 20x  3#  20 x 3# 5\" 20x   Marching  3#  20x seated 3# 20  seated 3# 20x ea 3#  20 x 3#  20 x   Bridge c add   20x 5\" add only 20 x 5 \"  add  20 x 5 \"    Ther Activity        Squats 10x 10x  10x 20 x  20x   Lunges 10x held held  NV   Step down 4\" 10x  4\" 10x np  4\" 10x   Step up Nv 6\" 20x 6' 20x  6\" 20 x 6\" 20x    Gait Training                Modalities                                       "

## 2024-04-18 ENCOUNTER — APPOINTMENT (OUTPATIENT)
Dept: PHYSICAL THERAPY | Facility: CLINIC | Age: 79
End: 2024-04-18
Payer: MEDICARE

## 2024-04-23 ENCOUNTER — OFFICE VISIT (OUTPATIENT)
Dept: PHYSICAL THERAPY | Facility: CLINIC | Age: 79
End: 2024-04-23
Payer: MEDICARE

## 2024-04-23 DIAGNOSIS — G89.29 CHRONIC PAIN OF RIGHT KNEE: ICD-10-CM

## 2024-04-23 DIAGNOSIS — M25.561 CHRONIC PAIN OF RIGHT KNEE: ICD-10-CM

## 2024-04-23 DIAGNOSIS — M17.11 PRIMARY OSTEOARTHRITIS OF RIGHT KNEE: Primary | ICD-10-CM

## 2024-04-23 PROCEDURE — 97110 THERAPEUTIC EXERCISES: CPT

## 2024-04-23 PROCEDURE — 97530 THERAPEUTIC ACTIVITIES: CPT

## 2024-04-23 PROCEDURE — 97112 NEUROMUSCULAR REEDUCATION: CPT

## 2024-04-23 NOTE — PROGRESS NOTES
"Daily Note     Today's date: 2024  Patient name: Angie Eastman  : 1945  MRN: [id number]  Referring provider: Chema Ruiz MD  Dx:   Encounter Diagnosis     ICD-10-CM    1. Primary osteoarthritis of right knee  M17.11       2. Chronic pain of right knee  M25.561     G89.29           Start Time: 1057  Stop Time: 1200  Total time in clinic (min): 63 minutes    Subjective: My knee is feeling good. I am getting stronger.       Objective: See treatment diary below      Assessment: Tolerated treatment well. Patient would benefit from continued PT  pt states feeling tighter in both legs today and had more difficulty with her standing marching and abduction today. She had to hold the rails to maintain her balance today with SLB and tandem walking. We will continue to work on her strength and balance. She does feel improvement over all .       Plan: Continue per plan of care.      Precautions: Hx of polio with R LE involvement.       Date    FOTO        Manuals        Passive hamstring stretch JF ds jf JF 30\" 5x           Neuro Re-Ed        SLS- foam 4x 15\" 15\" 4x ea.  4x 15\" ea 4 x 15 \" 20\" 4x    Tandem Walk-foam 5 laps 5 laps f/b 5 laps 5 laps 5 laps   Towel  press 20x 5\" 20x 5\" 20x 5\" 20 x 5 \" 20 x 5\"   Side to side 5 laps 5 laps 5 laps 5 laps 5 laps           Ther Ex        LAQ 3# 2/10 3# 20x 3# 20x 3# 20 x 3# 20x   SHC 3# 20x  3# 20x 3# 20x 3#  20 x    NuStep for strengthening 8m L5 8m L5 8m L5 8m L5 Bike L3 8 min   Standing hip abd/ ext 3# 20x ea 3# 20x ea.  3# 20x ea 3# 20 x 3# 20 x   SAQ 3 #  20 x 3# 20x 3# 20x  3#  20 x 3# 5\" 20x   Marching  3#  20 x stand 3# 20  seated 3# 20x ea 3#  20 x 3#  20 x   Bridge c add 10 x 5 \"   20x 5\" add only 20 x 5 \"  add  20 x 5 \"    Ther Activity        Squats 10x 10x  10x 20 x  20x   Lunges 10x held held  NV   Step down 4\" 10x  4\" 10x np  4\" 10x   Step up 6\" 20 x  6\" 20x 6' 20x  6\" 20 x 6\" 20x    Gait Training                Modalities             "

## 2024-04-25 ENCOUNTER — OFFICE VISIT (OUTPATIENT)
Dept: PHYSICAL THERAPY | Facility: CLINIC | Age: 79
End: 2024-04-25
Payer: MEDICARE

## 2024-04-25 DIAGNOSIS — M17.11 PRIMARY OSTEOARTHRITIS OF RIGHT KNEE: Primary | ICD-10-CM

## 2024-04-25 DIAGNOSIS — G89.29 CHRONIC PAIN OF RIGHT KNEE: ICD-10-CM

## 2024-04-25 DIAGNOSIS — M25.561 CHRONIC PAIN OF RIGHT KNEE: ICD-10-CM

## 2024-04-25 PROCEDURE — 97110 THERAPEUTIC EXERCISES: CPT

## 2024-04-25 PROCEDURE — 97112 NEUROMUSCULAR REEDUCATION: CPT

## 2024-04-25 NOTE — PROGRESS NOTES
PT Re-Evaluation     Today's date: 2024  Patient name: Angie Eastman  : 1945  MRN: [id number]  Referring provider: Chema Ruiz MD  Dx:   Encounter Diagnosis     ICD-10-CM    1. Primary osteoarthritis of right knee  M17.11       2. Chronic pain of right knee  M25.561     G89.29           Start Time: 1100  Stop Time: 1200  Total time in clinic (min): 60 minutes    Assessment  Assessment details: Patient has attended 17 physical therapy visits to date. She demonstrates improved to R knee extension rom along with R knee and hip strength during the evaluation today. She continues to have greatest strength limitation noted to R hip flexion and knee extension. She reports no pain during the evaluation today and notes that pain has been well controlled since the injection in her knee. Difficulties noted during single leg stance interventions to the R LE secondary to weakness.   Impairments: abnormal gait, abnormal muscle tone, abnormal or restricted ROM, impaired physical strength and lacks appropriate home exercise program    Symptom irritability: lowUnderstanding of Dx/Px/POC: good   Prognosis: good    Goals  STGs:  Patient will be independent with HEP in 1-2 visits - MET  Improve R knee strength to 4-/5 for improved tolerance with adls and transfers by 3-4 weeks. - Progressing  Improve R knee extension to 0 degrees for improved tolerance with adls and transfers by 3-4 weeks. - Progressing      LTGs:  Improve FOTO score from 55 To 64 Indicating improved tolerance with activities involving the LE by discharge. - Not MET  Improve R knee strength and rom to wnl for improved tolerance with adls, home duties, and recreation by discharge. - Progressing  Patient will be able to return to normal ambulation without deviation over all terrains without pain or limitation from the knee by discharge. - Progressing  Patient will be able to return to normal home duties without limitation from the knee by discharge. -  Progressing      Plan  Plan details: Patient informed that from this point forward, to ensure adherence to the aforementioned plan of care, all or some of the treatment may be performed and carried out by a Physical Therapy Assistant (PTA) with supervision from a licensed Physical Therapist (PT) in accordance with [facility] Physical Therapy Practice Act.  Patient will continue to benefit from skilled physical therapy to address the functional deficits that were identified during the evaluation today. We will continue to progress the therapy program to address these functional deficits and achieve the established goals.     Patient would benefit from: skilled physical therapy  Planned modality interventions: cryotherapy and thermotherapy: hydrocollator packs  Planned therapy interventions: ADL retraining, balance/weight bearing training, flexibility, functional ROM exercises, gait training, home exercise program, joint mobilization, manual therapy, neuromuscular re-education, patient education, strengthening, stretching, therapeutic activities and therapeutic exercise  Frequency: 1-2x week.  Duration in weeks: 4  Plan of Care beginning date: 4/25/2024  Plan of Care expiration date: 5/23/2024  Treatment plan discussed with: patient      Subjective Evaluation    History of Present Illness  Mechanism of injury: Patient presents to out patient physical therapy with chief c/o R knee pain. Patient reports onset of R knee pain approximately 3 weeks ago without incident. She notes that she she was walking after waking one day and she felt soreness in her knee that she never experienced before. She does disclose a history of polio that did effect her R LE when she was a child. She feels that since the injection in her knee she has noticed a significant improvement in knee pain but feels that it is only temporary.     Update 3/28/2024:  Patient reports that she is still having a hard time getting up from the floor and  she needs to use her arms on a chair to get up from the floor. She feels that doing stairs and performing car transfers has gotten easier for her as her legs are getting stronger.     Update 24:  The patient states that she feels that her strength and endurance has slightly improved since she started coming to PT. She states that she has less difficulty going up and down stairs compared to before she started PT. She states that her pain has been significantly better since she had a cortisone injection a few months ago.             Not a recurrent problem   Quality of life: good    Patient Goals  Patient goals for therapy: decreased pain, increased strength, increased motion and independence with ADLs/IADLs  Patient goal: Patient wishes to prevent return of pain in her knee by improving her mobility and strength in her legs.  Pain  Current pain ratin  At best pain ratin  At worst pain ratin  Location: R knee  Quality: discomfort and dull ache  Alleviating factors: Injection.  Aggravating factors: standing and walking (kneeling)    Social Support  Steps to enter house: yes  2  Stairs in house: yes (to basement)   Lives in: one-story house  Lives with: alone    Employment status: not working  Treatments  Previous treatment: injection treatment      Objective     Active Range of Motion     Right Knee   Flexion: 143 degrees   Extension: -5 degrees   Extensor la degrees     Passive Range of Motion     Right Knee   Extension: -2 degrees     Strength/Myotome Testing     Right Hip   Planes of Motion   Flexion: 4-  Extension: 4  Abduction: 4  Adduction: 4  External rotation: 4  Internal rotation: 4    Right Knee   Flexion: 4-  Extension: 3+      Flowsheet Rows      Flowsheet Row Most Recent Value   PT/OT G-Codes    Current Score 55   Projected Score 64               Precautions: Hx of polio with R LE involvement.   Date    FOTO  58 AW      Manuals        Passive hamstring stretch JF  "AW jf JF 30\" 5x           Neuro Re-Ed        SLS- foam 4x 15\" 15\" 4x ea.  4x 15\" ea 4 x 15 \" 20\" 4x    Tandem Walk-foam 5 laps 5 laps f/b 5 laps 5 laps 5 laps   Towel  press 20x 5\" 20x 5\" 20x 5\" 20 x 5 \" 20 x 5\"   Side to side 5 laps 5 laps 5 laps 5 laps 5 laps           Ther Ex        LAQ 3# 2x10 3# 20x 3# 20x 3# 20 x 3# 20x   SHC 3# 20x  3# 20x 3# 20x 3#  20 x    NuStep for strengthening 8m L5 8m L5 8m L5 8m L5 Bike L3 8 min   Standing hip abd/ ext 3# 20x ea 3# 20x ea.  3# 20x ea 3# 20 x 3# 20 x   SAQ 3 #  20 x 3# 20x 3# 20x  3#  20 x 3# 5\" 20x   Marching  3#  20 x stand 3# 20  stand 3# 20x ea 3#  20 x 3#  20 x   Bridge c add 10 x 5 \"  10x 5\" 20x 5\" add only 20 x 5 \"  add  20 x 5 \"    Ther Activity        Squats 10x 20x  10x 20 x  20x   Lunges 10x  held  NV   Step down 4\" 10x  4\" 10x np  4\" 10x   Step up 6\" 20 x  6\" 20x 6' 20x  6\" 20 x 6\" 20x    Gait Training                Modalities                                       "

## 2024-04-25 NOTE — LETTER
2024    Chema Ruiz MD  [address] [place] [id number]    Patient: Angie Eastman   YOB: 1945   Date of Visit: 2024     Encounter Diagnosis     ICD-10-CM    1. Primary osteoarthritis of right knee  M17.11       2. Chronic pain of right knee  M25.561     G89.29           Dear Dr. Ruiz:    Thank you for your recent referral of Angie Eastman. Please review the attached evaluation summary from Angie's recent visit.     Please verify that you agree with the plan of care by signing the attached order.     If you have any questions or concerns, please do not hesitate to call.     I sincerely appreciate the opportunity to share in the care of one of your patients and hope to have another opportunity to work with you in the near future.       Sincerely,    Iris Chase, PT      Referring Provider:      I certify that I have read the below Plan of Care and certify the need for these services furnished under this plan of treatment while under my care.                    Chema Ruiz MD  [address] [place] [id number]  Via In Basket          PT Re-Evaluation     Today's date: 2024  Patient name: Angie Eastman  : 1945  MRN: [id number]  Referring provider: Chema Ruiz MD  Dx:   Encounter Diagnosis     ICD-10-CM    1. Primary osteoarthritis of right knee  M17.11       2. Chronic pain of right knee  M25.561     G89.29           Start Time: 1100  Stop Time: 1200  Total time in clinic (min): 60 minutes    Assessment  Assessment details: Patient has attended 17 physical therapy visits to date. She demonstrates improved to R knee extension rom along with R knee and hip strength during the evaluation today. She continues to have greatest strength limitation noted to R hip flexion and knee extension. She reports no pain during the evaluation today and notes that pain has been well controlled since the injection in her knee. Difficulties noted during single leg stance  interventions to the R LE secondary to weakness.   Impairments: abnormal gait, abnormal muscle tone, abnormal or restricted ROM, impaired physical strength and lacks appropriate home exercise program    Symptom irritability: lowUnderstanding of Dx/Px/POC: good   Prognosis: good    Goals  STGs:  Patient will be independent with HEP in 1-2 visits - MET  Improve R knee strength to 4-/5 for improved tolerance with adls and transfers by 3-4 weeks. - Progressing  Improve R knee extension to 0 degrees for improved tolerance with adls and transfers by 3-4 weeks. - Progressing      LTGs:  Improve FOTO score from 55 To 64 Indicating improved tolerance with activities involving the LE by discharge. - Not MET  Improve R knee strength and rom to wnl for improved tolerance with adls, home duties, and recreation by discharge. - Progressing  Patient will be able to return to normal ambulation without deviation over all terrains without pain or limitation from the knee by discharge. - Progressing  Patient will be able to return to normal home duties without limitation from the knee by discharge. - Progressing      Plan  Plan details: Patient informed that from this point forward, to ensure adherence to the aforementioned plan of care, all or some of the treatment may be performed and carried out by a Physical Therapy Assistant (PTA) with supervision from a licensed Physical Therapist (PT) in accordance with [facility] Physical Therapy Practice Act.  Patient will continue to benefit from skilled physical therapy to address the functional deficits that were identified during the evaluation today. We will continue to progress the therapy program to address these functional deficits and achieve the established goals.     Patient would benefit from: skilled physical therapy  Planned modality interventions: cryotherapy and thermotherapy: hydrocollator packs  Planned therapy interventions: ADL retraining, balance/weight bearing  training, flexibility, functional ROM exercises, gait training, home exercise program, joint mobilization, manual therapy, neuromuscular re-education, patient education, strengthening, stretching, therapeutic activities and therapeutic exercise  Frequency: 1-2x week.  Duration in weeks: 4  Plan of Care beginning date: 4/25/2024  Plan of Care expiration date: 5/23/2024  Treatment plan discussed with: patient      Subjective Evaluation    History of Present Illness  Mechanism of injury: Patient presents to out patient physical therapy with chief c/o R knee pain. Patient reports onset of R knee pain approximately 3 weeks ago without incident. She notes that she she was walking after waking one day and she felt soreness in her knee that she never experienced before. She does disclose a history of polio that did effect her R LE when she was a child. She feels that since the injection in her knee she has noticed a significant improvement in knee pain but feels that it is only temporary.     Update 3/28/2024:  Patient reports that she is still having a hard time getting up from the floor and she needs to use her arms on a chair to get up from the floor. She feels that doing stairs and performing car transfers has gotten easier for her as her legs are getting stronger.     Update 4/25/24:  The patient states that she feels that her strength and endurance has slightly improved since she started coming to PT. She states that she has less difficulty going up and down stairs compared to before she started PT. She states that her pain has been significantly better since she had a cortisone injection a few months ago.             Not a recurrent problem   Quality of life: good    Patient Goals  Patient goals for therapy: decreased pain, increased strength, increased motion and independence with ADLs/IADLs  Patient goal: Patient wishes to prevent return of pain in her knee by improving her mobility and strength in her  "legs.  Pain  Current pain ratin  At best pain ratin  At worst pain ratin  Location: R knee  Quality: discomfort and dull ache  Alleviating factors: Injection.  Aggravating factors: standing and walking (kneeling)    Social Support  Steps to enter house: yes  2  Stairs in house: yes (to basement)   Lives in: one-story house  Lives with: alone    Employment status: not working  Treatments  Previous treatment: injection treatment      Objective     Active Range of Motion     Right Knee   Flexion: 143 degrees   Extension: -5 degrees   Extensor la degrees     Passive Range of Motion     Right Knee   Extension: -2 degrees     Strength/Myotome Testing     Right Hip   Planes of Motion   Flexion: 4-  Extension: 4  Abduction: 4  Adduction: 4  External rotation: 4  Internal rotation: 4    Right Knee   Flexion: 4-  Extension: 3+      Flowsheet Rows      Flowsheet Row Most Recent Value   PT/OT G-Codes    Current Score 55   Projected Score 64               Precautions: Hx of polio with R LE involvement.   Date    FOTO  58 AW      Manuals        Passive hamstring stretch JF AW jf JF 30\" 5x           Neuro Re-Ed        SLS- foam 4x 15\" 15\" 4x ea.  4x 15\" ea 4 x 15 \" 20\" 4x    Tandem Walk-foam 5 laps 5 laps f/b 5 laps 5 laps 5 laps   Towel  press 20x 5\" 20x 5\" 20x 5\" 20 x 5 \" 20 x 5\"   Side to side 5 laps 5 laps 5 laps 5 laps 5 laps           Ther Ex        LAQ 3# 2x10 3# 20x 3# 20x 3# 20 x 3# 20x   SHC 3# 20x  3# 20x 3# 20x 3#  20 x    NuStep for strengthening 8m L5 8m L5 8m L5 8m L5 Bike L3 8 min   Standing hip abd/ ext 3# 20x ea 3# 20x ea.  3# 20x ea 3# 20 x 3# 20 x   SAQ 3 #  20 x 3# 20x 3# 20x  3#  20 x 3# 5\" 20x   Marching  3#  20 x stand 3# 20  stand 3# 20x ea 3#  20 x 3#  20 x   Bridge c add 10 x 5 \"  10x 5\" 20x 5\" add only 20 x 5 \"  add  20 x 5 \"    Ther Activity        Squats 10x 20x  10x 20 x  20x   Lunges 10x  held  NV   Step down 4\" 10x  4\" 10x np  4\" 10x   Step up 6\" 20 x  6\" 20x " "6' 20x  6\" 20 x 6\" 20x    Gait Training                Modalities                                                       "

## 2024-04-30 ENCOUNTER — OFFICE VISIT (OUTPATIENT)
Dept: PHYSICAL THERAPY | Facility: CLINIC | Age: 79
End: 2024-04-30
Payer: MEDICARE

## 2024-04-30 DIAGNOSIS — G89.29 CHRONIC PAIN OF RIGHT KNEE: ICD-10-CM

## 2024-04-30 DIAGNOSIS — M17.11 PRIMARY OSTEOARTHRITIS OF RIGHT KNEE: Primary | ICD-10-CM

## 2024-04-30 DIAGNOSIS — M25.561 CHRONIC PAIN OF RIGHT KNEE: ICD-10-CM

## 2024-04-30 PROCEDURE — 97110 THERAPEUTIC EXERCISES: CPT

## 2024-04-30 PROCEDURE — 97112 NEUROMUSCULAR REEDUCATION: CPT

## 2024-04-30 PROCEDURE — 97530 THERAPEUTIC ACTIVITIES: CPT

## 2024-04-30 NOTE — PROGRESS NOTES
"Daily Note     Today's date: 2024  Patient name: Angie Eastman  : 1945  MRN: [id number]  Referring provider: Chema Ruiz MD  Dx:   Encounter Diagnosis     ICD-10-CM    1. Primary osteoarthritis of right knee  M17.11       2. Chronic pain of right knee  M25.561     G89.29           Start Time: 1100  Stop Time: 1150  Total time in clinic (min): 50 minutes    Subjective: Patient reports that her knee is feeling better since she got her injection.      Objective: See treatment diary below      Assessment: Tolerated treatment well.   Patient participated in skilled PT session focused on strengthening, stretching, and ROM.  Patient able to complete exercise program with no issue.  Patient continues to rely on B/L UE support with SLS and NBOS activities.  Patient would continue to benefit from skilled PT interventions to address strengthening, stretching, and ROM. Patient demonstrated fatigue post treatment and exhibited good technique with therapeutic exercises      Plan: Continue per plan of care.      Precautions: Hx of polio with R LE involvement.   Date    FOTO  58 AW      Manuals        Passive hamstring stretch JF AW CD JF 30\" 5x           Neuro Re-Ed        SLS- foam 4x 15\" 15\" 4x ea.  15\" 4x ea 4 x 15 \" 20\" 4x    Tandem Walk-foam 5 laps 5 laps f/b 5 laps Fwd/Bwd 5 laps 5 laps   Towel  press 20x 5\" 20x 5\" 5\" 20x 20 x 5 \" 20 x 5\"   Side to side 5 laps 5 laps 5 laps 5 laps 5 laps           Ther Ex        LAQ 3# 2x10 3# 20x 3# B/L 20x ea 3# 20 x 3# 20x   SHC 3# 20x  3# 20x 3# B/L 20x ea 3#  20 x    NuStep for strengthening 8m L5 8m L5 Nustep L5 x 8 min 8m L5 Bike L3 8 min   Standing hip abd/ ext 3# 20x ea 3# 20x ea.  3# B/L 20x ea 3# 20 x 3# 20 x   SAQ 3 #  20 x 3# 20x 3# B/L 20x ea 3#  20 x 3# 5\" 20x   Marching  3#  20 x stand 3# 20  stand 3# B/L 20x alt 3#  20 x 3#  20 x   Bridge c add 10 x 5 \"  10x 5\"  20 x 5 \"  add  20 x 5 \"    Ther Activity        Squats 10x 20x  20x 20 x  " "20x   Lunges 10x    NV   Step down 4\" 10x  4\" 10x 4\" step 10x ea  4\" 10x   Step up 6\" 20 x  6\" 20x 6\" step 20x ea 6\" 20 x 6\" 20x    Gait Training                Modalities                                       "

## 2024-05-02 ENCOUNTER — OFFICE VISIT (OUTPATIENT)
Dept: PHYSICAL THERAPY | Facility: CLINIC | Age: 79
End: 2024-05-02
Payer: MEDICARE

## 2024-05-02 DIAGNOSIS — M25.561 CHRONIC PAIN OF RIGHT KNEE: Primary | ICD-10-CM

## 2024-05-02 DIAGNOSIS — M17.11 PRIMARY OSTEOARTHRITIS OF RIGHT KNEE: ICD-10-CM

## 2024-05-02 DIAGNOSIS — G89.29 CHRONIC PAIN OF RIGHT KNEE: Primary | ICD-10-CM

## 2024-05-02 PROCEDURE — 97530 THERAPEUTIC ACTIVITIES: CPT

## 2024-05-02 PROCEDURE — 97110 THERAPEUTIC EXERCISES: CPT

## 2024-05-02 PROCEDURE — 97112 NEUROMUSCULAR REEDUCATION: CPT

## 2024-05-02 NOTE — PROGRESS NOTES
"Daily Note     Today's date: 2024  Patient name: Angie Eastman  : 1945  MRN: [id number]  Referring provider: Chema Ruiz MD  Dx:   Encounter Diagnosis     ICD-10-CM    1. Chronic pain of right knee  M25.561     G89.29       2. Primary osteoarthritis of right knee  M17.11           Start Time: 1015  Stop Time: 1100  Total time in clinic (min): 45 minutes    Subjective:  My knee is feeling pretty good. I am doing the stairs better, but they are still the hardest for me .       Objective: See treatment diary below      Assessment: Tolerated treatment well. Patient would benefit from continued PT  we used the 6 inch box today for step downs and she was able to do it , but, had to use both arms holding onto the bars for support. She was only able to do 10 reps . She completed her full program with good tolerance. She feels that over all she can do more , but, still has most trouble with stairs.  We will continue to work on her strength,.       Plan: Continue per plan of care.      Precautions: Hx of polio with R LE involvement.   Date    FOTO  58 AW  KX     Manuals        Passive hamstring stretch JF AW CD JF 30\" 5x           Neuro Re-Ed        SLS- foam 4x 15\" 15\" 4x ea.  15\" 4x ea 4 x 15 \" 20\" 4x    Tandem Walk-foam 5 laps 5 laps f/b 5 laps Fwd/Bwd 5 laps 5 laps   Towel  press 20x 5\" 20x 5\" 5\" 20x 20 x 5 \" 20 x 5\"   Side to side 5 laps 5 laps 5 laps 5 laps 5 laps           Ther Ex        LAQ 3# 2x10 3# 20x 3# B/L 20x ea 3# 20 x 3# 20x   SHC 3# 20x  3# 20x 3# B/L 20x ea 3#  20 x    NuStep for strengthening 8m L5 8m L5 Nustep L5 x 8 min 8m L5 Bike L3 8 min   Standing hip abd/ ext 3# 20x ea 3# 20x ea.  3# B/L 20x ea 3# 20 x 3# 20 x   SAQ 3 #  20 x 3# 20x 3# B/L 20x ea 3#  20 x 3# 5\" 20x   Marching  3#  20 x stand 3# 20  stand 3# B/L 20x alt 3#  20 x 3#  20 x   Bridge c add 10 x 5 \"  10x 5\"  20 x 5 \"  add  20 x 5 \"    Ther Activity        Squats 10x 20x  20x 20 x  20x   Lunges 10x    " "NV   Step down 4\" 10x  4\" 10x 4\" step 10x ea 6\" 10x 4\" 10x   Step up 6\" 20 x  6\" 20x 6\" step 20x ea 6\" 20 x 6\" 20x    Gait Training                Modalities                                         "

## 2024-05-06 ENCOUNTER — REMOTE DEVICE CLINIC VISIT (OUTPATIENT)
Dept: CARDIOLOGY CLINIC | Facility: CLINIC | Age: 79
End: 2024-05-06
Payer: MEDICARE

## 2024-05-06 DIAGNOSIS — Z95.818 PRESENCE OF OTHER CARDIAC IMPLANTS AND GRAFTS: Primary | ICD-10-CM

## 2024-05-06 PROCEDURE — 93298 REM INTERROG DEV EVAL SCRMS: CPT | Performed by: INTERNAL MEDICINE

## 2024-05-06 NOTE — PROGRESS NOTES
MDT LOOP   CARELINK TRANSMISSION: LOOP RECORDER. PRESENTING RHYTHM NSR @ 60 BPM. BATTERY RRT SINCE 4/5/24 AND HAS BEEN ADDRESSED. 1 PAUSE EPISODE W/ EGRAM SHOWING 3 SEC PAUSE. NO PATIENT OR NEW DEVICE ACTIVATED EPISODES. NORMAL DEVICE FUNCTION. DL

## 2024-05-07 ENCOUNTER — APPOINTMENT (OUTPATIENT)
Dept: PHYSICAL THERAPY | Facility: CLINIC | Age: 79
End: 2024-05-07
Payer: MEDICARE

## 2024-05-09 ENCOUNTER — OFFICE VISIT (OUTPATIENT)
Dept: PHYSICAL THERAPY | Facility: CLINIC | Age: 79
End: 2024-05-09
Payer: MEDICARE

## 2024-05-09 DIAGNOSIS — M17.11 PRIMARY OSTEOARTHRITIS OF RIGHT KNEE: ICD-10-CM

## 2024-05-09 DIAGNOSIS — G89.29 CHRONIC PAIN OF RIGHT KNEE: Primary | ICD-10-CM

## 2024-05-09 DIAGNOSIS — M25.561 CHRONIC PAIN OF RIGHT KNEE: Primary | ICD-10-CM

## 2024-05-09 PROCEDURE — 97112 NEUROMUSCULAR REEDUCATION: CPT

## 2024-05-09 PROCEDURE — 97110 THERAPEUTIC EXERCISES: CPT

## 2024-05-09 NOTE — PROGRESS NOTES
"Daily Note     Today's date: 2024  Patient name: Angie Eastman  : 1945  MRN: [id number]  Referring provider: Chema Ruiz MD  Dx:   Encounter Diagnosis     ICD-10-CM    1. Chronic pain of right knee  M25.561     G89.29       2. Primary osteoarthritis of right knee  M17.11           Start Time: 0935  Stop Time: 1030  Total time in clinic (min): 55 minutes    Subjective: The patient states that she has no complain of knee pain today.       Objective: See treatment diary below      Assessment: Tolerated treatment well. The patient is able to complete all exercises with good tolerance and no complaints of knee pain. Patient demonstrated fatigue post treatment, exhibited good technique with therapeutic exercises, and would benefit from continued PT      Plan: Continue per plan of care.      Precautions: Hx of polio with R LE involvement.   Date    FOTO  58 AW  KX     Manuals        Passive hamstring stretch JF AW CD JF AW           Neuro Re-Ed        SLS- foam 4x 15\" 15\" 4x ea.  15\" 4x ea 4 x 15 \" 15\" 4x    Tandem Walk-foam 5 laps 5 laps f/b 5 laps Fwd/Bwd 5 laps 5 laps   Towel  press 20x 5\" 20x 5\" 5\" 20x 20 x 5 \" HEP   Side to side 5 laps 5 laps 5 laps 5 laps 5 laps           Ther Ex        LAQ 3# 2x10 3# 20x 3# B/L 20x ea 3# 20 x 3# 20x   SHC 3# 20x  3# 20x 3# B/L 20x ea 3#  20 x 3# 20x   NuStep for strengthening 8m L5 8m L5 Nustep L5 x 8 min 8m L5 L5 8 min   Standing hip abd/ ext 3# 20x ea 3# 20x ea.  3# B/L 20x ea 3# 20 x 3# 20 x   SAQ 3 #  20 x 3# 20x 3# B/L 20x ea 3#  20 x 3# 5\" 20x   Marching  3#  20 x stand 3# 20  stand 3# B/L 20x alt 3#  20 x 3#  20 x   Bridge c add 10 x 5 \"  10x 5\"  20 x 5 \"  add  20 x 5 \"    Ther Activity        Squats 10x 20x  20x 20 x  20x   Lunges 10x       Step down 4\" 10x  4\" 10x 4\" step 10x ea 6\" 10x 4\" 10x   Step up 6\" 20 x  6\" 20x 6\" step 20x ea 6\" 20 x 6\" 20x    Gait Training                Modalities                                           "

## 2024-05-14 ENCOUNTER — OFFICE VISIT (OUTPATIENT)
Dept: URGENT CARE | Facility: CLINIC | Age: 79
End: 2024-05-14
Payer: MEDICARE

## 2024-05-14 ENCOUNTER — OFFICE VISIT (OUTPATIENT)
Dept: PHYSICAL THERAPY | Facility: CLINIC | Age: 79
End: 2024-05-14
Payer: MEDICARE

## 2024-05-14 VITALS
RESPIRATION RATE: 16 BRPM | HEART RATE: 56 BPM | SYSTOLIC BLOOD PRESSURE: 110 MMHG | TEMPERATURE: 97.8 F | DIASTOLIC BLOOD PRESSURE: 62 MMHG | OXYGEN SATURATION: 99 %

## 2024-05-14 DIAGNOSIS — M17.11 PRIMARY OSTEOARTHRITIS OF RIGHT KNEE: ICD-10-CM

## 2024-05-14 DIAGNOSIS — H65.193 ACUTE EFFUSION OF BOTH MIDDLE EARS: Primary | ICD-10-CM

## 2024-05-14 DIAGNOSIS — M25.561 CHRONIC PAIN OF RIGHT KNEE: Primary | ICD-10-CM

## 2024-05-14 DIAGNOSIS — G89.29 CHRONIC PAIN OF RIGHT KNEE: Primary | ICD-10-CM

## 2024-05-14 PROCEDURE — 97110 THERAPEUTIC EXERCISES: CPT

## 2024-05-14 PROCEDURE — 99213 OFFICE O/P EST LOW 20 MIN: CPT

## 2024-05-14 PROCEDURE — 97530 THERAPEUTIC ACTIVITIES: CPT

## 2024-05-14 PROCEDURE — G0463 HOSPITAL OUTPT CLINIC VISIT: HCPCS

## 2024-05-14 PROCEDURE — 97112 NEUROMUSCULAR REEDUCATION: CPT

## 2024-05-14 RX ORDER — FLUTICASONE PROPIONATE 50 MCG
2 SPRAY, SUSPENSION (ML) NASAL DAILY
Qty: 11.1 ML | Refills: 0 | Status: SHIPPED | OUTPATIENT
Start: 2024-05-14

## 2024-05-14 NOTE — PATIENT INSTRUCTIONS
Take prescribed medication as instructed.  Tylenol for pain or fever.  Follow-up with ENT as scheduled.  May also see your family doctor if no improvement.  Follow up with PCP in 3-5 days.  Proceed to  ER if symptoms worsen.    If tests are performed, our office will contact you with results only if changes need to made to the care plan discussed with you at the visit. You can review your full results on [facility]'s Mychart.  Fluid In The Ear (Serous Otitis Media)   WHAT YOU NEED TO KNOW:   ALONZO is fluid trapped in the middle of your ear behind your eardrum. This condition usually develops without signs or symptoms of an ear infection. Serous otitis media may be caused by an upper respiratory infection or allergies. It is most common in the fall and early spring.       DISCHARGE INSTRUCTIONS:   Call your doctor if:   You develop severe ear pain.    The outside of your ear is red or swollen.    You have fluid coming from your ear.    You have questions or concerns about your condition or care.    How to stay healthy:   Wash your hands often throughout the day.  Use soap and water. Rub your soapy hands together, lacing your fingers, for at least 20 seconds. Rinse with warm, running water. Dry your hands with a clean towel or paper towel. Use hand  that contains alcohol if soap and water are not available. Teach children how to wash their hands and use hand .         Avoid people who are sick.  Some germs are easily and quickly spread through contact.    Follow up with your doctor as directed:  Write down your questions so you remember to ask them during your visits.   © Copyright [employer] 2023 Information is for End User's use only and may not be sold, redistributed or otherwise used for commercial purposes.  The above information is an  only. It is not intended as medical advice for individual conditions or treatments. Talk to your doctor, nurse or pharmacist before following any  medical regimen to see if it is safe and effective for you.

## 2024-05-14 NOTE — PROGRESS NOTES
"Daily Note     Today's date: 2024  Patient name: Angie Eastman  : 1945  MRN: [id number]  Referring provider: Chema Ruiz MD  Dx:   Encounter Diagnosis     ICD-10-CM    1. Chronic pain of right knee  M25.561     G89.29       2. Primary osteoarthritis of right knee  M17.11           Start Time: 1145  Stop Time: 1230  Total time in clinic (min): 45 minutes    Subjective: Pt notes overall improvement. She continues to struggle with the stairs.      Objective: See treatment diary below      Assessment: Tolerated treatment well. Patient exhibited good technique with therapeutic exercises      Plan: Continue per plan of care.      Precautions: Hx of polio with R LE involvement.   Date    FOTO  58 AW  KX     Manuals        Passive hamstring stretch ds AW CD JF AW           Neuro Re-Ed        SLS- foam 4x 15\" 15\" 4x ea.  15\" 4x ea 4 x 15 \" 15\" 4x    Tandem Walk-foam 5 laps 5 laps f/b 5 laps Fwd/Bwd 5 laps 5 laps   Side to side 5 laps 5 laps 5 laps 5 laps 5 laps           Ther Ex        LAQ 3# 20x 3# 20x 3# B/L 20x ea 3# 20 x 3# 20x   SHC 3# 20x  3# 20x 3# B/L 20x ea 3#  20 x 3# 20x   NuStep for strengthening 5m L5 8m L5 Nustep L5 x 8 min 8m L5 L5 8 min   Standing hip abd/ ext 3# 20x ea 3# 20x ea.  3# B/L 20x ea 3# 20 x 3# 20 x   SAQ 3# 20x 5\" 3# 20x 3# B/L 20x ea 3#  20 x 3# 5\" 20x   Marching  3#  20x 3# 20  stand 3# B/L 20x alt 3#  20 x 3#  20 x   Bridge c add 10 x 5 \"  10x 5\"  20 x 5 \"  add  20 x 5 \"    Ther Activity        Squats 20x 20x  20x 20 x  20x   Step down 4\" 10x  4\" 10x 4\" step 10x ea 6\" 10x 4\" 10x   Step up 6\" 20 x  6\" 20x 6\" step 20x ea 6\" 20 x 6\" 20x    Gait Training                Modalities                                             "

## 2024-05-14 NOTE — PROGRESS NOTES
[facility]        NAME: Angie Eastman is a 78 y.o. female  : 1945    MRN: [id number]  DATE: May 14, 2024  TIME: 12:56 PM    Assessment and Plan   Acute effusion of both middle ears [H65.193]  1. Acute effusion of both middle ears  fluticasone (FLONASE) 50 mcg/act nasal spray        Ear fullness without pain.  Having some decreased hearing for 2 months.  No cerumen impaction.  Will start on Flonase.  Advised to follow-up with ENT/family doctor.  Advised to go to the ER if any symptoms worsen.    Patient Instructions     Take prescribed medication as instructed.  Tylenol for pain or fever.  Follow-up with ENT as scheduled.  May also see your family doctor if no improvement.  Follow up with PCP in 3-5 days.  Proceed to  ER if symptoms worsen.    If tests are performed, our office will contact you with results only if changes need to made to the care plan discussed with you at the visit. [facility]    Chief Complaint     Chief Complaint   Patient presents with    Ear Fullness     Started 2 months ago  Bilateral ears blocked         History of Present Illness       78-year-old female presents the clinic with bilateral ear fullness and decreased hearing for the last 2 months.  Denies any recent illness or congestion.  Denies any ear pain or drainage from the ears.  Denies fever, chills, congestion, cough, chest pain, shortness of breath, dizziness/vertigo.    Ear Fullness   Pertinent negatives include no ear discharge or rhinorrhea.       Review of Systems   Review of Systems   Constitutional: Negative.    HENT:  Negative for congestion, ear discharge, ear pain, rhinorrhea, sinus pressure and sinus pain.         Bilateral ear fullness   Respiratory: Negative.     Cardiovascular: Negative.    Musculoskeletal: Negative.    Skin: Negative.    Neurological: Negative.          Current Medications       Current Outpatient Medications:     ACCU-CHEK KAILEY PLUS test  strip, , Disp: , Rfl: 3    acetaminophen (TYLENOL) 325 mg tablet, Take 2 tablets (650 mg total) by mouth every 6 (six) hours as needed (pain, fever) Taking as needed, Disp: , Rfl:     ARMOUR THYROID 15 MG tablet, 15 mg every evening Before bedtime, Disp: , Rfl: 5    ARMOUR THYROID 30 MG tablet, Take 30 mg by mouth 2 (two) times a day Take one in the morning, Disp: , Rfl: 6    atorvastatin (LIPITOR) 40 mg tablet, Take 1 tablet (40 mg total) by mouth daily, Disp: 90 tablet, Rfl: 3    Blood Glucose Monitoring Suppl (ACCU-CHEK KAILEY CONNECT) w/Device KIT, by Does not apply route, Disp: , Rfl:     Cholecalciferol 125 MCG (5000 UT) TABS, Take 1 tablet (5,000 Units total) by mouth in the morning, Disp: , Rfl:     clopidogrel (PLAVIX) 75 mg tablet, Take 1 tablet (75 mg total) by mouth daily, Disp: 90 tablet, Rfl: 3    doxazosin (CARDURA) 4 mg tablet, Take 2 tablets (8 mg total) by mouth daily, Disp: 90 tablet, Rfl: 3    fluticasone (FLONASE) 50 mcg/act nasal spray, 2 sprays into each nostril daily, Disp: 11.1 mL, Rfl: 0    irbesartan (AVAPRO) 300 mg tablet, Take 1 tablet (300 mg total) by mouth daily at bedtime, Disp: 90 tablet, Rfl: 3    Lancets Misc. (ACCU-CHEK MULTICLIX LANCET DEV) KIT, by Does not apply route 2 (two) times a day, Disp: , Rfl:     metFORMIN (GLUCOPHAGE) 500 mg tablet, Take 500 mg by mouth 3 (three) times a day 2 tablets in the morning and one in the afternoon, Disp: , Rfl:     metoprolol tartrate (LOPRESSOR) 25 mg tablet, Take 1 tablet (25 mg total) by mouth every 12 (twelve) hours, Disp: 180 tablet, Rfl: 3    nitroglycerin (NITROSTAT) 0.4 mg SL tablet, Place 0.4 mg under the tongue Taking as needed, Disp: , Rfl:     doxycycline monohydrate (MONODOX) 100 mg capsule, , Disp: , Rfl:     metroNIDAZOLE (METROGEL) 0.75 % gel, , Disp: , Rfl:     Current Allergies     Allergies as of 05/14/2024 - Reviewed 05/14/2024   Allergen Reaction Noted    Lisinopril Rash 02/07/2014    Amlodipine Other (See Comments)  11/27/2017    Jardiance [empagliflozin] Other (See Comments) 03/27/2024            The following portions of the patient's history were reviewed and updated as appropriate: allergies, current medications, past family history, past medical history, past social history, past surgical history and problem list.     Past Medical History:   Diagnosis Date    Abnormal stress test     Angina pectoris (HCC)     1 wk ago    Atherosclerosis of renal artery (HCC)     CAD (coronary artery disease)     Chronic kidney disease, stage 2 (mild)     Diabetes mellitus (HCC)     niddm    Edema     Endometriosis     History of palpitations     History of poliomyelitis     Hyperlipidemia     Hypertension     Hypertensive chronic kidney disease with stage 1 through stage 4 chronic kidney disease, or unspecified chronic kidney disease     Hypothyroid     Kidney stones     Myocardial infarction (HCC)     NSTEMI (non-ST elevated myocardial infarction) ([facility]) 01/05/2019    Obesity     Paroxysmal SVT (supraventricular tachycardia)     Renal insufficiency     Rosacea     Thyroid nodule     Type 2 diabetes mellitus (HCC)     Wears glasses        Past Surgical History:   Procedure Laterality Date    ACHILLES TENDON LENGTHENING Right     Achilles tendon stretch     CARDIAC CATHETERIZATION  2017    CARDIAC CATHETERIZATION  01/07/2019    Stent placed in RCA     CATARACT EXTRACTION Bilateral     COLONOSCOPY      FL INJECTION RIGHT SHOULDER (ARTHROGRAM)  11/1/2021    HYSTERECTOMY      Total - Endometriosis     ROTATOR CUFF REPAIR Right        Family History   Problem Relation Age of Onset    Asthma Mother     Stomach cancer Father     Breast cancer Sister 55    No Known Problems Daughter     No Known Problems Daughter     No Known Problems Daughter     No Known Problems Maternal Grandmother     No Known Problems Maternal Grandfather     No Known Problems Paternal Grandmother     Heart attack Paternal Grandfather     No Known Problems Maternal Aunt      No Known Problems Paternal Aunt     No Known Problems Paternal Aunt     No Known Problems Son     No Known Problems Son          Medications have been verified.        Objective   /62   Pulse 56   Temp 97.8 °F (36.6 °C)   Resp 16   SpO2 99%        Physical Exam     Physical Exam  Constitutional:       General: She is not in acute distress.     Appearance: Normal appearance. She is not ill-appearing or diaphoretic.   HENT:      Head: Normocephalic and atraumatic.      Right Ear: Ear canal and external ear normal. No drainage, swelling or tenderness. A middle ear effusion is present. There is no impacted cerumen. Tympanic membrane is not perforated or erythematous.      Left Ear: Ear canal and external ear normal. No drainage, swelling or tenderness. A middle ear effusion is present. There is no impacted cerumen. Tympanic membrane is not perforated or erythematous.      Mouth/Throat:      Mouth: Mucous membranes are moist.      Pharynx: Oropharynx is clear.   Cardiovascular:      Rate and Rhythm: Normal rate and regular rhythm.      Pulses: Normal pulses.   Pulmonary:      Effort: Pulmonary effort is normal. No respiratory distress.      Breath sounds: Normal breath sounds.   Skin:     General: Skin is warm and dry.      Capillary Refill: Capillary refill takes less than 2 seconds.      Findings: No erythema or rash.   Neurological:      Mental Status: She is alert and oriented to person, place, and time.   Psychiatric:         Mood and Affect: Mood normal.

## 2024-05-16 ENCOUNTER — OFFICE VISIT (OUTPATIENT)
Dept: PHYSICAL THERAPY | Facility: CLINIC | Age: 79
End: 2024-05-16
Payer: MEDICARE

## 2024-05-16 DIAGNOSIS — M17.11 PRIMARY OSTEOARTHRITIS OF RIGHT KNEE: ICD-10-CM

## 2024-05-16 DIAGNOSIS — M25.561 CHRONIC PAIN OF RIGHT KNEE: Primary | ICD-10-CM

## 2024-05-16 DIAGNOSIS — G89.29 CHRONIC PAIN OF RIGHT KNEE: Primary | ICD-10-CM

## 2024-05-16 PROCEDURE — 97530 THERAPEUTIC ACTIVITIES: CPT

## 2024-05-16 PROCEDURE — 97110 THERAPEUTIC EXERCISES: CPT

## 2024-05-16 PROCEDURE — 97112 NEUROMUSCULAR REEDUCATION: CPT

## 2024-05-16 NOTE — PROGRESS NOTES
"Daily Note     Today's date: 2024  Patient name: Angie Eastman  : 1945  MRN: [id number]  Referring provider: Chema Ruiz MD  Dx:   Encounter Diagnosis     ICD-10-CM    1. Chronic pain of right knee  M25.561     G89.29       2. Primary osteoarthritis of right knee  M17.11           Start Time: 0930  Stop Time: 1015  Total time in clinic (min): 45 minutes    Subjective: Pt notes better mobility.      Objective: See treatment diary below      Assessment: Tolerated treatment well. Patient exhibited good technique with therapeutic exercises      Plan: Continue per plan of care.      Precautions: Hx of polio with R LE involvement.   Date    FOTO  Ds 83  KX     Manuals        Passive hamstring stretch ds ds CD JF AW           Neuro Re-Ed        SLS- foam 4x 15\" 15\" 4x ea.  15\" 4x ea 4 x 15 \" 15\" 4x    Tandem Walk-foam 5 laps 5 laps f/b 5 laps Fwd/Bwd 5 laps 5 laps   Side to side 5 laps 5 laps 5 laps 5 laps 5 laps           Ther Ex        LAQ 3# 20x 3# 20x 3# B/L 20x ea 3# 20 x 3# 20x   SHC 3# 20x  3# 20x 3# B/L 20x ea 3#  20 x 3# 20x   NuStep for strengthening 5m L5 8m L5 Nustep L5 x 8 min 8m L5 L5 8 min   Standing hip abd/ ext 3# 20x ea 3# 20x ea.  3# B/L 20x ea 3# 20 x 3# 20 x   SAQ 3# 20x 5\" 3# 20x 3# B/L 20x ea 3#  20 x 3# 5\" 20x   Marching  3#  20x 3# 20  stand 3# B/L 20x alt 3#  20 x 3#  20 x   Bridge c add 10 x 5 \"  10x 5\"  20 x 5 \"  add  20 x 5 \"    Ther Activity        Squats 20x 20x  20x 20 x  20x   Step down 4\" 10x  4\" 10x 4\" step 10x ea 6\" 10x 4\" 10x   Step up 6\" 20 x  6\" 20x 6\" step 20x ea 6\" 20 x 6\" 20x    Gait Training                Modalities                                               "

## 2024-05-21 ENCOUNTER — OFFICE VISIT (OUTPATIENT)
Dept: PHYSICAL THERAPY | Facility: CLINIC | Age: 79
End: 2024-05-21
Payer: MEDICARE

## 2024-05-21 DIAGNOSIS — M17.11 PRIMARY OSTEOARTHRITIS OF RIGHT KNEE: Primary | ICD-10-CM

## 2024-05-21 DIAGNOSIS — M25.561 CHRONIC PAIN OF RIGHT KNEE: ICD-10-CM

## 2024-05-21 DIAGNOSIS — G89.29 CHRONIC PAIN OF RIGHT KNEE: ICD-10-CM

## 2024-05-21 PROCEDURE — 97112 NEUROMUSCULAR REEDUCATION: CPT

## 2024-05-21 PROCEDURE — 97530 THERAPEUTIC ACTIVITIES: CPT

## 2024-05-21 PROCEDURE — 97110 THERAPEUTIC EXERCISES: CPT

## 2024-05-21 NOTE — PROGRESS NOTES
"Daily Note     Today's date: 2024  Patient name: Angie Eastman  : 1945  MRN: [id number]  Referring provider: Chema Ruiz MD  Dx:   Encounter Diagnosis     ICD-10-CM    1. Primary osteoarthritis of right knee  M17.11       2. Chronic pain of right knee  M25.561     G89.29           Start Time: 1415  Stop Time: 1505  Total time in clinic (min): 50 minutes    Subjective:  My knee is feeling ok today.     Objective: See treatment diary below      Assessment: Tolerated treatment well. Patient would benefit from continued PT  pt completes all exercises today with no pain in her right knee., she reports having some difficulty with step downs today due to limited motion in her right ankle. We will keep working on her strength and balance. She feels improvement with her strength over all.       Plan: Continue per plan of care.      Precautions: Hx of polio with R LE involvement.   Date    FOTO  Ds 83  KX     Manuals        Passive hamstring stretch ds ds JF JF AW           Neuro Re-Ed        SLS- foam 4x 15\" 15\" 4x ea.  15\" 4x ea 4 x 15 \" 15\" 4x    Tandem Walk-foam 5 laps 5 laps f/b 5 laps Fwd/Bwd 5 laps 5 laps   Side to side 5 laps 5 laps 5 laps 5 laps 5 laps           Ther Ex        LAQ 3# 20x 3# 20x 3# B/L 20x ea 3# 20 x 3# 20x   SHC 3# 20x  3# 20x 3# B/L 20x ea 3#  20 x 3# 20x   NuStep for strengthening 5m L5 8m L5 Nustep L5 x 8 min 8m L5 L5 8 min   Standing hip abd/ ext 3# 20x ea 3# 20x ea.  3# B/L 20x ea 3# 20 x 3# 20 x   SAQ 3# 20x 5\" 3# 20x 3# B/L 20x ea 3#  20 x 3# 5\" 20x   Marching  3#  20x 3# 20  stand 3# B/L 20x alt 3#  20 x 3#  20 x   Bridge c add 10 x 5 \"  10x 5\" 10 x  5\"  20 x 5 \"  add  20 x 5 \"    Ther Activity        Squats 20x 20x  20x 20 x  20x   Step down 4\" 10x  4\" 10x 4\" step 10x ea 6\" 10x 4\" 10x   Step up 6\" 20 x  6\" 20x 6\" step 20x ea 6\" 20 x 6\" 20x    Gait Training                Modalities                                                 "

## 2024-05-23 ENCOUNTER — APPOINTMENT (OUTPATIENT)
Dept: PHYSICAL THERAPY | Facility: CLINIC | Age: 79
End: 2024-05-23
Payer: MEDICARE

## 2024-05-23 DIAGNOSIS — G89.29 CHRONIC PAIN OF RIGHT KNEE: ICD-10-CM

## 2024-05-23 DIAGNOSIS — M17.11 PRIMARY OSTEOARTHRITIS OF RIGHT KNEE: Primary | ICD-10-CM

## 2024-05-23 DIAGNOSIS — M25.561 CHRONIC PAIN OF RIGHT KNEE: ICD-10-CM

## 2024-05-30 ENCOUNTER — EVALUATION (OUTPATIENT)
Dept: PHYSICAL THERAPY | Facility: CLINIC | Age: 79
End: 2024-05-30
Payer: MEDICARE

## 2024-05-30 DIAGNOSIS — G89.29 CHRONIC PAIN OF RIGHT KNEE: ICD-10-CM

## 2024-05-30 DIAGNOSIS — M17.11 PRIMARY OSTEOARTHRITIS OF RIGHT KNEE: Primary | ICD-10-CM

## 2024-05-30 DIAGNOSIS — M25.561 CHRONIC PAIN OF RIGHT KNEE: ICD-10-CM

## 2024-05-30 PROCEDURE — 97530 THERAPEUTIC ACTIVITIES: CPT | Performed by: PHYSICAL THERAPIST

## 2024-05-30 PROCEDURE — 97112 NEUROMUSCULAR REEDUCATION: CPT | Performed by: PHYSICAL THERAPIST

## 2024-05-30 PROCEDURE — 97110 THERAPEUTIC EXERCISES: CPT | Performed by: PHYSICAL THERAPIST

## 2024-05-30 NOTE — PROGRESS NOTES
PT Re-Evaluation  and PT Discharge    Today's date: 2024  Patient name: Angie Eastman  : 1945  MRN: [id number]  Referring provider: Chema Ruiz MD  Dx:   Encounter Diagnosis     ICD-10-CM    1. Primary osteoarthritis of right knee  M17.11       2. Chronic pain of right knee  M25.561     G89.29           Start Time: 1015  Stop Time: 1100  Total time in clinic (min): 45 minutes    Assessment  Impairments: abnormal muscle tone and impaired physical strength  Symptom irritability: low    Assessment details: Patient has attended 24 visits since the initial evaluation. She is demonstrating improvements with R LE strength and mobility per the evaluation today. She continues to show positive improvements with therapy interventions and at this time is ready to discharge to a self guided HEP to continue to improve and maintain current progress. We spent time reviewing her interventions which she states verbal understanding to.   Understanding of Dx/Px/POC: good     Prognosis: good    Goals  STGs:  Patient will be independent with HEP in 1-2 visits - MET  Improve R knee strength to 4-/5 for improved tolerance with adls and transfers by 3-4 weeks. - MET  Improve R knee extension to 0 degrees for improved tolerance with adls and transfers by 3-4 weeks. - MET      LTGs:  Improve FOTO score from 55 To 64 Indicating improved tolerance with activities involving the LE by discharge. - MET  Improve R knee strength and rom to wnl for improved tolerance with adls, home duties, and recreation by discharge. - MET  Patient will be able to return to normal ambulation without deviation over all terrains without pain or limitation from the knee by discharge. - MET  Patient will be able to return to normal home duties without limitation from the knee by discharge. - MET      Plan    Treatment plan discussed with: patient  Plan details: D/C to HEP.         Subjective Evaluation    History of Present Illness  Mechanism of injury:  Patient presents to out patient physical therapy with chief c/o R knee pain. Patient reports onset of R knee pain approximately 3 weeks ago without incident. She notes that she she was walking after waking one day and she felt soreness in her knee that she never experienced before. She does disclose a history of polio that did effect her R LE when she was a child. She feels that since the injection in her knee she has noticed a significant improvement in knee pain but feels that it is only temporary.     Update 3/28/2024:  Patient reports that she is still having a hard time getting up from the floor and she needs to use her arms on a chair to get up from the floor. She feels that doing stairs and performing car transfers has gotten easier for her as her legs are getting stronger.     Update 4/25/24:  The patient states that she feels that her strength and endurance has slightly improved since she started coming to PT. She states that she has less difficulty going up and down stairs compared to before she started PT. She states that her pain has been significantly better since she had a cortisone injection a few months ago.     Update 5/30/2024:  Patient reports no pain to the knee entering therapy today nor over the past week. She feels that she is doing most of her daily activities without any issues or limitations that are not currently being addressed with her current exercises. She feels that steps have gotten easier and that her leg has gotten stronger to help with this improvement. She feels comfortable with continuing her exercises on her own at this time.             Not a recurrent problem   Quality of life: good    Patient Goals  Patient goals for therapy: decreased pain, increased strength, increased motion and independence with ADLs/IADLs  Patient goal: Patient wishes to prevent return of pain in her knee by improving her mobility and strength in her legs.  Pain  No pain reported  Current pain rating:  "0  At best pain ratin  At worst pain ratin  Location: R knee    Social Support  Steps to enter house: yes  2  Stairs in house: yes (to basement)   Lives in: one-story house  Lives with: alone    Employment status: not working  Treatments  Previous treatment: injection treatment      Objective     Active Range of Motion     Right Knee   Flexion: 143 degrees   Extension: -2 degrees     Passive Range of Motion     Right Knee   Extension: -2 degrees     Strength/Myotome Testing     Right Hip   Planes of Motion   Flexion: 4  Extension: 4  Abduction: 4  Adduction: 4  External rotation: 4  Internal rotation: 4    Right Knee   Flexion: 4  Extension: 4               Precautions: Hx of polio with R LE involvement.   Date  DC    FOTO  Ds 83  67 SC    Manuals        Passive hamstring stretch ds ds JF  AW           Neuro Re-Ed        SLS- foam 4x 15\" 15\" 4x ea.  15\" 4x ea 20\" 4x ea.  15\" 4x    Tandem Walk-foam 5 laps 5 laps f/b 5 laps Fwd/Bwd 5 laps 5 laps   Side to side 5 laps 5 laps 5 laps  5 laps           Ther Ex        LAQ 3# 20x 3# 20x 3# B/L 20x ea Machine 15\" 20x 3# 20x   SHC 3# 20x  3# 20x 3# B/L 20x ea  3# 20x   NuStep for strengthening 5m L5 8m L5 Nustep L5 x 8 min L5 5 min L5 8 min   Standing hip abd/ ext 3# 20x ea 3# 20x ea.  3# B/L 20x ea Machine 30# 15x ea.  3# 20 x   SAQ 3# 20x 5\" 3# 20x 3# B/L 20x ea  3# 5\" 20x   Marching  3#  20x 3# 20  stand 3# B/L 20x alt Machine 30# 15x ea.  3#  20 x   Bridge c add 10 x 5 \"  10x 5\" 10 x  5\"   20 x 5 \"    Ther Activity        Squats 20x 20x  20x  20x   Step down 4\" 10x  4\" 10x 4\" step 10x ea 4\" 15x ea.  4\" 10x   Step up 6\" 20 x  6\" 20x 6\" step 20x ea 6\" 15x ea.  6\" 20x    Gait Training                Modalities                                       "

## 2024-06-13 ENCOUNTER — OFFICE VISIT (OUTPATIENT)
Dept: DENTISTRY | Facility: CLINIC | Age: 79
End: 2024-06-13

## 2024-06-13 VITALS — TEMPERATURE: 98.6 F

## 2024-06-13 DIAGNOSIS — Z01.21 ENCOUNTER FOR DENTAL EXAMINATION AND CLEANING WITH ABNORMAL FINDINGS: Primary | ICD-10-CM

## 2024-06-13 PROCEDURE — D0150 COMPREHENSIVE ORAL EVALUATION - NEW OR ESTABLISHED PATIENT: HCPCS | Performed by: DENTIST

## 2024-06-13 PROCEDURE — D0603 CARIES RISK ASSESSMENT AND DOCUMENTATION, WITH A FINDING OF HIGH RISK: HCPCS | Performed by: DENTIST

## 2024-06-13 PROCEDURE — D0210 INTRAORAL - COMPLETE SERIES OF RADIOGRAPHIC IMAGES: HCPCS | Performed by: DENTIST

## 2024-06-13 PROCEDURE — D1330 ORAL HYGIENE INSTRUCTIONS: HCPCS | Performed by: DENTIST

## 2024-06-13 NOTE — DENTAL PROCEDURE DETAILS
Angie Eastman presents for a Comprehensive exam. Verbal consent for treatment given in addition to the forms.     Reviewed health history - Patient is ASA II  Consents signed: Yes     Perio: Generalized  Pain Scale: 0  Caries Assessment: High  Radiographs: Complete mouth series   Old fillings in fair status .No pain at all ,as pt stated, but needs a lot of flossing.  Fair oral hygiene  Oral Hygiene instruction reviewed and given.  Recommended Hygiene recall visits with the Angie.     Treatment Plan:  1.  RCT on # 11( pt. Informed but she wanted to wait due to the absence of any symtoms )    2.  Periodontal therapy: adult prophy  3.  Caries control: as charted  4.  Occlusal evaluation:   5.  Case Difficulty Type 1  Prognosis is Good.  Referrals needed: to endodontic ( Later if pt agrees)  Next Visit:  Prophylactics adult with localized SRP

## 2024-06-19 DIAGNOSIS — I47.10 PAROXYSMAL SVT (SUPRAVENTRICULAR TACHYCARDIA) (HCC): ICD-10-CM

## 2024-06-19 RX ORDER — METOPROLOL TARTRATE 25 MG/1
25 TABLET, FILM COATED ORAL EVERY 12 HOURS SCHEDULED
Qty: 180 TABLET | Refills: 3 | OUTPATIENT
Start: 2024-06-19

## 2024-06-19 NOTE — TELEPHONE ENCOUNTER
Reason for call:   [x] Refill   [] Prior Auth  [] Other:     Office:   [] PCP/Provider -   [x] Specialty/Provider - Card Susi Reddy     Medication: Metoprolol     Dose/Frequency: 25 mg Q 12 HRS    Quantity: 180    Pharmacy: [facility] [place] [address]    Does the patient have enough for 3 days?   [] Yes   [x] No - Send as HP to POD

## 2024-06-25 ENCOUNTER — OFFICE VISIT (OUTPATIENT)
Dept: DENTISTRY | Facility: CLINIC | Age: 79
End: 2024-06-25

## 2024-06-25 DIAGNOSIS — Z01.21 ENCOUNTER FOR DENTAL EXAMINATION AND CLEANING WITH ABNORMAL FINDINGS: Primary | ICD-10-CM

## 2024-06-25 PROBLEM — M79.89 LEFT ARM SWELLING: Status: ACTIVE | Noted: 2017-06-30

## 2024-06-25 PROBLEM — I25.2 H/O NON-ST ELEVATION MYOCARDIAL INFARCTION (NSTEMI): Status: ACTIVE | Noted: 2017-09-29

## 2024-06-25 PROBLEM — N39.46 MIXED INCONTINENCE URGE AND STRESS: Status: ACTIVE | Noted: 2017-05-09

## 2024-06-25 PROBLEM — Z92.82 RECEIVED TISSUE PLASMINOGEN ACTIVATOR (TPA) LESS THAN 24 HOURS PRIOR TO ARRIVAL: Status: ACTIVE | Noted: 2020-10-08

## 2024-06-25 PROBLEM — I25.119 CORONARY ARTERY DISEASE INVOLVING NATIVE CORONARY ARTERY OF NATIVE HEART WITH ANGINA PECTORIS (HCC): Status: ACTIVE | Noted: 2018-03-23

## 2024-06-25 PROBLEM — I25.10 CAD (CORONARY ARTERY DISEASE): Status: ACTIVE | Noted: 2017-10-12

## 2024-06-25 PROBLEM — E03.9 HYPOTHYROIDISM: Status: ACTIVE | Noted: 2019-01-05

## 2024-06-25 PROCEDURE — D1330 ORAL HYGIENE INSTRUCTIONS: HCPCS

## 2024-06-25 PROCEDURE — D1110 PROPHYLAXIS - ADULT: HCPCS

## 2024-06-25 RX ORDER — DOXYCYCLINE 50 MG/1
TABLET ORAL
COMMUNITY
Start: 2024-06-11

## 2024-06-25 RX ORDER — FLUCONAZOLE 150 MG/1
TABLET ORAL
COMMUNITY
Start: 2024-03-28

## 2024-06-25 NOTE — DENTAL PROCEDURE DETAILS
ADULT PROPHY , OHI   REVIEWED MED HX: meds, allergies, health changes reviewed in [facility]. All consents signed.  CHIEF CONCERN: no dental pain or concerns  PAIN SCALE:  0  ASA CLASS:  II  PLAQUE:  mild  CALCULUS:  moderate loc  BLEEDING:   light  STAIN :   light      PERIO: history of periodontal disease, stable    Hygiene Procedures:  Scaled, Polished, Flossed and Used Cavitron    Oral Hygiene Instruction: Brushing Minimum 2x daily for 2 minutes, daily flossing and Interproximal Brush    Dispensed: Toothbrush, Toothpaste, Floss, and Proxabrush    Visual and Tactile Intraoral/ Extraoral evaluation: Oral and Oropharyngeal cancer evaluation. No findings     No exam due:   Reviewed with patient clinical and radiographic findings and patient verbalized understanding. All questions and concerns addressed.     REFERRALS: none    CARIES FINDINGS:    #11 PAP, asymptomatic.  Dr Hastings recommend root canal treatment, patient wants to wait due to be asymptomatic. Informed patient to call, if #11 becomes symptomatic, explained signs and symptoms to watch out for.        TREATMENT  PLAN :   NV:  6mrc, re-evaluate #11    Next Recall: 6 month recall due Dec 2024    Last Ex and FMX : 6-13-24

## 2024-07-10 DIAGNOSIS — I10 ESSENTIAL HYPERTENSION: ICD-10-CM

## 2024-07-10 RX ORDER — DOXAZOSIN MESYLATE 4 MG/1
8 TABLET ORAL DAILY
Qty: 90 TABLET | Refills: 1 | Status: SHIPPED | OUTPATIENT
Start: 2024-07-10

## 2024-07-29 DIAGNOSIS — I21.4 NSTEMI (NON-ST ELEVATED MYOCARDIAL INFARCTION) (HCC): ICD-10-CM

## 2024-07-30 RX ORDER — CLOPIDOGREL BISULFATE 75 MG/1
75 TABLET ORAL DAILY
Qty: 30 TABLET | Refills: 0 | Status: SHIPPED | OUTPATIENT
Start: 2024-07-30

## 2024-07-31 DIAGNOSIS — I10 ESSENTIAL HYPERTENSION: ICD-10-CM

## 2024-08-01 RX ORDER — DOXAZOSIN MESYLATE 4 MG/1
8 TABLET ORAL DAILY
Qty: 90 TABLET | Refills: 3 | OUTPATIENT
Start: 2024-08-01

## 2024-08-07 ENCOUNTER — APPOINTMENT (OUTPATIENT)
Age: 79
End: 2024-08-07
Payer: MEDICARE

## 2024-08-07 ENCOUNTER — REMOTE DEVICE CLINIC VISIT (OUTPATIENT)
Dept: CARDIOLOGY CLINIC | Facility: CLINIC | Age: 79
End: 2024-08-07
Payer: MEDICARE

## 2024-08-07 DIAGNOSIS — I51.9 MYXEDEMA HEART DISEASE: ICD-10-CM

## 2024-08-07 DIAGNOSIS — E11.9 TYPE 2 DIABETES MELLITUS WITHOUT COMPLICATION, UNSPECIFIED WHETHER LONG TERM INSULIN USE (HCC): ICD-10-CM

## 2024-08-07 DIAGNOSIS — E03.9 MYXEDEMA HEART DISEASE: ICD-10-CM

## 2024-08-07 DIAGNOSIS — I63.9 CRYPTOGENIC STROKE (HCC): Primary | ICD-10-CM

## 2024-08-07 DIAGNOSIS — E78.5 HYPERLIPIDEMIA, UNSPECIFIED HYPERLIPIDEMIA TYPE: ICD-10-CM

## 2024-08-07 LAB
ALBUMIN SERPL BCG-MCNC: 3.9 G/DL (ref 3.5–5)
ALP SERPL-CCNC: 89 U/L (ref 34–104)
ALT SERPL W P-5'-P-CCNC: 15 U/L (ref 7–52)
ANION GAP SERPL CALCULATED.3IONS-SCNC: 11 MMOL/L (ref 4–13)
AST SERPL W P-5'-P-CCNC: 18 U/L (ref 13–39)
BASOPHILS # BLD AUTO: 0.04 THOUSANDS/ÂΜL (ref 0–0.1)
BASOPHILS NFR BLD AUTO: 1 % (ref 0–1)
BILIRUB SERPL-MCNC: 0.72 MG/DL (ref 0.2–1)
BUN SERPL-MCNC: 20 MG/DL (ref 5–25)
CALCIUM SERPL-MCNC: 9.2 MG/DL (ref 8.4–10.2)
CHLORIDE SERPL-SCNC: 105 MMOL/L (ref 96–108)
CHOLEST SERPL-MCNC: 122 MG/DL
CO2 SERPL-SCNC: 25 MMOL/L (ref 21–32)
CREAT SERPL-MCNC: 0.73 MG/DL (ref 0.6–1.3)
CREAT UR-MCNC: 68.9 MG/DL
EOSINOPHIL # BLD AUTO: 0.17 THOUSAND/ÂΜL (ref 0–0.61)
EOSINOPHIL NFR BLD AUTO: 3 % (ref 0–6)
ERYTHROCYTE [DISTWIDTH] IN BLOOD BY AUTOMATED COUNT: 12.1 % (ref 11.6–15.1)
EST. AVERAGE GLUCOSE BLD GHB EST-MCNC: 146 MG/DL
FERRITIN SERPL-MCNC: 35 NG/ML (ref 11–307)
GFR SERPL CREATININE-BSD FRML MDRD: 78 ML/MIN/1.73SQ M
GLUCOSE P FAST SERPL-MCNC: 116 MG/DL (ref 65–99)
HBA1C MFR BLD: 6.7 %
HCT VFR BLD AUTO: 40.7 % (ref 34.8–46.1)
HDLC SERPL-MCNC: 56 MG/DL
HGB BLD-MCNC: 13.3 G/DL (ref 11.5–15.4)
IMM GRANULOCYTES # BLD AUTO: 0.01 THOUSAND/UL (ref 0–0.2)
IMM GRANULOCYTES NFR BLD AUTO: 0 % (ref 0–2)
INSULIN SERPL-ACNC: 4.79 UIU/ML (ref 1.9–23)
IRON SATN MFR SERPL: 34 % (ref 15–50)
IRON SERPL-MCNC: 89 UG/DL (ref 50–212)
LDLC SERPL CALC-MCNC: 44 MG/DL (ref 0–100)
LYMPHOCYTES # BLD AUTO: 1.76 THOUSANDS/ÂΜL (ref 0.6–4.47)
LYMPHOCYTES NFR BLD AUTO: 26 % (ref 14–44)
MCH RBC QN AUTO: 30.6 PG (ref 26.8–34.3)
MCHC RBC AUTO-ENTMCNC: 32.7 G/DL (ref 31.4–37.4)
MCV RBC AUTO: 94 FL (ref 82–98)
MICROALBUMIN UR-MCNC: <7 MG/L
MONOCYTES # BLD AUTO: 0.38 THOUSAND/ÂΜL (ref 0.17–1.22)
MONOCYTES NFR BLD AUTO: 6 % (ref 4–12)
NEUTROPHILS # BLD AUTO: 4.55 THOUSANDS/ÂΜL (ref 1.85–7.62)
NEUTS SEG NFR BLD AUTO: 64 % (ref 43–75)
NONHDLC SERPL-MCNC: 66 MG/DL
NRBC BLD AUTO-RTO: 0 /100 WBCS
PLATELET # BLD AUTO: 178 THOUSANDS/UL (ref 149–390)
PMV BLD AUTO: 12 FL (ref 8.9–12.7)
POTASSIUM SERPL-SCNC: 4 MMOL/L (ref 3.5–5.3)
PROT SERPL-MCNC: 6.3 G/DL (ref 6.4–8.4)
RBC # BLD AUTO: 4.34 MILLION/UL (ref 3.81–5.12)
SODIUM SERPL-SCNC: 141 MMOL/L (ref 135–147)
T3FREE SERPL-MCNC: 3.67 PG/ML (ref 2.5–3.9)
T4 FREE SERPL-MCNC: 0.75 NG/DL (ref 0.61–1.12)
TIBC SERPL-MCNC: 258 UG/DL (ref 250–450)
TRIGL SERPL-MCNC: 110 MG/DL
TSH SERPL DL<=0.05 MIU/L-ACNC: 1.54 UIU/ML (ref 0.45–4.5)
UIBC SERPL-MCNC: 169 UG/DL (ref 155–355)
WBC # BLD AUTO: 6.91 THOUSAND/UL (ref 4.31–10.16)

## 2024-08-07 PROCEDURE — 83525 ASSAY OF INSULIN: CPT

## 2024-08-07 PROCEDURE — 83036 HEMOGLOBIN GLYCOSYLATED A1C: CPT

## 2024-08-07 PROCEDURE — 82570 ASSAY OF URINE CREATININE: CPT

## 2024-08-07 PROCEDURE — 84439 ASSAY OF FREE THYROXINE: CPT

## 2024-08-07 PROCEDURE — 84482 T3 REVERSE: CPT

## 2024-08-07 PROCEDURE — 84481 FREE ASSAY (FT-3): CPT

## 2024-08-07 PROCEDURE — 85025 COMPLETE CBC W/AUTO DIFF WBC: CPT

## 2024-08-07 PROCEDURE — 83550 IRON BINDING TEST: CPT

## 2024-08-07 PROCEDURE — 82728 ASSAY OF FERRITIN: CPT

## 2024-08-07 PROCEDURE — 80061 LIPID PANEL: CPT

## 2024-08-07 PROCEDURE — 82043 UR ALBUMIN QUANTITATIVE: CPT

## 2024-08-07 PROCEDURE — 86800 THYROGLOBULIN ANTIBODY: CPT

## 2024-08-07 PROCEDURE — 84432 ASSAY OF THYROGLOBULIN: CPT

## 2024-08-07 PROCEDURE — 93298 REM INTERROG DEV EVAL SCRMS: CPT | Performed by: INTERNAL MEDICINE

## 2024-08-07 PROCEDURE — 83540 ASSAY OF IRON: CPT

## 2024-08-07 PROCEDURE — 80053 COMPREHEN METABOLIC PANEL: CPT

## 2024-08-07 PROCEDURE — 84443 ASSAY THYROID STIM HORMONE: CPT

## 2024-08-07 PROCEDURE — 36415 COLL VENOUS BLD VENIPUNCTURE: CPT

## 2024-08-07 PROCEDURE — 86376 MICROSOMAL ANTIBODY EACH: CPT

## 2024-08-07 NOTE — PROGRESS NOTES
MDT LOOP   CARELINK TRANSMISSION: LOOP RECORDER. PRESENTING RHYTHM SB @ 56 BPM. BATTERY RRT SINCE 4/5/24.  TASK SENT TO CC FOR POSSIBLE EXPLANT.  3 PAUSE EPISODES W/ AVAILABLE EGRAM SHOWING 3 SEC PAUSE. NO ALERTS RECIEVED DO TO MONITOR BEING DISCONNECTED. NO PATIENT ACTIVATED EPISODES. NORMAL DEVICE FUNCTION. DL

## 2024-08-08 LAB
THYROGLOB AB SERPL-ACNC: <1 IU/ML (ref 0–0.9)
THYROGLOB SERPL-MCNC: 4.1 NG/ML (ref 1.5–38.5)
THYROPEROXIDASE AB SERPL-ACNC: 19 IU/ML (ref 0–34)

## 2024-08-11 LAB — T3REVERSE SERPL-MCNC: 18.9 NG/DL (ref 9.2–24.1)

## 2024-09-04 ENCOUNTER — APPOINTMENT (OUTPATIENT)
Age: 79
End: 2024-09-04
Payer: MEDICARE

## 2024-09-04 DIAGNOSIS — R80.9 ALBUMINURIA: ICD-10-CM

## 2024-09-04 LAB
BACTERIA UR QL AUTO: NORMAL /HPF
BILIRUB UR QL STRIP: NEGATIVE
CLARITY UR: CLEAR
COLOR UR: NORMAL
CREAT UR-MCNC: 78.9 MG/DL
GLUCOSE UR STRIP-MCNC: NEGATIVE MG/DL
HGB UR QL STRIP.AUTO: NEGATIVE
KETONES UR STRIP-MCNC: NEGATIVE MG/DL
LEUKOCYTE ESTERASE UR QL STRIP: NEGATIVE
MICROALBUMIN UR-MCNC: 8.3 MG/L
MICROALBUMIN/CREAT 24H UR: 11 MG/G CREATININE (ref 0–30)
NITRITE UR QL STRIP: NEGATIVE
NON-SQ EPI CELLS URNS QL MICRO: NORMAL /HPF
PH UR STRIP.AUTO: 6 [PH]
PROT UR STRIP-MCNC: NEGATIVE MG/DL
RBC #/AREA URNS AUTO: NORMAL /HPF
SP GR UR STRIP.AUTO: 1.01 (ref 1–1.03)
UROBILINOGEN UR STRIP-ACNC: <2 MG/DL
WBC #/AREA URNS AUTO: NORMAL /HPF

## 2024-09-04 PROCEDURE — 82570 ASSAY OF URINE CREATININE: CPT

## 2024-09-04 PROCEDURE — 82043 UR ALBUMIN QUANTITATIVE: CPT

## 2024-09-04 PROCEDURE — 81001 URINALYSIS AUTO W/SCOPE: CPT

## 2024-10-21 DIAGNOSIS — I21.4 NSTEMI (NON-ST ELEVATED MYOCARDIAL INFARCTION) (HCC): ICD-10-CM

## 2024-10-21 RX ORDER — CLOPIDOGREL BISULFATE 75 MG/1
75 TABLET ORAL DAILY
Qty: 90 TABLET | Refills: 3 | Status: SHIPPED | OUTPATIENT
Start: 2024-10-21

## 2024-10-21 NOTE — TELEPHONE ENCOUNTER
Patient Angie [phone] established patient of Dr. Archibald last seen on 10/24/2023.    Patient contacting office requesting 90 day   clopidogrel (PLAVIX) 75 mg tablet medication refill to be sent to [facility] in [place] on [address].    Patient has 6 days left on medication and is scheduled for a routine follow up appointment on 11/12/2024

## 2024-11-12 ENCOUNTER — OFFICE VISIT (OUTPATIENT)
Dept: CARDIOLOGY CLINIC | Facility: HOSPITAL | Age: 79
End: 2024-11-12
Payer: MEDICARE

## 2024-11-12 VITALS
BODY MASS INDEX: 31.21 KG/M2 | SYSTOLIC BLOOD PRESSURE: 116 MMHG | OXYGEN SATURATION: 98 % | DIASTOLIC BLOOD PRESSURE: 52 MMHG | HEIGHT: 64 IN | HEART RATE: 49 BPM | WEIGHT: 182.8 LBS

## 2024-11-12 DIAGNOSIS — I10 BENIGN ESSENTIAL HYPERTENSION: ICD-10-CM

## 2024-11-12 DIAGNOSIS — I25.119 CORONARY ARTERY DISEASE INVOLVING NATIVE CORONARY ARTERY OF NATIVE HEART WITH ANGINA PECTORIS (HCC): Primary | ICD-10-CM

## 2024-11-12 DIAGNOSIS — I65.23 CAROTID ARTERY STENOSIS WITHOUT CEREBRAL INFARCTION, BILATERAL: ICD-10-CM

## 2024-11-12 DIAGNOSIS — I47.10 PAROXYSMAL SVT (SUPRAVENTRICULAR TACHYCARDIA) (HCC): ICD-10-CM

## 2024-11-12 DIAGNOSIS — E78.00 HYPERCHOLESTEROLEMIA: ICD-10-CM

## 2024-11-12 PROCEDURE — 93000 ELECTROCARDIOGRAM COMPLETE: CPT | Performed by: INTERNAL MEDICINE

## 2024-11-12 PROCEDURE — 99214 OFFICE O/P EST MOD 30 MIN: CPT | Performed by: INTERNAL MEDICINE

## 2024-11-12 RX ORDER — LEVOTHYROXINE SODIUM 75 UG/1
75 TABLET ORAL DAILY
COMMUNITY

## 2024-11-12 NOTE — PROGRESS NOTES
Cardiology Follow Up    Angie Eastman  1945  [id number]  [facility] [address] [address]    1. Coronary artery disease involving native coronary artery of native heart with angina pectoris (HCC)  POCT ECG      2. Benign essential hypertension  POCT ECG      3. Paroxysmal SVT (supraventricular tachycardia) (HCC)  POCT ECG      4. Hypercholesterolemia  POCT ECG      5. Carotid artery stenosis without cerebral infarction, bilateral  VAS carotid complete study          Discussion/Summary:   Overall she is doing well with no symptoms.  Coronary disease stable no active angina.  Blood pressure well-controlled.  Lipids are doing excellent.  No further SVT since ablation.  Loop recorder will be removed at end-of-life.  She is due for a 1 year follow-up on moderate bilateral carotid stenosis.      Interval History:  79 -year-old female history of coronary disease, hypertension, renal artery stenosis, paroxysmal supraventricular tachycardia Routine follow-up visit.  In early January she was driving in the car and had an episode of tachycardia with chest tightness.  She was brought to an outside hospital and was sent to [facility] where they found a small troponin elevation.  Left heart catheterization was performed the stent was placed in the right coronary artery.      Overall she has been feeling well she remains quite physically active.  Denies any chest pain, shortness of breath, palpitations, lightheadedness, dizziness, or syncope.  There has been no lower extremity edema, PND, orthopnea.  She has been taking all medications as prescribed.  There has been no claudication.    Problem List       Chest pain    Tachycardia    Hypertension    Diabetes mellitus (HCC)    NSTEMI (non-ST elevated myocardial infarction) ([facility])    Coronary artery disease involving native coronary artery of  native heart with angina pectoris (HCC)    Paroxysmal SVT (supraventricular tachycardia) (HCC)          Past Medical History:   Diagnosis Date    Abnormal stress test     Angina pectoris (HCC)     1 wk ago    Atherosclerosis of renal artery (HCC)     CAD (coronary artery disease)     Chronic kidney disease, stage 2 (mild)     Diabetes mellitus (HCC)     niddm    Edema     Endometriosis     History of palpitations     History of poliomyelitis     Hyperlipidemia     Hypertension     Hypertensive chronic kidney disease with stage 1 through stage 4 chronic kidney disease, or unspecified chronic kidney disease     Hypothyroid     Kidney stones     Myocardial infarction (HCC)     NSTEMI (non-ST elevated myocardial infarction) ([facility]) 01/05/2019    Obesity     Paroxysmal SVT (supraventricular tachycardia) (HCC)     Renal insufficiency     Rosacea     Thyroid nodule     Type 2 diabetes mellitus (HCC)     Wears glasses      Social History     Socioeconomic History    Marital status:      Spouse name: Not on file    Number of children: Not on file    Years of education: Not on file    Highest education level: Not on file   Occupational History    Occupation: /    Tobacco Use    Smoking status: Never    Smokeless tobacco: Never   Vaping Use    Vaping status: Never Used   Substance and Sexual Activity    Alcohol use: No    Drug use: No    Sexual activity: Not on file   Other Topics Concern    Not on file   Social History Narrative    Consumes on average 2 cups of decaf coffee per day      Social Determinants of Health     Financial Resource Strain: Not on file   Food Insecurity: Not on file   Transportation Needs: Not on file   Physical Activity: Not on file   Stress: Not on file   Social Connections: Not on file   Intimate Partner Violence: Not on file   Housing Stability: Not on file      Family History   Problem Relation Age of Onset    Asthma Mother     Stomach cancer Father     Breast cancer Sister 55     No Known Problems Daughter     No Known Problems Daughter     No Known Problems Daughter     No Known Problems Maternal Grandmother     No Known Problems Maternal Grandfather     No Known Problems Paternal Grandmother     Heart attack Paternal Grandfather     No Known Problems Maternal Aunt     No Known Problems Paternal Aunt     No Known Problems Paternal Aunt     No Known Problems Son     No Known Problems Son      Past Surgical History:   Procedure Laterality Date    ACHILLES TENDON LENGTHENING Right     Achilles tendon stretch     CARDIAC CATHETERIZATION  2017    CARDIAC CATHETERIZATION  01/07/2019    Stent placed in RCA     CATARACT EXTRACTION Bilateral     COLONOSCOPY      FL INJECTION RIGHT SHOULDER (ARTHROGRAM)  11/1/2021    HYSTERECTOMY      Total - Endometriosis     ROTATOR CUFF REPAIR Right        Current Outpatient Medications:     ACCU-CHEK KAILEY PLUS test strip, , Disp: , Rfl: 3    acetaminophen (TYLENOL) 325 mg tablet, Take 2 tablets (650 mg total) by mouth every 6 (six) hours as needed (pain, fever) Taking as needed, Disp: , Rfl:     atorvastatin (LIPITOR) 40 mg tablet, Take 1 tablet (40 mg total) by mouth daily, Disp: 90 tablet, Rfl: 1    Blood Glucose Monitoring Suppl (ACCU-CHEK KAILEY CONNECT) w/Device KIT, by Does not apply route, Disp: , Rfl:     Cholecalciferol 125 MCG (5000 UT) TABS, Take 1 tablet (5,000 Units total) by mouth in the morning, Disp: , Rfl:     clopidogrel (PLAVIX) 75 mg tablet, Take 1 tablet (75 mg total) by mouth daily, Disp: 90 tablet, Rfl: 3    doxazosin (CARDURA) 4 mg tablet, Take 2 tablets (8 mg total) by mouth daily, Disp: 90 tablet, Rfl: 1    doxycycline (ADOXA) 50 MG tablet, , Disp: , Rfl:     irbesartan (AVAPRO) 300 mg tablet, Take 1 tablet (300 mg total) by mouth daily at bedtime, Disp: 100 tablet, Rfl: 1    Lancets Misc. (ACCU-CHEK MULTICLIX LANCET DEV) KIT, by Does not apply route 2 (two) times a day, Disp: , Rfl:     levothyroxine 75 mcg tablet, Take 75 mcg by mouth  daily Pt takes 50mcg in the am and 25mcg at bedtime, Disp: , Rfl:     metFORMIN (GLUCOPHAGE) 500 mg tablet, Take 500 mg by mouth 3 (three) times a day 2 tablets in the morning and one in the afternoon, Disp: , Rfl:     metoprolol tartrate (LOPRESSOR) 25 mg tablet, Take 1 tablet (25 mg total) by mouth every 12 (twelve) hours, Disp: 180 tablet, Rfl: 3    nitroglycerin (NITROSTAT) 0.4 mg SL tablet, Place 0.4 mg under the tongue Taking as needed, Disp: , Rfl:     ARMOUR THYROID 15 MG tablet, 15 mg every evening Before bedtime (Patient not taking: Reported on 11/12/2024), Disp: , Rfl: 5    ARMOUR THYROID 30 MG tablet, Take 30 mg by mouth 2 (two) times a day Take one in the morning (Patient not taking: Reported on 11/12/2024), Disp: , Rfl: 6    doxycycline monohydrate (MONODOX) 100 mg capsule, , Disp: , Rfl:     fluconazole (DIFLUCAN) 150 mg tablet, , Disp: , Rfl:     fluticasone (FLONASE) 50 mcg/act nasal spray, 2 sprays into each nostril daily (Patient not taking: Reported on 11/12/2024), Disp: 11.1 mL, Rfl: 0    metroNIDAZOLE (METROGEL) 0.75 % gel, , Disp: , Rfl:   Allergies   Allergen Reactions    Lisinopril Rash     Changed pigment of my skin     Amlodipine Other (See Comments)     Patient does not remember     Jardiance [Empagliflozin] Other (See Comments)     Yeast infections       Labs:     Chemistry        Component Value Date/Time     12/08/2015 1014    K 4.0 08/07/2024 0713    K 4.9 12/02/2020 0829     08/07/2024 0713     (H) 12/02/2020 0829    CO2 25 08/07/2024 0713    CO2 27 12/02/2020 0829    BUN 20 08/07/2024 0713    BUN 23 12/02/2020 0829    CREATININE 0.73 08/07/2024 0713    CREATININE 0.73 12/02/2020 0829        Component Value Date/Time    CALCIUM 9.2 08/07/2024 0713    CALCIUM 9.3 12/02/2020 0829    ALKPHOS 89 08/07/2024 0713    ALKPHOS 107 12/02/2020 0829    AST 18 08/07/2024 0713    AST 18 12/02/2020 0829    ALT 15 08/07/2024 0713    ALT 28 12/02/2020 0829    BILITOT 0.48 12/08/2015  "1014            Lab Results   Component Value Date    CHOL 267 12/08/2015    CHOL 270 06/10/2015    CHOL 243 03/18/2015     Lab Results   Component Value Date    HDL 56 08/07/2024    HDL 50 02/07/2024    HDL 60 10/19/2022     Lab Results   Component Value Date    LDLCALC 44 08/07/2024    LDLCALC 51 02/07/2024    LDLCALC 48 10/19/2022     Lab Results   Component Value Date    TRIG 110 08/07/2024    TRIG 204 (H) 02/07/2024    TRIG 132 10/19/2022     No results found for: \"CHOLHDL\"    Imaging: No results found.    ECG:  Sinus bradycardia PACs      Review of Systems   Constitutional: Negative.   HENT: Negative.     Eyes: Negative.    Cardiovascular: Negative.    Respiratory: Negative.     Endocrine: Negative.    Hematologic/Lymphatic: Negative.    Skin: Negative.    Musculoskeletal: Negative.    Gastrointestinal: Negative.    Genitourinary: Negative.    Neurological: Negative.    Psychiatric/Behavioral: Negative.         Vitals:    11/12/24 0938   BP: 116/52   Pulse: (!) 49   SpO2: 98%     Vitals:    11/12/24 0938   Weight: 82.9 kg (182 lb 12.8 oz)     Height: 5' 3.5\" (161.3 cm)   Body mass index is 31.87 kg/m².    Physical Exam:  Vital signs reviewed  General:  Alert and cooperative, appears stated age, no acute distress  HEENT:  PERRLA, EOMI, no scleral icterus, no conjunctival pallor  Neck:  No lymphadenopathy, no thyromegaly, no carotid bruits, no elevated JVP  Heart:  Regular rate and rhythm, normal S1/S2, no S3/S4, no murmur, rubs or gallops.  PMI nondisplaced  Lungs:  Clear to auscultation bilaterally, no wheezes rales or rhonchi  Abdomen:  Soft, non-tender, positive bowel sounds, no rebound or guarding,   no organomegaly   Extremities:  Normal range of motion.  No clubbing, cyanosis or edema   Vascular:  2+ pedal pulses  Skin:  No rashes or lesions on exposed skin  Neurologic:  Cranial nerves II-XII grossly intact without focal deficits  Psych:  Normal mood and affect                "

## 2024-11-20 DIAGNOSIS — I10 ESSENTIAL HYPERTENSION: ICD-10-CM

## 2024-11-20 RX ORDER — DOXAZOSIN 4 MG/1
8 TABLET ORAL DAILY
Qty: 90 TABLET | Refills: 1 | Status: SHIPPED | OUTPATIENT
Start: 2024-11-20

## 2024-11-20 NOTE — TELEPHONE ENCOUNTER
Medication Refill      doxazosin (CARDURA) 4 mg tablet     90 day supply    [facility] [address] in [place]

## 2024-12-04 DIAGNOSIS — I47.10 PAROXYSMAL SVT (SUPRAVENTRICULAR TACHYCARDIA) (HCC): ICD-10-CM

## 2024-12-04 DIAGNOSIS — I21.4 NSTEMI (NON-ST ELEVATED MYOCARDIAL INFARCTION) (HCC): ICD-10-CM

## 2024-12-04 DIAGNOSIS — I10 PRIMARY HYPERTENSION: ICD-10-CM

## 2024-12-04 NOTE — TELEPHONE ENCOUNTER
Not a duplicate    Patient is switching to [facility] pharmacy and wants a new script sent so that it'll be there when she needs her next refill     Reason for call:   [] Refill   [] Prior Auth  [x] Other:     Office:   [x] Specialty/Provider - neph / Varvarelis    Medication: irbesartan    Dose/Frequency: 300mg qhs    Quantity: 90    Pharmacy: [facility]/pharmacy #[id number] - [place], [place] - [address]     Does the patient have enough for 3 days?   [x] Yes   [] No - Send as HP to POD

## 2024-12-04 NOTE — TELEPHONE ENCOUNTER
Not a duplicate    Patient is switching to [facility] pharmacy and wants new scripts sent so that they'll be there when she needs her next refill    Reason for call:   [] Refill   [] Prior Auth  [x] Other: pharmacy change    Office:   [x] Specialty/Provider - card / Cristela    Medication:   Atorvastatin 40mg qd  #90  Metoprolol tartrate 25mg q12  #180    Pharmacy: [facility]/pharmacy #[id number] - [place] - [address]     Does the patient have enough for 3 days?   [x] Yes   [] No - Send as HP to POD

## 2024-12-05 RX ORDER — IRBESARTAN 300 MG/1
300 TABLET ORAL
Qty: 90 TABLET | Refills: 0 | Status: SHIPPED | OUTPATIENT
Start: 2024-12-05

## 2024-12-05 RX ORDER — METOPROLOL TARTRATE 25 MG/1
25 TABLET, FILM COATED ORAL EVERY 12 HOURS SCHEDULED
Qty: 180 TABLET | Refills: 1 | Status: SHIPPED | OUTPATIENT
Start: 2024-12-05

## 2024-12-05 RX ORDER — ATORVASTATIN CALCIUM 40 MG/1
40 TABLET, FILM COATED ORAL DAILY
Qty: 90 TABLET | Refills: 1 | Status: SHIPPED | OUTPATIENT
Start: 2024-12-05 | End: 2025-06-03

## 2024-12-18 ENCOUNTER — HOSPITAL ENCOUNTER (OUTPATIENT)
Dept: NON INVASIVE DIAGNOSTICS | Facility: HOSPITAL | Age: 79
Discharge: HOME | End: 2024-12-18
Attending: INTERNAL MEDICINE
Payer: MEDICARE

## 2024-12-18 DIAGNOSIS — I65.23 CAROTID ARTERY STENOSIS WITHOUT CEREBRAL INFARCTION, BILATERAL: ICD-10-CM

## 2024-12-18 PROCEDURE — 93880 EXTRACRANIAL BILAT STUDY: CPT

## 2024-12-18 PROCEDURE — 93880 EXTRACRANIAL BILAT STUDY: CPT | Performed by: SURGERY

## 2025-01-09 ENCOUNTER — TELEPHONE (OUTPATIENT)
Dept: NEPHROLOGY | Facility: CLINIC | Age: 80
End: 2025-01-09

## 2025-01-09 DIAGNOSIS — E11.9 TYPE 2 DIABETES MELLITUS WITHOUT COMPLICATION, WITHOUT LONG-TERM CURRENT USE OF INSULIN (HCC): Primary | ICD-10-CM

## 2025-01-09 DIAGNOSIS — I10 ESSENTIAL HYPERTENSION: ICD-10-CM

## 2025-01-09 DIAGNOSIS — I10 PRIMARY HYPERTENSION: ICD-10-CM

## 2025-01-09 RX ORDER — BLOOD SUGAR DIAGNOSTIC
1 STRIP MISCELLANEOUS DAILY
Qty: 100 STRIP | Refills: 3 | Status: SHIPPED | OUTPATIENT
Start: 2025-01-09

## 2025-01-09 NOTE — TELEPHONE ENCOUNTER
Reason for call:   [x] Refill   [] Prior Auth  [] Other:     Office:   [] PCP/Provider -   [x] Specialty/Provider -  Eren Sen,      Medication: irbesartan 300 mg / 1 tab daily / 90 tabs                      doxazosin 4 mg / 2 tabs daily / 90 tabs        Pharmacy: [facility]/pharmacy #[id number] - [place] - [address]    Does the patient have enough for 3 days?   [x] Yes   [] No - Send as HP to POD

## 2025-01-09 NOTE — TELEPHONE ENCOUNTER
Patient called the RX Refill Line. Message is being forwarded to the office.     Patient is requesting a new rx for True matrix test strips. Pt states she has a new monitor and she is almost out of strips. That specific brand is not active on Pt's med list. Please review thank you.      [id number] [place] - [address]

## 2025-01-10 RX ORDER — DOXAZOSIN 4 MG/1
8 TABLET ORAL DAILY
Qty: 90 TABLET | Refills: 0 | Status: SHIPPED | OUTPATIENT
Start: 2025-01-10

## 2025-01-10 RX ORDER — IRBESARTAN 300 MG/1
300 TABLET ORAL
Qty: 90 TABLET | Refills: 0 | Status: SHIPPED | OUTPATIENT
Start: 2025-01-10

## 2025-03-01 ENCOUNTER — APPOINTMENT (OUTPATIENT)
Dept: LAB | Facility: CLINIC | Age: 80
End: 2025-03-01
Payer: MEDICARE

## 2025-03-01 DIAGNOSIS — N20.0 NEPHROLITHIASIS: ICD-10-CM

## 2025-03-01 DIAGNOSIS — E55.9 VITAMIN D INSUFFICIENCY: ICD-10-CM

## 2025-03-01 DIAGNOSIS — N18.2 CKD (CHRONIC KIDNEY DISEASE) STAGE 2, GFR 60-89 ML/MIN: ICD-10-CM

## 2025-03-01 LAB
25(OH)D3 SERPL-MCNC: 67.9 NG/ML (ref 30–100)
ALBUMIN SERPL BCG-MCNC: 4.1 G/DL (ref 3.5–5)
ALP SERPL-CCNC: 95 U/L (ref 34–104)
ALT SERPL W P-5'-P-CCNC: 14 U/L (ref 7–52)
ANION GAP SERPL CALCULATED.3IONS-SCNC: 10 MMOL/L (ref 4–13)
AST SERPL W P-5'-P-CCNC: 18 U/L (ref 13–39)
BACTERIA UR QL AUTO: NORMAL /HPF
BASOPHILS # BLD AUTO: 0.04 THOUSANDS/ÂΜL (ref 0–0.1)
BASOPHILS NFR BLD AUTO: 1 % (ref 0–1)
BILIRUB SERPL-MCNC: 0.48 MG/DL (ref 0.2–1)
BILIRUB UR QL STRIP: NEGATIVE
BUN SERPL-MCNC: 19 MG/DL (ref 5–25)
CALCIUM SERPL-MCNC: 9.4 MG/DL (ref 8.4–10.2)
CHLORIDE SERPL-SCNC: 105 MMOL/L (ref 96–108)
CLARITY UR: CLEAR
CO2 SERPL-SCNC: 27 MMOL/L (ref 21–32)
COLOR UR: COLORLESS
CREAT SERPL-MCNC: 0.87 MG/DL (ref 0.6–1.3)
CREAT UR-MCNC: 54.7 MG/DL
EOSINOPHIL # BLD AUTO: 0.16 THOUSAND/ÂΜL (ref 0–0.61)
EOSINOPHIL NFR BLD AUTO: 2 % (ref 0–6)
ERYTHROCYTE [DISTWIDTH] IN BLOOD BY AUTOMATED COUNT: 12.6 % (ref 11.6–15.1)
GFR SERPL CREATININE-BSD FRML MDRD: 63 ML/MIN/1.73SQ M
GLUCOSE P FAST SERPL-MCNC: 137 MG/DL (ref 65–99)
GLUCOSE UR STRIP-MCNC: NEGATIVE MG/DL
HCT VFR BLD AUTO: 41.8 % (ref 34.8–46.1)
HGB BLD-MCNC: 13.4 G/DL (ref 11.5–15.4)
HGB UR QL STRIP.AUTO: NEGATIVE
IMM GRANULOCYTES # BLD AUTO: 0.02 THOUSAND/UL (ref 0–0.2)
IMM GRANULOCYTES NFR BLD AUTO: 0 % (ref 0–2)
KETONES UR STRIP-MCNC: NEGATIVE MG/DL
LEUKOCYTE ESTERASE UR QL STRIP: NEGATIVE
LYMPHOCYTES # BLD AUTO: 1.37 THOUSANDS/ÂΜL (ref 0.6–4.47)
LYMPHOCYTES NFR BLD AUTO: 21 % (ref 14–44)
MCH RBC QN AUTO: 30.8 PG (ref 26.8–34.3)
MCHC RBC AUTO-ENTMCNC: 32.1 G/DL (ref 31.4–37.4)
MCV RBC AUTO: 96 FL (ref 82–98)
MICROALBUMIN UR-MCNC: <7 MG/L
MONOCYTES # BLD AUTO: 0.38 THOUSAND/ÂΜL (ref 0.17–1.22)
MONOCYTES NFR BLD AUTO: 6 % (ref 4–12)
NEUTROPHILS # BLD AUTO: 4.71 THOUSANDS/ÂΜL (ref 1.85–7.62)
NEUTS SEG NFR BLD AUTO: 70 % (ref 43–75)
NITRITE UR QL STRIP: NEGATIVE
NON-SQ EPI CELLS URNS QL MICRO: NORMAL /HPF
NRBC BLD AUTO-RTO: 0 /100 WBCS
PH UR STRIP.AUTO: 6.5 [PH]
PLATELET # BLD AUTO: 165 THOUSANDS/UL (ref 149–390)
PMV BLD AUTO: 11.8 FL (ref 8.9–12.7)
POTASSIUM SERPL-SCNC: 4.1 MMOL/L (ref 3.5–5.3)
PROT SERPL-MCNC: 6.8 G/DL (ref 6.4–8.4)
PROT UR STRIP-MCNC: NEGATIVE MG/DL
PTH-INTACT SERPL-MCNC: 48.6 PG/ML (ref 12–88)
RBC # BLD AUTO: 4.35 MILLION/UL (ref 3.81–5.12)
RBC #/AREA URNS AUTO: NORMAL /HPF
SODIUM SERPL-SCNC: 142 MMOL/L (ref 135–147)
SP GR UR STRIP.AUTO: 1.01 (ref 1–1.03)
UROBILINOGEN UR STRIP-ACNC: <2 MG/DL
WBC # BLD AUTO: 6.68 THOUSAND/UL (ref 4.31–10.16)
WBC #/AREA URNS AUTO: NORMAL /HPF

## 2025-03-01 PROCEDURE — 82570 ASSAY OF URINE CREATININE: CPT

## 2025-03-01 PROCEDURE — 82306 VITAMIN D 25 HYDROXY: CPT

## 2025-03-01 PROCEDURE — 36415 COLL VENOUS BLD VENIPUNCTURE: CPT

## 2025-03-01 PROCEDURE — 81001 URINALYSIS AUTO W/SCOPE: CPT

## 2025-03-01 PROCEDURE — 83970 ASSAY OF PARATHORMONE: CPT

## 2025-03-01 PROCEDURE — 85025 COMPLETE CBC W/AUTO DIFF WBC: CPT

## 2025-03-01 PROCEDURE — 82043 UR ALBUMIN QUANTITATIVE: CPT

## 2025-03-01 PROCEDURE — 80053 COMPREHEN METABOLIC PANEL: CPT

## 2025-03-14 DIAGNOSIS — I10 ESSENTIAL HYPERTENSION: ICD-10-CM

## 2025-03-14 DIAGNOSIS — I21.4 NSTEMI (NON-ST ELEVATED MYOCARDIAL INFARCTION) (HCC): ICD-10-CM

## 2025-03-14 DIAGNOSIS — I47.10 PAROXYSMAL SVT (SUPRAVENTRICULAR TACHYCARDIA) (HCC): ICD-10-CM

## 2025-03-14 RX ORDER — ATORVASTATIN CALCIUM 40 MG/1
40 TABLET, FILM COATED ORAL DAILY
Qty: 90 TABLET | Refills: 1 | Status: SHIPPED | OUTPATIENT
Start: 2025-03-14 | End: 2025-09-10

## 2025-03-14 RX ORDER — METOPROLOL TARTRATE 25 MG/1
25 TABLET, FILM COATED ORAL EVERY 12 HOURS SCHEDULED
Qty: 180 TABLET | Refills: 1 | Status: SHIPPED | OUTPATIENT
Start: 2025-03-14

## 2025-03-14 RX ORDER — DOXAZOSIN 8 MG/1
8 TABLET ORAL DAILY
Qty: 90 TABLET | Refills: 3 | Status: SHIPPED | OUTPATIENT
Start: 2025-03-14

## 2025-03-14 NOTE — TELEPHONE ENCOUNTER
Reason for call:   [x] Refill   [] Prior Auth  [] Other:     Office:   [] PCP/Provider -   [x] Specialty/Provider - Cardio    Medication: Atorvastatin    Dose/Frequency: 40 mg    Quantity: 90 tablets    Pharmacy: [facility]/pharmacy #[id number] - [place] - [address] [phone]     Local Pharmacy   Does the patient have enough for 3 days?   [x] Yes   [] No - Send as HP to POD    Mail Away Pharmacy   Does the patient have enough for 10 days?   [] Yes   [] No - Send as HP to POD

## 2025-03-19 NOTE — TELEPHONE ENCOUNTER
Patient called requesting refill for atorvastatin 40 mg. Patient made aware medication was refilled on 3/14/25 for 90 with 1 refills to [facility] pharmacy. Patient instructed to contact the pharmacy to obtain refills of medication. Patient verbalized understanding.

## 2025-03-20 ENCOUNTER — OFFICE VISIT (OUTPATIENT)
Dept: NEPHROLOGY | Facility: CLINIC | Age: 80
End: 2025-03-20
Payer: MEDICARE

## 2025-03-20 VITALS
DIASTOLIC BLOOD PRESSURE: 70 MMHG | WEIGHT: 177 LBS | HEIGHT: 64 IN | TEMPERATURE: 97.9 F | OXYGEN SATURATION: 97 % | SYSTOLIC BLOOD PRESSURE: 118 MMHG | BODY MASS INDEX: 30.22 KG/M2 | HEART RATE: 58 BPM

## 2025-03-20 DIAGNOSIS — N20.0 NEPHROLITHIASIS: ICD-10-CM

## 2025-03-20 DIAGNOSIS — N18.2 CKD (CHRONIC KIDNEY DISEASE) STAGE 2, GFR 60-89 ML/MIN: ICD-10-CM

## 2025-03-20 DIAGNOSIS — I70.1 RAS (RENAL ARTERY STENOSIS) (HCC): ICD-10-CM

## 2025-03-20 DIAGNOSIS — R60.0 BILATERAL LOWER EXTREMITY EDEMA: ICD-10-CM

## 2025-03-20 DIAGNOSIS — E11.22 TYPE 2 DIABETES MELLITUS WITH CHRONIC KIDNEY DISEASE, WITHOUT LONG-TERM CURRENT USE OF INSULIN, UNSPECIFIED CKD STAGE (HCC): ICD-10-CM

## 2025-03-20 DIAGNOSIS — I10 PRIMARY HYPERTENSION: Primary | ICD-10-CM

## 2025-03-20 LAB — SL AMB POCT HEMOGLOBIN AIC: 6.4 (ref ?–6.5)

## 2025-03-20 PROCEDURE — 83036 HEMOGLOBIN GLYCOSYLATED A1C: CPT | Performed by: INTERNAL MEDICINE

## 2025-03-20 PROCEDURE — G2211 COMPLEX E/M VISIT ADD ON: HCPCS | Performed by: INTERNAL MEDICINE

## 2025-03-20 PROCEDURE — 99214 OFFICE O/P EST MOD 30 MIN: CPT | Performed by: INTERNAL MEDICINE

## 2025-03-20 NOTE — PROGRESS NOTES
[facility]  Eren Sen DO    Name: Angie Eastman  YOB: 1945      Assessment/Plan:    CKD (chronic kidney disease) stage 2, GFR 60-89 ml/min  Lab Results   Component Value Date    EGFR 63 03/01/2025    EGFR 78 08/07/2024    EGFR 82 03/04/2024    CREATININE 0.87 03/01/2025    CREATININE 0.73 08/07/2024    CREATININE 0.70 03/04/2024     Patient's kidney function is little lower than usual, but certainly within the range from the creatinine standpoint, usually between 0.7-0.8 mg/dL.  Continue to monitor for now, optimize care in general and avoid nephrotoxins.    Nephrolithiasis  Patient does not drink enough fluids, she will try to improve this going forward.  Thankfully, she has not had issues with kidney stones since her last visit.    Type 2 diabetes mellitus with chronic kidney disease, without long-term current use of insulin, unspecified CKD stage ([facility])  A1c evaluated during this office visit, came back within goal at less than 6.5%.  Continue to encourage low carbohydrate diet.    Lab Results   Component Value Date    HGBA1C 6.4 03/20/2025       Bilateral lower extremity edema  Patient has mild bilateral lower extremity edema, recommended to continue to focus on low sodium diet which will help with the swelling as well as help reduce chances for future kidney stones.    Hypertension  Blood pressures are stable and appropriate at this time, we will continue current dosage of medications including metoprolol 25 mg twice daily and irbesartan 300 mg once daily.         Problem List Items Addressed This Visit          Cardiovascular and Mediastinum    Hypertension - Primary    Blood pressures are stable and appropriate at this time, we will continue current dosage of medications including metoprolol 25 mg twice daily and irbesartan 300 mg once daily.         VICKEY (renal artery stenosis) (HCC)       Endocrine    Type 2 diabetes mellitus with chronic kidney  disease, without long-term current use of insulin, unspecified CKD stage (HCC)    A1c evaluated during this office visit, came back within goal at less than 6.5%.  Continue to encourage low carbohydrate diet.    Lab Results   Component Value Date    HGBA1C 6.4 03/20/2025            Relevant Orders    POCT hemoglobin A1c (Completed)       Genitourinary    CKD (chronic kidney disease) stage 2, GFR 60-89 ml/min    Lab Results   Component Value Date    EGFR 63 03/01/2025    EGFR 78 08/07/2024    EGFR 82 03/04/2024    CREATININE 0.87 03/01/2025    CREATININE 0.73 08/07/2024    CREATININE 0.70 03/04/2024     Patient's kidney function is little lower than usual, but certainly within the range from the creatinine standpoint, usually between 0.7-0.8 mg/dL.  Continue to monitor for now, optimize care in general and avoid nephrotoxins.         Nephrolithiasis    Patient does not drink enough fluids, she will try to improve this going forward.  Thankfully, she has not had issues with kidney stones since her last visit.            Surgery/Wound/Pain    Bilateral lower extremity edema    Patient has mild bilateral lower extremity edema, recommended to continue to focus on low sodium diet which will help with the swelling as well as help reduce chances for future kidney stones.            Patient is stable from renal standpoint, with see her back for regular visit in approximately 1 year.    Subjective:      Patient ID: Angie Eastman is a 79 y.o. female.    Patient presents for follow up.    We reviewed labs in detail, most recent creatinine noted to be 0.87 mg/dL, estimated GFR 63 mL/min.    There were no significant electrolyte abnormalities noted.  Patient is taking all medications as prescribed with no specific side effects and denies use of nonsteroid anti-inflammatory medications.              The following portions of the patient's history were reviewed and updated as appropriate: allergies, current medications, past family  history, past medical history, past social history, past surgical history and problem list.    Review of Systems   All other systems reviewed and are negative.        Social History     Socioeconomic History    Marital status:      Spouse name: None    Number of children: None    Years of education: None    Highest education level: None   Occupational History    Occupation: /    Tobacco Use    Smoking status: Never    Smokeless tobacco: Never   Vaping Use    Vaping status: Never Used   Substance and Sexual Activity    Alcohol use: No    Drug use: No    Sexual activity: Not Currently     Partners: Male   Other Topics Concern    None   Social History Narrative    Consumes on average 2 cups of decaf coffee per day      Social Drivers of Health     Financial Resource Strain: Not on file   Food Insecurity: Not on file   Transportation Needs: Not on file   Physical Activity: Not on file   Stress: Not on file   Social Connections: Not on file   Intimate Partner Violence: Not on file   Housing Stability: Not on file     Past Medical History:   Diagnosis Date    Abnormal stress test     Angina pectoris (HCC)     1 wk ago    Atherosclerosis of renal artery (HCC)     CAD (coronary artery disease)     Chronic kidney disease, stage 2 (mild)     Diabetes mellitus (HCC)     niddm    Edema     Endometriosis     History of palpitations     History of poliomyelitis     Hyperlipidemia     Hypertension     Hypertensive chronic kidney disease with stage 1 through stage 4 chronic kidney disease, or unspecified chronic kidney disease     Hypothyroid     Kidney stones     Myocardial infarction (HCC)     NSTEMI (non-ST elevated myocardial infarction) ([facility]) 01/05/2019    Obesity     Paroxysmal SVT (supraventricular tachycardia) ([facility])     Renal insufficiency     Rosacea     Thyroid nodule     Type 2 diabetes mellitus (HCC)     Wears glasses      Past Surgical History:   Procedure Laterality Date    ACHILLES TENDON  LENGTHENING Right     Achilles tendon stretch     CARDIAC CATHETERIZATION  2017    CARDIAC CATHETERIZATION  01/07/2019    Stent placed in RCA     CATARACT EXTRACTION Bilateral     COLONOSCOPY      FL INJECTION RIGHT SHOULDER (ARTHROGRAM)  11/1/2021    HYSTERECTOMY      Total - Endometriosis     ROTATOR CUFF REPAIR Right        Current Outpatient Medications:     acetaminophen (TYLENOL) 325 mg tablet, Take 2 tablets (650 mg total) by mouth every 6 (six) hours as needed (pain, fever) Taking as needed, Disp: , Rfl:     atorvastatin (LIPITOR) 40 mg tablet, Take 1 tablet (40 mg total) by mouth daily, Disp: 90 tablet, Rfl: 1    Blood Glucose Monitoring Suppl (ACCU-CHEK KAILEY CONNECT) w/Device KIT, by Does not apply route, Disp: , Rfl:     Cholecalciferol 125 MCG (5000 UT) TABS, Take 1 tablet (5,000 Units total) by mouth in the morning, Disp: , Rfl:     clopidogrel (PLAVIX) 75 mg tablet, Take 1 tablet (75 mg total) by mouth daily, Disp: 90 tablet, Rfl: 3    doxazosin (CARDURA) 8 MG tablet, Take 1 tablet (8 mg total) by mouth daily, Disp: 90 tablet, Rfl: 3    doxycycline (ADOXA) 50 MG tablet, , Disp: , Rfl:     Glucose Blood (Blood Glucose Test Strips 333) STRP, Test 1 strip in the morning, Disp: 100 strip, Rfl: 3    irbesartan (AVAPRO) 300 mg tablet, Take 1 tablet (300 mg total) by mouth daily at bedtime, Disp: 90 tablet, Rfl: 0    Lancets Misc. (ACCU-CHEK MULTICLIX LANCET DEV) KIT, by Does not apply route 2 (two) times a day, Disp: , Rfl:     levothyroxine 75 mcg tablet, Take 75 mcg by mouth daily Pt takes 50mcg in the am and 25mcg at bedtime, Disp: , Rfl:     metFORMIN (GLUCOPHAGE) 500 mg tablet, Take 500 mg by mouth 3 (three) times a day 2 tablets in the morning and one in the afternoon, Disp: , Rfl:     metoprolol tartrate (LOPRESSOR) 25 mg tablet, TAKE 1 TABLET (25 MG TOTAL) BY MOUTH EVERY 12 (TWELVE) HOURS, Disp: 180 tablet, Rfl: 1    nitroglycerin (NITROSTAT) 0.4 mg SL tablet, Place 0.4 mg under the tongue Taking as  "needed, Disp: , Rfl:     Lab Results   Component Value Date     12/08/2015    SODIUM 142 03/01/2025    K 4.1 03/01/2025     03/01/2025    CO2 27 03/01/2025    ANIONGAP 9 12/08/2015    AGAP 10 03/01/2025    BUN 19 03/01/2025    CREATININE 0.87 03/01/2025    GLUC 157 (H) 06/04/2021    GLUF 137 (H) 03/01/2025    CALCIUM 9.4 03/01/2025    AST 18 03/01/2025    ALT 14 03/01/2025    ALKPHOS 95 03/01/2025    PROT 7.0 12/08/2015    TP 6.8 03/01/2025    BILITOT 0.48 12/08/2015    TBILI 0.48 03/01/2025    EGFR 63 03/01/2025     Lab Results   Component Value Date    WBC 6.68 03/01/2025    HGB 13.4 03/01/2025    HCT 41.8 03/01/2025    MCV 96 03/01/2025     03/01/2025     Lab Results   Component Value Date    CHOLESTEROL 122 08/07/2024    CHOLESTEROL 142 02/07/2024    CHOLESTEROL 134 10/19/2022     Lab Results   Component Value Date    HDL 56 08/07/2024    HDL 50 02/07/2024    HDL 60 10/19/2022     Lab Results   Component Value Date    LDLCALC 44 08/07/2024    LDLCALC 51 02/07/2024    LDLCALC 48 10/19/2022     Lab Results   Component Value Date    TRIG 110 08/07/2024    TRIG 204 (H) 02/07/2024    TRIG 132 10/19/2022     No results found for: \"CHOLHDL\"  Lab Results   Component Value Date    BUM8FNNIDUEC 1.543 08/07/2024    TSH 2.18 10/06/2020     Lab Results   Component Value Date    PTH 48.6 03/01/2025    CALCIUM 9.4 03/01/2025    PHOS 3.2 06/14/2021     Lab Results   Component Value Date    SPEP See Comment 10/24/2023    UPEP See Comment 10/24/2023     Lab Results   Component Value Date    [id number] 12 06/20/2022           Objective:      /70 (BP Location: Left arm, Patient Position: Sitting, Cuff Size: Standard)   Pulse 58   Temp 97.9 °F (36.6 °C) (Temporal)   Ht 5' 3.5\" (1.613 m)   Wt 80.3 kg (177 lb)   SpO2 97%   BMI 30.86 kg/m²          Physical Exam  Vitals reviewed.   Constitutional:       General: She is not in acute distress.     Appearance: Normal appearance.   HENT:      Head: " Normocephalic and atraumatic.      Right Ear: External ear normal.      Left Ear: External ear normal.   Eyes:      Conjunctiva/sclera: Conjunctivae normal.   Cardiovascular:      Rate and Rhythm: Normal rate and regular rhythm.      Heart sounds: Normal heart sounds.   Pulmonary:      Effort: No respiratory distress.      Breath sounds: No wheezing.   Abdominal:      Palpations: Abdomen is soft.   Skin:     General: Skin is warm and dry.   Neurological:      General: No focal deficit present.      Mental Status: She is alert and oriented to person, place, and time.   Psychiatric:         Mood and Affect: Mood normal.         Behavior: Behavior normal.

## 2025-03-20 NOTE — PATIENT INSTRUCTIONS
"Regarding iron supplementation, I recommend getting a \"heme iron\" supplement.  Avoid elemental iron supplement due to poor absorption and GI side-effects.    Easiest place to get heme iron is through an online realtor like [employer].    1 mg of heme iron = to about 1,500 mg of elemental iron, and there is usually 65 mg of iron in most supplements.      Recommend getting a 20 mg heme iron supplement and taking 2 or 3 times a week.      "

## 2025-03-21 NOTE — ASSESSMENT & PLAN NOTE
Lab Results   Component Value Date    EGFR 63 03/01/2025    EGFR 78 08/07/2024    EGFR 82 03/04/2024    CREATININE 0.87 03/01/2025    CREATININE 0.73 08/07/2024    CREATININE 0.70 03/04/2024     Patient's kidney function is little lower than usual, but certainly within the range from the creatinine standpoint, usually between 0.7-0.8 mg/dL.  Continue to monitor for now, optimize care in general and avoid nephrotoxins.

## 2025-03-21 NOTE — ASSESSMENT & PLAN NOTE
Blood pressures are stable and appropriate at this time, we will continue current dosage of medications including metoprolol 25 mg twice daily and irbesartan 300 mg once daily.

## 2025-03-21 NOTE — ASSESSMENT & PLAN NOTE
Patient has mild bilateral lower extremity edema, recommended to continue to focus on low sodium diet which will help with the swelling as well as help reduce chances for future kidney stones.

## 2025-03-21 NOTE — ASSESSMENT & PLAN NOTE
Patient does not drink enough fluids, she will try to improve this going forward.  Thankfully, she has not had issues with kidney stones since her last visit.

## 2025-03-21 NOTE — ASSESSMENT & PLAN NOTE
A1c evaluated during this office visit, came back within goal at less than 6.5%.  Continue to encourage low carbohydrate diet.    Lab Results   Component Value Date    HGBA1C 6.4 03/20/2025

## 2025-04-12 ENCOUNTER — HOSPITAL ENCOUNTER (EMERGENCY)
Facility: HOSPITAL | Age: 80
Discharge: HOME | End: 2025-04-12
Attending: FAMILY MEDICINE
Payer: MEDICARE

## 2025-04-12 ENCOUNTER — APPOINTMENT (EMERGENCY)
Dept: CT IMAGING | Facility: HOSPITAL | Age: 80
End: 2025-04-12
Payer: MEDICARE

## 2025-04-12 ENCOUNTER — APPOINTMENT (EMERGENCY)
Dept: RADIOLOGY | Facility: HOSPITAL | Age: 80
End: 2025-04-12
Payer: MEDICARE

## 2025-04-12 VITALS
RESPIRATION RATE: 18 BRPM | SYSTOLIC BLOOD PRESSURE: 176 MMHG | WEIGHT: 175 LBS | HEIGHT: 64 IN | OXYGEN SATURATION: 98 % | BODY MASS INDEX: 29.88 KG/M2 | HEART RATE: 62 BPM | TEMPERATURE: 97.8 F | DIASTOLIC BLOOD PRESSURE: 73 MMHG

## 2025-04-12 DIAGNOSIS — R55 NEAR SYNCOPE: ICD-10-CM

## 2025-04-12 DIAGNOSIS — R03.0 ELEVATED BLOOD PRESSURE READING: ICD-10-CM

## 2025-04-12 DIAGNOSIS — R53.1 WEAKNESS: Primary | ICD-10-CM

## 2025-04-12 LAB
2HR DELTA HS TROPONIN: 1 NG/L
ANION GAP SERPL CALCULATED.3IONS-SCNC: 9 MMOL/L (ref 4–13)
BASOPHILS # BLD AUTO: 0.06 THOUSANDS/ÂΜL (ref 0–0.1)
BASOPHILS NFR BLD AUTO: 1 % (ref 0–1)
BILIRUB UR QL STRIP: NEGATIVE
BNP SERPL-MCNC: 128 PG/ML (ref 0–100)
BUN SERPL-MCNC: 22 MG/DL (ref 5–25)
CALCIUM SERPL-MCNC: 9.5 MG/DL (ref 8.4–10.2)
CARDIAC TROPONIN I PNL SERPL HS: 10 NG/L (ref ?–50)
CARDIAC TROPONIN I PNL SERPL HS: 11 NG/L (ref ?–50)
CHLORIDE SERPL-SCNC: 105 MMOL/L (ref 96–108)
CLARITY UR: CLEAR
CO2 SERPL-SCNC: 25 MMOL/L (ref 21–32)
COLOR UR: YELLOW
CREAT SERPL-MCNC: 0.82 MG/DL (ref 0.6–1.3)
EOSINOPHIL # BLD AUTO: 0.18 THOUSAND/ÂΜL (ref 0–0.61)
EOSINOPHIL NFR BLD AUTO: 2 % (ref 0–6)
ERYTHROCYTE [DISTWIDTH] IN BLOOD BY AUTOMATED COUNT: 12.2 % (ref 11.6–15.1)
GFR SERPL CREATININE-BSD FRML MDRD: 67 ML/MIN/1.73SQ M
GLUCOSE SERPL-MCNC: 154 MG/DL (ref 65–140)
GLUCOSE UR STRIP-MCNC: NEGATIVE MG/DL
HCT VFR BLD AUTO: 40.9 % (ref 34.8–46.1)
HGB BLD-MCNC: 13.2 G/DL (ref 11.5–15.4)
HGB UR QL STRIP.AUTO: NEGATIVE
IMM GRANULOCYTES # BLD AUTO: 0.03 THOUSAND/UL (ref 0–0.2)
IMM GRANULOCYTES NFR BLD AUTO: 0 % (ref 0–2)
KETONES UR STRIP-MCNC: NEGATIVE MG/DL
LEUKOCYTE ESTERASE UR QL STRIP: NEGATIVE
LYMPHOCYTES # BLD AUTO: 1.47 THOUSANDS/ÂΜL (ref 0.6–4.47)
LYMPHOCYTES NFR BLD AUTO: 17 % (ref 14–44)
MAGNESIUM SERPL-MCNC: 2 MG/DL (ref 1.9–2.7)
MCH RBC QN AUTO: 31.1 PG (ref 26.8–34.3)
MCHC RBC AUTO-ENTMCNC: 32.3 G/DL (ref 31.4–37.4)
MCV RBC AUTO: 97 FL (ref 82–98)
MONOCYTES # BLD AUTO: 0.44 THOUSAND/ÂΜL (ref 0.17–1.22)
MONOCYTES NFR BLD AUTO: 5 % (ref 4–12)
NEUTROPHILS # BLD AUTO: 6.68 THOUSANDS/ÂΜL (ref 1.85–7.62)
NEUTS SEG NFR BLD AUTO: 75 % (ref 43–75)
NITRITE UR QL STRIP: NEGATIVE
NRBC BLD AUTO-RTO: 0 /100 WBCS
PH UR STRIP.AUTO: 6 [PH]
PLATELET # BLD AUTO: 176 THOUSANDS/UL (ref 149–390)
PMV BLD AUTO: 11 FL (ref 8.9–12.7)
POTASSIUM SERPL-SCNC: 4.5 MMOL/L (ref 3.5–5.3)
PROT UR STRIP-MCNC: NEGATIVE MG/DL
RBC # BLD AUTO: 4.24 MILLION/UL (ref 3.81–5.12)
SODIUM SERPL-SCNC: 139 MMOL/L (ref 135–147)
SP GR UR STRIP.AUTO: 1.01
UROBILINOGEN UR QL STRIP.AUTO: 0.2 E.U./DL
WBC # BLD AUTO: 8.86 THOUSAND/UL (ref 4.31–10.16)

## 2025-04-12 PROCEDURE — 93005 ELECTROCARDIOGRAM TRACING: CPT

## 2025-04-12 PROCEDURE — 99285 EMERGENCY DEPT VISIT HI MDM: CPT

## 2025-04-12 PROCEDURE — 99284 EMERGENCY DEPT VISIT MOD MDM: CPT | Performed by: FAMILY MEDICINE

## 2025-04-12 PROCEDURE — 71045 X-RAY EXAM CHEST 1 VIEW: CPT

## 2025-04-12 PROCEDURE — 83880 ASSAY OF NATRIURETIC PEPTIDE: CPT | Performed by: FAMILY MEDICINE

## 2025-04-12 PROCEDURE — 70450 CT HEAD/BRAIN W/O DYE: CPT

## 2025-04-12 PROCEDURE — 36415 COLL VENOUS BLD VENIPUNCTURE: CPT | Performed by: FAMILY MEDICINE

## 2025-04-12 PROCEDURE — 84484 ASSAY OF TROPONIN QUANT: CPT | Performed by: FAMILY MEDICINE

## 2025-04-12 PROCEDURE — 81003 URINALYSIS AUTO W/O SCOPE: CPT | Performed by: FAMILY MEDICINE

## 2025-04-12 PROCEDURE — 85025 COMPLETE CBC W/AUTO DIFF WBC: CPT | Performed by: FAMILY MEDICINE

## 2025-04-12 PROCEDURE — 83735 ASSAY OF MAGNESIUM: CPT | Performed by: FAMILY MEDICINE

## 2025-04-12 PROCEDURE — 96374 THER/PROPH/DIAG INJ IV PUSH: CPT

## 2025-04-12 PROCEDURE — 80048 BASIC METABOLIC PNL TOTAL CA: CPT | Performed by: FAMILY MEDICINE

## 2025-04-12 RX ORDER — HYDRALAZINE HYDROCHLORIDE 20 MG/ML
5 INJECTION INTRAMUSCULAR; INTRAVENOUS ONCE
Status: COMPLETED | OUTPATIENT
Start: 2025-04-12 | End: 2025-04-12

## 2025-04-12 RX ADMIN — HYDRALAZINE HYDROCHLORIDE 5 MG: 20 INJECTION INTRAMUSCULAR; INTRAVENOUS at 14:24

## 2025-04-12 NOTE — ED PROVIDER NOTES
Time reflects when diagnosis was documented in both MDM as applicable and the Disposition within this note       Time User Action Codes Description Comment    4/12/2025  3:56 PM Ahmad, James Add [R53.1] Weakness     4/12/2025  3:56 PM Ahmad, James Add [R55] Near syncope     4/12/2025  3:57 PM Ahmad, James Add [R03.0] Elevated blood pressure reading           ED Disposition       ED Disposition   Discharge    Condition   Stable    Date/Time   Sat Apr 12, 2025  3:56 PM    Comment   Angie RODRIGUEZ Jaren discharge to home/self care.                   Assessment & Plan       Medical Decision Making  Amount and/or Complexity of Data Reviewed  Labs: ordered.  Radiology: ordered.    Risk  Prescription drug management.    79-year-old female presented to ED with the complaint of dizziness and weakness [place]  Patient states she only had cereal this morning and was unsure if her sugar is low.  Patient denies any headache blurry vision double vision patient that she did had an episode of dizziness and lightheadedness denies any nausea vomiting any chest pain shortness of breath.  Patient was sent to the emergency department  Differential diagnoses include vasovagal syncope/hypoglycemia/electro abnormality/arrhythmia rule out TIA stroke  Plan will obtain lab CT head chest x-ray UA  Lab reviewed within normal limits CT head within normal limits patient blood pressure was found to be elevated states he did take her medication patient is given hydralazine.  Patient blood pressure improved to 160 systolic patient is feeling much better.  Patient assessed at bedside states feeling much better patient ambulatory in the ED without any difficulty requesting for discharge.  Family at bedside also agrees.         Medications   sodium chloride 0.9 % bolus 1,000 mL ( Intravenous Canceled Entry 4/12/25 1426)   hydrALAZINE (APRESOLINE) injection 5 mg (5 mg Intravenous Given 4/12/25 1424)       ED Risk Strat Scores                     No data recorded        SBIRT 20yo+      Flowsheet Row Most Recent Value   Initial Alcohol Screen: US AUDIT-C     1. How often do you have a drink containing alcohol? 0 Filed at: 04/12/2025 1125   2. How many drinks containing alcohol do you have on a typical day you are drinking?  0 Filed at: 04/12/2025 1125   3a. Male UNDER 65: How often do you have five or more drinks on one occasion? 0 Filed at: 04/12/2025 1125   3b. FEMALE Any Age, or MALE 65+: How often do you have 4 or more drinks on one occassion? 0 Filed at: 04/12/2025 1125   Audit-C Score 0 Filed at: 04/12/2025 1125   VIKA: How many times in the past year have you...    Used an illegal drug or used a prescription medication for non-medical reasons? Never Filed at: 04/12/2025 1125                            History of Present Illness       Chief Complaint   Patient presents with    Weakness - Generalized     Pt reports being at [employer] event when had onset of dizziness & near syncopal episode; alert & oriented upon arrival; c/o of generalized weakness        Past Medical History:   Diagnosis Date    Abnormal stress test     Angina pectoris (HCC)     1 wk ago    Atherosclerosis of renal artery (HCC)     CAD (coronary artery disease)     Chronic kidney disease, stage 2 (mild)     Diabetes mellitus (HCC)     niddm    Edema     Endometriosis     History of palpitations     History of poliomyelitis     Hyperlipidemia     Hypertension     Hypertensive chronic kidney disease with stage 1 through stage 4 chronic kidney disease, or unspecified chronic kidney disease     Hypothyroid     Kidney stones     Myocardial infarction ([facility])     NSTEMI (non-ST elevated myocardial infarction) ([facility]) 01/05/2019    Obesity     Paroxysmal SVT (supraventricular tachycardia) ([facility])     Renal insufficiency     Rosacea     Thyroid nodule     Type 2 diabetes mellitus (HCC)     Wears glasses       Past Surgical History:   Procedure Laterality Date    ACHILLES TENDON  LENGTHENING Right     Achilles tendon stretch     CARDIAC CATHETERIZATION  2017    CARDIAC CATHETERIZATION  01/07/2019    Stent placed in RCA     CATARACT EXTRACTION Bilateral     COLONOSCOPY      FL INJECTION RIGHT SHOULDER (ARTHROGRAM)  11/1/2021    HYSTERECTOMY      Total - Endometriosis     ROTATOR CUFF REPAIR Right       Family History   Problem Relation Age of Onset    Asthma Mother     Stomach cancer Father     Breast cancer Sister 55    No Known Problems Daughter     No Known Problems Daughter     No Known Problems Daughter     No Known Problems Maternal Grandmother     No Known Problems Maternal Grandfather     No Known Problems Paternal Grandmother     Heart attack Paternal Grandfather     No Known Problems Maternal Aunt     No Known Problems Paternal Aunt     No Known Problems Paternal Aunt     No Known Problems Son     No Known Problems Son       Social History     Tobacco Use    Smoking status: Never    Smokeless tobacco: Never   Vaping Use    Vaping status: Never Used   Substance Use Topics    Alcohol use: No    Drug use: No      E-Cigarette/Vaping    E-Cigarette Use Never User       E-Cigarette/Vaping Substances    Nicotine No     THC No     CBD No     Flavoring No     Other No     Unknown No       I have reviewed and agree with the history as documented.     HPI    Review of Systems   Constitutional:  Negative for chills and fever.   HENT:  Negative for rhinorrhea and sore throat.    Eyes:  Negative for visual disturbance.   Respiratory:  Negative for cough and shortness of breath.    Cardiovascular:  Negative for chest pain and leg swelling.   Gastrointestinal:  Negative for abdominal pain, diarrhea, nausea and vomiting.   Genitourinary:  Negative for dysuria.   Musculoskeletal:  Negative for back pain and myalgias.   Skin:  Negative for rash.   Neurological:  Positive for weakness and light-headedness. Negative for dizziness and headaches.   Psychiatric/Behavioral:  Negative for confusion.    All  other systems reviewed and are negative.          Objective       ED Triage Vitals   Temperature Pulse Blood Pressure Respirations SpO2 Patient Position - Orthostatic VS   04/12/25 1117 04/12/25 1117 04/12/25 1117 04/12/25 1117 04/12/25 1117 04/12/25 1208   97.8 °F (36.6 °C) (!) 54 (!) 192/81 18 95 % Lying - Orthostatic VS      Temp Source Heart Rate Source BP Location FiO2 (%) Pain Score    04/12/25 1117 -- 04/12/25 1117 -- --    Oral  Left arm        Vitals      Date and Time Temp Pulse SpO2 Resp BP Pain Score FACES Pain Rating User   04/12/25 1424 -- -- -- -- 184/72 -- -- RTM   04/12/25 1357 -- 57 99 % 21 168/71 -- -- RTM   04/12/25 1350 -- 60 98 % 21 182/76 -- -- RTM   04/12/25 1212 -- 62 98 % 17 183/75 -- -- RTM   04/12/25 1209 -- 56 97 % 22 167/72 -- -- RTM   04/12/25 1208 -- 59 97 % 18 186/79 -- -- RTM   04/12/25 1200 -- -- -- 15 -- -- -- RTM   04/12/25 1117 97.8 °F (36.6 °C) 54 95 % 18 192/81 -- -- KF            Physical Exam  Vitals and nursing note reviewed.   Constitutional:       Appearance: She is well-developed.   HENT:      Head: Normocephalic and atraumatic.      Right Ear: External ear normal.      Left Ear: External ear normal.      Nose: Nose normal.      Mouth/Throat:      Mouth: Mucous membranes are moist.      Pharynx: No oropharyngeal exudate.   Eyes:      General: No scleral icterus.        Right eye: No discharge.         Left eye: No discharge.      Conjunctiva/sclera: Conjunctivae normal.      Pupils: Pupils are equal, round, and reactive to light.   Cardiovascular:      Rate and Rhythm: Normal rate and regular rhythm.      Pulses: Normal pulses.      Heart sounds: Normal heart sounds.   Pulmonary:      Effort: Pulmonary effort is normal. No respiratory distress.      Breath sounds: Normal breath sounds. No wheezing.   Abdominal:      General: Bowel sounds are normal.      Palpations: Abdomen is soft.   Musculoskeletal:         General: Normal range of motion.      Cervical back: Normal  range of motion and neck supple.   Lymphadenopathy:      Cervical: No cervical adenopathy.   Skin:     General: Skin is warm and dry.      Capillary Refill: Capillary refill takes less than 2 seconds.   Neurological:      General: No focal deficit present.      Mental Status: She is alert and oriented to person, place, and time.   Psychiatric:         Mood and Affect: Mood normal.         Behavior: Behavior normal.         Results Reviewed       Procedure Component Value Units Date/Time    HS Troponin I 2hr [[id number]]  (Normal) Collected: 04/12/25 1329    Lab Status: Final result Specimen: Blood from Hand, Right Updated: 04/12/25 1358     hs TnI 2hr 11 ng/L      Delta 2hr hsTnI 1 ng/L     UA w Reflex to Microscopic w Reflex to Culture [[id number]]  (Normal) Collected: 04/12/25 1219    Lab Status: Final result Specimen: Urine, Clean Catch Updated: 04/12/25 1232     Color, UA Yellow     Clarity, UA Clear     Specific Gravity, UA 1.010     pH, UA 6.0     Leukocytes, UA Negative     Nitrite, UA Negative     Protein, UA Negative mg/dl      Glucose, UA Negative mg/dl      Ketones, UA Negative mg/dl      Urobilinogen, UA 0.2 E.U./dl      Bilirubin, UA Negative     Occult Blood, UA Negative    B-Type Natriuretic Peptide(BNP) [[id number]]  (Abnormal) Collected: 04/12/25 1153    Lab Status: Final result Specimen: Blood from Arm, Right Updated: 04/12/25 1222      pg/mL     HS Troponin 0hr (reflex protocol) [[id number]]  (Normal) Collected: 04/12/25 1153    Lab Status: Final result Specimen: Blood from Arm, Right Updated: 04/12/25 1220     hs TnI 0hr 10 ng/L     HS Troponin I 4hr [[id number]]     Lab Status: No result Specimen: Blood     Basic metabolic panel [[id number]]  (Abnormal) Collected: 04/12/25 1153    Lab Status: Final result Specimen: Blood from Arm, Right Updated: 04/12/25 1212     Sodium 139 mmol/L      Potassium 4.5 mmol/L      Chloride 105 mmol/L      CO2 25 mmol/L      ANION GAP 9 mmol/L      BUN 22 mg/dL       Creatinine 0.82 mg/dL      Glucose 154 mg/dL      Calcium 9.5 mg/dL      eGFR 67 ml/min/1.73sq m     Narrative:      [employer] guidelines for Chronic Kidney Disease (CKD):     Stage 1 with normal or high GFR (GFR > 90 mL/min/1.73 square meters)    Stage 2 Mild CKD (GFR = 60-89 mL/min/1.73 square meters)    Stage 3A Moderate CKD (GFR = 45-59 mL/min/1.73 square meters)    Stage 3B Moderate CKD (GFR = 30-44 mL/min/1.73 square meters)    Stage 4 Severe CKD (GFR = 15-29 mL/min/1.73 square meters)    Stage 5 End Stage CKD (GFR <15 mL/min/1.73 square meters)  Note: GFR calculation is accurate only with a steady state creatinine    Magnesium [[id number]]  (Normal) Collected: 04/12/25 1153    Lab Status: Final result Specimen: Blood from Arm, Right Updated: 04/12/25 1212     Magnesium 2.0 mg/dL     CBC and differential [[id number]] Collected: 04/12/25 1153    Lab Status: Final result Specimen: Blood from Arm, Right Updated: 04/12/25 1200     WBC 8.86 Thousand/uL      RBC 4.24 Million/uL      Hemoglobin 13.2 g/dL      Hematocrit 40.9 %      MCV 97 fL      MCH 31.1 pg      MCHC 32.3 g/dL      RDW 12.2 %      MPV 11.0 fL      Platelets 176 Thousands/uL      nRBC 0 /100 WBCs      Segmented % 75 %      Immature Grans % 0 %      Lymphocytes % 17 %      Monocytes % 5 %      Eosinophils Relative 2 %      Basophils Relative 1 %      Absolute Neutrophils 6.68 Thousands/µL      Absolute Immature Grans 0.03 Thousand/uL      Absolute Lymphocytes 1.47 Thousands/µL      Absolute Monocytes 0.44 Thousand/µL      Eosinophils Absolute 0.18 Thousand/µL      Basophils Absolute 0.06 Thousands/µL             CT head wo contrast   Final Interpretation by Johnson Schumacher MD (04/12 5585)      No acute intracranial abnormality. Minimal chronic microangiopathic changes.                  Resident: WILFREDO MURILLO I, the attending radiologist, have reviewed the images and agree with the final report above.      Workstation  performed: [id number]         XR chest 1 view portable    (Results Pending)       Procedures    ED Medication and Procedure Management   Prior to Admission Medications   Prescriptions Last Dose Informant Patient Reported? Taking?   Blood Glucose Monitoring Suppl (ACCU-CHEK KAILEY CONNECT) w/Device KIT  Self Yes No   Sig: by Does not apply route   Cholecalciferol 125 MCG (5000 UT) TABS  Self No No   Sig: Take 1 tablet (5,000 Units total) by mouth in the morning   Glucose Blood (Blood Glucose Test Strips 333) STRP   No No   Sig: Test 1 strip in the morning   Lancets Misc. (ACCU-CHEK MULTICLIX LANCET DEV) KIT  Self Yes No   Sig: by Does not apply route 2 (two) times a day   acetaminophen (TYLENOL) 325 mg tablet   No No   Sig: Take 2 tablets (650 mg total) by mouth every 6 (six) hours as needed (pain, fever) Taking as needed   atorvastatin (LIPITOR) 40 mg tablet   No No   Sig: Take 1 tablet (40 mg total) by mouth daily   clopidogrel (PLAVIX) 75 mg tablet   No No   Sig: Take 1 tablet (75 mg total) by mouth daily   doxazosin (CARDURA) 8 MG tablet   No No   Sig: Take 1 tablet (8 mg total) by mouth daily   doxycycline (ADOXA) 50 MG tablet   Yes No   irbesartan (AVAPRO) 300 mg tablet   No No   Sig: Take 1 tablet (300 mg total) by mouth daily at bedtime   levothyroxine 75 mcg tablet   Yes No   Sig: Take 75 mcg by mouth daily Pt takes 50mcg in the am and 25mcg at bedtime   metFORMIN (GLUCOPHAGE) 500 mg tablet  Self Yes No   Sig: Take 500 mg by mouth 3 (three) times a day 2 tablets in the morning and one in the afternoon   metoprolol tartrate (LOPRESSOR) 25 mg tablet   No No   Sig: TAKE 1 TABLET (25 MG TOTAL) BY MOUTH EVERY 12 (TWELVE) HOURS   nitroglycerin (NITROSTAT) 0.4 mg SL tablet  Self Yes No   Sig: Place 0.4 mg under the tongue Taking as needed      Facility-Administered Medications: None     Current Discharge Medication List        CONTINUE these medications which have NOT CHANGED    Details   acetaminophen (TYLENOL)  325 mg tablet Take 2 tablets (650 mg total) by mouth every 6 (six) hours as needed (pain, fever) Taking as needed    Comments: May substitute with therapeutic equivalent covered by insurance  Associated Diagnoses: Sprain of left foot, initial encounter      atorvastatin (LIPITOR) 40 mg tablet Take 1 tablet (40 mg total) by mouth daily  Qty: 90 tablet, Refills: 1    Associated Diagnoses: NSTEMI (non-ST elevated myocardial infarction) ([facility])      Blood Glucose Monitoring Suppl (ACCU-CHEK KAILEY CONNECT) w/Device KIT by Does not apply route      Cholecalciferol 125 MCG (5000 UT) TABS Take 1 tablet (5,000 Units total) by mouth in the morning    Associated Diagnoses: Vitamin D deficiency      clopidogrel (PLAVIX) 75 mg tablet Take 1 tablet (75 mg total) by mouth daily  Qty: 90 tablet, Refills: 3    Associated Diagnoses: NSTEMI (non-ST elevated myocardial infarction) ([facility])      doxazosin (CARDURA) 8 MG tablet Take 1 tablet (8 mg total) by mouth daily  Qty: 90 tablet, Refills: 3    Associated Diagnoses: Essential hypertension      doxycycline (ADOXA) 50 MG tablet       Glucose Blood (Blood Glucose Test Strips 333) STRP Test 1 strip in the morning  Qty: 100 strip, Refills: 3    Comments: True matrix test strips please  Associated Diagnoses: Type 2 diabetes mellitus without complication, without long-term current use of insulin ([facility])      irbesartan (AVAPRO) 300 mg tablet Take 1 tablet (300 mg total) by mouth daily at bedtime  Qty: 90 tablet, Refills: 0    Associated Diagnoses: Primary hypertension      Lancets Misc. (ACCU-CHEK MULTICLIX LANCET DEV) KIT by Does not apply route 2 (two) times a day      levothyroxine 75 mcg tablet Take 75 mcg by mouth daily Pt takes 50mcg in the am and 25mcg at bedtime      metFORMIN (GLUCOPHAGE) 500 mg tablet Take 500 mg by mouth 3 (three) times a day 2 tablets in the morning and one in the afternoon      metoprolol tartrate (LOPRESSOR) 25 mg tablet TAKE 1 TABLET (25 MG TOTAL) BY MOUTH EVERY 12  (TWELVE) HOURS  Qty: 180 tablet, Refills: 1    Associated Diagnoses: Paroxysmal SVT (supraventricular tachycardia) (HCC)      nitroglycerin (NITROSTAT) 0.4 mg SL tablet Place 0.4 mg under the tongue Taking as needed           No discharge procedures on file.  ED SEPSIS DOCUMENTATION   Time reflects when diagnosis was documented in both MDM as applicable and the Disposition within this note       Time User Action Codes Description Comment    4/12/2025  3:56 PM James Witt Add [R53.1] Weakness     4/12/2025  3:56 PM James Witt Add [R55] Near syncope     4/12/2025  3:57 PM James Witt Add [R03.0] Elevated blood pressure reading                  James Witt MD  04/12/25 5524

## 2025-04-15 LAB
ATRIAL RATE: 52 BPM
P AXIS: 73 DEGREES
PR INTERVAL: 222 MS
QRS AXIS: 86 DEGREES
QRSD INTERVAL: 92 MS
QT INTERVAL: 476 MS
QTC INTERVAL: 442 MS
T WAVE AXIS: 58 DEGREES
VENTRICULAR RATE: 52 BPM

## 2025-04-15 PROCEDURE — 93010 ELECTROCARDIOGRAM REPORT: CPT | Performed by: INTERNAL MEDICINE

## 2025-04-16 ENCOUNTER — APPOINTMENT (EMERGENCY)
Dept: RADIOLOGY | Facility: HOSPITAL | Age: 80
End: 2025-04-16
Payer: MEDICARE

## 2025-04-16 ENCOUNTER — HOSPITAL ENCOUNTER (EMERGENCY)
Facility: HOSPITAL | Age: 80
Discharge: HOME | End: 2025-04-16
Attending: EMERGENCY MEDICINE
Payer: MEDICARE

## 2025-04-16 VITALS
SYSTOLIC BLOOD PRESSURE: 176 MMHG | BODY MASS INDEX: 30.22 KG/M2 | HEART RATE: 54 BPM | WEIGHT: 177 LBS | OXYGEN SATURATION: 97 % | TEMPERATURE: 97.6 F | RESPIRATION RATE: 16 BRPM | HEIGHT: 64 IN | DIASTOLIC BLOOD PRESSURE: 70 MMHG

## 2025-04-16 DIAGNOSIS — S99.921A INJURY OF RIGHT FOOT, INITIAL ENCOUNTER: Primary | ICD-10-CM

## 2025-04-16 LAB
ATRIAL RATE: 55 BPM
P AXIS: 83 DEGREES
PR INTERVAL: 226 MS
QRS AXIS: 82 DEGREES
QRSD INTERVAL: 94 MS
QT INTERVAL: 454 MS
QTC INTERVAL: 434 MS
T WAVE AXIS: 54 DEGREES
VENTRICULAR RATE: 55 BPM

## 2025-04-16 PROCEDURE — 73630 X-RAY EXAM OF FOOT: CPT

## 2025-04-16 PROCEDURE — 99283 EMERGENCY DEPT VISIT LOW MDM: CPT

## 2025-04-16 PROCEDURE — 99284 EMERGENCY DEPT VISIT MOD MDM: CPT | Performed by: EMERGENCY MEDICINE

## 2025-04-16 PROCEDURE — 93010 ELECTROCARDIOGRAM REPORT: CPT | Performed by: INTERNAL MEDICINE

## 2025-04-16 RX ORDER — OXYCODONE HYDROCHLORIDE 5 MG/1
5 TABLET ORAL ONCE
Refills: 0 | Status: COMPLETED | OUTPATIENT
Start: 2025-04-16 | End: 2025-04-16

## 2025-04-16 RX ORDER — OXYCODONE HYDROCHLORIDE 5 MG/1
5 TABLET ORAL EVERY 4 HOURS PRN
Qty: 12 TABLET | Refills: 0 | Status: SHIPPED | OUTPATIENT
Start: 2025-04-16

## 2025-04-16 RX ADMIN — OXYCODONE HYDROCHLORIDE 5 MG: 5 TABLET ORAL at 05:45

## 2025-04-16 NOTE — ED PROVIDER NOTES
Time reflects when diagnosis was documented in both MDM as applicable and the Disposition within this note       Time User Action Codes Description Comment    4/16/2025  5:53 AM Duong Moore Add [S99.921A] Injury of right foot, initial encounter           ED Disposition       ED Disposition   Discharge    Condition   Stable    Date/Time   Wed Apr 16, 2025  5:53 AM    Comment   Angie RODRIGUEZ Jaren discharge to home/self care.                   Assessment & Plan       Medical Decision Making  This patient presented to emergency department for injury to his extremity.  Differential diagnosis includes but not limited to: Sprain, strain, fracture, dislocation, subluxation, soft tissue contusion/hematoma.       Problems Addressed:  Injury of right foot, initial encounter: acute illness or injury    Amount and/or Complexity of Data Reviewed  Radiology: ordered.  Discussion of management or test interpretation with external provider(s): Xray likely non displaced fracture    Risk  Prescription drug management.             Medications   oxyCODONE (ROXICODONE) IR tablet 5 mg (5 mg Oral Given 4/16/25 0545)       ED Risk Strat Scores                    No data recorded        SBIRT 20yo+      Flowsheet Row Most Recent Value   Initial Alcohol Screen: US AUDIT-C     1. How often do you have a drink containing alcohol? 0 Filed at: 04/16/2025 0528   2. How many drinks containing alcohol do you have on a typical day you are drinking?  0 Filed at: 04/16/2025 0528   3b. FEMALE Any Age, or MALE 65+: How often do you have 4 or more drinks on one occassion? 0 Filed at: 04/16/2025 0528   Audit-C Score 0 Filed at: 04/16/2025 0528   VIKA: How many times in the past year have you...    Used an illegal drug or used a prescription medication for non-medical reasons? Never Filed at: 04/16/2025 0528                            History of Present Illness       Chief Complaint   Patient presents with    Foot Injury     Right foot pain after losing balance  yesterday and falling. No head strike, no other injury.       Past Medical History:   Diagnosis Date    Abnormal stress test     Angina pectoris (Formerly [facility])     1 wk ago    Atherosclerosis of renal artery (Formerly [facility])     CAD (coronary artery disease)     Chronic kidney disease, stage 2 (mild)     Diabetes mellitus (HCC)     niddm    Edema     Endometriosis     History of palpitations     History of poliomyelitis     Hyperlipidemia     Hypertension     Hypertensive chronic kidney disease with stage 1 through stage 4 chronic kidney disease, or unspecified chronic kidney disease     Hypothyroid     Kidney stones     Myocardial infarction (Formerly [facility])     NSTEMI (non-ST elevated myocardial infarction) (Formerly [facility]) 01/05/2019    Obesity     Paroxysmal SVT (supraventricular tachycardia) (Formerly [facility])     Renal insufficiency     Rosacea     Thyroid nodule     Type 2 diabetes mellitus ([facility]ly [facility])     Wears glasses       Past Surgical History:   Procedure Laterality Date    ACHILLES TENDON LENGTHENING Right     Achilles tendon stretch     CARDIAC CATHETERIZATION  2017    CARDIAC CATHETERIZATION  01/07/2019    Stent placed in RCA     CATARACT EXTRACTION Bilateral     COLONOSCOPY      FL INJECTION RIGHT SHOULDER (ARTHROGRAM)  11/1/2021    HYSTERECTOMY      Total - Endometriosis     ROTATOR CUFF REPAIR Right       Family History   Problem Relation Age of Onset    Asthma Mother     Stomach cancer Father     Breast cancer Sister 55    No Known Problems Daughter     No Known Problems Daughter     No Known Problems Daughter     No Known Problems Maternal Grandmother     No Known Problems Maternal Grandfather     No Known Problems Paternal Grandmother     Heart attack Paternal Grandfather     No Known Problems Maternal Aunt     No Known Problems Paternal Aunt     No Known Problems Paternal Aunt     No Known Problems Son     No Known Problems Son       Social History     Tobacco Use    Smoking status: Never    Smokeless tobacco: Never   Vaping Use    Vaping status: Never  Used   Substance Use Topics    Alcohol use: No    Drug use: No      E-Cigarette/Vaping    E-Cigarette Use Never User       E-Cigarette/Vaping Substances    Nicotine No     THC No     CBD No     Flavoring No     Other No     Unknown No       I have reviewed and agree with the history as documented.     Angie Eastman is a 79 y.o.  year old female  Past Medical History:  No date: Abnormal stress test  No date: Angina pectoris ([facility])      Comment:  1 wk ago  No date: Atherosclerosis of renal artery ([facility])  No date: CAD (coronary artery disease)  No date: Chronic kidney disease, stage 2 (mild)  No date: Diabetes mellitus ([facility])      Comment:  niddm  No date: Edema  No date: Endometriosis  No date: History of palpitations  No date: History of poliomyelitis  No date: Hyperlipidemia  No date: Hypertension  No date: Hypertensive chronic kidney disease with stage 1 through   stage 4 chronic kidney disease, or unspecified chronic kidney disease  No date: Hypothyroid  No date: Kidney stones  No date: Myocardial infarction ([facility])  01/05/2019: NSTEMI (non-ST elevated myocardial infarction) ([facility])  No date: Obesity  No date: Paroxysmal SVT (supraventricular tachycardia) ([facility])  No date: Renal insufficiency  No date: Rosacea  No date: Thyroid nodule  No date: Type 2 diabetes mellitus ([facility])  No date: Wears glasses  Social History    Tobacco Use      Smoking status: Never      Smokeless tobacco: Never    Vaping Use      Vaping status: Never Used    Alcohol use: No    Drug use: No    Patient presents with:  Foot Injury: Right foot pain after losing balance yesterday and falling.   No head strike, no other injury.  Has moderate to severe pain to R foot, swelling, discolored  Has appointment later today with PMD for evaluation                    History provided by:  Patient   used: No        Review of Systems   Constitutional:  Negative for chills and fever.   HENT:  Negative for ear pain and sore throat.    Eyes:   Negative for pain and visual disturbance.   Respiratory:  Negative for cough and shortness of breath.    Cardiovascular:  Negative for chest pain and palpitations.   Gastrointestinal:  Negative for abdominal pain and vomiting.   Genitourinary:  Negative for dysuria and hematuria.   Musculoskeletal:  Positive for arthralgias and gait problem. Negative for back pain.   Skin:  Negative for color change and rash.   Neurological:  Negative for seizures and syncope.   All other systems reviewed and are negative.          Objective       ED Triage Vitals   Temperature Pulse Blood Pressure Respirations SpO2 Patient Position - Orthostatic VS   04/16/25 0526 04/16/25 0526 04/16/25 0526 04/16/25 0526 04/16/25 0526 04/16/25 0526   97.8 °F (36.6 °C) 72 (!) 200/81 16 98 % Lying      Temp Source Heart Rate Source BP Location FiO2 (%) Pain Score    04/16/25 0526 04/16/25 0526 04/16/25 0526 -- 04/16/25 0525    Temporal Monitor Left arm  3      Vitals      Date and Time Temp Pulse SpO2 Resp BP Pain Score FACES Pain Rating User   04/16/25 0600 97.6 °F (36.4 °C) 54 97 % 16 176/70 -- -- JR   04/16/25 0545 -- -- -- -- -- 6 -- JR   04/16/25 0526 97.8 °F (36.6 °C) 72 98 % 16 200/81 -- -- TG   04/16/25 0525 -- -- -- -- -- 3 -- TG            Physical Exam  Vitals and nursing note reviewed.   Constitutional:       General: She is not in acute distress.     Appearance: Normal appearance. She is well-developed.   HENT:      Head: Normocephalic and atraumatic.      Right Ear: External ear normal.      Left Ear: External ear normal.   Eyes:      Conjunctiva/sclera: Conjunctivae normal.   Cardiovascular:      Rate and Rhythm: Normal rate.      Heart sounds: No murmur heard.  Pulmonary:      Effort: Pulmonary effort is normal. No respiratory distress.   Abdominal:      Palpations: Abdomen is soft.      Tenderness: There is no abdominal tenderness.   Musculoskeletal:         General: Swelling, tenderness and signs of injury present. No deformity.       Cervical back: Neck supple.   Skin:     General: Skin is warm and dry.      Capillary Refill: Capillary refill takes less than 2 seconds.   Neurological:      General: No focal deficit present.      Mental Status: She is alert and oriented to person, place, and time.   Psychiatric:         Mood and Affect: Mood normal.         Results Reviewed       None            XR foot 3+ views RIGHT    (Results Pending)       Procedures    ED Medication and Procedure Management   Prior to Admission Medications   Prescriptions Last Dose Informant Patient Reported? Taking?   Blood Glucose Monitoring Suppl (ACCU-CHEK KAILEY CONNECT) w/Device KIT  Self Yes No   Sig: by Does not apply route   Cholecalciferol 125 MCG (5000 UT) TABS  Self No No   Sig: Take 1 tablet (5,000 Units total) by mouth in the morning   Glucose Blood (Blood Glucose Test Strips 333) STRP   No No   Sig: Test 1 strip in the morning   Lancets Misc. (ACCU-CHEK MULTICLIX LANCET DEV) KIT  Self Yes No   Sig: by Does not apply route 2 (two) times a day   acetaminophen (TYLENOL) 325 mg tablet   No No   Sig: Take 2 tablets (650 mg total) by mouth every 6 (six) hours as needed (pain, fever) Taking as needed   atorvastatin (LIPITOR) 40 mg tablet   No No   Sig: Take 1 tablet (40 mg total) by mouth daily   clopidogrel (PLAVIX) 75 mg tablet   No No   Sig: Take 1 tablet (75 mg total) by mouth daily   doxazosin (CARDURA) 8 MG tablet   No No   Sig: Take 1 tablet (8 mg total) by mouth daily   doxycycline (ADOXA) 50 MG tablet   Yes No   irbesartan (AVAPRO) 300 mg tablet   No No   Sig: Take 1 tablet (300 mg total) by mouth daily at bedtime   levothyroxine 75 mcg tablet   Yes No   Sig: Take 75 mcg by mouth daily Pt takes 50mcg in the am and 25mcg at bedtime   metFORMIN (GLUCOPHAGE) 500 mg tablet  Self Yes No   Sig: Take 500 mg by mouth 3 (three) times a day 2 tablets in the morning and one in the afternoon   metoprolol tartrate (LOPRESSOR) 25 mg tablet   No No   Sig: TAKE 1 TABLET  (25 MG TOTAL) BY MOUTH EVERY 12 (TWELVE) HOURS   nitroglycerin (NITROSTAT) 0.4 mg SL tablet  Self Yes No   Sig: Place 0.4 mg under the tongue Taking as needed      Facility-Administered Medications: None     Patient's Medications   Discharge Prescriptions    OXYCODONE (ROXICODONE) 5 IMMEDIATE RELEASE TABLET    Take 1 tablet (5 mg total) by mouth every 4 (four) hours as needed for moderate pain or severe pain for up to 12 doses Max Daily Amount: 30 mg       Start Date: 4/16/2025 End Date: --       Order Dose: 5 mg       Quantity: 12 tablet    Refills: 0     No discharge procedures on file.  ED SEPSIS DOCUMENTATION   Time reflects when diagnosis was documented in both MDM as applicable and the Disposition within this note       Time User Action Codes Description Comment    4/16/2025  5:53 AM Duong Moore Add [S99.921A] Injury of right foot, initial encounter                  Duong Moore MD  04/16/25 0609

## 2025-04-16 NOTE — DISCHARGE INSTRUCTIONS
A  personal message from Dr. Duong Moore,  Thank you so much for allowing me to care for you today.    I pride myself in the care and attention I give all my patients.  I hope you were a witness to this tonight.   If for any reason your condition does not improve or worsens, or you have a question that was not answered during your visit you can feel free to text me on my personal phone #  # [phone].   I will answer to your message and continue your care past your emergency room visit.     Please understand that although you are being discharged because your condition has been deemed stable and able to be managed on an outpatient setting. However your condition may worsen as part of the natural progression of the illness/condition, if this occurs please come back to the emergency department for a repeat evaluation.

## 2025-04-22 ENCOUNTER — RESULTS FOLLOW-UP (OUTPATIENT)
Dept: EMERGENCY DEPT | Facility: HOSPITAL | Age: 80
End: 2025-04-22

## 2025-04-22 DIAGNOSIS — S92.901A CLOSED FRACTURE OF RIGHT FOOT, INITIAL ENCOUNTER: Primary | ICD-10-CM

## 2025-05-01 ENCOUNTER — APPOINTMENT (OUTPATIENT)
Age: 80
End: 2025-05-01
Payer: MEDICARE

## 2025-05-01 ENCOUNTER — OFFICE VISIT (OUTPATIENT)
Age: 80
End: 2025-05-01
Attending: PHYSICIAN ASSISTANT
Payer: MEDICARE

## 2025-05-01 VITALS — WEIGHT: 177 LBS | BODY MASS INDEX: 30.22 KG/M2 | HEIGHT: 64 IN

## 2025-05-01 DIAGNOSIS — I51.9 MYXEDEMA HEART DISEASE: ICD-10-CM

## 2025-05-01 DIAGNOSIS — E03.9 MYXEDEMA HEART DISEASE: ICD-10-CM

## 2025-05-01 DIAGNOSIS — S92.514A CLOSED NONDISPLACED FRACTURE OF PROXIMAL PHALANX OF LESSER TOE OF RIGHT FOOT, INITIAL ENCOUNTER: ICD-10-CM

## 2025-05-01 LAB
T3FREE SERPL-MCNC: 2.62 PG/ML (ref 2.5–3.9)
T4 FREE SERPL-MCNC: 1.07 NG/DL (ref 0.61–1.12)
TSH SERPL DL<=0.05 MIU/L-ACNC: 2.35 UIU/ML (ref 0.45–4.5)

## 2025-05-01 PROCEDURE — 84432 ASSAY OF THYROGLOBULIN: CPT

## 2025-05-01 PROCEDURE — 84439 ASSAY OF FREE THYROXINE: CPT

## 2025-05-01 PROCEDURE — 99203 OFFICE O/P NEW LOW 30 MIN: CPT | Performed by: PODIATRIST

## 2025-05-01 PROCEDURE — 86800 THYROGLOBULIN ANTIBODY: CPT

## 2025-05-01 PROCEDURE — 84443 ASSAY THYROID STIM HORMONE: CPT

## 2025-05-01 PROCEDURE — 36415 COLL VENOUS BLD VENIPUNCTURE: CPT

## 2025-05-01 PROCEDURE — 84481 FREE ASSAY (FT-3): CPT

## 2025-05-01 PROCEDURE — 84482 T3 REVERSE: CPT

## 2025-05-01 PROCEDURE — 86376 MICROSOMAL ANTIBODY EACH: CPT

## 2025-05-01 NOTE — PROGRESS NOTES
"Name: Angie Eastman      : 1945      MRN: [id number]  Encounter Provider: Aurelio Cage DPM  Encounter Date: 2025   Encounter department: [facility] PODIATRY [facility] [address]  :  Assessment & Plan  Closed nondisplaced fracture of proximal phalanx of lesser toe of right foot, initial encounter    Orders:    Ambulatory Referral to Podiatry    - XR review reviewed and shows a very small fracture along the shaft of the right fourth digit but I also see an abnormality along the base of the right fifth toe and in the seems to be transverse fracture  - She is having some substantial pain here I advised rest ice compression elevation and use of anti-inflammatories  - She is to return at 9 weeks postop for repeat x-ray evaluation and if she is having delayed union we can discuss potentially getting a bone stimulator  - I advised vitamin D and calcium supplementation she is to return at 9 weeks post injury for repeat assessment continue surgical shoe      History of Present Illness   HPI  Angie Eastman is a 79 y.o. female who presents evaluation and management of her right foot. Ambulating in surgical shoe. Hit the 5th toe. Was told that she has a hairline fracture, injury was 3 weeks ago.           Review of Systems   Constitutional:  Negative for chills and fever.   HENT:  Negative for ear pain and sore throat.    Eyes:  Negative for pain and visual disturbance.   Respiratory:  Negative for cough and shortness of breath.    Cardiovascular:  Negative for chest pain and palpitations.   Gastrointestinal:  Negative for abdominal pain and vomiting.   Genitourinary:  Negative for dysuria and hematuria.   Musculoskeletal:  Negative for arthralgias and back pain.   Skin:  Negative for color change and rash.   Neurological:  Negative for seizures and syncope.   All other systems reviewed and are negative.         Objective   Ht 5' 4\" (1.626 m)   Wt 80.3 kg (177 lb)   BMI 30.38 kg/m²      Physical " Exam  Constitutional:       Appearance: Normal appearance.   HENT:      Head: Normocephalic and atraumatic.      Nose: Nose normal.      Mouth/Throat:      Mouth: Mucous membranes are moist.   Eyes:      Pupils: Pupils are equal, round, and reactive to light.   Pulmonary:      Effort: Pulmonary effort is normal.   Musculoskeletal:         General: Swelling, tenderness and signs of injury present.      Comments: TTP along the 4,5 digits. TTP aty the 5th digit at the base and also the shaft of the R 4th toe.     ROM< is limited and intact.    Skin:     Capillary Refill: Capillary refill takes 2 to 3 seconds.   Neurological:      General: No focal deficit present.      Mental Status: She is alert and oriented to person, place, and time. Mental status is at baseline.

## 2025-05-03 LAB
THYROGLOB AB SERPL-ACNC: <1 IU/ML (ref 0–0.9)
THYROGLOB SERPL-MCNC: 6.9 NG/ML (ref 1.5–38.5)
THYROPEROXIDASE AB SERPL-ACNC: 11 IU/ML (ref 0–34)

## 2025-05-08 LAB — T3REVERSE SERPL-MCNC: 36.2 NG/DL (ref 9.2–24.1)

## 2025-05-23 DIAGNOSIS — I10 PRIMARY HYPERTENSION: ICD-10-CM

## 2025-05-27 RX ORDER — IRBESARTAN 300 MG/1
300 TABLET ORAL
Qty: 90 TABLET | Refills: 1 | Status: SHIPPED | OUTPATIENT
Start: 2025-05-27

## 2025-06-17 ENCOUNTER — OFFICE VISIT (OUTPATIENT)
Age: 80
End: 2025-06-17
Payer: MEDICARE

## 2025-06-17 VITALS — BODY MASS INDEX: 30.22 KG/M2 | HEIGHT: 64 IN | WEIGHT: 177 LBS

## 2025-06-17 DIAGNOSIS — S92.514A CLOSED NONDISPLACED FRACTURE OF PROXIMAL PHALANX OF LESSER TOE OF RIGHT FOOT, INITIAL ENCOUNTER: Primary | ICD-10-CM

## 2025-06-17 DIAGNOSIS — I87.2 VENOUS INSUFFICIENCY: ICD-10-CM

## 2025-06-17 PROCEDURE — 99213 OFFICE O/P EST LOW 20 MIN: CPT | Performed by: PODIATRIST

## 2025-06-17 NOTE — PROGRESS NOTES
Name: Angie Eastman      : 1945      MRN: [id number]  Encounter Provider: Aurelio Cage DPM  Encounter Date: 2025   Encounter department: [facility] PODIATRY [facility] [address]  :  Assessment & Plan  Closed nondisplaced fracture of proximal phalanx of lesser toe of right foot, initial encounter         Venous insufficiency         - She is a risk category of 0/3  - healed toe fracture  - She is doing very good with her overall toe fracture, I am going to avoid repeat radiation here, she is having no symptoms, no pain  - She does not quite for any diabetic offloading shoes risk category 0 out of 3, return as needed for continued care.    History of Present Illness   HPI  Angie Eastman is a 79 y.o. female who presents evaluation management of her right foot, she is also wondering if she qualifies for diabetic shoes history of polio.  Has some gait abnormalities on the right.  And also notes asymmetric left lower extremity swelling that has been going on for as long as she can remember.  No acute abnormalities, denies any toe pain.  States that her foot has absolutely no pain no swelling no issues      Review of Systems   Constitutional:  Negative for chills and fever.   HENT:  Negative for ear pain and sore throat.    Eyes:  Negative for pain and visual disturbance.   Respiratory:  Negative for cough and shortness of breath.    Cardiovascular:  Negative for chest pain and palpitations.   Gastrointestinal:  Negative for abdominal pain and vomiting.   Genitourinary:  Negative for dysuria and hematuria.   Musculoskeletal:  Negative for arthralgias and back pain.   Skin:  Negative for color change and rash.   Neurological:  Negative for seizures and syncope.   All other systems reviewed and are negative.         Objective   There were no vitals taken for this visit.     Physical Exam  Constitutional:       Appearance: Normal appearance.   HENT:      Head: Normocephalic and atraumatic.       Nose: Nose normal.      Mouth/Throat:      Mouth: Mucous membranes are moist.      Pharynx: Oropharynx is clear.     Eyes:      Pupils: Pupils are equal, round, and reactive to light.     Pulmonary:      Effort: Pulmonary effort is normal.   Abdominal:      General: Abdomen is flat.     Musculoskeletal:      Comments: Asymmetrical lower extremity edema, there is pitting edema bilateral lower extremities, around +2 on the left, +1 on the right.  DP PT pulses palpable plus 1 out of 4, positive varicosities, skin is shiny and atrophic.  Range of motion intact, no pain on palpation right fifth toe.  No loss of protective sensation.     Skin:     Capillary Refill: Capillary refill takes 2 to 3 seconds.     Neurological:      General: No focal deficit present.      Mental Status: She is alert and oriented to person, place, and time. Mental status is at baseline.

## 2025-06-18 DIAGNOSIS — I47.10 PAROXYSMAL SVT (SUPRAVENTRICULAR TACHYCARDIA) (HCC): ICD-10-CM

## 2025-06-18 RX ORDER — METOPROLOL TARTRATE 25 MG/1
25 TABLET, FILM COATED ORAL EVERY 12 HOURS SCHEDULED
Qty: 180 TABLET | Refills: 1 | Status: SHIPPED | OUTPATIENT
Start: 2025-06-18

## 2025-06-18 NOTE — TELEPHONE ENCOUNTER
Patient informed there are refills remaining on current prescription. Patient states there are no refills at the pharmacy and is requesting prescription to be refilled.     Reason for call:   [x] Refill   [] Prior Auth  [] Other:     Office:   [] PCP/Provider -   [x] Specialty/Provider - CARDIO ASSADELSO [facility]  Authorized By: Deon Archibald DO      Medication: metoprolol tartrate (LOPRESSOR) 25 mg tablet    Dose/Frequency: TAKE 1 TABLET (25 MG TOTAL) BY MOUTH EVERY 12 (TWELVE) HOURS,    Quantity: 180    Pharmacy: [id number] - [place], [place] - [address] [place] [address] Pharmacy   Does the patient have enough for 3 days?   [x] Yes   [] No - Send as HP to POD    Mail Away Pharmacy   Does the patient have enough for 10 days?   [] Yes   [] No - Send as HP to POD